# Patient Record
Sex: MALE | Race: WHITE | Employment: OTHER | URBAN - METROPOLITAN AREA
[De-identification: names, ages, dates, MRNs, and addresses within clinical notes are randomized per-mention and may not be internally consistent; named-entity substitution may affect disease eponyms.]

---

## 2017-12-11 ENCOUNTER — HOSPITAL ENCOUNTER (OUTPATIENT)
Age: 72
Discharge: HOME | End: 2017-12-11
Attending: FAMILY MEDICINE
Payer: MEDICARE

## 2017-12-11 VITALS
TEMPERATURE: 98 F | OXYGEN SATURATION: 98 % | SYSTOLIC BLOOD PRESSURE: 138 MMHG | HEART RATE: 75 BPM | DIASTOLIC BLOOD PRESSURE: 81 MMHG | RESPIRATION RATE: 19 BRPM

## 2017-12-11 DIAGNOSIS — L03.317 CELLULITIS OF BUTTOCK: ICD-10-CM

## 2017-12-11 DIAGNOSIS — L89.311 DECUBITUS ULCER OF RIGHT BUTTOCK, STAGE 1: Primary | ICD-10-CM

## 2017-12-11 PROCEDURE — 99202 OFFICE O/P NEW SF 15 MIN: CPT

## 2017-12-11 PROCEDURE — 99213 OFFICE O/P EST LOW 20 MIN: CPT

## 2017-12-11 RX ORDER — CEPHALEXIN 500 MG/1
500 CAPSULE ORAL 4 TIMES DAILY
Qty: 28 CAPSULE | Refills: 0 | Status: SHIPPED | OUTPATIENT
Start: 2017-12-11 | End: 2017-12-18

## 2020-01-22 ENCOUNTER — HOSPITAL ENCOUNTER (OUTPATIENT)
Age: 75
Discharge: HOME | End: 2020-01-22
Attending: FAMILY MEDICINE
Payer: MEDICARE

## 2020-01-22 VITALS
HEIGHT: 69 IN | DIASTOLIC BLOOD PRESSURE: 89 MMHG | SYSTOLIC BLOOD PRESSURE: 155 MMHG | WEIGHT: 180 LBS | BODY MASS INDEX: 26.66 KG/M2 | RESPIRATION RATE: 16 BRPM | TEMPERATURE: 98 F | HEART RATE: 79 BPM

## 2020-01-22 DIAGNOSIS — H61.23 BILATERAL IMPACTED CERUMEN: Primary | ICD-10-CM

## 2020-01-22 PROCEDURE — 69210 REMOVE IMPACTED EAR WAX UNI: CPT

## 2020-01-22 PROCEDURE — 99213 OFFICE O/P EST LOW 20 MIN: CPT

## 2020-01-22 PROCEDURE — 99212 OFFICE O/P EST SF 10 MIN: CPT

## 2020-01-22 NOTE — ED PROVIDER NOTES
Patient Seen in: [facility]      History   Patient presents with:  Cough/URI  Ear Problem Pain    Stated Complaint: ear problem 2 wks    HPI    82-year-old male presents the IC secondary to cerumen impaction.   Patient's had hist Left ear canal with a small piece of hard cerumen. TM is clear. Attempted to remove the cerumen with a curette and partially removed, but patient with some discomfort. Right canal with cerumen impaction. Nose: Bilateral nares are clear. Mouth: MMM.  P

## 2020-03-02 PROBLEM — G35 MS (MULTIPLE SCLEROSIS) (HCC): Status: ACTIVE | Noted: 2020-03-02

## 2020-03-02 PROBLEM — M1A.09X0 IDIOPATHIC CHRONIC GOUT OF MULTIPLE SITES WITHOUT TOPHUS: Status: ACTIVE | Noted: 2020-03-02

## 2020-03-03 PROBLEM — G35 MULTIPLE SCLEROSIS (HCC): Status: ACTIVE | Noted: 2019-08-13

## 2020-03-03 PROBLEM — M1A.09X0 IDIOPATHIC CHRONIC GOUT OF MULTIPLE SITES WITHOUT TOPHUS: Status: RESOLVED | Noted: 2020-03-02 | Resolved: 2020-03-03

## 2020-08-25 ENCOUNTER — OFFICE VISIT (OUTPATIENT)
Dept: WOUND CARE | Facility: HOSPITAL | Age: 75
End: 2020-08-25
Attending: FAMILY MEDICINE
Payer: MEDICARE

## 2020-08-25 DIAGNOSIS — L89.312 PRESSURE ULCER OF RIGHT BUTTOCK, STAGE 2 (HCC): Primary | ICD-10-CM

## 2020-08-25 DIAGNOSIS — L89.322 PRESSURE INJURY OF LEFT BUTTOCK, STAGE 2 (HCC): ICD-10-CM

## 2020-08-25 PROCEDURE — 99214 OFFICE O/P EST MOD 30 MIN: CPT

## 2020-08-25 NOTE — PROGRESS NOTES
Print   x Close    Header Image    Progress Note Details  Patient Name: Tawanda Griffith  Patient Number: [id number]  Patient YOB: 1945  Date: 8/25/2020  Physician / Preeti Membreno: Robyn Hylton: Gabe 46    Chief Complaint  Shantel Fernandez (ROS)  This information was obtained from the patient    Complaints and Symptoms  Patient denies complaints or symptoms related to:  Constitutional Symptoms (General Health)  Eyes  Ear/Nose/Mouth/Throat  Respiratory  Cardiovascular (Central/Peripheral)  Ga area of 2.88 sq cm and a volume of 0.288 cubic cm. No tunneling has been noted. No sinus tract has been noted. No undermining has been noted. There is a moderate amount of sero-sanguineous drainage noted which has no odor.  The patient reports a wound pain intact. Alert and oriented times 3. Short and long term memory intact. No evidence of depression, anxiety, or agitation. Calm, cooperative, and communicative. Appropriate interactions and affect.         Assessment    Active Problems    ICD-10  (Encounter D

## 2020-09-01 ENCOUNTER — OFFICE VISIT (OUTPATIENT)
Dept: WOUND CARE | Facility: HOSPITAL | Age: 75
End: 2020-09-01
Attending: FAMILY MEDICINE
Payer: MEDICARE

## 2020-09-01 DIAGNOSIS — L89.312 PRESSURE ULCER OF RIGHT BUTTOCK, STAGE 2 (HCC): Primary | ICD-10-CM

## 2020-09-01 DIAGNOSIS — L89.322 PRESSURE INJURY OF LEFT BUTTOCK, STAGE 2 (HCC): ICD-10-CM

## 2020-09-01 PROCEDURE — 99213 OFFICE O/P EST LOW 20 MIN: CPT

## 2020-09-01 NOTE — PROGRESS NOTES
Print   x Close    Header Image    Progress Note Details  Patient Name: Nay Edmondson  Patient Number: [id number]  Patient YOB: 1945  Date: 9/1/2020  Physician / Mraj Salvage: Letty Likes: Yuni Chamorro    Chief Complaint  This measurements are 1.3cm length x 2cm width x 0.1cm depth, with an area of 2.6 sq cm and a volume of 0.26 cubic cm. No tunneling has been noted. No sinus tract has been noted. No undermining has been noted.  There is a scant amount of sero-sanguineous drainag hyperresonance . Palpation of chest without tactile fremitus. Bilateral breath sounds are clear and equal w/ no wheezes, rales or rhonchi. Cardiovascular:  Palpation of heart without thrills. Heart rhythm and rate regular, without murmur or gallop.  No c

## 2020-09-08 ENCOUNTER — OFFICE VISIT (OUTPATIENT)
Dept: WOUND CARE | Facility: HOSPITAL | Age: 75
End: 2020-09-08
Attending: FAMILY MEDICINE
Payer: MEDICARE

## 2020-09-08 DIAGNOSIS — L89.312 PRESSURE ULCER OF RIGHT BUTTOCK, STAGE 2 (HCC): Primary | ICD-10-CM

## 2020-09-08 DIAGNOSIS — L89.322 PRESSURE INJURY OF LEFT BUTTOCK, STAGE 2 (HCC): ICD-10-CM

## 2020-09-08 PROCEDURE — 99213 OFFICE O/P EST LOW 20 MIN: CPT

## 2020-09-08 NOTE — PROGRESS NOTES
Print   x Close    Header Image    Progress Note Details  Patient Name: Maral Fish  Patient Number: [id number]  Patient YOB: 1945  Date: 9/8/2020  Physician / Merle Grad: Tilden Hamman: Gabe 46    Chief Complaint  This loss > 5% / month?: No  Does the patient recieve enteral feedings?: No    Additional Information  Medication reconciliation completed at today's visit. : Yes        Objective    Wound Assessment(s)  Wound #1 Right Buttock is a chronic Stage 2 Pressure Inju Weight normal for height. . Appearance neat and clean. Appears in no acute distress. Well nourished and well developed. Respiratory:  Respiratory effort is easy and symmetric bilaterally. Rate is normal at rest and on room air .  Chest Percussion clear wi follow-up appointment should be scheduled.             Entered By: Sita Pulliam on 09/08/2020 16:46:54  Signature(s): Date(s):

## 2020-09-15 ENCOUNTER — OFFICE VISIT (OUTPATIENT)
Dept: WOUND CARE | Facility: HOSPITAL | Age: 75
End: 2020-09-15
Attending: FAMILY MEDICINE
Payer: MEDICARE

## 2020-09-15 DIAGNOSIS — L89.312 PRESSURE ULCER OF RIGHT BUTTOCK, STAGE 2 (HCC): Primary | ICD-10-CM

## 2020-09-15 DIAGNOSIS — L89.322 PRESSURE INJURY OF LEFT BUTTOCK, STAGE 2 (HCC): ICD-10-CM

## 2020-09-15 PROCEDURE — 99213 OFFICE O/P EST LOW 20 MIN: CPT

## 2020-09-15 NOTE — PROGRESS NOTES
Print   x Close    Header Image    CurrentOld Version  Modified Version:  Progress Note Details  Patient Name: Will Barkley  Patient Number: [id number]  Patient YOB: 1945  Date: 9/15/2020  Physician / Long Pitts: Dayna Ross: Chanda Estes 5% / month?: No  Does the patient recieve enteral feedings?: No    Additional Information  Medication reconciliation completed at today's visit. : Yes        Objective    Wound Assessment(s)  Wound #1 Right Buttock is a chronic Stage 2 Pressure Injury Pres Extremities free of varicosities, clubbing or edema. Capillary refill < 3 seconds. Digits are warm. toenails are wnl for color, thickness and hygeine. + hairgrowth on legs and feet. .    Gastrointestinal (GI):  No inguinal or umbilical hernias.  Abdomen is s

## 2020-09-22 ENCOUNTER — OFFICE VISIT (OUTPATIENT)
Dept: WOUND CARE | Facility: HOSPITAL | Age: 75
End: 2020-09-22
Attending: FAMILY MEDICINE
Payer: MEDICARE

## 2020-09-22 DIAGNOSIS — L89.312 PRESSURE ULCER OF RIGHT BUTTOCK, STAGE 2 (HCC): Primary | ICD-10-CM

## 2020-09-22 DIAGNOSIS — L89.322 PRESSURE INJURY OF LEFT BUTTOCK, STAGE 2 (HCC): ICD-10-CM

## 2020-09-22 PROCEDURE — 99213 OFFICE O/P EST LOW 20 MIN: CPT

## 2020-09-22 NOTE — PROGRESS NOTES
Print   x Close    Header Image    Progress Note Details  Patient Name: Mary Darden  Patient Number: [id number]  Patient YOB: 1945  Date: 9/22/2020  Physician / Deann Deepwater: Lalitha Alvarez: Gabe 46    Chief Complaint  Abhi Louie daily if not on a fluid restriction? : Yes  Patient on prescription diet?: Low Sodium  Unintentional weight gain or loss > 5% / month?: No  Does the patient recieve enteral feedings?: No    Additional Information  Medication reconciliation completed at d and rate regular, without murmur or gallop. No carotids bruits. No abdominal aorta bruits. Femoral arteries without bruits and pulses strong. dp/pt palpable bilaterally. Extremities free of varicosities, clubbing or edema. Capillary refill < 3 seconds.  Dig

## 2020-09-29 ENCOUNTER — OFFICE VISIT (OUTPATIENT)
Dept: WOUND CARE | Facility: HOSPITAL | Age: 75
End: 2020-09-29
Attending: FAMILY MEDICINE
Payer: MEDICARE

## 2020-09-29 DIAGNOSIS — L89.312 PRESSURE ULCER OF RIGHT BUTTOCK, STAGE 2 (HCC): Primary | ICD-10-CM

## 2020-09-29 DIAGNOSIS — L89.322 PRESSURE INJURY OF LEFT BUTTOCK, STAGE 2 (HCC): ICD-10-CM

## 2020-09-29 PROCEDURE — 99213 OFFICE O/P EST LOW 20 MIN: CPT

## 2020-09-29 NOTE — PROGRESS NOTES
Print   x Close    Header Image    CurrentOld Version  Modified Version:  Progress Note Details  Patient Name: Nany Colon  Patient Number: [id number]  Patient YOB: 1945  Date: 9/29/2020  Physician / Nate Linda: Ayala Sanford: Roxana Hart protein daily?: Yes  5 servings fruit/veg daily? : Yes  8 – 10 cups water daily if not on a fluid restriction? : Yes  Patient on prescription diet?: Low Sodium  Unintentional weight gain or loss > 5% / month?: No  Does the patient recieve enteral feedings? no wheezes, rales or rhonchi. Cardiovascular:  Palpation of heart without thrills. Heart rhythm and rate regular, without murmur or gallop. No carotids bruits. No abdominal aorta bruits. Femoral arteries without bruits and pulses strong.  dp/pt palpable

## 2020-10-13 ENCOUNTER — OFFICE VISIT (OUTPATIENT)
Dept: WOUND CARE | Facility: HOSPITAL | Age: 75
End: 2020-10-13
Attending: FAMILY MEDICINE
Payer: MEDICARE

## 2020-10-13 DIAGNOSIS — L89.322 PRESSURE INJURY OF LEFT BUTTOCK, STAGE 2 (HCC): ICD-10-CM

## 2020-10-13 DIAGNOSIS — L89.312 PRESSURE ULCER OF RIGHT BUTTOCK, STAGE 2 (HCC): Primary | ICD-10-CM

## 2020-10-13 PROCEDURE — 99213 OFFICE O/P EST LOW 20 MIN: CPT

## 2020-10-19 ENCOUNTER — APPOINTMENT (OUTPATIENT)
Dept: WOUND CARE | Facility: HOSPITAL | Age: 75
End: 2020-10-19
Attending: FAMILY MEDICINE
Payer: MEDICARE

## 2020-10-23 ENCOUNTER — OFFICE VISIT (OUTPATIENT)
Dept: WOUND CARE | Facility: HOSPITAL | Age: 75
End: 2020-10-23
Attending: FAMILY MEDICINE
Payer: MEDICARE

## 2020-10-23 DIAGNOSIS — L89.313 PRESSURE ULCER OF RIGHT BUTTOCK, STAGE 3 (HCC): ICD-10-CM

## 2020-10-23 DIAGNOSIS — R26.89 OTHER ABNORMALITIES OF GAIT AND MOBILITY: ICD-10-CM

## 2020-10-23 DIAGNOSIS — T14.8XXA INFECTED WOUND: Primary | ICD-10-CM

## 2020-10-23 DIAGNOSIS — L89.322 PRESSURE INJURY OF LEFT BUTTOCK, STAGE 2 (HCC): ICD-10-CM

## 2020-10-23 DIAGNOSIS — L08.9 INFECTED WOUND: Primary | ICD-10-CM

## 2020-10-23 DIAGNOSIS — G35 MULTIPLE SCLEROSIS (HCC): ICD-10-CM

## 2020-10-23 PROCEDURE — 87186 SC STD MICRODIL/AGAR DIL: CPT

## 2020-10-23 PROCEDURE — 87205 SMEAR GRAM STAIN: CPT

## 2020-10-23 PROCEDURE — 87077 CULTURE AEROBIC IDENTIFY: CPT

## 2020-10-23 PROCEDURE — 87070 CULTURE OTHR SPECIMN AEROBIC: CPT

## 2020-10-23 PROCEDURE — 99213 OFFICE O/P EST LOW 20 MIN: CPT

## 2020-10-23 RX ORDER — CLOTRIMAZOLE AND BETAMETHASONE DIPROPIONATE 10; .64 MG/G; MG/G
1 CREAM TOPICAL 2 TIMES DAILY
Qty: 45 G | Refills: 0 | Status: SHIPPED | OUTPATIENT
Start: 2020-10-23 | End: 2020-11-22

## 2020-10-23 NOTE — PROGRESS NOTES
Chief Complaint  This information was obtained from the patient  Patient is here for a follow of right buttocks wound. Patient denies any pain at this time. Wife believes its getting bigger not smaller.     Allergies  NKA  General Notes:  no allergies not Assessment(s)  Wound #1 Right Buttock is a chronic Stage 2 Pressure Injury Pressure Ulcer and has received a status of Bridged.  Subsequent wound encounter measurements are 1.4cm length x 2.1cm width x 0.1cm depth, with an area of 2.94 sq cm and a volume of patient verbally acknowledges understanding of all instructions and all questions were answered.  - wound culture            Entered By: Narayan Thornton on 10/23/2020 16:20:05  Signature(s): Date(s):

## 2020-10-27 RX ORDER — GENTAMICIN SULFATE 1 MG/G
1 OINTMENT TOPICAL 3 TIMES DAILY
Qty: 30 G | Refills: 3 | Status: SHIPPED | OUTPATIENT
Start: 2020-10-27 | End: 2020-12-02 | Stop reason: ALTCHOICE

## 2020-10-30 ENCOUNTER — OFFICE VISIT (OUTPATIENT)
Dept: WOUND CARE | Facility: HOSPITAL | Age: 75
End: 2020-10-30
Attending: FAMILY MEDICINE
Payer: MEDICARE

## 2020-10-30 DIAGNOSIS — A49.01 METHICILLIN SUSCEPTIBLE STAPHYLOCOCCUS AUREUS INFECTION, UNSPECIFIED SITE: ICD-10-CM

## 2020-10-30 DIAGNOSIS — L89.312 PRESSURE ULCER OF RIGHT BUTTOCK, STAGE 2 (HCC): Primary | ICD-10-CM

## 2020-10-30 DIAGNOSIS — B96.4 PROTEUS (MIRABILIS) (MORGANII) AS THE CAUSE OF DISEASES CLASSIFIED ELSEWHERE: ICD-10-CM

## 2020-10-30 DIAGNOSIS — G35 MULTIPLE SCLEROSIS (HCC): ICD-10-CM

## 2020-10-30 DIAGNOSIS — L89.322 PRESSURE INJURY OF LEFT BUTTOCK, STAGE 2 (HCC): ICD-10-CM

## 2020-10-30 PROCEDURE — 99214 OFFICE O/P EST MOD 30 MIN: CPT

## 2020-11-13 ENCOUNTER — OFFICE VISIT (OUTPATIENT)
Dept: WOUND CARE | Facility: HOSPITAL | Age: 75
End: 2020-11-13
Attending: INTERNAL MEDICINE
Payer: MEDICARE

## 2020-11-13 DIAGNOSIS — L89.322 PRESSURE ULCER OF LEFT BUTTOCK, STAGE 2 (HCC): ICD-10-CM

## 2020-11-13 DIAGNOSIS — L89.312 PRESSURE ULCER OF RIGHT BUTTOCK, STAGE 2 (HCC): Primary | ICD-10-CM

## 2020-11-13 PROCEDURE — 99213 OFFICE O/P EST LOW 20 MIN: CPT

## 2020-11-13 NOTE — PROGRESS NOTES
Chief Complaint  This information was obtained from the patient  Patient is here for a follow of right buttocks wound. Pt denies any new problems or pain.     Allergies  NKA  General Notes:  no allergies noted    HPI  This information was obtained from th ()  Endocrine  Hematologic/Lymphatic  Allergic/Immunologic  Psychiatric    General Notes:  neg except HPI    Additional Information  Medication reconciliation completed at today's visit. : Yes        Objective    Wound Assessment(s)  Wound #1 Right Butto PRN    Off-Loading:  EHOB cushion  Turn Q 2 hrs - change positions every 2 hours  Other: - pressure relieving mattress or inlay is recommended    Follow-Up Appointments:  Return Appointment in 2 weeks.     Misc/Additional Orders:  Supplement with a daily mu

## 2020-11-25 ENCOUNTER — OFFICE VISIT (OUTPATIENT)
Dept: WOUND CARE | Facility: HOSPITAL | Age: 75
End: 2020-11-25
Attending: INTERNAL MEDICINE
Payer: MEDICARE

## 2020-11-25 DIAGNOSIS — B96.4 PROTEUS (MIRABILIS) (MORGANII) AS THE CAUSE OF DISEASES CLASSIFIED ELSEWHERE: ICD-10-CM

## 2020-11-25 DIAGNOSIS — L89.312 PRESSURE ULCER OF RIGHT BUTTOCK, STAGE 2 (HCC): Primary | ICD-10-CM

## 2020-11-25 DIAGNOSIS — G35 MULTIPLE SCLEROSIS (HCC): ICD-10-CM

## 2020-11-25 DIAGNOSIS — A49.01 METHICILLIN SUSCEPTIBLE STAPHYLOCOCCUS AUREUS INFECTION, UNSPECIFIED SITE: ICD-10-CM

## 2020-11-25 DIAGNOSIS — L89.322 PRESSURE ULCER OF LEFT BUTTOCK, STAGE 2 (HCC): ICD-10-CM

## 2020-11-25 PROCEDURE — 99214 OFFICE O/P EST MOD 30 MIN: CPT

## 2020-11-25 NOTE — PROGRESS NOTES
Chief Complaint  This information was obtained from the patient  Patient is here for a follow of right buttocks wound.  Wife is in the [facility] and has concerns that the treatment plan we are doing is not working, the endoform worked better than the Josselyn patricia of Multiple Sclerosis , is not mobile, and prone to pressure ulcers. They have used medihoney, bacitracin, but wounds seem to have gotten worse. Denies fever or chills denies much pain.     Review of Systems (ROS)  This information was obtained from the pat tunneling has been noted. No sinus tract has been noted. No undermining has been noted. There is a moderate amount of serous drainage noted which has no odor. The patient reports a wound pain of level 3/10. The wound margin is well defined.  Wound bed has 7 patient verbally acknowledges understanding of all instructions and all questions were answered. Wound #2 Left Buttock    Wound Cleansing & Dressings:  May shower and/or cleanse wound with mild soap and water.   Barrier ointment/paste/cream - COLOPLAST-

## 2020-12-02 ENCOUNTER — OFFICE VISIT (OUTPATIENT)
Dept: WOUND CARE | Facility: HOSPITAL | Age: 75
End: 2020-12-02
Attending: INTERNAL MEDICINE
Payer: MEDICARE

## 2020-12-02 DIAGNOSIS — G35 MULTIPLE SCLEROSIS (HCC): ICD-10-CM

## 2020-12-02 DIAGNOSIS — L08.9 INFECTED WOUND: Primary | ICD-10-CM

## 2020-12-02 DIAGNOSIS — A49.01 METHICILLIN SUSCEPTIBLE STAPHYLOCOCCUS AUREUS INFECTION, UNSPECIFIED SITE: ICD-10-CM

## 2020-12-02 DIAGNOSIS — L89.322 PRESSURE ULCER OF LEFT BUTTOCK, STAGE 2 (HCC): ICD-10-CM

## 2020-12-02 DIAGNOSIS — B96.4 PROTEUS (MIRABILIS) (MORGANII) AS THE CAUSE OF DISEASES CLASSIFIED ELSEWHERE: ICD-10-CM

## 2020-12-02 DIAGNOSIS — L89.312 PRESSURE ULCER OF RIGHT BUTTOCK, STAGE 2 (HCC): ICD-10-CM

## 2020-12-02 DIAGNOSIS — T14.8XXA INFECTED WOUND: Primary | ICD-10-CM

## 2020-12-02 PROCEDURE — 99214 OFFICE O/P EST MOD 30 MIN: CPT

## 2020-12-02 PROCEDURE — 87077 CULTURE AEROBIC IDENTIFY: CPT

## 2020-12-02 PROCEDURE — 87205 SMEAR GRAM STAIN: CPT

## 2020-12-02 PROCEDURE — 87147 CULTURE TYPE IMMUNOLOGIC: CPT

## 2020-12-02 PROCEDURE — 87186 SC STD MICRODIL/AGAR DIL: CPT

## 2020-12-02 PROCEDURE — 87070 CULTURE OTHR SPECIMN AEROBIC: CPT

## 2020-12-02 RX ORDER — CLOTRIMAZOLE AND BETAMETHASONE DIPROPIONATE 10; .64 MG/G; MG/G
1 CREAM TOPICAL DAILY
Qty: 45 G | Refills: 1 | Status: SHIPPED | OUTPATIENT
Start: 2020-12-02 | End: 2021-01-01

## 2020-12-02 NOTE — PROGRESS NOTES
Chief Complaint  This information was obtained from the patient  Patient is here for a wound care follow up. He denies any pain to the wound.     Allergies  NKA  General Notes:  no allergies noted    HPI  This information was obtained from the patient to have gotten worse. Denies fever or chills denies much pain.     Review of Systems (ROS)  This information was obtained from the patient    Complaints and Symptoms  Patient denies complaints or symptoms related to:  Constitutional Symptoms (General Health wound pain of level 0/10. The wound margin is well defined. Wound bed has 26-50% epithelialization, 26-50% bright red, pink, firm granulation. The periwound skin exhibited: Dry/Scaly, Ecchymosis, Erythema.  The periwound skin did not exhibit: Induration, C and/or cleanse wound with mild soap and water. Antifungal cream/powder  Barrier ointment/paste/cream    Follow-Up Appointments:  Return Appointment in 1 week. Wound culture done today.        Orders Placed This Encounter      clotrimazole-betamethas 0

## 2020-12-09 ENCOUNTER — OFFICE VISIT (OUTPATIENT)
Dept: WOUND CARE | Facility: HOSPITAL | Age: 75
End: 2020-12-09
Attending: INTERNAL MEDICINE
Payer: MEDICARE

## 2020-12-09 DIAGNOSIS — A49.01 METHICILLIN SUSCEPTIBLE STAPHYLOCOCCUS AUREUS INFECTION, UNSPECIFIED SITE: ICD-10-CM

## 2020-12-09 DIAGNOSIS — G35 MULTIPLE SCLEROSIS (HCC): ICD-10-CM

## 2020-12-09 DIAGNOSIS — L89.322 PRESSURE ULCER OF LEFT BUTTOCK, STAGE 2 (HCC): ICD-10-CM

## 2020-12-09 DIAGNOSIS — B96.4 PROTEUS (MIRABILIS) (MORGANII) AS THE CAUSE OF DISEASES CLASSIFIED ELSEWHERE: ICD-10-CM

## 2020-12-09 DIAGNOSIS — L89.312 PRESSURE ULCER OF RIGHT BUTTOCK, STAGE 2 (HCC): Primary | ICD-10-CM

## 2020-12-09 PROCEDURE — 99213 OFFICE O/P EST LOW 20 MIN: CPT

## 2020-12-09 NOTE — PROGRESS NOTES
Chief Complaint  This information was obtained from the patient  Patient is here for a wound care follow up. He denies any pain to the wound.     Allergies  NKA  General Notes:  no allergies noted    HPI  This information was obtained from the patient is not mobile, and prone to pressure ulcers. They have used medihoney, bacitracin, but wounds seem to have gotten worse. Denies fever or chills denies much pain.     Review of Systems (ROS)  This information was obtained from the patient    Complaints and S sinus tract has been noted. No undermining has been noted. There is a small amount of sero-sanguineous drainage noted which has no odor. The patient reports a wound pain of level 0/10. The wound margin is well defined.  Wound bed has % pink, firm gran Ointment/Cream.  ABD pad  Change Dressing BID    Follow-Up Appointments:  Return Appointment in 1 week.             Entered By: Ada Dior on 12/09/2020 15:03:28  Signature(s): Date(s):

## 2020-12-16 ENCOUNTER — OFFICE VISIT (OUTPATIENT)
Dept: WOUND CARE | Facility: HOSPITAL | Age: 75
End: 2020-12-16
Attending: INTERNAL MEDICINE
Payer: MEDICARE

## 2020-12-16 DIAGNOSIS — L89.322 PRESSURE ULCER OF LEFT BUTTOCK, STAGE 2 (HCC): ICD-10-CM

## 2020-12-16 DIAGNOSIS — L89.312 PRESSURE ULCER OF RIGHT BUTTOCK, STAGE 2 (HCC): Primary | ICD-10-CM

## 2020-12-16 DIAGNOSIS — G35 MULTIPLE SCLEROSIS (HCC): ICD-10-CM

## 2020-12-16 DIAGNOSIS — A49.01 METHICILLIN SUSCEPTIBLE STAPHYLOCOCCUS AUREUS INFECTION, UNSPECIFIED SITE: ICD-10-CM

## 2020-12-16 DIAGNOSIS — B96.4 PROTEUS (MIRABILIS) (MORGANII) AS THE CAUSE OF DISEASES CLASSIFIED ELSEWHERE: ICD-10-CM

## 2020-12-16 PROCEDURE — 97597 DBRDMT OPN WND 1ST 20 CM/<: CPT

## 2020-12-16 NOTE — PROGRESS NOTES
Chief Complaint  This information was obtained from the patient  Patient is here for a wound care follow up. He denies any pain to the wound.     Allergies  NKA  General Notes:  no allergies noted    HPI  This information was obtained from the patient healed. 8/25/20: New patient here for evaluation of buttock wounds on both sides since around 7/27/20 or shortly before. Pt has a h/o of Multiple Sclerosis , is not mobile, and prone to pressure ulcers.  They have used medihoney, bacitracin, but wounds s width x 0.1cm depth, with an area of 0.16 sq cm and a volume of 0.016 cubic cm. No tunneling has been noted. No sinus tract has been noted. No undermining has been noted. There is a small amount of sero-sanguineous drainage noted which has no odor.  The pat debridement Stage noted as Stage 2 Pressure Injury. Plan    Wound Orders:  Wound #1 Right Buttock    Wound Cleansing & Dressings:  May shower and/or cleanse wound with mild soap and water.   Barrier ointment/paste/cream - periwound  Endoform Collagen

## 2020-12-30 ENCOUNTER — OFFICE VISIT (OUTPATIENT)
Dept: WOUND CARE | Facility: HOSPITAL | Age: 75
End: 2020-12-30
Attending: INTERNAL MEDICINE
Payer: MEDICARE

## 2020-12-30 DIAGNOSIS — B96.4 PROTEUS (MIRABILIS) (MORGANII) AS THE CAUSE OF DISEASES CLASSIFIED ELSEWHERE: ICD-10-CM

## 2020-12-30 DIAGNOSIS — A49.01 METHICILLIN SUSCEPTIBLE STAPHYLOCOCCUS AUREUS INFECTION, UNSPECIFIED SITE: ICD-10-CM

## 2020-12-30 DIAGNOSIS — G35 MULTIPLE SCLEROSIS (HCC): ICD-10-CM

## 2020-12-30 DIAGNOSIS — L89.322 PRESSURE ULCER OF LEFT BUTTOCK, STAGE 2 (HCC): ICD-10-CM

## 2020-12-30 DIAGNOSIS — L89.312 PRESSURE ULCER OF RIGHT BUTTOCK, STAGE 2 (HCC): Primary | ICD-10-CM

## 2020-12-30 PROCEDURE — 97597 DBRDMT OPN WND 1ST 20 CM/<: CPT

## 2020-12-30 NOTE — PROGRESS NOTES
Chief Complaint  This information was obtained from the patient  Patient is here for a wound care follow up. He denies any pain to the wound at this time but complains of tenderness when touched.     Allergies  NKA  General Notes:  no allergies noted    H buttock wounds, left buttock wound may be healed, right is improved. no new complaints. Did receive contact from [facility], supplier of low air loss inlay and pressure relief cushion.  9/1/20: f/u of buttock wounds, using coloplast, they appear to be much improv Moist, Maceration, Erythema. The temperature of the periwound skin is WNL. Periwound skin does not exhibit signs or symptoms of infection. Wound #2 Left Buttock is a chronic Stage 2 Pressure Injury Pressure Ulcer and has received a status of Not Healed. the start of the procedure. A minimal amount of bleeding was controlled with pressure. The procedure was tolerated well with a pain level of 0 throughout and a pain level of 0 following the procedure.  Post Debridement Measurements: 3cm length x 3.6cm width

## 2021-01-04 NOTE — PROGRESS NOTES
Chief Complaint  This information was obtained from the patient  Patient is here for a wound care follow up. He denies any pain to the wound at this time but complains of tenderness when touched.     Allergies  NKA  General Notes:  no allergies noted    H buttock wounds, left buttock wound may be healed, right is improved. no new complaints. 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  9/1/20: f/u of buttock wounds, using coloplast, they appear to be much improv Moist, Maceration, Erythema. The temperature of the periwound skin is WNL. Periwound skin does not exhibit signs or symptoms of infection. Wound #2 Left Buttock is a chronic Stage 2 Pressure Injury Pressure Ulcer and has received a status of Not Healed. the start of the procedure. A minimal amount of bleeding was controlled with pressure. The procedure was tolerated well with a pain level of 0 throughout and a pain level of 0 following the procedure.  Post Debridement Measurements: 3cm length x 3.6cm width

## 2021-01-08 ENCOUNTER — OFFICE VISIT (OUTPATIENT)
Dept: WOUND CARE | Facility: HOSPITAL | Age: 76
End: 2021-01-08
Attending: INTERNAL MEDICINE
Payer: MEDICARE

## 2021-01-08 DIAGNOSIS — L89.322 PRESSURE ULCER OF LEFT BUTTOCK, STAGE 2 (HCC): ICD-10-CM

## 2021-01-08 DIAGNOSIS — L89.312 PRESSURE ULCER OF RIGHT BUTTOCK, STAGE 2 (HCC): Primary | ICD-10-CM

## 2021-01-08 PROCEDURE — 99213 OFFICE O/P EST LOW 20 MIN: CPT

## 2021-01-08 NOTE — PROGRESS NOTES
Chief Complaint  This information was obtained from the patient  Patient is here for a wound care follow up. He denies any pain to the wound at this time but complains of tenderness when touched.     Allergies  NKA  General Notes:  no allergies noted    H buttock wounds, doing well. appears to have improved with collagen. 9/15/20: f/u of buttock wounds. doing well, wife is doing dressing changes. no complaints.  using coloplast.    9/8/20: f/u of buttock wounds, left buttock wound may be healed, right is 0/10. The wound margin is well defined. Wound bed has % pale grey, pink, firm granulation. The periwound skin exhibited: Dry/Scaly, Ecchymosis. The periwound skin did not exhibit: Induration, Moist, Maceration, Erythema.  The temperature of the periw COLOPLAST WOUND BASE  Change Dressing Daily and/or PRN    Off-Loading:  EHOB cushion  Turn Q 2 hrs - change positions every 2 hours  Other: - pressure relieving mattress or inlay is recommended    Misc/Additional Orders:  Supplement with a daily multivitam

## 2021-01-29 ENCOUNTER — OFFICE VISIT (OUTPATIENT)
Dept: WOUND CARE | Facility: HOSPITAL | Age: 76
End: 2021-01-29
Attending: INTERNAL MEDICINE
Payer: MEDICARE

## 2021-01-29 DIAGNOSIS — G35 MULTIPLE SCLEROSIS (HCC): ICD-10-CM

## 2021-01-29 DIAGNOSIS — L89.322 PRESSURE ULCER OF LEFT BUTTOCK, STAGE 2 (HCC): ICD-10-CM

## 2021-01-29 DIAGNOSIS — L89.312 PRESSURE ULCER OF RIGHT BUTTOCK, STAGE 2 (HCC): Primary | ICD-10-CM

## 2021-01-29 PROCEDURE — 99213 OFFICE O/P EST LOW 20 MIN: CPT

## 2021-01-29 NOTE — PROGRESS NOTES
Chief Complaint  This information was obtained from the patient  Patient is here for a wound care follow up.  Patients denies any issues    Allergies  NKA  General Notes:  no allergies noted    HPI  This information was obtained from the patient    1/29/2 well. appears to have improved with collagen. 9/15/20: f/u of buttock wounds. doing well, wife is doing dressing changes. no complaints. using coloplast.    9/8/20: f/u of buttock wounds, left buttock wound may be healed, right is improved.  no new compl pink, firm granulation. The periwound skin exhibited: Dry/Scaly, Ecchymosis. The periwound skin did not exhibit: Induration, Moist, Maceration, Erythema. The temperature of the periwound skin is WNL.  Periwound skin does not exhibit signs or symptoms of in Dressing Daily and/or PRN    Off-Loading:  EHOB cushion  Turn Q 2 hrs - change positions every 2 hours  Other: - pressure relieving mattress or inlay is recommended    Misc/Additional Orders:  Supplement with a daily multivitamin  Increase protein into die

## 2021-02-24 ENCOUNTER — OFFICE VISIT (OUTPATIENT)
Dept: WOUND CARE | Facility: HOSPITAL | Age: 76
End: 2021-02-24
Attending: INTERNAL MEDICINE
Payer: MEDICARE

## 2021-02-24 DIAGNOSIS — L89.312 PRESSURE ULCER OF RIGHT BUTTOCK, STAGE 2 (HCC): Primary | ICD-10-CM

## 2021-02-24 DIAGNOSIS — A49.01 METHICILLIN SUSCEPTIBLE STAPHYLOCOCCUS AUREUS INFECTION, UNSPECIFIED SITE: ICD-10-CM

## 2021-02-24 DIAGNOSIS — L89.322 PRESSURE ULCER OF LEFT BUTTOCK, STAGE 2 (HCC): ICD-10-CM

## 2021-02-24 DIAGNOSIS — B96.4 PROTEUS (MIRABILIS) (MORGANII) AS THE CAUSE OF DISEASES CLASSIFIED ELSEWHERE: ICD-10-CM

## 2021-02-24 DIAGNOSIS — G35 MULTIPLE SCLEROSIS (HCC): ICD-10-CM

## 2021-02-24 PROCEDURE — 99214 OFFICE O/P EST MOD 30 MIN: CPT

## 2021-02-24 NOTE — PROGRESS NOTES
Chief Complaint  This information was obtained from the patient  Patient is here for a wound care follow up. Patients denies any issues or pain.     Allergies  NKA  General Notes:  no allergies noted    HPI  This information was obtained from the patient time.    9/29/20: f/u of buttock wounds, doing well. no new complaints, improved. 9/22/20: f/u of buttock wounds, doing well. appears to have improved with collagen. 9/15/20: f/u of buttock wounds. doing well, wife is doing dressing changes.  no compl noted which has no odor. The patient reports a wound pain of level 0/10. The wound margin is well defined. Wound bed has % bright red, pink, firm granulation. The periwound skin exhibited: Dry/Scaly, Ecchymosis.  The periwound skin did not exhibit: I wound with mild soap and water.   Barrier ointment/paste/cream - COLOPLAST WOUND BASE  Change Dressing Daily and/or PRN    Off-Loading:  EHOB cushion  Turn Q 2 hrs - change positions every 2 hours  Other: - pressure relieving mattress or inlay is recommende

## 2021-03-24 ENCOUNTER — OFFICE VISIT (OUTPATIENT)
Dept: WOUND CARE | Facility: HOSPITAL | Age: 76
End: 2021-03-24
Attending: INTERNAL MEDICINE
Payer: MEDICARE

## 2021-03-24 DIAGNOSIS — G35 MULTIPLE SCLEROSIS (HCC): ICD-10-CM

## 2021-03-24 DIAGNOSIS — L89.312 PRESSURE ULCER OF RIGHT BUTTOCK, STAGE 2 (HCC): Primary | ICD-10-CM

## 2021-03-24 DIAGNOSIS — A49.01 METHICILLIN SUSCEPTIBLE STAPHYLOCOCCUS AUREUS INFECTION, UNSPECIFIED SITE: ICD-10-CM

## 2021-03-24 DIAGNOSIS — L89.322 PRESSURE ULCER OF LEFT BUTTOCK, STAGE 2 (HCC): ICD-10-CM

## 2021-03-24 DIAGNOSIS — B96.4 PROTEUS (MIRABILIS) (MORGANII) AS THE CAUSE OF DISEASES CLASSIFIED ELSEWHERE: ICD-10-CM

## 2021-03-24 PROCEDURE — 99213 OFFICE O/P EST LOW 20 MIN: CPT

## 2021-03-24 NOTE — PROGRESS NOTES
Chief Complaint  This information was obtained from the patient  Patient is here for a wound care follow up.  Patients denies any pain or issues    Allergies  NKA  General Notes:  no allergies noted    HPI  This information was obtained from the patient with maceration and breakdown. pt is not ambulatory and sits most of the time. 9/29/20: f/u of buttock wounds, doing well. no new complaints, improved. 9/22/20: f/u of buttock wounds, doing well. appears to have improved with collagen.     9/15/20: f has been noted. There is a small amount of sero-sanguineous drainage noted which has no odor. The patient reports a wound pain of level 0/10. The wound margin is well defined. Wound bed has % bright red, pink, firm granulation.   The periwound skin ex water.   Barrier ointment/paste/cream - COLOPLAST WOUND BASE  Change Dressing Daily and/or PRN    Off-Loading:  EHOB cushion  Turn Q 2 hrs - change positions every 2 hours  Other: - pressure relieving mattress or inlay is recommended    Misc/Additional Orde

## 2021-04-21 ENCOUNTER — OFFICE VISIT (OUTPATIENT)
Dept: WOUND CARE | Facility: HOSPITAL | Age: 76
End: 2021-04-21
Attending: INTERNAL MEDICINE
Payer: MEDICARE

## 2021-04-21 DIAGNOSIS — G35 MULTIPLE SCLEROSIS (HCC): ICD-10-CM

## 2021-04-21 DIAGNOSIS — A49.01 METHICILLIN SUSCEPTIBLE STAPHYLOCOCCUS AUREUS INFECTION, UNSPECIFIED SITE: ICD-10-CM

## 2021-04-21 DIAGNOSIS — L89.322 PRESSURE ULCER OF LEFT BUTTOCK, STAGE 2 (HCC): ICD-10-CM

## 2021-04-21 DIAGNOSIS — L89.312 PRESSURE ULCER OF RIGHT BUTTOCK, STAGE 2 (HCC): Primary | ICD-10-CM

## 2021-04-21 DIAGNOSIS — B96.4 PROTEUS (MIRABILIS) (MORGANII) AS THE CAUSE OF DISEASES CLASSIFIED ELSEWHERE: ICD-10-CM

## 2021-04-21 PROCEDURE — 99213 OFFICE O/P EST LOW 20 MIN: CPT

## 2021-04-21 NOTE — PROGRESS NOTES
Chief Complaint  This information was obtained from the patient  Patient is here for a wound care follow up.  Patients denies any pain or issues    Allergies  NKA  General Notes:  no allergies noted    HPI  This information was obtained from the patient proteus - ordered gentamycin. 10/23/20- Wound not improving - infat getting bigger. periwound significantly of a darker color with maceration and breakdown. pt is not ambulatory and sits most of the time. 9/29/20: f/u of buttock wounds, doing well. with an area of 0.02 sq cm and a volume of 0.002 cubic cm. No tunneling has been noted. No sinus tract has been noted. No undermining has been noted. There is a scant amount of sero-sanguineous drainage noted which has no odor.  The patient reports a wound answered. Additional Orders:     Follow-Up Appointments:  Return appointment in 4 weeks            Entered By: Rolando Carlton on 04/21/2021 14:43:51  Signature(s): Date(s):

## 2021-05-26 ENCOUNTER — OFFICE VISIT (OUTPATIENT)
Dept: WOUND CARE | Facility: HOSPITAL | Age: 76
End: 2021-05-26
Attending: INTERNAL MEDICINE
Payer: MEDICARE

## 2021-05-26 VITALS
RESPIRATION RATE: 14 BRPM | BODY MASS INDEX: 27.4 KG/M2 | SYSTOLIC BLOOD PRESSURE: 146 MMHG | HEIGHT: 69 IN | TEMPERATURE: 98 F | HEART RATE: 82 BPM | DIASTOLIC BLOOD PRESSURE: 78 MMHG | WEIGHT: 185 LBS

## 2021-05-26 DIAGNOSIS — L89.312 PRESSURE ULCER OF RIGHT BUTTOCK, STAGE 2 (HCC): Primary | ICD-10-CM

## 2021-05-26 DIAGNOSIS — G35 MS (MULTIPLE SCLEROSIS) (HCC): ICD-10-CM

## 2021-05-26 DIAGNOSIS — L89.322 PRESSURE ULCER OF LEFT BUTTOCK, STAGE 2 (HCC): ICD-10-CM

## 2021-05-26 PROCEDURE — 99213 OFFICE O/P EST LOW 20 MIN: CPT

## 2021-05-26 NOTE — PROGRESS NOTES
Weekly Wound Education Note    Teaching Provided To: Patient  Training Topics: Discharge instructions;Cleasing and general instructions;Dressing;Off-loading  Training Method: Explain/Verbal  Training Response: Patient responds and understands        Notes:

## 2021-05-26 NOTE — PROGRESS NOTES
[facility] NOTE  Michi Clemente MD  5/26/2021    Chief Complaint:   Patient presents with:  Wound Care: Patients is here for a follow up for right buttock.  Wife stated that been applying bond lotion to the wound       HPI:   Subjective Length (cm) 14 cm 05/26/21 1547   Wound Width (cm) 9 cm 05/26/21 1547   Wound Surface Area (cm^2) 126 cm^2 05/26/21 1547   Wound Depth (cm) 0 cm 05/26/21 1547   Wound Volume (cm^3) 0 cm^3 05/26/21 1547   Wound Bed Granulation (%) 50 % 05/26/21 1547   Wound moisture to stay in this area. Keep dry. Recommend avoid dressings if possible. Offload wounded area as much as possible. Return to clinic as needed only. Patient/Caregiver Education: There are no barriers to learning.  Medical education for above d

## 2021-10-04 RX ORDER — CHOLECALCIFEROL (VITAMIN D3) 125 MCG
2000 CAPSULE ORAL DAILY
COMMUNITY

## 2021-10-04 RX ORDER — MELATONIN
1000 DAILY
COMMUNITY

## 2021-10-08 ENCOUNTER — LAB ENCOUNTER (OUTPATIENT)
Dept: LAB | Age: 76
End: 2021-10-08
Attending: SURGERY
Payer: MEDICARE

## 2021-10-08 DIAGNOSIS — M79.89 SOFT TISSUE MASS: ICD-10-CM

## 2021-10-10 ENCOUNTER — ANESTHESIA EVENT (OUTPATIENT)
Dept: SURGERY | Facility: HOSPITAL | Age: 76
End: 2021-10-10
Payer: MEDICARE

## 2021-10-11 ENCOUNTER — HOSPITAL ENCOUNTER (OUTPATIENT)
Facility: HOSPITAL | Age: 76
Setting detail: HOSPITAL OUTPATIENT SURGERY
Discharge: HOME | End: 2021-10-11
Attending: SURGERY | Admitting: SURGERY
Payer: MEDICARE

## 2021-10-11 ENCOUNTER — ANESTHESIA (OUTPATIENT)
Dept: SURGERY | Facility: HOSPITAL | Age: 76
End: 2021-10-11
Payer: MEDICARE

## 2021-10-11 VITALS
WEIGHT: 160 LBS | DIASTOLIC BLOOD PRESSURE: 74 MMHG | HEIGHT: 69 IN | RESPIRATION RATE: 16 BRPM | TEMPERATURE: 97 F | OXYGEN SATURATION: 99 % | HEART RATE: 53 BPM | SYSTOLIC BLOOD PRESSURE: 158 MMHG | BODY MASS INDEX: 23.7 KG/M2

## 2021-10-11 DIAGNOSIS — M79.89 SOFT TISSUE MASS: Primary | ICD-10-CM

## 2021-10-11 PROCEDURE — 88304 TISSUE EXAM BY PATHOLOGIST: CPT | Performed by: SURGERY

## 2021-10-11 PROCEDURE — 0JBF0ZZ EXCISION OF LEFT UPPER ARM SUBCUTANEOUS TISSUE AND FASCIA, OPEN APPROACH: ICD-10-PCS | Performed by: SURGERY

## 2021-10-11 RX ORDER — HYDROCODONE BITARTRATE AND ACETAMINOPHEN 5; 325 MG/1; MG/1
1 TABLET ORAL AS NEEDED
Status: DISCONTINUED | OUTPATIENT
Start: 2021-10-11 | End: 2021-10-11

## 2021-10-11 RX ORDER — BUPIVACAINE HYDROCHLORIDE AND EPINEPHRINE 5; 5 MG/ML; UG/ML
INJECTION, SOLUTION EPIDURAL; INTRACAUDAL; PERINEURAL AS NEEDED
Status: DISCONTINUED | OUTPATIENT
Start: 2021-10-11 | End: 2021-10-11 | Stop reason: HOSPADM

## 2021-10-11 RX ORDER — HYDROCODONE BITARTRATE AND ACETAMINOPHEN 5; 325 MG/1; MG/1
2 TABLET ORAL AS NEEDED
Status: DISCONTINUED | OUTPATIENT
Start: 2021-10-11 | End: 2021-10-11

## 2021-10-11 RX ORDER — HYDROMORPHONE HYDROCHLORIDE 1 MG/ML
0.4 INJECTION, SOLUTION INTRAMUSCULAR; INTRAVENOUS; SUBCUTANEOUS EVERY 5 MIN PRN
Status: DISCONTINUED | OUTPATIENT
Start: 2021-10-11 | End: 2021-10-11

## 2021-10-11 RX ORDER — ACETAMINOPHEN 500 MG
1000 TABLET ORAL EVERY 6 HOURS PRN
COMMUNITY

## 2021-10-11 RX ORDER — NALOXONE HYDROCHLORIDE 0.4 MG/ML
80 INJECTION, SOLUTION INTRAMUSCULAR; INTRAVENOUS; SUBCUTANEOUS AS NEEDED
Status: DISCONTINUED | OUTPATIENT
Start: 2021-10-11 | End: 2021-10-11

## 2021-10-11 RX ORDER — HYDROCODONE BITARTRATE AND ACETAMINOPHEN 5; 325 MG/1; MG/1
1-2 TABLET ORAL EVERY 6 HOURS PRN
Qty: 30 TABLET | Refills: 0 | Status: SHIPPED | OUTPATIENT
Start: 2021-10-11

## 2021-10-11 RX ORDER — METOCLOPRAMIDE HYDROCHLORIDE 5 MG/ML
10 INJECTION INTRAMUSCULAR; INTRAVENOUS AS NEEDED
Status: DISCONTINUED | OUTPATIENT
Start: 2021-10-11 | End: 2021-10-11

## 2021-10-11 RX ORDER — LIDOCAINE HYDROCHLORIDE 10 MG/ML
INJECTION, SOLUTION INFILTRATION; PERINEURAL AS NEEDED
Status: DISCONTINUED | OUTPATIENT
Start: 2021-10-11 | End: 2021-10-11 | Stop reason: HOSPADM

## 2021-10-11 RX ORDER — ACETAMINOPHEN 500 MG
1000 TABLET ORAL ONCE
Status: DISCONTINUED | OUTPATIENT
Start: 2021-10-11 | End: 2021-10-11 | Stop reason: HOSPADM

## 2021-10-11 RX ORDER — SODIUM CHLORIDE, SODIUM LACTATE, POTASSIUM CHLORIDE, CALCIUM CHLORIDE 600; 310; 30; 20 MG/100ML; MG/100ML; MG/100ML; MG/100ML
INJECTION, SOLUTION INTRAVENOUS CONTINUOUS
Status: DISCONTINUED | OUTPATIENT
Start: 2021-10-11 | End: 2021-10-11

## 2021-10-11 RX ORDER — ONDANSETRON 2 MG/ML
INJECTION INTRAMUSCULAR; INTRAVENOUS AS NEEDED
Status: DISCONTINUED | OUTPATIENT
Start: 2021-10-11 | End: 2021-10-11 | Stop reason: SURG

## 2021-10-11 RX ORDER — LIDOCAINE HYDROCHLORIDE 10 MG/ML
INJECTION, SOLUTION EPIDURAL; INFILTRATION; INTRACAUDAL; PERINEURAL AS NEEDED
Status: DISCONTINUED | OUTPATIENT
Start: 2021-10-11 | End: 2021-10-11 | Stop reason: SURG

## 2021-10-11 RX ORDER — ONDANSETRON 2 MG/ML
4 INJECTION INTRAMUSCULAR; INTRAVENOUS AS NEEDED
Status: DISCONTINUED | OUTPATIENT
Start: 2021-10-11 | End: 2021-10-11

## 2021-10-11 RX ORDER — CEFAZOLIN SODIUM/WATER 2 G/20 ML
2 SYRINGE (ML) INTRAVENOUS ONCE
Status: COMPLETED | OUTPATIENT
Start: 2021-10-11 | End: 2021-10-11

## 2021-10-11 RX ADMIN — ONDANSETRON 4 MG: 2 INJECTION INTRAMUSCULAR; INTRAVENOUS at 13:16:00

## 2021-10-11 RX ADMIN — CEFAZOLIN SODIUM/WATER 2 G: 2 G/20 ML SYRINGE (ML) INTRAVENOUS at 13:10:00

## 2021-10-11 RX ADMIN — SODIUM CHLORIDE, SODIUM LACTATE, POTASSIUM CHLORIDE, CALCIUM CHLORIDE: 600; 310; 30; 20 INJECTION, SOLUTION INTRAVENOUS at 13:29:00

## 2021-10-11 RX ADMIN — LIDOCAINE HYDROCHLORIDE 50 MG: 10 INJECTION, SOLUTION EPIDURAL; INFILTRATION; INTRACAUDAL; PERINEURAL at 13:08:00

## 2021-10-11 NOTE — ANESTHESIA POSTPROCEDURE EVALUATION
[id number] [facility]  Patient Status:  Hospital Outpatient Surgery   Age/Gender 78year old adult MRN [id number]   Location [address] [place] [address] Attending Temitope Frias MD   [facility] Day # 0 VIKASH Isbell,        Anesthesia Po

## 2021-10-11 NOTE — H&P
No chief complaint on file. HPI:    Nohemi Kiran is a 78year old male who presents for evaluation of soft tissue mass left upper arm. Patient states he has had this for many years. He will occasionally squeeze this mass and express purulence. lymphadenopathy  EXTREMITIES: 2.5 cm soft tissue mass left upper arm with some surrounding scar. I expect this to be a epidermal inclusion cyst                    IMPRESSION/PLAN:    1. Soft tissue mass left upper arm: Excision under sedation.   The risk,

## 2021-10-11 NOTE — OPERATIVE REPORT
[facility]    PATIENT'S NAME: Destiny Tevin   ATTENDING PHYSICIAN: Priyanka Javier M.D. OPERATING PHYSICIAN: Priyanka Javier M.D.    PATIENT ACCOUNT#:   [de-identified]    LOCATION:  PREOPASCC  PRE ASCC 9 EDWP 10  MEDICAL RECORD #:   [id number]       DATE OF BI

## 2021-10-11 NOTE — BRIEF OP NOTE
Pre-Operative Diagnosis: Soft tissue mass [M79.89]     Post-Operative Diagnosis: Soft tissue mass [M79.89]      Procedure Performed:   EXCISION OF SOFT TISSUE MASS ON LEFT UPPER ARM    Surgeon(s) and Role:     Yaa Millard MD - Primary    Assistant(s):

## 2021-10-11 NOTE — ANESTHESIA PREPROCEDURE EVALUATION
PRE-OP EVALUATION    Patient Name: Tejas Mckinney    Admit Diagnosis: Soft tissue mass [M79.89]    Pre-op Diagnosis: Soft tissue mass [M79.89]    EXCISION OF SOFT TISSUE MASS ON LEFT UPPER ARM    Anesthesia Procedure: EXCISION OF SOFT TISSUE MASS ON LEFT U time  HYDROCORTISONE 2.5 % External Lotion, APPLY SPARINGLY TO AFFECTED AREA EVERY DAY (Patient taking differently: Apply topically daily as needed.), Disp: 118 mL, Rfl: 0, 10/10/2021 at Unknown time  allopurinol 300 MG Oral Tab, Take 1 tablet (300 mg tota obvious evidence of dental disease           Pulmonary      Breath sounds clear to auscultation bilaterally. Other findings            ASA: 2   Plan: MAC  NPO status verified and patient meets guidelines.         Comment: All anesthetic relate

## 2021-11-21 ENCOUNTER — IMMUNIZATION (OUTPATIENT)
Dept: LAB | Facility: HOSPITAL | Age: 76
End: 2021-11-21
Attending: EMERGENCY MEDICINE
Payer: MEDICARE

## 2021-11-21 DIAGNOSIS — Z23 NEED FOR VACCINATION: Primary | ICD-10-CM

## 2021-11-21 PROCEDURE — 0064A SARSCOV2 VAC 50MCG/0.25ML IM: CPT

## 2022-01-20 PROBLEM — G35 MS (MULTIPLE SCLEROSIS) (HCC): Status: RESOLVED | Noted: 2020-03-02 | Resolved: 2022-01-20

## 2022-03-03 ENCOUNTER — TELEPHONE (OUTPATIENT)
Dept: NEUROLOGY | Facility: CLINIC | Age: 77
End: 2022-03-03

## 2022-03-03 ENCOUNTER — OFFICE VISIT (OUTPATIENT)
Dept: NEUROLOGY | Facility: CLINIC | Age: 77
End: 2022-03-03
Payer: MEDICARE

## 2022-03-03 VITALS
WEIGHT: 160 LBS | HEIGHT: 69 IN | RESPIRATION RATE: 16 BRPM | DIASTOLIC BLOOD PRESSURE: 74 MMHG | HEART RATE: 74 BPM | SYSTOLIC BLOOD PRESSURE: 140 MMHG | BODY MASS INDEX: 23.7 KG/M2

## 2022-03-03 DIAGNOSIS — G35 MULTIPLE SCLEROSIS (HCC): Primary | ICD-10-CM

## 2022-03-03 PROCEDURE — 99204 OFFICE O/P NEW MOD 45 MIN: CPT | Performed by: OTHER

## 2022-03-03 RX ORDER — DALFAMPRIDINE 10 MG/1
1 TABLET, FILM COATED, EXTENDED RELEASE ORAL 2 TIMES DAILY
COMMUNITY

## 2022-03-03 NOTE — TELEPHONE ENCOUNTER
Received fax from Dr. Marilyn Garcia with [facility] with patient information and recent DOS. Placed original on Dr. Darylene Ormond desk and copy sent to scan.

## 2022-03-03 NOTE — TELEPHONE ENCOUNTER
Provider would like patient to have steroid infusions. Solumedrol 500 mg daily for 3 days in the [place] office next week Monday, Tuesday and Wednesday    Referral order placed.

## 2022-03-04 NOTE — TELEPHONE ENCOUNTER
RN spoke to the patient and was informed he is not able to get a ride to the office next week for the Solumedrol Infusions. Per the patient he will call on Monday to schedule the infusions.

## 2022-03-07 ENCOUNTER — PATIENT MESSAGE (OUTPATIENT)
Dept: NEUROLOGY | Facility: CLINIC | Age: 77
End: 2022-03-07

## 2022-03-07 NOTE — TELEPHONE ENCOUNTER
From: Tracee Meehan  To: Myrna Villavicencio MD  Sent: 3/7/2022 11:30 AM CST  Subject: Lakeisha Randhawa,    Just making sure. Magnesium citrate is used to treat constipation. Is an alternate use for wound treatment?     Sim

## 2022-03-14 NOTE — PROGRESS NOTES
Irma Darling:    There are patchy areas of MS plaques in the cord and where there was a dominant lesion at C5 level, it is less prominent now but is replaced by some cord atrophy. Am waiting for the brain MRI to get a full picture of what we are dealing with.     Dr. Omi Markham

## 2022-03-21 ENCOUNTER — NURSE ONLY (OUTPATIENT)
Dept: NEUROLOGY | Facility: CLINIC | Age: 77
End: 2022-03-21
Payer: MEDICARE

## 2022-03-21 VITALS — DIASTOLIC BLOOD PRESSURE: 80 MMHG | SYSTOLIC BLOOD PRESSURE: 114 MMHG

## 2022-03-21 DIAGNOSIS — G35 MULTIPLE SCLEROSIS (HCC): Primary | ICD-10-CM

## 2022-03-21 PROCEDURE — 96365 THER/PROPH/DIAG IV INF INIT: CPT | Performed by: OTHER

## 2022-03-21 RX ORDER — METHYLPREDNISOLONE SODIUM SUCCINATE 500 MG/1
500 INJECTION, POWDER, FOR SOLUTION INTRAMUSCULAR; INTRAVENOUS DAILY
Status: COMPLETED | OUTPATIENT
Start: 2022-03-21 | End: 2022-03-23

## 2022-03-21 RX ADMIN — METHYLPREDNISOLONE SODIUM SUCCINATE 500 MG: 500 INJECTION, POWDER, FOR SOLUTION INTRAMUSCULAR; INTRAVENOUS at 11:59:00

## 2022-03-21 NOTE — PROGRESS NOTES
IV started per Jose Aguilar RN and Solu-Medrol infused over 20 minutes without difficulty. IV discontinued per RN. Patient tolerated infusion well with no complications. Complains of fatigue, general weakness in arms and legs, spasms in various muscles in arms/legs after use.

## 2022-03-22 ENCOUNTER — NURSE ONLY (OUTPATIENT)
Dept: NEUROLOGY | Facility: CLINIC | Age: 77
End: 2022-03-22
Payer: MEDICARE

## 2022-03-22 VITALS — HEART RATE: 65 BPM | RESPIRATION RATE: 18 BRPM | SYSTOLIC BLOOD PRESSURE: 100 MMHG | DIASTOLIC BLOOD PRESSURE: 70 MMHG

## 2022-03-22 DIAGNOSIS — G35 MULTIPLE SCLEROSIS (HCC): ICD-10-CM

## 2022-03-22 PROCEDURE — 96365 THER/PROPH/DIAG IV INF INIT: CPT | Performed by: OTHER

## 2022-03-22 RX ADMIN — METHYLPREDNISOLONE SODIUM SUCCINATE 500 MG: 500 INJECTION, POWDER, FOR SOLUTION INTRAMUSCULAR; INTRAVENOUS at 13:51:00

## 2022-03-22 NOTE — PROGRESS NOTES
IV started per Fanny Toth RN into right antecubital site and Solu-Medrol infused over 20 minutes without difficulty. IV discontinued per RN. Patient tolerated infusion well with no complications. Patient voiced positive response to infusion. States cognition improved, able to \"swim\" longer today and was able to stand and pull pants up.

## 2022-03-23 ENCOUNTER — NURSE ONLY (OUTPATIENT)
Dept: NEUROLOGY | Facility: CLINIC | Age: 77
End: 2022-03-23
Payer: MEDICARE

## 2022-03-23 VITALS — HEART RATE: 72 BPM | SYSTOLIC BLOOD PRESSURE: 134 MMHG | DIASTOLIC BLOOD PRESSURE: 82 MMHG

## 2022-03-23 DIAGNOSIS — G35 MULTIPLE SCLEROSIS (HCC): ICD-10-CM

## 2022-03-23 PROCEDURE — 96365 THER/PROPH/DIAG IV INF INIT: CPT | Performed by: OTHER

## 2022-03-23 RX ORDER — PREDNISONE 20 MG/1
TABLET ORAL
Qty: 30 TABLET | Refills: 0 | Status: SHIPPED | OUTPATIENT
Start: 2022-03-24

## 2022-03-23 NOTE — PROGRESS NOTES
IV started per Jada Sanchez RN and Solu-Medrol infused over 20 minutes without difficulty. IV discontinued per RN. Patient tolerated infusion well with no complications. Patient states he is feeling much better, was able to swim 12 laps this morning. States cognitive issues have improved greatly.

## 2022-03-26 ENCOUNTER — PATIENT MESSAGE (OUTPATIENT)
Dept: NEUROLOGY | Facility: CLINIC | Age: 77
End: 2022-03-26

## 2022-03-28 ENCOUNTER — TELEPHONE (OUTPATIENT)
Dept: NEUROLOGY | Facility: CLINIC | Age: 77
End: 2022-03-28

## 2022-03-28 NOTE — TELEPHONE ENCOUNTER
LMTCB to inform that in MRI Spine Cervical, Dr Bella Martin states he is waiting for MRI Brain to get a full picture. Patient may schedule f/up for 2+ days after MRI Brain completed.

## 2022-03-28 NOTE — TELEPHONE ENCOUNTER
Received call from [facility], who never received MRI Brain order from   MRI Brain ordered @ [facility]. Patient informed of new order and [facility] will contact him to schedule.

## 2022-03-28 NOTE — TELEPHONE ENCOUNTER
From: Selvin Loveless  To: Darrow Phoenix, MD  Sent: 3/26/2022 11:01 AM CDT  Subject: Follow up    Hi Dr. Sariah Zaldivar,    Do I need to set up a return visit?     Sim

## 2022-03-28 NOTE — TELEPHONE ENCOUNTER
Patient returning call, patient had MRI Brain @ [facility]. Images not visible in chart. Patient does have results on CD. Patient to call [facility] to have images made available in chart. Informed that provider will discuss plan of care after viewing MRI Brain results, as explained in result note for MRI Cervical Spine.

## 2022-03-28 NOTE — TELEPHONE ENCOUNTER
Received call from Naveen Guzman @ [facility], who never received MRI Brain order from MRI Brain ordered @ [facility]. Patient informed of new order and [facility] will contact him to schedule. Patient will try to complete imaging soon, but may reschedule next OV on 4/1/2022.

## 2022-04-04 ENCOUNTER — TELEPHONE (OUTPATIENT)
Dept: NEUROLOGY | Facility: CLINIC | Age: 77
End: 2022-04-04

## 2022-04-05 NOTE — TELEPHONE ENCOUNTER
Tried calling but no answer    Points to consider: minor brain lesions  Majority of lesions are CORD  Favorable response to steroid speaks for favorable response to DMT  JCV not done yet  If low, then choice would be to put him on TYSABRI  Otherwise, recommend putting him on B cell depleting therapy (Luz Boles) or S1P1 drug (Hung Khalil)    Luisito Jj MD  Vascular & General Neurology  Director, Multiple Sclerosis Program  [facility]  4/5/2022, Time completed 4:28 PM

## 2022-04-06 NOTE — TELEPHONE ENCOUNTER
Pt call back he stated we have been calling his wife instead of him. That's why no one is answering please call him not her.

## 2022-04-06 NOTE — TELEPHONE ENCOUNTER
Patient called, advised he missed Dr. Dozier Ask call and is returning call. Please call back to advise MRI results.

## 2022-04-07 NOTE — TELEPHONE ENCOUNTER
Pt spoke to the patient and informed him of the above information. Pt wanted to know if Dr. Carloz Santos was able to compare his old scans to his new scans. If he had what did he find. Please advise.

## 2022-04-13 NOTE — TELEPHONE ENCOUNTER
Spoke to patient and relayed that on my review there is definite progression mostly of the disease burden in the cervical and thoracic cord which probably explain his greatest disability being in his ambulation. He has not done his BERT virus index and I encouraged him to do with as soon as possible so we can make a decision whether to put him on S1 P1 drug or the more aggressive Tysabri.   I also told him that he does not have any follow-up appointment and to grab any spot available for me or with my assistant    Lanie Dumont MD  Vascular & General Neurology  Director, Multiple Sclerosis Program  [facility]  4/13/2022, Time completed 4:54 PM

## 2022-04-14 ENCOUNTER — TELEPHONE (OUTPATIENT)
Dept: NEUROLOGY | Facility: CLINIC | Age: 77
End: 2022-04-14

## 2022-04-14 NOTE — TELEPHONE ENCOUNTER
Faxed JCV Lab orders to [facility] to bring JCV form that was sent via [employer] to appt.     Fax:  [phone]  Fax sent & confirmed

## 2022-05-06 LAB
INDEX VALUE: 3.66
JCV ANTIBODY: POSITIVE

## 2022-05-26 ENCOUNTER — OFFICE VISIT (OUTPATIENT)
Dept: NEUROLOGY | Facility: CLINIC | Age: 77
End: 2022-05-26
Payer: MEDICARE

## 2022-05-26 VITALS — RESPIRATION RATE: 16 BRPM | DIASTOLIC BLOOD PRESSURE: 84 MMHG | SYSTOLIC BLOOD PRESSURE: 122 MMHG | HEART RATE: 87 BPM

## 2022-05-26 DIAGNOSIS — G35 MS (MULTIPLE SCLEROSIS) (HCC): Primary | ICD-10-CM

## 2022-05-27 ENCOUNTER — TELEPHONE (OUTPATIENT)
Dept: NEUROLOGY | Facility: CLINIC | Age: 77
End: 2022-05-27

## 2022-05-27 NOTE — TELEPHONE ENCOUNTER
RN discussed the drug Aubagio with the patient. Patient signed the consent form. RN completed the start form. RN faxed the startform and demographics to [phone]. Fax confirmation received.

## 2022-06-21 ENCOUNTER — TELEPHONE (OUTPATIENT)
Dept: NEUROLOGY | Facility: CLINIC | Age: 77
End: 2022-06-21

## 2022-06-21 DIAGNOSIS — G35 MS (MULTIPLE SCLEROSIS) (HCC): Primary | ICD-10-CM

## 2022-06-22 RX ORDER — RENAGEL 800 MG/1
TABLET ORAL
Qty: 30 TABLET | Refills: 11 | COMMUNITY
Start: 2022-06-22 | End: 2022-07-28

## 2022-07-28 RX ORDER — RENAGEL 800 MG/1
14 TABLET ORAL DAILY
Qty: 90 TABLET | Refills: 3 | Status: SHIPPED
Start: 2022-07-28

## 2022-07-28 NOTE — TELEPHONE ENCOUNTER
Marlene Chaparro lab results dated 6/18/22. Placed on [employer] desk for review.   Sent to scanning on 6/21/22
PA has been approved from 5/31/22 - 12/31/22. Per alternate TE start form has been faxed. LMTCB to advise that labs are approved and patient may start medication. Start form sent to scan. Medication updated in Epic.
Patient is applying for the Aubagio Patient Assistance program. They are requiring a written prescription for the program.    Hand signed prescription will need to be faxed to [facility] @ [phone]. Will need a office cover sheet attached. Prescription entered in [facility], awaiting provider approval and signature. Prescription faxed with receipt confirmation.
Please let patient know that TB test is negative and CBC/CMP were unremarkable. He can start Aubagio. Remind him that once he starts he will need to do monthly lab monitoring.
Relayed below to pt. Verbalized understanding but did indicate that cost of medication is $3000. Spoke with alternative RN regarding options. Recommended to call [place] [facility] first to see if assistance available through [employer] or can at least get enrolled. Also mentioned possibility of receiving samples, but would need to investigate. Relayed to pt.  Verbalized understanding, was appreciative
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 Patient presented to [facility] with  c/o squeezing and burning 6-7/10 anterior chest pain starting on right side of the chest and radiating to RUE, worsened with moderate exertion and associated with dyspnea. TROP I peaked at 3.180, CPK 2.5, CKMB 5. ECHO completed: EF >55%, normal. CTA Chest: negative for PE, CXR: negative. Patient then transferred to [facility] for cardiac cath for further cardiac evaluation. Upon arrival to cath lab pt denies fever, chills, palpitations, diaphoresis, PND, orthopnea, SOB.  S/p cath , CAD, referred for surgical evaluation.    3/17/17 s/p cabg x 4 with LIMA ef nml  post op Thrombocytopenia, heme following for thrombocytopenia and h/o CML, pt on sprycell.  HIT negative.  Endocrine following for glucose control, new to insulin.  The patient was instructed and demonstrated the ability to administer himself insulin.  He is following up with Dr. Quevedo next week.  C/o "dizziness with ambulation yesterday.  He was non orthostatic and is w/o complaints today.  He is being discharged home

## 2022-08-03 ENCOUNTER — TELEPHONE (OUTPATIENT)
Dept: NEUROLOGY | Facility: CLINIC | Age: 77
End: 2022-08-03

## 2022-08-03 NOTE — TELEPHONE ENCOUNTER
Patient received his first shipment of Aubagio tablets. Asking when he should start taking the medication. Patient has not been on any medication before. Would like to start after he returns from vacation. Instructed to make a follow up for 3 months after starting new medication. Also needs to contact office to let us know he has been on medication so we can order the lab testing for 6 months. All questions answered. Patient will notify office via [employer].

## 2022-08-03 NOTE — TELEPHONE ENCOUNTER
Pt calling for medication advice. She got Aubagio delivered today. Going on vacation, would like to know when to start this medication. Please advise.

## 2022-08-19 ENCOUNTER — TELEPHONE (OUTPATIENT)
Dept: NEUROLOGY | Facility: CLINIC | Age: 77
End: 2022-08-19

## 2022-08-19 NOTE — TELEPHONE ENCOUNTER
S Side effects after starting AUBAGIO 2 days ago    B Dx MS, Rx AUBAGIO    A Complains of: leg spasms keeping him up at night, new onset weakness in L foot with tenderness, slight HA, feeling of fluid in lungs. R Wants to know if he should continue taking AUBAGIO. Referred to PCP for lungs and told patient should be evaluated. Discussed hydration, states he took in 40 oz yesterday. Routed to provider for recommendations.

## 2022-08-19 NOTE — TELEPHONE ENCOUNTER
Patient calling, advised that he has started [place] and has feedback regarding the medication. Is requesting to speak to a nurse. Please advise.

## 2022-08-22 DIAGNOSIS — G35 MULTIPLE SCLEROSIS (HCC): ICD-10-CM

## 2022-08-22 RX ORDER — PREDNISONE 20 MG/1
TABLET ORAL
Qty: 30 TABLET | Refills: 0 | OUTPATIENT
Start: 2022-08-22

## 2022-08-23 NOTE — ED PROVIDER NOTES
Patient Seen in: [facility]    History   Patient presents with:  Abscess (integumentary)    Stated Complaint: SORE ON BUTTOCKS    HPI  68-year-old male with previous history of multiple sclerosis present with chief complaint of Examination of the right buttock area there is a localized area of bruising, erythema measuring approximately 7×5 cm in size with the superficial skin abrasion, localized tenderness. No swelling, no abscess, no fluctuation.           ED Course   Labs Revie The patient is a 24y Female complaining of shortness of breath.

## 2022-09-10 ENCOUNTER — PATIENT MESSAGE (OUTPATIENT)
Dept: NEUROLOGY | Facility: CLINIC | Age: 77
End: 2022-09-10

## 2022-09-12 NOTE — TELEPHONE ENCOUNTER
From: Rodrigo Osorio  To: Mt Ruiz MD  Sent: 9/10/2022 12:31 PM CDT  Subject: Alternatives    Hi Dr. Beto Olivarez,    I will be starting Aubagio next week. Lately, a ton of ads have run for Cheryl Tyson and Dorina Keto claiming their superiority to Aubagio. Is this just marketing hype?     Sim

## 2022-09-12 NOTE — TELEPHONE ENCOUNTER
Answered patient via [employer], this is the medication Dr Dimitri Yuen feels will benefit him most at this time, but to reach out with any specific questions he may have.

## 2022-09-14 NOTE — TELEPHONE ENCOUNTER
Started back on Aubagio today and will observe  Mole removed in ankle    Will report back     Dr. Love Gusman

## 2023-03-15 ENCOUNTER — TELEPHONE (OUTPATIENT)
Dept: NEUROLOGY | Facility: CLINIC | Age: 78
End: 2023-03-15

## 2023-03-15 DIAGNOSIS — G35 MS (MULTIPLE SCLEROSIS) (HCC): ICD-10-CM

## 2023-03-15 RX ORDER — RENAGEL 800 MG/1
14 TABLET ORAL DAILY
Qty: 90 TABLET | Refills: 1 | Status: SHIPPED | OUTPATIENT
Start: 2023-03-15

## 2023-03-15 NOTE — TELEPHONE ENCOUNTER
Patient has changed pharmacy to [facility], requests current Rx be sent there. Order changed to [address] [facility], signed as written.

## 2023-06-01 ENCOUNTER — TELEPHONE (OUTPATIENT)
Dept: NEUROLOGY | Facility: CLINIC | Age: 78
End: 2023-06-01

## 2023-06-01 DIAGNOSIS — G35 MS (MULTIPLE SCLEROSIS) (HCC): ICD-10-CM

## 2023-06-01 RX ORDER — RENAGEL 800 MG/1
14 TABLET ORAL DAILY
Qty: 90 TABLET | Refills: 3 | Status: SHIPPED | OUTPATIENT
Start: 2023-06-01

## 2023-06-01 NOTE — TELEPHONE ENCOUNTER
Fax received from [facility] requesting new prescription for patients Aubagio. Prescription requested for PAP program.    Prescription can be escribed to [address] [place] [address] Rd. Order sent as requested to requesting pharmacy.

## 2023-08-02 ENCOUNTER — HOSPITAL ENCOUNTER (EMERGENCY)
Facility: HOSPITAL | Age: 78
Discharge: HOME | End: 2023-08-02
Attending: EMERGENCY MEDICINE
Payer: MEDICARE

## 2023-08-02 VITALS
HEART RATE: 78 BPM | HEIGHT: 69 IN | TEMPERATURE: 97 F | RESPIRATION RATE: 18 BRPM | OXYGEN SATURATION: 98 % | BODY MASS INDEX: 26.66 KG/M2 | DIASTOLIC BLOOD PRESSURE: 78 MMHG | WEIGHT: 180 LBS | SYSTOLIC BLOOD PRESSURE: 160 MMHG

## 2023-08-02 DIAGNOSIS — L89.159 PRESSURE INJURY OF SKIN OF SACRAL REGION, UNSPECIFIED INJURY STAGE: Primary | ICD-10-CM

## 2023-08-02 DIAGNOSIS — L03.317 CELLULITIS OF BUTTOCK: ICD-10-CM

## 2023-08-02 LAB
ANION GAP SERPL CALC-SCNC: 7 MMOL/L (ref 0–18)
BASOPHILS # BLD AUTO: 0.01 X10(3) UL (ref 0–0.2)
BASOPHILS NFR BLD AUTO: 0.1 %
BUN BLD-MCNC: 17 MG/DL (ref 7–18)
CALCIUM BLD-MCNC: 9.3 MG/DL (ref 8.5–10.1)
CHLORIDE SERPL-SCNC: 106 MMOL/L (ref 98–112)
CO2 SERPL-SCNC: 25 MMOL/L (ref 21–32)
CREAT BLD-MCNC: 0.98 MG/DL
EGFRCR SERPLBLD CKD-EPI 2021: 80 ML/MIN/1.73M2 (ref 60–?)
EOSINOPHIL # BLD AUTO: 0 X10(3) UL (ref 0–0.7)
EOSINOPHIL NFR BLD AUTO: 0 %
ERYTHROCYTE [DISTWIDTH] IN BLOOD BY AUTOMATED COUNT: 12.7 %
GLUCOSE BLD-MCNC: 115 MG/DL (ref 70–99)
HCT VFR BLD AUTO: 51 %
HGB BLD-MCNC: 16.5 G/DL
IMM GRANULOCYTES # BLD AUTO: 0.03 X10(3) UL (ref 0–1)
IMM GRANULOCYTES NFR BLD: 0.4 %
LYMPHOCYTES # BLD AUTO: 1.38 X10(3) UL (ref 1–4)
LYMPHOCYTES NFR BLD AUTO: 20.1 %
MCH RBC QN AUTO: 29.7 PG (ref 26–34)
MCHC RBC AUTO-ENTMCNC: 32.4 G/DL (ref 31–37)
MCV RBC AUTO: 91.9 FL
MONOCYTES # BLD AUTO: 0.94 X10(3) UL (ref 0.1–1)
MONOCYTES NFR BLD AUTO: 13.7 %
NEUTROPHILS # BLD AUTO: 4.5 X10 (3) UL (ref 1.5–7.7)
NEUTROPHILS # BLD AUTO: 4.5 X10(3) UL (ref 1.5–7.7)
NEUTROPHILS NFR BLD AUTO: 65.7 %
OSMOLALITY SERPL CALC.SUM OF ELEC: 288 MOSM/KG (ref 275–295)
PLATELET # BLD AUTO: 260 10(3)UL (ref 150–450)
POTASSIUM SERPL-SCNC: 4 MMOL/L (ref 3.5–5.1)
RBC # BLD AUTO: 5.55 X10(6)UL
SODIUM SERPL-SCNC: 138 MMOL/L (ref 136–145)
WBC # BLD AUTO: 6.9 X10(3) UL (ref 4–11)

## 2023-08-02 PROCEDURE — 85025 COMPLETE CBC W/AUTO DIFF WBC: CPT | Performed by: EMERGENCY MEDICINE

## 2023-08-02 PROCEDURE — 80048 BASIC METABOLIC PNL TOTAL CA: CPT | Performed by: EMERGENCY MEDICINE

## 2023-08-02 PROCEDURE — 99283 EMERGENCY DEPT VISIT LOW MDM: CPT

## 2023-08-02 PROCEDURE — 99284 EMERGENCY DEPT VISIT MOD MDM: CPT

## 2023-08-02 PROCEDURE — 36415 COLL VENOUS BLD VENIPUNCTURE: CPT

## 2023-08-02 RX ORDER — CEPHALEXIN 500 MG/1
500 CAPSULE ORAL 4 TIMES DAILY
Qty: 40 CAPSULE | Refills: 0 | Status: SHIPPED | OUTPATIENT
Start: 2023-08-02 | End: 2023-08-12

## 2023-08-02 RX ORDER — SULFAMETHOXAZOLE AND TRIMETHOPRIM 800; 160 MG/1; MG/1
1 TABLET ORAL 2 TIMES DAILY
COMMUNITY
Start: 2023-07-24

## 2023-08-02 NOTE — DISCHARGE INSTRUCTIONS
Follow-up for further evaluation with wound clinic. Call today for appointment. Continued dressing changes as previously directed by dermatology. Add Keflex as prescribed. Return if new or worse symptoms.

## 2023-08-02 NOTE — ED QUICK NOTES
Rounding Completed    Plan of Care reviewed. Waiting for lab results. Elimination needs assessed    Bed is locked and in lowest position. Call light within reach.

## 2023-08-02 NOTE — ED INITIAL ASSESSMENT (HPI)
Pt states wound on buttock that has been being treated for several years. Pt was sent by dermatology due to not healing. Pt now having thick discharge from wound.

## 2023-08-18 ENCOUNTER — OFFICE VISIT (OUTPATIENT)
Dept: WOUND CARE | Facility: HOSPITAL | Age: 78
End: 2023-08-18
Attending: INTERNAL MEDICINE
Payer: MEDICARE

## 2023-08-18 VITALS
SYSTOLIC BLOOD PRESSURE: 156 MMHG | TEMPERATURE: 98 F | DIASTOLIC BLOOD PRESSURE: 91 MMHG | HEART RATE: 94 BPM | RESPIRATION RATE: 16 BRPM | WEIGHT: 180 LBS | BODY MASS INDEX: 26.66 KG/M2 | HEIGHT: 69 IN

## 2023-08-18 DIAGNOSIS — L89.153 DECUBITUS ULCER OF COCCYX, STAGE III (HCC): Primary | ICD-10-CM

## 2023-08-18 DIAGNOSIS — R19.7 DIARRHEA, UNSPECIFIED TYPE: ICD-10-CM

## 2023-08-18 DIAGNOSIS — G35 MULTIPLE SCLEROSIS (HCC): ICD-10-CM

## 2023-08-18 PROCEDURE — 99214 OFFICE O/P EST MOD 30 MIN: CPT

## 2023-08-18 NOTE — PATIENT INSTRUCTIONS
Wound Cleaning and Dressings:    Wash your hands with soap and water. Always wear gloves while changing dressings. Donot touch wound / warner-wound skin with un-gloved hands. Remove old dressing, discard and place into trash. DRESSINGS: coloplast.   Change dressing daily and after soiling    Off-Loading: EHOB cushion. Miscellaneous Instructions:  Supplement with a daily multivitamin   Increase protein intake / consider protein supplements - see below      DIETARY MODIFICATIONS TO HELP WITH WOUND HEALING:    Protein: Meats, beans, eggs, milk and yogurt particularly [place] yogurt), tofu, soy nuts, soy protein products    Vitamin C: Citrus fruits and juices, strawberries, tomatoes, tomato juice, peppers, baked potatoes, spinach, broccoli, cauliflower, [place] sprouts, cabbage    Vitamin A: Dark green, leafy vegetables, orange or yellow vegetables, cantaloupe, fortified dairy products, liver, fortified cereals    Zinc: Fortified cereals, red meats, seafood    Consider Jah by [employer] (These are essential branch chain amino acids that help with tissue building and wound healing) and take 2 packets/day. you can order online at [employer] or [address] [place] [address] "[employer] REMINDERS:    The treatment plan has been discussed at length with you and your provider. Follow all instructions carefully, it is very important. If you do not follow all instructions, you are at  risk of your wound not healing, infection, possible loss of limb and even end of life. Please call the clinic during regular business hours ( 7:30 AM - 5:30 PM) if you notice increased bleeding, redness, warmth, pain or pus like drainage or start running a fever greater than 100.3. For after hour emergencies, please call your primary physician or go to the nearest emergency room. Composite Graft Text: The defect edges were debeveled with a #15 scalpel blade.  Given the location of the defect, shape of the defect, the proximity to free margins and the fact the defect was full thickness a composite graft was deemed most appropriate.  The defect was outline and then transferred to the donor site.  A full thickness graft was then excised from the donor site. The graft was then placed in the primary defect, oriented appropriately and then sutured into place.  The secondary defect was then repaired using a primary closure.

## 2023-08-18 NOTE — PROGRESS NOTES
Weekly Wound Education Note    Teaching Provided To: Patient; Family  Training Topics: Cleasing and general instructions; Discharge instructions;Dressing;Off-loading  Training Method: Explain/Verbal  Training Response: Patient responds and understands; Reinforcement needed        Notes: Initial visit: wound to coccyx. Triad paste applied - spouse educated on dressing. Pt is currently on Bactrim due to a UTI and also states he will be having a prostate procedure in the near future. Dicussed off-loading, spouse states the patinet has EHOB and KALYANI cushio at home - informed her that either cushion should be with him at all times even in his travel chair.

## 2023-09-08 ENCOUNTER — APPOINTMENT (OUTPATIENT)
Dept: WOUND CARE | Facility: HOSPITAL | Age: 78
End: 2023-09-08
Attending: INTERNAL MEDICINE
Payer: MEDICARE

## 2023-09-08 ENCOUNTER — TELEPHONE (OUTPATIENT)
Dept: WOUND CARE | Facility: HOSPITAL | Age: 78
End: 2023-09-08

## 2023-09-08 NOTE — TELEPHONE ENCOUNTER
Per Nic Russell is not feeling well. Offered to come in on 9/13, /915 and 9/22 wife said she is working. Pt is schedule on 9/29.

## 2023-09-29 ENCOUNTER — OFFICE VISIT (OUTPATIENT)
Dept: WOUND CARE | Facility: HOSPITAL | Age: 78
End: 2023-09-29
Attending: INTERNAL MEDICINE
Payer: MEDICARE

## 2023-09-29 VITALS
HEART RATE: 90 BPM | RESPIRATION RATE: 14 BRPM | TEMPERATURE: 98 F | SYSTOLIC BLOOD PRESSURE: 173 MMHG | DIASTOLIC BLOOD PRESSURE: 96 MMHG

## 2023-09-29 DIAGNOSIS — L30.8 DERMATITIS ASSOCIATED WITH MOISTURE: ICD-10-CM

## 2023-09-29 DIAGNOSIS — G35 MULTIPLE SCLEROSIS (HCC): ICD-10-CM

## 2023-09-29 DIAGNOSIS — L89.153 DECUBITUS ULCER OF COCCYX, STAGE III (HCC): Primary | ICD-10-CM

## 2023-09-29 DIAGNOSIS — R26.9 GAIT ABNORMALITY: ICD-10-CM

## 2023-09-29 PROCEDURE — 99213 OFFICE O/P EST LOW 20 MIN: CPT

## 2023-09-29 NOTE — PROGRESS NOTES
Weekly Wound Education Note    Teaching Provided To: Patient  Training Topics: Cleasing and general instructions; Discharge instructions;Dressing;Off-loading  Training Method: Explain/Verbal  Training Response: Patient responds and understands; Reinforcement needed        Notes: Stable. Switch to zinc paste, spouse feels that triad paste is making skin \"more red\". continue to use EHOB, provided with EHOB bed overlay information.

## 2023-10-16 ENCOUNTER — TELEPHONE (OUTPATIENT)
Dept: NEUROLOGY | Facility: CLINIC | Age: 78
End: 2023-10-16

## 2023-10-16 DIAGNOSIS — G35 MS (MULTIPLE SCLEROSIS) (HCC): Primary | ICD-10-CM

## 2023-10-16 NOTE — TELEPHONE ENCOUNTER
Pt stated he has an appt on 10/23/23 and asking if should complete liver labs for aubagio prior to appt. Pt completes lab outside [facility] pt to discuss and advise.

## 2023-10-16 NOTE — TELEPHONE ENCOUNTER
Last CMP performed 1/12/23. Patient due for hepatic panel. Order placed. Patient notified; he will perform at [address] in [facility].

## 2023-10-23 ENCOUNTER — OFFICE VISIT (OUTPATIENT)
Dept: NEUROLOGY | Facility: CLINIC | Age: 78
End: 2023-10-23

## 2023-10-23 VITALS
RESPIRATION RATE: 16 BRPM | DIASTOLIC BLOOD PRESSURE: 74 MMHG | SYSTOLIC BLOOD PRESSURE: 126 MMHG | BODY MASS INDEX: 26.66 KG/M2 | HEIGHT: 69 IN | HEART RATE: 91 BPM | WEIGHT: 180 LBS

## 2023-10-23 DIAGNOSIS — G35 MS (MULTIPLE SCLEROSIS) (HCC): Primary | ICD-10-CM

## 2023-10-23 DIAGNOSIS — R29.898 WEAKNESS OF BOTH LEGS: ICD-10-CM

## 2023-10-23 DIAGNOSIS — N39.498 OTHER URINARY INCONTINENCE: ICD-10-CM

## 2023-10-23 PROCEDURE — 99214 OFFICE O/P EST MOD 30 MIN: CPT | Performed by: OTHER

## 2023-10-23 PROCEDURE — 96365 THER/PROPH/DIAG IV INF INIT: CPT | Performed by: OTHER

## 2023-10-23 RX ORDER — PREDNISONE 20 MG/1
TABLET ORAL
Qty: 30 TABLET | Refills: 0 | Status: SHIPPED | OUTPATIENT
Start: 2023-10-26

## 2023-10-23 RX ORDER — PREDNISONE 20 MG/1
20 TABLET ORAL AS NEEDED
COMMUNITY
Start: 2023-08-21

## 2023-10-23 RX ORDER — METHYLPREDNISOLONE SODIUM SUCCINATE 500 MG/8ML
500 INJECTION INTRAMUSCULAR; INTRAVENOUS DAILY
Status: COMPLETED | OUTPATIENT
Start: 2023-10-24 | End: 2023-10-25

## 2023-10-23 RX ORDER — METHYLPREDNISOLONE SODIUM SUCCINATE 1 G/16ML
1000 INJECTION, POWDER, LYOPHILIZED, FOR SOLUTION INTRAMUSCULAR; INTRAVENOUS ONCE
Status: COMPLETED | OUTPATIENT
Start: 2023-10-23 | End: 2023-10-23

## 2023-10-23 RX ORDER — CEFDINIR 300 MG/1
300 CAPSULE ORAL 2 TIMES DAILY
COMMUNITY
Start: 2023-10-20 | End: 2023-10-30

## 2023-10-23 RX ADMIN — METHYLPREDNISOLONE SODIUM SUCCINATE 1000 MG: 1 INJECTION, POWDER, LYOPHILIZED, FOR SOLUTION INTRAMUSCULAR; INTRAVENOUS at 14:57:00

## 2023-10-23 NOTE — PROGRESS NOTES
IV started per Winsome Yates RN and Solu-Medrol infused over 20 minutes without difficulty. IV discontinued per RN. Patient tolerated infusion well with no complications. Complains of fatigue and BLE weakness.

## 2023-10-24 ENCOUNTER — NURSE ONLY (OUTPATIENT)
Dept: NEUROLOGY | Facility: CLINIC | Age: 78
End: 2023-10-24

## 2023-10-24 VITALS — SYSTOLIC BLOOD PRESSURE: 122 MMHG | DIASTOLIC BLOOD PRESSURE: 76 MMHG | RESPIRATION RATE: 16 BRPM | HEART RATE: 72 BPM

## 2023-10-24 PROCEDURE — 96365 THER/PROPH/DIAG IV INF INIT: CPT | Performed by: OTHER

## 2023-10-24 RX ADMIN — METHYLPREDNISOLONE SODIUM SUCCINATE 500 MG: 500 INJECTION INTRAMUSCULAR; INTRAVENOUS at 10:16:00

## 2023-10-25 ENCOUNTER — NURSE ONLY (OUTPATIENT)
Dept: NEUROLOGY | Facility: CLINIC | Age: 78
End: 2023-10-25

## 2023-10-25 VITALS — DIASTOLIC BLOOD PRESSURE: 70 MMHG | SYSTOLIC BLOOD PRESSURE: 124 MMHG | HEART RATE: 69 BPM

## 2023-10-25 PROCEDURE — 96365 THER/PROPH/DIAG IV INF INIT: CPT | Performed by: OTHER

## 2023-10-25 NOTE — PROGRESS NOTES
IV started per [facility] and Solu-Medrol infused over 20 minutes without difficulty. IV discontinued per RN. Patient tolerated infusion well with no complications. Continues to be tired, is not sleeping well.

## 2023-11-10 ENCOUNTER — OFFICE VISIT (OUTPATIENT)
Dept: WOUND CARE | Facility: HOSPITAL | Age: 78
End: 2023-11-10
Attending: INTERNAL MEDICINE
Payer: MEDICARE

## 2023-11-10 VITALS
HEART RATE: 79 BPM | TEMPERATURE: 98 F | SYSTOLIC BLOOD PRESSURE: 123 MMHG | DIASTOLIC BLOOD PRESSURE: 71 MMHG | RESPIRATION RATE: 14 BRPM

## 2023-11-10 DIAGNOSIS — G35 MULTIPLE SCLEROSIS (HCC): ICD-10-CM

## 2023-11-10 DIAGNOSIS — R26.9 GAIT ABNORMALITY: ICD-10-CM

## 2023-11-10 DIAGNOSIS — N39.490 OVERFLOW INCONTINENCE OF URINE: ICD-10-CM

## 2023-11-10 DIAGNOSIS — L89.153 DECUBITUS ULCER OF COCCYX, STAGE III (HCC): Primary | ICD-10-CM

## 2023-11-10 PROCEDURE — 87186 SC STD MICRODIL/AGAR DIL: CPT | Performed by: INTERNAL MEDICINE

## 2023-11-10 PROCEDURE — 87077 CULTURE AEROBIC IDENTIFY: CPT | Performed by: INTERNAL MEDICINE

## 2023-11-10 PROCEDURE — 99214 OFFICE O/P EST MOD 30 MIN: CPT

## 2023-11-10 PROCEDURE — 87205 SMEAR GRAM STAIN: CPT | Performed by: INTERNAL MEDICINE

## 2023-11-10 PROCEDURE — 87070 CULTURE OTHR SPECIMN AEROBIC: CPT | Performed by: INTERNAL MEDICINE

## 2023-11-10 RX ORDER — GENTAMICIN SULFATE 1 MG/G
1 OINTMENT TOPICAL 3 TIMES DAILY
Qty: 30 G | Refills: 3 | Status: SHIPPED | OUTPATIENT
Start: 2023-11-10

## 2023-11-10 NOTE — PATIENT INSTRUCTIONS
Wound Cleaning and Dressings:    Wash your hands with soap and water. Always wear gloves while changing dressings. Donot touch wound / warner-wound skin with un-gloved hands. Remove old dressing, discard and place into trash. DRESSINGS: COLOPLAST TRIAD PASTE / zinc barrier cream on affected areas of skin covering wund bed   Change dressing daily and after soiling    Off-Loading: EHOB cushion. Miscellaneous Instructions:  Supplement with a daily multivitamin   Increase protein intake / consider protein supplements - see below      DIETARY MODIFICATIONS TO HELP WITH WOUND HEALING:    Protein: Meats, beans, eggs, milk and yogurt particularly [place] yogurt), tofu, soy nuts, soy protein products    Vitamin C: Citrus fruits and juices, strawberries, tomatoes, tomato juice, peppers, baked potatoes, spinach, broccoli, cauliflower, [place] sprouts, cabbage    Vitamin A: Dark green, leafy vegetables, orange or yellow vegetables, cantaloupe, fortified dairy products, liver, fortified cereals    Zinc: Fortified cereals, red meats, seafood    Consider Jah by [employer] (These are essential branch chain amino acids that help with tissue building and wound healing) and take 2 packets/day. you can order online at [employer] or [address] [place] [address] [employer] REMINDERS:    The treatment plan has been discussed at length with you and your provider. Follow all instructions carefully, it is very important. If you do not follow all instructions, you are at  risk of your wound not healing, infection, possible loss of limb and even end of life. Please call the clinic during regular business hours ( 7:30 AM - 5:30 PM) if you notice increased bleeding, redness, warmth, pain or pus like drainage or start running a fever greater than 100.3. For after hour emergencies, please call your primary physician or go to the nearest emergency room.

## 2023-11-10 NOTE — PROGRESS NOTES
Weekly Wound Education Note    Teaching Provided To: Patient; Family  Training Topics: Cleasing and general instructions; Discharge instructions;Dressing;Off-loading  Training Method: Explain/Verbal  Training Response: Patient responds and understands; Reinforcement needed        Notes: Wound cultured today. Gentamycin ordered. Bacitracin applied to open areas, zinc to periwound, bordered foam. Reminded to offload with EHOB cushion.  Orderde supplies from [facility].

## 2023-11-13 ENCOUNTER — TELEPHONE (OUTPATIENT)
Dept: NEUROLOGY | Facility: CLINIC | Age: 78
End: 2023-11-13

## 2023-11-13 DIAGNOSIS — G35 MS (MULTIPLE SCLEROSIS) (HCC): Primary | ICD-10-CM

## 2023-11-13 NOTE — PROGRESS NOTES
Spoke to patient, went over results. He states he has  topical gentamicin and has started using - continue as ordered. He verbalizes understanding.

## 2023-11-13 NOTE — TELEPHONE ENCOUNTER
Patient completed his steroids. Per patient he started to have intermittent face prick feelings. Patient took some prednisone he had at home and the symptoms went away.    Patient is concerned why he had those symptoms and maybe he should stay on a steroid.    Patient informed RN Dr. Aleman mentioned starting a new drug and he would like to move forward starting a new medication, he feels the Aubagio is not helping. Please advise.

## 2023-11-13 NOTE — TELEPHONE ENCOUNTER
Patient calling, advised that he is having an MS flare.    States for the last few days he has had prickling sensations on neck/face, leg weakness to the point that he could not move his legs yesterday.     Has done solumedrol in the past and would like to know what the best course of action to address symptoms is.     Please advise.

## 2023-11-14 NOTE — TELEPHONE ENCOUNTER
Tell him part of the plan is to put him on pulsed monthly solumedrol while starting the B cell depleting drug      Current Outpatient Medications:     gentamicin 0.1 % External Ointment, Apply 1 Application topically 3 (three) times daily., Disp: 30 g, Rfl: 3    predniSONE 20 MG Oral Tab, Take 1 tablet (20 mg total) by mouth as needed., Disp: , Rfl:     predniSONE 20 MG Oral Tab, Take 60mg (3 tabs) daily x 5 days, then 40mg (2 tabs) daily x 5 days, then 20mg (1 tab) daily x 5 days then discontinue medication., Disp: 30 tablet, Rfl: 0    AUBAGIO 14 MG Oral Tab, Take 14 mg by mouth daily., Disp: 90 tablet, Rfl: 3    MAGNESIUM OR, Take 400 mg by mouth daily., Disp: , Rfl:     ALLOPURINOL 300 MG Oral Tab, TAKE 1 TABLET BY MOUTH EVERY DAY, Disp: 90 tablet, Rfl: 3    hydrocortisone 2.5 % External Cream, Apply topically as needed., Disp: , Rfl:     Dalfampridine ER 10 MG Oral Tablet 12 Hr, Take 1 tablet (10 mg total) by mouth in the morning and 1 tablet (10 mg total) before bedtime., Disp: , Rfl:     LOSARTAN 100 MG Oral Tab, TAKE 1 TABLET BY MOUTH EVERY DAY, Disp: 90 tablet, Rfl: 0    acetaminophen 500 MG Oral Tab, Take 2 tablets (1,000 mg total) by mouth every 6 (six) hours as needed for Pain., Disp: , Rfl:     Multiple Vitamins-Minerals (CENTRUM) Oral Tab, Take 1 tablet by mouth daily., Disp: , Rfl:     Glucosamine-Chondroit-Vit C-Mn (GLUCOSAMINE CHONDR 1500 COMPLX OR), Take 1 tablet by mouth daily., Disp: , Rfl:     Vitamin D3, Cholecalciferol, 50 MCG (2000 UT) Oral Tab, Take 1 tablet (2,000 Units total) by mouth daily., Disp: , Rfl:     cyanocobalamine 1000 MCG Oral Tab, Take 1 tablet (1,000 mcg total) by mouth daily., Disp: , Rfl:     ketoconazole 2 % External Cream, Apply topically 2 (two) times daily., Disp: , Rfl:     silver sulfADIAZINE 1 % External Cream, silver sulfadiazine 1 % topical cream  APPLY TO AFFECTED AREA TWICE A DAY DIRECTLY TO SORE, Disp: , Rfl:     ALPRAZolam 0.5 MG Oral Tab, Take 1 tablet (0.5 mg  total) by mouth 3 (three) times daily as needed., Disp: , Rfl:       Dr. SKYLER Aleman

## 2023-11-15 NOTE — TELEPHONE ENCOUNTER
RN spoke to the patient and he would like to infuse Ocrevus at [facility]. RN advised we will send the paperwork to him and order the necessary labs.    RN informed the patient we will confirm with Dr. Aleman if he would like him to start steroids first or after he started Ocrevus.     Patient verbalized understanding and will wait to hear from the office.

## 2023-11-16 NOTE — TELEPHONE ENCOUNTER
Patient enrolled for [employer] online at [employer]. Patient completed his authorization.    Will await lab completion prior to sending referral to [employer].

## 2023-11-20 ENCOUNTER — TELEPHONE (OUTPATIENT)
Dept: NEUROLOGY | Facility: CLINIC | Age: 78
End: 2023-11-20

## 2023-11-20 NOTE — TELEPHONE ENCOUNTER
RN LM for the patient's wife to confirm if they have completed the labs for Briumvi. RN advised to call the office with an update.

## 2023-11-20 NOTE — TELEPHONE ENCOUNTER
MERARY faxed Kemal Bowie approval to [id number]. Faxed confirmation received.      Labs pending and still need to send referral.

## 2023-11-21 ENCOUNTER — TELEPHONE (OUTPATIENT)
Dept: NEUROLOGY | Facility: CLINIC | Age: 78
End: 2023-11-21

## 2023-11-21 NOTE — TELEPHONE ENCOUNTER
Fax referral sent to [employer] to obtain insurance authorization.    Patient has not had labs completed at this time.

## 2023-12-06 ENCOUNTER — TELEPHONE (OUTPATIENT)
Dept: NEUROLOGY | Facility: CLINIC | Age: 78
End: 2023-12-06

## 2023-12-06 NOTE — TELEPHONE ENCOUNTER
[employer] calling, advised needs Hepatitis B results to be faxed to them prior to beginning ocrevus. Currently scheduled for 12/12/23.    P: [phone]  F: [phone]    Please advise.

## 2023-12-06 NOTE — TELEPHONE ENCOUNTER
Informed infusion center that patient still needs to complete his labs and will need to reschedule his infusions.

## 2023-12-15 ENCOUNTER — APPOINTMENT (OUTPATIENT)
Dept: WOUND CARE | Facility: HOSPITAL | Age: 78
End: 2023-12-15
Attending: INTERNAL MEDICINE
Payer: MEDICARE

## 2023-12-15 ENCOUNTER — TELEPHONE (OUTPATIENT)
Dept: WOUND CARE | Facility: HOSPITAL | Age: 78
End: 2023-12-15

## 2023-12-15 NOTE — TELEPHONE ENCOUNTER
Noted. Provider made aware. RN called pt, pt states he needs supplies ordered. Order faxed to [facility]. Noted in chart patient has rescheduled already.

## 2023-12-21 ENCOUNTER — LAB ENCOUNTER (OUTPATIENT)
Dept: LAB | Age: 78
End: 2023-12-21
Attending: Other
Payer: MEDICARE

## 2023-12-21 DIAGNOSIS — G35 MS (MULTIPLE SCLEROSIS) (HCC): ICD-10-CM

## 2023-12-21 LAB
ALBUMIN SERPL-MCNC: 3.4 G/DL (ref 3.4–5)
ALP LIVER SERPL-CCNC: 45 U/L
ALT SERPL-CCNC: 26 U/L
AST SERPL-CCNC: 17 U/L (ref 15–37)
BILIRUB DIRECT SERPL-MCNC: 0.2 MG/DL (ref 0–0.2)
BILIRUB SERPL-MCNC: 0.6 MG/DL (ref 0.1–2)
HBV CORE AB SERPL QL IA: NONREACTIVE
HBV SURFACE AB SER QL: NONREACTIVE
HBV SURFACE AB SERPL IA-ACNC: 3.63 MIU/ML
HBV SURFACE AG SER-ACNC: 0.34 [IU]/L
HBV SURFACE AG SERPL QL IA: NONREACTIVE
IGA SERPL-MCNC: 157 MG/DL (ref 70–312)
IGM SERPL-MCNC: 155 MG/DL (ref 43–279)
IMMUNOGLOBULIN PNL SER-MCNC: 708 MG/DL (ref 791–1643)
PROT SERPL-MCNC: 7.2 G/DL (ref 6.4–8.2)

## 2023-12-21 PROCEDURE — 86704 HEP B CORE ANTIBODY TOTAL: CPT

## 2023-12-21 PROCEDURE — 80076 HEPATIC FUNCTION PANEL: CPT | Performed by: OTHER

## 2023-12-21 PROCEDURE — 86480 TB TEST CELL IMMUN MEASURE: CPT

## 2023-12-21 PROCEDURE — 86706 HEP B SURFACE ANTIBODY: CPT

## 2023-12-21 PROCEDURE — 36415 COLL VENOUS BLD VENIPUNCTURE: CPT

## 2023-12-21 PROCEDURE — 87340 HEPATITIS B SURFACE AG IA: CPT

## 2023-12-21 PROCEDURE — 82784 ASSAY IGA/IGD/IGG/IGM EACH: CPT

## 2023-12-26 LAB
M TB IFN-G CD4+ T-CELLS BLD-ACNC: 0 IU/ML
M TB TUBERC IFN-G BLD QL: NEGATIVE
M TB TUBERC IGNF/MITOGEN IGNF CONTROL: >10 IU/ML
QFT TB1 AG MINUS NIL: 0 IU/ML
QFT TB2 AG MINUS NIL: 0 IU/ML

## 2023-12-27 NOTE — TELEPHONE ENCOUNTER
Dominique with [facility] calling, requesting fax with lab results to be sent to  [phone].     States patient has infusion scheduled this Friday, requesting results to be sent as soon as possible.

## 2024-01-01 ENCOUNTER — APPOINTMENT (OUTPATIENT)
Dept: CT IMAGING | Facility: HOSPITAL | Age: 79
End: 2024-01-01
Attending: STUDENT IN AN ORGANIZED HEALTH CARE EDUCATION/TRAINING PROGRAM
Payer: MEDICARE

## 2024-01-01 ENCOUNTER — APPOINTMENT (OUTPATIENT)
Dept: GENERAL RADIOLOGY | Facility: HOSPITAL | Age: 79
End: 2024-01-01
Attending: STUDENT IN AN ORGANIZED HEALTH CARE EDUCATION/TRAINING PROGRAM
Payer: MEDICARE

## 2024-01-01 ENCOUNTER — APPOINTMENT (OUTPATIENT)
Dept: GENERAL RADIOLOGY | Facility: HOSPITAL | Age: 79
End: 2024-01-01
Attending: NURSE PRACTITIONER
Payer: MEDICARE

## 2024-01-01 ENCOUNTER — APPOINTMENT (OUTPATIENT)
Dept: GENERAL RADIOLOGY | Facility: HOSPITAL | Age: 79
End: 2024-01-01
Payer: MEDICARE

## 2024-01-01 ENCOUNTER — HOSPITAL ENCOUNTER (INPATIENT)
Facility: HOSPITAL | Age: 79
LOS: 3 days | End: 2024-01-01
Attending: STUDENT IN AN ORGANIZED HEALTH CARE EDUCATION/TRAINING PROGRAM | Admitting: STUDENT IN AN ORGANIZED HEALTH CARE EDUCATION/TRAINING PROGRAM
Payer: OTHER MISCELLANEOUS

## 2024-01-01 ENCOUNTER — HOSPITAL ENCOUNTER (INPATIENT)
Facility: HOSPITAL | Age: 79
LOS: 3 days | Discharge: HOSPICE | End: 2024-01-01
Attending: STUDENT IN AN ORGANIZED HEALTH CARE EDUCATION/TRAINING PROGRAM | Admitting: HOSPITALIST
Payer: MEDICARE

## 2024-01-01 VITALS
HEART RATE: 97 BPM | BODY MASS INDEX: 20 KG/M2 | SYSTOLIC BLOOD PRESSURE: 119 MMHG | DIASTOLIC BLOOD PRESSURE: 60 MMHG | OXYGEN SATURATION: 98 % | TEMPERATURE: 98 F | RESPIRATION RATE: 24 BRPM | WEIGHT: 134.06 LBS

## 2024-01-01 VITALS
TEMPERATURE: 98 F | RESPIRATION RATE: 24 BRPM | HEART RATE: 117 BPM | DIASTOLIC BLOOD PRESSURE: 72 MMHG | OXYGEN SATURATION: 87 % | SYSTOLIC BLOOD PRESSURE: 134 MMHG

## 2024-01-01 DIAGNOSIS — L89.304 PRESSURE INJURY OF BUTTOCK, STAGE 4, UNSPECIFIED LATERALITY (HCC): ICD-10-CM

## 2024-01-01 DIAGNOSIS — R65.21 SEPTIC SHOCK (HCC): Primary | ICD-10-CM

## 2024-01-01 DIAGNOSIS — T68.XXXA HYPOTHERMIA, INITIAL ENCOUNTER: ICD-10-CM

## 2024-01-01 DIAGNOSIS — K52.89 STERCORAL COLITIS: ICD-10-CM

## 2024-01-01 DIAGNOSIS — A41.9 SEPTIC SHOCK (HCC): Primary | ICD-10-CM

## 2024-01-01 DIAGNOSIS — G35 MS (MULTIPLE SCLEROSIS) (HCC): ICD-10-CM

## 2024-01-01 DIAGNOSIS — N39.0 URINARY TRACT INFECTION WITHOUT HEMATURIA, SITE UNSPECIFIED: ICD-10-CM

## 2024-01-01 LAB
ADENOVIRUS F 40/41 PCR: NEGATIVE
ALBUMIN SERPL-MCNC: 2 G/DL (ref 3.2–4.8)
ALBUMIN SERPL-MCNC: 2 G/DL (ref 3.2–4.8)
ALBUMIN SERPL-MCNC: 2.1 G/DL (ref 3.2–4.8)
ALBUMIN SERPL-MCNC: 2.2 G/DL (ref 3.2–4.8)
ALBUMIN/GLOB SERPL: 1 {RATIO} (ref 1–2)
ALBUMIN/GLOB SERPL: 1.2 {RATIO} (ref 1–2)
ALBUMIN/GLOB SERPL: 1.2 {RATIO} (ref 1–2)
ALBUMIN/GLOB SERPL: 1.4 {RATIO} (ref 1–2)
ALP LIVER SERPL-CCNC: 56 U/L
ALP LIVER SERPL-CCNC: 67 U/L
ALP LIVER SERPL-CCNC: 72 U/L
ALP LIVER SERPL-CCNC: 80 U/L
ALT SERPL-CCNC: 7 U/L
ALT SERPL-CCNC: 9 U/L
ALT SERPL-CCNC: 9 U/L
ALT SERPL-CCNC: <7 U/L
ANION GAP SERPL CALC-SCNC: 10 MMOL/L (ref 0–18)
ANION GAP SERPL CALC-SCNC: 13 MMOL/L (ref 0–18)
ANION GAP SERPL CALC-SCNC: 7 MMOL/L (ref 0–18)
ANION GAP SERPL CALC-SCNC: 9 MMOL/L (ref 0–18)
ANION GAP SERPL CALC-SCNC: 9 MMOL/L (ref 0–18)
AST SERPL-CCNC: 10 U/L (ref ?–34)
AST SERPL-CCNC: 11 U/L (ref ?–34)
AST SERPL-CCNC: 12 U/L (ref ?–34)
AST SERPL-CCNC: 9 U/L (ref ?–34)
ASTROVIRUS PCR: NEGATIVE
ATRIAL RATE: 103 BPM
BASE EXCESS BLDA CALC-SCNC: -5.5 MMOL/L (ref ?–2)
BASE EXCESS BLDA CALC-SCNC: -5.5 MMOL/L (ref ?–2)
BASOPHILS # BLD: 0 X10(3) UL (ref 0–0.2)
BASOPHILS NFR BLD: 0 %
BILIRUB SERPL-MCNC: 0.2 MG/DL (ref 0.2–1.1)
BILIRUB SERPL-MCNC: 0.3 MG/DL (ref 0.2–1.1)
BILIRUB UR QL STRIP.AUTO: NEGATIVE
BODY TEMPERATURE: 98.6 F
BODY TEMPERATURE: 98.6 F
BUN BLD-MCNC: 27 MG/DL (ref 9–23)
BUN BLD-MCNC: 27 MG/DL (ref 9–23)
BUN BLD-MCNC: 28 MG/DL (ref 9–23)
BUN BLD-MCNC: 29 MG/DL (ref 9–23)
BUN BLD-MCNC: 33 MG/DL (ref 9–23)
C CAYETANENSIS DNA SPEC QL NAA+PROBE: NEGATIVE
C DIFF GDH + TOXINS A+B STL QL IA.RAPID: DETECTED
C DIFF TOX B STL QL: POSITIVE
CA-I BLD-SCNC: 1.32 MMOL/L (ref 0.95–1.32)
CA-I BLD-SCNC: 1.33 MMOL/L (ref 0.95–1.32)
CALCIUM BLD-MCNC: 8.1 MG/DL (ref 8.7–10.4)
CALCIUM BLD-MCNC: 8.2 MG/DL (ref 8.7–10.4)
CALCIUM BLD-MCNC: 8.3 MG/DL (ref 8.7–10.4)
CALCIUM BLD-MCNC: 8.7 MG/DL (ref 8.7–10.4)
CALCIUM BLD-MCNC: 9.2 MG/DL (ref 8.7–10.4)
CAMPY SP DNA.DIARRHEA STL QL NAA+PROBE: NEGATIVE
CHLORIDE SERPL-SCNC: 108 MMOL/L (ref 98–112)
CHLORIDE SERPL-SCNC: 109 MMOL/L (ref 98–112)
CHLORIDE SERPL-SCNC: 109 MMOL/L (ref 98–112)
CHLORIDE SERPL-SCNC: 110 MMOL/L (ref 98–112)
CHLORIDE SERPL-SCNC: 111 MMOL/L (ref 98–112)
CO2 SERPL-SCNC: 17 MMOL/L (ref 21–32)
CO2 SERPL-SCNC: 20 MMOL/L (ref 21–32)
CO2 SERPL-SCNC: 22 MMOL/L (ref 21–32)
COHGB MFR BLD: 1.6 % SAT (ref 0–3)
COHGB MFR BLD: 1.7 % SAT (ref 0–3)
COLOR UR AUTO: YELLOW
CORTIS SERPL-MCNC: 42.1 UG/DL
CREAT BLD-MCNC: 0.43 MG/DL
CREAT BLD-MCNC: 0.43 MG/DL
CREAT BLD-MCNC: 0.47 MG/DL
CREAT BLD-MCNC: 0.49 MG/DL
CREAT BLD-MCNC: 0.67 MG/DL
CRYPTOSP DNA SPEC QL NAA+PROBE: NEGATIVE
EAEC PAA PLAS AGGR+AATA ST NAA+NON-PRB: NEGATIVE
EC STX1+STX2 + H7 FLIC SPEC NAA+PROBE: NEGATIVE
EGFRCR SERPLBLD CKD-EPI 2021: 106 ML/MIN/1.73M2 (ref 60–?)
EGFRCR SERPLBLD CKD-EPI 2021: 107 ML/MIN/1.73M2 (ref 60–?)
EGFRCR SERPLBLD CKD-EPI 2021: 110 ML/MIN/1.73M2 (ref 60–?)
EGFRCR SERPLBLD CKD-EPI 2021: 110 ML/MIN/1.73M2 (ref 60–?)
EGFRCR SERPLBLD CKD-EPI 2021: 96 ML/MIN/1.73M2 (ref 60–?)
ENTAMOEBA HISTOLYTICA PCR: NEGATIVE
EOSINOPHIL # BLD: 0 X10(3) UL (ref 0–0.7)
EOSINOPHIL NFR BLD: 0 %
EPEC EAE GENE STL QL NAA+NON-PROBE: NEGATIVE
ERYTHROCYTE [DISTWIDTH] IN BLOOD BY AUTOMATED COUNT: 17.2 %
ERYTHROCYTE [DISTWIDTH] IN BLOOD BY AUTOMATED COUNT: 17.2 %
ERYTHROCYTE [DISTWIDTH] IN BLOOD BY AUTOMATED COUNT: 17.3 %
ERYTHROCYTE [DISTWIDTH] IN BLOOD BY AUTOMATED COUNT: 17.5 %
ETEC LTA+ST1A+ST1B TOX ST NAA+NON-PROBE: NEGATIVE
FLUAV + FLUBV RNA SPEC NAA+PROBE: NEGATIVE
FLUAV + FLUBV RNA SPEC NAA+PROBE: NEGATIVE
GIARDIA LAMBLIA PCR: NEGATIVE
GLOBULIN PLAS-MCNC: 1.4 G/DL (ref 2–3.5)
GLOBULIN PLAS-MCNC: 1.7 G/DL (ref 2–3.5)
GLOBULIN PLAS-MCNC: 1.7 G/DL (ref 2–3.5)
GLOBULIN PLAS-MCNC: 2.3 G/DL (ref 2–3.5)
GLUCOSE BLD-MCNC: 100 MG/DL (ref 70–99)
GLUCOSE BLD-MCNC: 113 MG/DL (ref 70–99)
GLUCOSE BLD-MCNC: 114 MG/DL (ref 70–99)
GLUCOSE BLD-MCNC: 125 MG/DL (ref 70–99)
GLUCOSE BLD-MCNC: 150 MG/DL (ref 70–99)
GLUCOSE BLD-MCNC: 179 MG/DL (ref 70–99)
GLUCOSE BLD-MCNC: 232 MG/DL (ref 70–99)
GLUCOSE BLD-MCNC: 78 MG/DL (ref 70–99)
GLUCOSE BLD-MCNC: 83 MG/DL (ref 70–99)
GLUCOSE BLD-MCNC: 86 MG/DL (ref 70–99)
GLUCOSE BLD-MCNC: 98 MG/DL (ref 70–99)
GLUCOSE UR STRIP.AUTO-MCNC: NORMAL MG/DL
HCO3 BLDA-SCNC: 20.7 MEQ/L (ref 21–27)
HCO3 BLDA-SCNC: 20.7 MEQ/L (ref 21–27)
HCT VFR BLD AUTO: 27.3 %
HCT VFR BLD AUTO: 29.3 %
HCT VFR BLD AUTO: 29.9 %
HCT VFR BLD AUTO: 32.4 %
HGB BLD-MCNC: 10 G/DL
HGB BLD-MCNC: 8.3 G/DL
HGB BLD-MCNC: 8.4 G/DL
HGB BLD-MCNC: 8.5 G/DL
HGB BLD-MCNC: 9.1 G/DL
HGB BLD-MCNC: 9.2 G/DL
KETONES UR STRIP.AUTO-MCNC: NEGATIVE MG/DL
LACTATE BLD-SCNC: 1.2 MMOL/L (ref 0.5–2)
LACTATE BLD-SCNC: 1.4 MMOL/L (ref 0.5–2)
LACTATE SERPL-SCNC: 1.7 MMOL/L (ref 0.5–2)
LACTATE SERPL-SCNC: 2.3 MMOL/L (ref 0.5–2)
LACTATE SERPL-SCNC: 2.6 MMOL/L (ref 0.5–2)
LACTATE SERPL-SCNC: 3.3 MMOL/L (ref 0.5–2)
LEUKOCYTE ESTERASE UR QL STRIP.AUTO: 500
LYMPHOCYTES NFR BLD: 1.16 X10(3) UL (ref 1–4)
LYMPHOCYTES NFR BLD: 1.18 X10(3) UL (ref 1–4)
LYMPHOCYTES NFR BLD: 2 %
LYMPHOCYTES NFR BLD: 3 %
LYMPHOCYTES NFR BLD: 3 X10(3) UL (ref 1–4)
LYMPHOCYTES NFR BLD: 3.25 X10(3) UL (ref 1–4)
LYMPHOCYTES NFR BLD: 7 %
LYMPHOCYTES NFR BLD: 9 %
MAGNESIUM SERPL-MCNC: 1.8 MG/DL (ref 1.6–2.6)
MAGNESIUM SERPL-MCNC: 1.9 MG/DL (ref 1.6–2.6)
MAGNESIUM SERPL-MCNC: 2.2 MG/DL (ref 1.6–2.6)
MCH RBC QN AUTO: 23.8 PG (ref 26–34)
MCH RBC QN AUTO: 24 PG (ref 26–34)
MCH RBC QN AUTO: 24.2 PG (ref 26–34)
MCH RBC QN AUTO: 24.4 PG (ref 26–34)
MCHC RBC AUTO-ENTMCNC: 30.4 G/DL (ref 31–37)
MCHC RBC AUTO-ENTMCNC: 30.8 G/DL (ref 31–37)
MCHC RBC AUTO-ENTMCNC: 30.9 G/DL (ref 31–37)
MCHC RBC AUTO-ENTMCNC: 31.1 G/DL (ref 31–37)
MCV RBC AUTO: 77.5 FL
MCV RBC AUTO: 78.5 FL
MCV RBC AUTO: 78.6 FL
MCV RBC AUTO: 78.9 FL
METAMYELOCYTES # BLD: 1.54 X10(3) UL
METAMYELOCYTES NFR BLD: 4 %
METHGB MFR BLD: 0 % SAT (ref 0.4–1.5)
METHGB MFR BLD: 0.5 % SAT (ref 0.4–1.5)
MONOCYTES # BLD: 0.59 X10(3) UL (ref 0.1–1)
MONOCYTES # BLD: 1 X10(3) UL (ref 0.1–1)
MONOCYTES # BLD: 1.39 X10(3) UL (ref 0.1–1)
MONOCYTES # BLD: 1.54 X10(3) UL (ref 0.1–1)
MONOCYTES NFR BLD: 1 %
MONOCYTES NFR BLD: 3 %
MONOCYTES NFR BLD: 3 %
MONOCYTES NFR BLD: 4 %
MORPHOLOGY: NORMAL
MRSA DNA SPEC QL NAA+PROBE: POSITIVE
NEUTROPHILS # BLD AUTO: 27.4 X10 (3) UL (ref 1.5–7.7)
NEUTROPHILS # BLD AUTO: 28.66 X10 (3) UL (ref 1.5–7.7)
NEUTROPHILS # BLD AUTO: 40.42 X10 (3) UL (ref 1.5–7.7)
NEUTROPHILS # BLD AUTO: 53.03 X10 (3) UL (ref 1.5–7.7)
NEUTROPHILS NFR BLD: 78 %
NEUTROPHILS NFR BLD: 82 %
NEUTROPHILS NFR BLD: 85 %
NEUTROPHILS NFR BLD: 89 %
NEUTS BAND NFR BLD: 10 %
NEUTS BAND NFR BLD: 4 %
NEUTS BAND NFR BLD: 8 %
NEUTS BAND NFR BLD: 8 %
NEUTS HYPERSEG # BLD: 29.3 X10(3) UL (ref 1.5–7.7)
NEUTS HYPERSEG # BLD: 34.35 X10(3) UL (ref 1.5–7.7)
NEUTS HYPERSEG # BLD: 41.76 X10(3) UL (ref 1.5–7.7)
NEUTS HYPERSEG # BLD: 57.42 X10(3) UL (ref 1.5–7.7)
NITRITE UR QL STRIP.AUTO: NEGATIVE
NOROVIRUS GI/GII PCR: NEGATIVE
OSMOLALITY SERPL CALC.SUM OF ELEC: 289 MOSM/KG (ref 275–295)
OSMOLALITY SERPL CALC.SUM OF ELEC: 294 MOSM/KG (ref 275–295)
OSMOLALITY SERPL CALC.SUM OF ELEC: 294 MOSM/KG (ref 275–295)
OSMOLALITY SERPL CALC.SUM OF ELEC: 296 MOSM/KG (ref 275–295)
OSMOLALITY SERPL CALC.SUM OF ELEC: 296 MOSM/KG (ref 275–295)
OXYHGB MFR BLDA: 97 % (ref 92–100)
OXYHGB MFR BLDA: 98.2 % (ref 92–100)
P AXIS: 55 DEGREES
P SHIGELLOIDES DNA STL QL NAA+PROBE: NEGATIVE
P-R INTERVAL: 172 MS
PCO2 BLDA: 29 MM HG (ref 35–45)
PCO2 BLDA: 30 MM HG (ref 35–45)
PH BLDA: 7.4 [PH] (ref 7.35–7.45)
PH BLDA: 7.41 [PH] (ref 7.35–7.45)
PH UR STRIP.AUTO: 8 [PH] (ref 5–8)
PHOSPHATE SERPL-MCNC: 4.6 MG/DL (ref 2.4–5.1)
PHOSPHATE SERPL-MCNC: 4.6 MG/DL (ref 2.4–5.1)
PHOSPHATE SERPL-MCNC: 4.9 MG/DL (ref 2.4–5.1)
PLATELET # BLD AUTO: 369 10(3)UL (ref 150–450)
PLATELET # BLD AUTO: 374 10(3)UL (ref 150–450)
PLATELET # BLD AUTO: 452 10(3)UL (ref 150–450)
PLATELET # BLD AUTO: 522 10(3)UL (ref 150–450)
PLATELET MORPHOLOGY: NORMAL
PO2 BLDA: 108 MM HG (ref 80–100)
PO2 BLDA: 99 MM HG (ref 80–100)
POTASSIUM BLD-SCNC: 3.6 MMOL/L (ref 3.6–5.1)
POTASSIUM BLD-SCNC: 4.2 MMOL/L (ref 3.6–5.1)
POTASSIUM SERPL-SCNC: 3.4 MMOL/L (ref 3.5–5.1)
POTASSIUM SERPL-SCNC: 3.8 MMOL/L (ref 3.5–5.1)
POTASSIUM SERPL-SCNC: 4 MMOL/L (ref 3.5–5.1)
POTASSIUM SERPL-SCNC: 4 MMOL/L (ref 3.5–5.1)
POTASSIUM SERPL-SCNC: 4.1 MMOL/L (ref 3.5–5.1)
POTASSIUM SERPL-SCNC: 5 MMOL/L (ref 3.5–5.1)
PROT SERPL-MCNC: 3.4 G/DL (ref 5.7–8.2)
PROT SERPL-MCNC: 3.7 G/DL (ref 5.7–8.2)
PROT SERPL-MCNC: 3.8 G/DL (ref 5.7–8.2)
PROT SERPL-MCNC: 4.5 G/DL (ref 5.7–8.2)
PROT UR STRIP.AUTO-MCNC: 600 MG/DL
Q-T INTERVAL: 336 MS
QRS DURATION: 84 MS
QTC CALCULATION (BEZET): 440 MS
R AXIS: 46 DEGREES
RBC # BLD AUTO: 3.46 X10(6)UL
RBC # BLD AUTO: 3.73 X10(6)UL
RBC # BLD AUTO: 3.86 X10(6)UL
RBC # BLD AUTO: 4.13 X10(6)UL
RBC #/AREA URNS AUTO: >10 /HPF
ROTAVIRUS A PCR: NEGATIVE
RSV RNA SPEC NAA+PROBE: NEGATIVE
SALMONELLA DNA SPEC QL NAA+PROBE: NEGATIVE
SAPOVIRUS PCR: NEGATIVE
SARS-COV-2 RNA RESP QL NAA+PROBE: NOT DETECTED
SHIGELLA SP+EIEC IPAH ST NAA+NON-PROBE: NEGATIVE
SODIUM BLD-SCNC: 133 MMOL/L (ref 135–145)
SODIUM BLD-SCNC: 134 MMOL/L (ref 135–145)
SODIUM SERPL-SCNC: 136 MMOL/L (ref 136–145)
SODIUM SERPL-SCNC: 139 MMOL/L (ref 136–145)
SODIUM SERPL-SCNC: 139 MMOL/L (ref 136–145)
SODIUM SERPL-SCNC: 140 MMOL/L (ref 136–145)
SODIUM SERPL-SCNC: 140 MMOL/L (ref 136–145)
SP GR UR STRIP.AUTO: 1.03 (ref 1–1.03)
T AXIS: 225 DEGREES
TOTAL CELLS COUNTED BLD: 100
TROPONIN I SERPL HS-MCNC: 7 NG/L
UROBILINOGEN UR STRIP.AUTO-MCNC: 4 MG/DL
V CHOLERAE DNA SPEC QL NAA+PROBE: NEGATIVE
VENTRICULAR RATE: 103 BPM
VIBRIO DNA SPEC NAA+PROBE: NEGATIVE
WBC # BLD AUTO: 33.3 X10(3) UL (ref 4–11)
WBC # BLD AUTO: 38.6 X10(3) UL (ref 4–11)
WBC # BLD AUTO: 46.4 X10(3) UL (ref 4–11)
WBC # BLD AUTO: 59.2 X10(3) UL (ref 4–11)
WBC #/AREA URNS AUTO: >50 /HPF
WBC CLUMPS UR QL AUTO: PRESENT /HPF
YERSINIA DNA SPEC NAA+PROBE: NEGATIVE

## 2024-01-01 PROCEDURE — 71045 X-RAY EXAM CHEST 1 VIEW: CPT | Performed by: STUDENT IN AN ORGANIZED HEALTH CARE EDUCATION/TRAINING PROGRAM

## 2024-01-01 PROCEDURE — 99233 SBSQ HOSP IP/OBS HIGH 50: CPT | Performed by: NURSE PRACTITIONER

## 2024-01-01 PROCEDURE — 99223 1ST HOSP IP/OBS HIGH 75: CPT | Performed by: NURSE PRACTITIONER

## 2024-01-01 PROCEDURE — 71045 X-RAY EXAM CHEST 1 VIEW: CPT | Performed by: NURSE PRACTITIONER

## 2024-01-01 PROCEDURE — 05HD33Z INSERTION OF INFUSION DEVICE INTO RIGHT CEPHALIC VEIN, PERCUTANEOUS APPROACH: ICD-10-PCS | Performed by: STUDENT IN AN ORGANIZED HEALTH CARE EDUCATION/TRAINING PROGRAM

## 2024-01-01 PROCEDURE — XW0334A INTRODUCTION OF CEFEPIME-TANIBORBACTAM ANTI-INFECTIVE INTO PERIPHERAL VEIN, PERCUTANEOUS APPROACH, NEW TECHNOLOGY GROUP 10: ICD-10-PCS | Performed by: STUDENT IN AN ORGANIZED HEALTH CARE EDUCATION/TRAINING PROGRAM

## 2024-01-01 PROCEDURE — 4A133J1 MONITORING OF ARTERIAL PULSE, PERIPHERAL, PERCUTANEOUS APPROACH: ICD-10-PCS | Performed by: STUDENT IN AN ORGANIZED HEALTH CARE EDUCATION/TRAINING PROGRAM

## 2024-01-01 PROCEDURE — 74177 CT ABD & PELVIS W/CONTRAST: CPT | Performed by: STUDENT IN AN ORGANIZED HEALTH CARE EDUCATION/TRAINING PROGRAM

## 2024-01-01 PROCEDURE — 99291 CRITICAL CARE FIRST HOUR: CPT | Performed by: INTERNAL MEDICINE

## 2024-01-01 PROCEDURE — 74018 RADEX ABDOMEN 1 VIEW: CPT | Performed by: NURSE PRACTITIONER

## 2024-01-01 PROCEDURE — B54MZZA ULTRASONOGRAPHY OF RIGHT UPPER EXTREMITY VEINS, GUIDANCE: ICD-10-PCS | Performed by: STUDENT IN AN ORGANIZED HEALTH CARE EDUCATION/TRAINING PROGRAM

## 2024-01-01 PROCEDURE — 99291 CRITICAL CARE FIRST HOUR: CPT | Performed by: EMERGENCY MEDICINE

## 2024-01-01 PROCEDURE — 99232 SBSQ HOSP IP/OBS MODERATE 35: CPT | Performed by: NURSE PRACTITIONER

## 2024-01-01 PROCEDURE — 0DH67UZ INSERTION OF FEEDING DEVICE INTO STOMACH, VIA NATURAL OR ARTIFICIAL OPENING: ICD-10-PCS | Performed by: STUDENT IN AN ORGANIZED HEALTH CARE EDUCATION/TRAINING PROGRAM

## 2024-01-01 PROCEDURE — 03HY32Z INSERTION OF MONITORING DEVICE INTO UPPER ARTERY, PERCUTANEOUS APPROACH: ICD-10-PCS | Performed by: STUDENT IN AN ORGANIZED HEALTH CARE EDUCATION/TRAINING PROGRAM

## 2024-01-01 PROCEDURE — 4A133B1 MONITORING OF ARTERIAL PRESSURE, PERIPHERAL, PERCUTANEOUS APPROACH: ICD-10-PCS | Performed by: STUDENT IN AN ORGANIZED HEALTH CARE EDUCATION/TRAINING PROGRAM

## 2024-01-01 PROCEDURE — 99233 SBSQ HOSP IP/OBS HIGH 50: CPT | Performed by: INTERNAL MEDICINE

## 2024-01-01 RX ORDER — FUROSEMIDE 10 MG/ML
40 INJECTION INTRAMUSCULAR; INTRAVENOUS EVERY 8 HOURS PRN
Status: DISCONTINUED | OUTPATIENT
Start: 2024-01-01 | End: 2024-01-01

## 2024-01-01 RX ORDER — MORPHINE SULFATE 2 MG/ML
2 INJECTION, SOLUTION INTRAMUSCULAR; INTRAVENOUS EVERY 2 HOUR PRN
Status: DISCONTINUED | OUTPATIENT
Start: 2024-01-01 | End: 2024-01-01

## 2024-01-01 RX ORDER — MORPHINE SULFATE 4 MG/ML
4 INJECTION, SOLUTION INTRAMUSCULAR; INTRAVENOUS EVERY 2 HOUR PRN
Status: DISCONTINUED | OUTPATIENT
Start: 2024-01-01 | End: 2024-01-01

## 2024-01-01 RX ORDER — HALOPERIDOL 5 MG/ML
1 INJECTION INTRAMUSCULAR
Status: DISCONTINUED | OUTPATIENT
Start: 2024-01-01 | End: 2024-01-01

## 2024-01-01 RX ORDER — SODIUM PHOSPHATE, DIBASIC AND SODIUM PHOSPHATE, MONOBASIC 7; 19 G/230ML; G/230ML
1 ENEMA RECTAL ONCE AS NEEDED
Status: DISCONTINUED | OUTPATIENT
Start: 2024-01-01 | End: 2024-01-01

## 2024-01-01 RX ORDER — SCOLOPAMINE TRANSDERMAL SYSTEM 1 MG/1
1 PATCH, EXTENDED RELEASE TRANSDERMAL
Status: DISCONTINUED | OUTPATIENT
Start: 2024-01-01 | End: 2024-01-01

## 2024-01-01 RX ORDER — ACETAMINOPHEN 10 MG/ML
1000 INJECTION, SOLUTION INTRAVENOUS EVERY 6 HOURS PRN
Status: DISCONTINUED | OUTPATIENT
Start: 2024-01-01 | End: 2024-01-01

## 2024-01-01 RX ORDER — ONDANSETRON 2 MG/ML
4 INJECTION INTRAMUSCULAR; INTRAVENOUS EVERY 6 HOURS PRN
Status: DISCONTINUED | OUTPATIENT
Start: 2024-01-01 | End: 2024-01-01

## 2024-01-01 RX ORDER — ACETAMINOPHEN 650 MG/1
650 SUPPOSITORY RECTAL EVERY 6 HOURS PRN
Status: DISCONTINUED | OUTPATIENT
Start: 2024-01-01 | End: 2024-01-01

## 2024-01-01 RX ORDER — ALBUMIN (HUMAN) 12.5 G/50ML
25 SOLUTION INTRAVENOUS ONCE
Status: COMPLETED | OUTPATIENT
Start: 2024-01-01 | End: 2024-01-01

## 2024-01-01 RX ORDER — MORPHINE SULFATE 20 MG/ML
5 SOLUTION ORAL
Status: DISCONTINUED | OUTPATIENT
Start: 2024-01-01 | End: 2024-01-01

## 2024-01-01 RX ORDER — LIDOCAINE HYDROCHLORIDE 10 MG/ML
INJECTION, SOLUTION EPIDURAL; INFILTRATION; INTRACAUDAL; PERINEURAL
Status: DISPENSED
Start: 2024-01-01 | End: 2024-01-01

## 2024-01-01 RX ORDER — ACETAMINOPHEN 160 MG/5ML
1000 SOLUTION ORAL EVERY 6 HOURS PRN
Status: DISCONTINUED | OUTPATIENT
Start: 2024-01-01 | End: 2024-01-01

## 2024-01-01 RX ORDER — MIDODRINE HYDROCHLORIDE 5 MG/1
5 TABLET ORAL 3 TIMES DAILY
Status: DISCONTINUED | OUTPATIENT
Start: 2024-01-01 | End: 2024-01-01

## 2024-01-01 RX ORDER — VANCOMYCIN HYDROCHLORIDE 50 MG/ML
125 KIT ORAL 4 TIMES DAILY
Status: CANCELLED | OUTPATIENT
Start: 2024-01-01 | End: 2024-12-14

## 2024-01-01 RX ORDER — SODIUM CHLORIDE 9 MG/ML
150 INJECTION, SOLUTION INTRAVENOUS CONTINUOUS
Status: DISCONTINUED | OUTPATIENT
Start: 2024-01-01 | End: 2024-01-01

## 2024-01-01 RX ORDER — VANCOMYCIN HYDROCHLORIDE 125 MG/1
125 CAPSULE ORAL 4 TIMES DAILY
Status: DISCONTINUED | OUTPATIENT
Start: 2024-01-01 | End: 2024-01-01

## 2024-01-01 RX ORDER — GLYCOPYRROLATE 0.2 MG/ML
0.4 INJECTION, SOLUTION INTRAMUSCULAR; INTRAVENOUS
Status: DISCONTINUED | OUTPATIENT
Start: 2024-01-01 | End: 2024-01-01

## 2024-01-01 RX ORDER — VANCOMYCIN HYDROCHLORIDE 50 MG/ML
125 KIT ORAL 4 TIMES DAILY
Status: DISCONTINUED | OUTPATIENT
Start: 2024-01-01 | End: 2024-01-01

## 2024-01-01 RX ORDER — METRONIDAZOLE 500 MG/100ML
500 INJECTION, SOLUTION INTRAVENOUS EVERY 8 HOURS
Status: DISCONTINUED | OUTPATIENT
Start: 2024-01-01 | End: 2024-01-01

## 2024-01-01 RX ORDER — BISACODYL 10 MG
10 SUPPOSITORY, RECTAL RECTAL
Status: DISCONTINUED | OUTPATIENT
Start: 2024-01-01 | End: 2024-01-01

## 2024-01-01 RX ORDER — POLYETHYLENE GLYCOL 3350 17 G/17G
17 POWDER, FOR SOLUTION ORAL DAILY PRN
Status: DISCONTINUED | OUTPATIENT
Start: 2024-01-01 | End: 2024-01-01

## 2024-01-01 RX ORDER — VANCOMYCIN HYDROCHLORIDE
25 ONCE
Status: COMPLETED | OUTPATIENT
Start: 2024-01-01 | End: 2024-01-01

## 2024-01-01 RX ORDER — VANCOMYCIN HYDROCHLORIDE 250 MG/1
500 CAPSULE ORAL 4 TIMES DAILY
Status: DISCONTINUED | OUTPATIENT
Start: 2024-01-01 | End: 2024-01-01

## 2024-01-01 RX ORDER — METRONIDAZOLE 500 MG/100ML
500 INJECTION, SOLUTION INTRAVENOUS EVERY 8 HOURS
Status: CANCELLED | OUTPATIENT
Start: 2024-01-01

## 2024-01-01 RX ORDER — MAGNESIUM SULFATE HEPTAHYDRATE 40 MG/ML
2 INJECTION, SOLUTION INTRAVENOUS ONCE
Status: COMPLETED | OUTPATIENT
Start: 2024-01-01 | End: 2024-01-01

## 2024-01-01 RX ORDER — NAPROXEN SODIUM 220 MG/1
1-2 TABLET, FILM COATED ORAL AS NEEDED
COMMUNITY

## 2024-01-01 RX ORDER — MORPHINE SULFATE 2 MG/ML
1 INJECTION, SOLUTION INTRAMUSCULAR; INTRAVENOUS EVERY 2 HOUR PRN
Status: DISCONTINUED | OUTPATIENT
Start: 2024-01-01 | End: 2024-01-01

## 2024-01-01 RX ORDER — ATROPINE SULFATE 10 MG/ML
2 SOLUTION/ DROPS OPHTHALMIC EVERY 2 HOUR PRN
Status: DISCONTINUED | OUTPATIENT
Start: 2024-01-01 | End: 2024-01-01

## 2024-01-01 RX ORDER — GLYCOPYRROLATE 0.2 MG/ML
0.4 INJECTION, SOLUTION INTRAMUSCULAR; INTRAVENOUS EVERY 4 HOURS
Status: DISCONTINUED | OUTPATIENT
Start: 2024-01-01 | End: 2024-01-01

## 2024-01-01 RX ORDER — LORAZEPAM 2 MG/ML
1 INJECTION INTRAMUSCULAR EVERY 4 HOURS PRN
Status: DISCONTINUED | OUTPATIENT
Start: 2024-01-01 | End: 2024-01-01

## 2024-01-01 RX ORDER — SODIUM CHLORIDE, SODIUM LACTATE, POTASSIUM CHLORIDE, CALCIUM CHLORIDE 600; 310; 30; 20 MG/100ML; MG/100ML; MG/100ML; MG/100ML
INJECTION, SOLUTION INTRAVENOUS CONTINUOUS
Status: DISCONTINUED | OUTPATIENT
Start: 2024-01-01 | End: 2024-01-01

## 2024-01-01 RX ORDER — LORAZEPAM 2 MG/ML
1 INJECTION INTRAMUSCULAR
Status: DISCONTINUED | OUTPATIENT
Start: 2024-01-01 | End: 2024-01-01

## 2024-01-01 RX ORDER — MORPHINE SULFATE 2 MG/ML
1 INJECTION, SOLUTION INTRAMUSCULAR; INTRAVENOUS
Status: DISCONTINUED | OUTPATIENT
Start: 2024-01-01 | End: 2024-01-01

## 2024-01-01 RX ORDER — HEPARIN SODIUM 5000 [USP'U]/ML
5000 INJECTION, SOLUTION INTRAVENOUS; SUBCUTANEOUS EVERY 8 HOURS SCHEDULED
Status: DISCONTINUED | OUTPATIENT
Start: 2024-01-01 | End: 2024-01-01

## 2024-01-01 RX ORDER — LIDOCAINE HYDROCHLORIDE 10 MG/ML
1 INJECTION, SOLUTION EPIDURAL; INFILTRATION; INTRACAUDAL; PERINEURAL ONCE
Status: COMPLETED | OUTPATIENT
Start: 2024-01-01 | End: 2024-01-01

## 2024-01-01 RX ORDER — DOXEPIN HYDROCHLORIDE 50 MG/1
1 CAPSULE ORAL DAILY
Status: DISCONTINUED | OUTPATIENT
Start: 2024-01-01 | End: 2024-01-01

## 2024-01-01 RX ORDER — DOCUSATE SODIUM 100 MG/1
100 CAPSULE, LIQUID FILLED ORAL 2 TIMES DAILY
Status: DISCONTINUED | OUTPATIENT
Start: 2024-01-01 | End: 2024-01-01

## 2024-01-01 RX ORDER — SODIUM BICARBONATE 325 MG/1
325 TABLET ORAL AS NEEDED
Status: DISCONTINUED | OUTPATIENT
Start: 2024-01-01 | End: 2024-01-01

## 2024-01-02 ENCOUNTER — MED REC SCAN ONLY (OUTPATIENT)
Dept: NEUROLOGY | Facility: CLINIC | Age: 79
End: 2024-01-02

## 2024-01-02 NOTE — TELEPHONE ENCOUNTER
Ocrevus infusion record received from [facility].    Patient infused first half of loading dose on 12/29/2023.    Patient denied any recent infection or vaccine prior to infusion.    Patient infused 300 mg Ocrevus IVPB without reaction. Stable during the post infusion observation period.    Premedicated with:    650 mg acetaminophen orally once  Solu-medrol 125 mg IVPB once  Diphenhydramine 50 mg orally once    Next infusion scheduled for 1/15/2024.    Paperwork to scanning on med rec scan dated 1/2/2024.

## 2024-01-05 ENCOUNTER — APPOINTMENT (OUTPATIENT)
Dept: WOUND CARE | Facility: HOSPITAL | Age: 79
End: 2024-01-05
Attending: INTERNAL MEDICINE
Payer: MEDICARE

## 2024-01-05 ENCOUNTER — TELEPHONE (OUTPATIENT)
Dept: WOUND CARE | Facility: HOSPITAL | Age: 79
End: 2024-01-05

## 2024-01-05 NOTE — TELEPHONE ENCOUNTER
Said had an infusion done and he is not feeling well. Wife left a voicemail msg with nurse. Pt will call us when ready to reschedule.

## 2024-01-05 NOTE — TELEPHONE ENCOUNTER
Received call from patient's wife stating that she needs advice on what else to do about his wound. Asked about if patient has a wheel chair cushion. She stated that they have a ROHO with a doughnut cushion on top. Instructed wife to stop using the doughnut as it does not allow for proper off loading, just use ROHO. Also suggested the EHOB bed cushion for while patient is sleeping. Wife stated understanding. Wife stated that the bandage is leaving an imprint on his buttock. Suggested the she just use the triad paste as this is able to be used with out a secondary dressing. Wife stated that she can try that. Instructed wife that patient needs to be seen in clinic in order to assist with further dressing recommendations. She stated understanding and will schedule a follow up when pt feels better.

## 2024-01-06 ENCOUNTER — TELEPHONE (OUTPATIENT)
Dept: NEUROLOGY | Facility: CLINIC | Age: 79
End: 2024-01-06

## 2024-01-06 NOTE — TELEPHONE ENCOUNTER
I was called by Brenden via answering service. He states that he has been lethargic, tired, had headaches since his infusion. He states that he called the  and they told him that he can contact his neurologist so he called the answering service to get a hold of Dr. Aleman. He states that he is feeling well and has nothing emergent to report just wanted to let Dr Aleman know of his lethargy since his infusion. I asked that he call the clinic on Monday or send a [employer] message to discuss and he verbalized agreement.

## 2024-01-08 RX ORDER — GENTAMICIN SULFATE 1 MG/G
1 OINTMENT TOPICAL 3 TIMES DAILY
Qty: 30 G | Refills: 3 | Status: SHIPPED | OUTPATIENT
Start: 2024-01-08

## 2024-01-08 NOTE — TELEPHONE ENCOUNTER
Since last infusion, has had:    Increased general fatigue and weakness. Low energy. Has leveled off, not getting worse.    Increased bladder incontinence starting one week after. Last two days have been good.    Increased HA, from one yearly to one headache. Takes  mg which is helping.    Patient is willing to move forward with next infusion on 1/15/2024.  Next OV 2/15/2024.    Provider informed.

## 2024-01-16 NOTE — TELEPHONE ENCOUNTER
Ocrevus infusion record received for 2nd loading dose.    Patient received 300 mg IVPB Ocrevus on 1/15/2024.    Patient denied any infection or vaccine prior to infusion.    Premedicated with the followin mg oral Acetaminophen once  Solumedrol 125 mg IVPB once  Diphenhydramine 50 mg orally once    Patient stable during infusion and observation period.    Next infusion will be full maintenance infusion scheduled for 2024.    Paperwork to scanning on [employer] scan dated: 2024.

## 2024-01-26 ENCOUNTER — OFFICE VISIT (OUTPATIENT)
Dept: WOUND CARE | Facility: HOSPITAL | Age: 79
End: 2024-01-26
Attending: INTERNAL MEDICINE
Payer: MEDICARE

## 2024-01-26 VITALS
TEMPERATURE: 98 F | RESPIRATION RATE: 16 BRPM | HEART RATE: 88 BPM | DIASTOLIC BLOOD PRESSURE: 80 MMHG | SYSTOLIC BLOOD PRESSURE: 155 MMHG

## 2024-01-26 DIAGNOSIS — G35 MULTIPLE SCLEROSIS (HCC): ICD-10-CM

## 2024-01-26 DIAGNOSIS — L89.150 PRESSURE ULCER OF COCCYGEAL REGION, UNSTAGEABLE (HCC): ICD-10-CM

## 2024-01-26 DIAGNOSIS — N39.490 OVERFLOW INCONTINENCE OF URINE: ICD-10-CM

## 2024-01-26 DIAGNOSIS — L89.153 DECUBITUS ULCER OF COCCYX, STAGE III (HCC): Primary | ICD-10-CM

## 2024-01-26 DIAGNOSIS — L30.8 DERMATITIS ASSOCIATED WITH MOISTURE: ICD-10-CM

## 2024-01-26 DIAGNOSIS — R26.9 GAIT ABNORMALITY: ICD-10-CM

## 2024-01-26 PROCEDURE — 99214 OFFICE O/P EST MOD 30 MIN: CPT

## 2024-01-26 PROCEDURE — 87205 SMEAR GRAM STAIN: CPT | Performed by: INTERNAL MEDICINE

## 2024-01-26 PROCEDURE — 87070 CULTURE OTHR SPECIMN AEROBIC: CPT | Performed by: INTERNAL MEDICINE

## 2024-01-26 PROCEDURE — 87077 CULTURE AEROBIC IDENTIFY: CPT | Performed by: INTERNAL MEDICINE

## 2024-01-26 PROCEDURE — 87186 SC STD MICRODIL/AGAR DIL: CPT | Performed by: INTERNAL MEDICINE

## 2024-01-26 NOTE — PROGRESS NOTES
Weekly Wound Education Note    Teaching Provided To: Patient  Training Topics: Cleasing and general instructions;Discharge instructions;Dressing;Off-loading  Training Method: Explain/Verbal  Training Response: Patient responds and understands;Reinforcement needed        Notes: Wound cultured. Triad paste to wound and periwound, no dressings. More pressure noted to area. Provided with EHOB cushion information, ROHO cushion being used - cushion needs to be assessed, did not bring in.  Discussed offloading - very important, due to increased pressure noted to the area.

## 2024-01-26 NOTE — PROGRESS NOTES
[facility] PROGRESS NOTE  DEE VALDERRAMA MD  1/26/2024    Chief Complaint:   Chief Complaint   Patient presents with    Wound Care     Patients is here for a follow up. Wife stated been bleeding and getting worst        HPI:   Subjective   Sim Cardenas is a 78 year old male coming in for a follow-up visit.    HPI    Pt not seen in 2 months ( nov 2023)  Wound has deteriorated significantly.   Central area of dark unstageable region - surrounded by full thickness ulcer - surrounded by moisture associated damage to skin.     Pt has not been offloading the wounded area- MS issues getting worse.   Looks infected / colonized possibly.     Also recently had diarrhea-- with incontinence   Also has urinary incontinence.     Review of Systems  Negative except HPI   Denies chest pain / SOB / palpitations  Denies fever.     Allergies  No Known Allergies    Current Meds:  Current Outpatient Medications   Medication Sig Dispense Refill    gentamicin 0.1 % External Ointment Apply 1 Application topically 3 (three) times daily. 30 g 3    predniSONE 20 MG Oral Tab Take 1 tablet (20 mg total) by mouth as needed.      predniSONE 20 MG Oral Tab Take 60mg (3 tabs) daily x 5 days, then 40mg (2 tabs) daily x 5 days, then 20mg (1 tab) daily x 5 days then discontinue medication. 30 tablet 0    AUBAGIO 14 MG Oral Tab Take 14 mg by mouth daily. 90 tablet 3    MAGNESIUM OR Take 400 mg by mouth daily.      ALLOPURINOL 300 MG Oral Tab TAKE 1 TABLET BY MOUTH EVERY DAY 90 tablet 3    hydrocortisone 2.5 % External Cream Apply topically as needed.      Dalfampridine ER 10 MG Oral Tablet 12 Hr Take 1 tablet (10 mg total) by mouth in the morning and 1 tablet (10 mg total) before bedtime.      LOSARTAN 100 MG Oral Tab TAKE 1 TABLET BY MOUTH EVERY DAY 90 tablet 0    acetaminophen 500 MG Oral Tab Take 2 tablets (1,000 mg total) by mouth every 6 (six) hours as needed for Pain.      Multiple Vitamins-Minerals (CENTRUM) Oral Tab Take 1 tablet by  mouth daily.      Glucosamine-Chondroit-Vit C-Mn (GLUCOSAMINE CHONDR 1500 COMPLX OR) Take 1 tablet by mouth daily.      Vitamin D3, Cholecalciferol, 50 MCG (2000 UT) Oral Tab Take 1 tablet (2,000 Units total) by mouth daily.      cyanocobalamine 1000 MCG Oral Tab Take 1 tablet (1,000 mcg total) by mouth daily.      ketoconazole 2 % External Cream Apply topically 2 (two) times daily.      silver sulfADIAZINE 1 % External Cream silver sulfadiazine 1 % topical cream   APPLY TO AFFECTED AREA TWICE A DAY DIRECTLY TO SORE      ALPRAZolam 0.5 MG Oral Tab Take 1 tablet (0.5 mg total) by mouth 3 (three) times daily as needed.           EXAM:   Objective   Objective    Physical Exam    Vital Signs  Vitals:    01/26/24 1015   BP: 155/80   Pulse: 88   Resp: 16   Temp: 97.7 °F (36.5 °C)       Wound Assessment  Wound 08/18/23 #3 Pressure Injury Coccyx (Active)   Wound Image   01/26/24 1017   Drainage Amount Moderate 01/26/24 1017   Drainage Description Serosanguineous 01/26/24 1017   Wound Length (cm) 7 cm 01/26/24 1017   Wound Width (cm) 6.8 cm 01/26/24 1017   Wound Surface Area (cm^2) 47.6 cm^2 01/26/24 1017   Wound Depth (cm) 0.1 cm 01/26/24 1017   Wound Volume (cm^3) 4.76 cm^3 01/26/24 1017   Wound Healing % -380 01/26/24 1017   Margins Poorly defined 01/26/24 1017   Non-staged Wound Description Full thickness 01/26/24 1017   Ashlee-wound Assessment Edema;Moist;Excoriated;Non-blanchable erythema 01/26/24 1017   Wound Granulation Tissue Red;Firm 01/26/24 1017   Wound Bed Granulation (%) 60 % 01/26/24 1017   Wound Bed Epithelium (%) 40 % 11/10/23 1521   Wound Bed Slough (%) 40 % 01/26/24 1017   Wound Odor None 01/26/24 1017   Shape clustered 01/26/24 1017   Pressure Injury Stage U 01/26/24 1017           ASSESSMENT AND PLAN:     Assessment   Assessment    Encounter Diagnosis  1. Decubitus ulcer of coccyx, stage III ([facility])    2. Pressure ulcer of coccygeal region, unstageable (HCC)    3. Multiple sclerosis (HCC)    4. Gait  abnormality    5. Overflow incontinence of urine    6. Dermatitis associated with moisture        Problem List  Patient Active Problem List   Diagnosis    Hypertrophy of prostate with urinary obstruction and other lower urinary tract symptoms (LUTS)    Spinal stenosis, lumbar region, without neurogenic claudication    Anxiety state, unspecified    Lumbar spondylosis with myelopathy    Benign essential hypertension    Multiple sclerosis (HCC)    Lumbar stenosis         MANAGEMENT    Wound culture done today  Extensive counseling reg: offloading.   Use coloplast triad paste all over.       Patient Instructions     Wound culture   Intense offloading - demoinstrated various techniques to offload  Use wedge pillow  Use EHOB cushion - see pamphlet    Wound Cleaning and Dressings:    Wash your hands with soap and water. Always wear gloves while changing dressings. Donot touch wound / warner-wound skin with un-gloved hands. Remove old dressing, discard and place into trash.      DRESSINGS: COLOPLAST TRIAD PASTE / zinc barrier cream on affected areas of skin covering wund bed   Change dressing daily and after soiling    Off-Loading: EHOB cushion, wedge pillow    Miscellaneous Instructions:  Supplement with a daily multivitamin   Increase protein intake / consider protein supplements - see below      DIETARY MODIFICATIONS TO HELP WITH WOUND HEALING:    Protein: Meats, beans, eggs, milk and yogurt particularly Greek yogurt), tofu, soy nuts, soy protein products    Vitamin C: Citrus fruits and juices, strawberries, tomatoes, tomato juice, peppers, baked potatoes, spinach, broccoli, cauliflower, [place] sprouts, cabbage    Vitamin A: Dark green, leafy vegetables, orange or yellow vegetables, cantaloupe, fortified dairy products, liver, fortified cereals    Zinc: Fortified cereals, red meats, seafood    Consider Jah by [employer] (These are essential branch chain amino acids that help with tissue building and wound healing) and  take 2 packets/day. you can order online at [employer] or [employer]    ADDITIONAL REMINDERS:    The treatment plan has been discussed at length with you and your provider. Follow all instructions carefully, it is very important. If you do not follow all instructions, you are at  risk of your wound not healing, infection, possible loss of limb and even end of life.  Please call the clinic during regular business hours ( 7:30 AM - 5:30 PM) if you notice increased bleeding, redness, warmth, pain or pus like drainage or start running a fever greater than 100.3.    For after hour emergencies, please call your primary physician or go to the nearest emergency room.        Patient/Caregiver Education: There are no barriers to learning. Medical education for above diagnosis given.   Answered all questions.    Outcome: Patient verbalizes understanding. Patient is notified to call with any questions, complications, allergies, or worsening or changing symptoms.  Patient is to call with any side effects or complications as a result of the treatments today.      DOCUMENTATION OF TIME SPENT: Code selection for this visit was based on time spent : 35 min on date of service in preparing to see the patient, obtaining and/or reviewing separately obtained history, performing a medically appropriate examination, counseling and educating the patient/family/caregiver, ordering medications or testing, referring and communicating with other healthcare providers, documenting clinical information in the E HR, independently interpreting results and communicating results to the patient/family/caregiver and care coordination with the patient's other providers.    Followup: Return in about 2 weeks (around 2/9/2024) for Wound followup.      Note to Patient:  The 21st Century Cures Act makes medical notes like these available to patients in the interest of transparency. However, be advised this is a medical document and is intended as [id number]  communication; it is written in medical language and may appear blunt, direct, or contain abbreviations or verbiage that are unfamiliar. Medical documents are intended to carry relevant information, facts as evident, and the clinical opinion of the practitioner.    Also, please note that this report has been produced using speech recognition software and may contain errors related to that system including, but not limited to, errors in grammar, punctuation, and spelling, as well as words and phrases that possibly may have been recognized inappropriately.  If there are any questions or concerns, contact the dictating provider for clarification.      Jenna Hurtado MD  1/26/2024  10:45 AM

## 2024-01-26 NOTE — PATIENT INSTRUCTIONS
Wound culture   Intense offloading - demoinstrated various techniques to offload  Use wedge pillow  Use EHOB cushion - see pamphlet    Wound Cleaning and Dressings:    Wash your hands with soap and water. Always wear gloves while changing dressings. Donot touch wound / warner-wound skin with un-gloved hands. Remove old dressing, discard and place into trash.      DRESSINGS: COLOPLAST TRIAD PASTE / zinc barrier cream on affected areas of skin covering wund bed   Change dressing daily and after soiling    Off-Loading: EHOB cushion, wedge pillow    Miscellaneous Instructions:  Supplement with a daily multivitamin   Increase protein intake / consider protein supplements - see below      DIETARY MODIFICATIONS TO HELP WITH WOUND HEALING:    Protein: Meats, beans, eggs, milk and yogurt particularly Greek yogurt), tofu, soy nuts, soy protein products    Vitamin C: Citrus fruits and juices, strawberries, tomatoes, tomato juice, peppers, baked potatoes, spinach, broccoli, cauliflower, [place] sprouts, cabbage    Vitamin A: Dark green, leafy vegetables, orange or yellow vegetables, cantaloupe, fortified dairy products, liver, fortified cereals    Zinc: Fortified cereals, red meats, seafood    Consider Jah by [employer] (These are essential branch chain amino acids that help with tissue building and wound healing) and take 2 packets/day. you can order online at [employer] or [employer]    ADDITIONAL REMINDERS:    The treatment plan has been discussed at length with you and your provider. Follow all instructions carefully, it is very important. If you do not follow all instructions, you are at  risk of your wound not healing, infection, possible loss of limb and even end of life.  Please call the clinic during regular business hours ( 7:30 AM - 5:30 PM) if you notice increased bleeding, redness, warmth, pain or pus like drainage or start running a fever greater than 100.3.    For after hour emergencies, please call your primary  physician or go to the nearest emergency room.

## 2024-01-29 NOTE — PROGRESS NOTES
Reviewed culture results and new orders with patient.  He stated he will  prescriptions today.  Patient also stated he is taking Jah now and is trying to sleep on his side at night.

## 2024-02-01 ENCOUNTER — TELEPHONE (OUTPATIENT)
Dept: WOUND CARE | Facility: HOSPITAL | Age: 79
End: 2024-02-01

## 2024-02-01 NOTE — TELEPHONE ENCOUNTER
This med does NOT cause lethargy or urinary incontinence.   Get labs done that I ordered 1/29/24    Jenna Hurtado MD, 02/01/24, 4:09 PM

## 2024-02-01 NOTE — TELEPHONE ENCOUNTER
Attempted to call pt on both cell and home phone listed, phone continued to ring unable to leave message.

## 2024-02-02 ENCOUNTER — HOSPITAL ENCOUNTER (INPATIENT)
Facility: HOSPITAL | Age: 79
LOS: 5 days | Discharge: NURSING, REHAB OR LONG-TERM CARE | End: 2024-02-07
Attending: EMERGENCY MEDICINE | Admitting: INTERNAL MEDICINE
Payer: MEDICARE

## 2024-02-02 ENCOUNTER — APPOINTMENT (OUTPATIENT)
Dept: GENERAL RADIOLOGY | Facility: HOSPITAL | Age: 79
End: 2024-02-02
Attending: EMERGENCY MEDICINE
Payer: MEDICARE

## 2024-02-02 ENCOUNTER — APPOINTMENT (OUTPATIENT)
Dept: CT IMAGING | Facility: HOSPITAL | Age: 79
End: 2024-02-02
Attending: EMERGENCY MEDICINE
Payer: MEDICARE

## 2024-02-02 DIAGNOSIS — L89.309 PRESSURE INJURY OF SKIN OF BUTTOCK, UNSPECIFIED INJURY STAGE, UNSPECIFIED LATERALITY: Primary | ICD-10-CM

## 2024-02-02 DIAGNOSIS — N30.00 ACUTE CYSTITIS WITHOUT HEMATURIA: ICD-10-CM

## 2024-02-02 DIAGNOSIS — R07.89 CHEST PAIN, ATYPICAL: ICD-10-CM

## 2024-02-02 DIAGNOSIS — U07.1 COVID-19: ICD-10-CM

## 2024-02-02 DIAGNOSIS — I31.9 PERICARDITIS, UNSPECIFIED CHRONICITY, UNSPECIFIED TYPE: ICD-10-CM

## 2024-02-02 DIAGNOSIS — G35 MULTIPLE SCLEROSIS (HCC): ICD-10-CM

## 2024-02-02 PROBLEM — R79.89 AZOTEMIA: Status: ACTIVE | Noted: 2024-02-02

## 2024-02-02 PROBLEM — R79.89 AZOTEMIA: Status: ACTIVE | Noted: 2024-01-01

## 2024-02-02 LAB
ALBUMIN SERPL-MCNC: 2.8 G/DL (ref 3.4–5)
ALBUMIN/GLOB SERPL: 0.7 {RATIO} (ref 1–2)
ALP LIVER SERPL-CCNC: 37 U/L
ALT SERPL-CCNC: 33 U/L
ANION GAP SERPL CALC-SCNC: 4 MMOL/L (ref 0–18)
AST SERPL-CCNC: 49 U/L (ref 15–37)
BASOPHILS # BLD AUTO: 0.03 X10(3) UL (ref 0–0.2)
BASOPHILS NFR BLD AUTO: 0.2 %
BILIRUB SERPL-MCNC: 0.5 MG/DL (ref 0.1–2)
BILIRUB UR QL STRIP.AUTO: NEGATIVE
BUN BLD-MCNC: 25 MG/DL (ref 9–23)
CALCIUM BLD-MCNC: 9.3 MG/DL (ref 8.5–10.1)
CHLORIDE SERPL-SCNC: 109 MMOL/L (ref 98–112)
CLARITY UR REFRACT.AUTO: CLEAR
CO2 SERPL-SCNC: 26 MMOL/L (ref 21–32)
CREAT BLD-MCNC: 1.05 MG/DL
D DIMER PPP FEU-MCNC: 2.28 UG/ML FEU (ref ?–0.76)
EGFRCR SERPLBLD CKD-EPI 2021: 74 ML/MIN/1.73M2 (ref 60–?)
EOSINOPHIL # BLD AUTO: 0 X10(3) UL (ref 0–0.7)
EOSINOPHIL NFR BLD AUTO: 0 %
ERYTHROCYTE [DISTWIDTH] IN BLOOD BY AUTOMATED COUNT: 13.6 %
FLUAV + FLUBV RNA SPEC NAA+PROBE: NEGATIVE
FLUAV + FLUBV RNA SPEC NAA+PROBE: NEGATIVE
GLOBULIN PLAS-MCNC: 4.1 G/DL (ref 2.8–4.4)
GLUCOSE BLD-MCNC: 86 MG/DL (ref 70–99)
GLUCOSE UR STRIP.AUTO-MCNC: NORMAL MG/DL
HCT VFR BLD AUTO: 42.2 %
HGB BLD-MCNC: 14.1 G/DL
IMM GRANULOCYTES # BLD AUTO: 0.29 X10(3) UL (ref 0–1)
IMM GRANULOCYTES NFR BLD: 1.8 %
KETONES UR STRIP.AUTO-MCNC: NEGATIVE MG/DL
LACTATE SERPL-SCNC: 1.5 MMOL/L (ref 0.4–2)
LEUKOCYTE ESTERASE UR QL STRIP.AUTO: 500
LYMPHOCYTES # BLD AUTO: 1.5 X10(3) UL (ref 1–4)
LYMPHOCYTES NFR BLD AUTO: 9.5 %
MCH RBC QN AUTO: 30.4 PG (ref 26–34)
MCHC RBC AUTO-ENTMCNC: 33.4 G/DL (ref 31–37)
MCV RBC AUTO: 90.9 FL
MONOCYTES # BLD AUTO: 2.17 X10(3) UL (ref 0.1–1)
MONOCYTES NFR BLD AUTO: 13.8 %
NEUTROPHILS # BLD AUTO: 11.72 X10 (3) UL (ref 1.5–7.7)
NEUTROPHILS # BLD AUTO: 11.72 X10(3) UL (ref 1.5–7.7)
NEUTROPHILS NFR BLD AUTO: 74.7 %
NITRITE UR QL STRIP.AUTO: NEGATIVE
OSMOLALITY SERPL CALC.SUM OF ELEC: 292 MOSM/KG (ref 275–295)
PH UR STRIP.AUTO: 5.5 [PH] (ref 5–8)
PLATELET # BLD AUTO: 331 10(3)UL (ref 150–450)
POTASSIUM SERPL-SCNC: 3.8 MMOL/L (ref 3.5–5.1)
PROT SERPL-MCNC: 6.9 G/DL (ref 6.4–8.2)
PROT UR STRIP.AUTO-MCNC: NEGATIVE MG/DL
RBC # BLD AUTO: 4.64 X10(6)UL
RSV RNA SPEC NAA+PROBE: NEGATIVE
SARS-COV-2 RNA RESP QL NAA+PROBE: DETECTED
SODIUM SERPL-SCNC: 139 MMOL/L (ref 136–145)
SP GR UR STRIP.AUTO: 1.03 (ref 1–1.03)
TROPONIN I SERPL HS-MCNC: 54 NG/L
UROBILINOGEN UR STRIP.AUTO-MCNC: NORMAL MG/DL
WBC # BLD AUTO: 15.7 X10(3) UL (ref 4–11)
WBC #/AREA URNS AUTO: >50 /HPF

## 2024-02-02 PROCEDURE — 71275 CT ANGIOGRAPHY CHEST: CPT | Performed by: EMERGENCY MEDICINE

## 2024-02-02 PROCEDURE — 71045 X-RAY EXAM CHEST 1 VIEW: CPT | Performed by: EMERGENCY MEDICINE

## 2024-02-02 RX ORDER — LOSARTAN POTASSIUM 100 MG/1
100 TABLET ORAL DAILY
Status: DISCONTINUED | OUTPATIENT
Start: 2024-02-02 | End: 2024-02-07

## 2024-02-02 RX ORDER — SENNOSIDES 8.6 MG
17.2 TABLET ORAL NIGHTLY PRN
Status: DISCONTINUED | OUTPATIENT
Start: 2024-02-02 | End: 2024-02-07

## 2024-02-02 RX ORDER — SODIUM CHLORIDE 9 MG/ML
INJECTION, SOLUTION INTRAVENOUS CONTINUOUS
Status: CANCELLED | OUTPATIENT
Start: 2024-02-02 | End: 2024-02-02

## 2024-02-02 RX ORDER — ONDANSETRON 2 MG/ML
4 INJECTION INTRAMUSCULAR; INTRAVENOUS EVERY 6 HOURS PRN
Status: DISCONTINUED | OUTPATIENT
Start: 2024-02-02 | End: 2024-02-07

## 2024-02-02 RX ORDER — GUAIFENESIN 600 MG/1
600 TABLET, EXTENDED RELEASE ORAL 2 TIMES DAILY
Status: DISCONTINUED | OUTPATIENT
Start: 2024-02-02 | End: 2024-02-07

## 2024-02-02 RX ORDER — HYDROCODONE BITARTRATE AND ACETAMINOPHEN 5; 325 MG/1; MG/1
1 TABLET ORAL EVERY 4 HOURS PRN
Status: DISCONTINUED | OUTPATIENT
Start: 2024-02-02 | End: 2024-02-07

## 2024-02-02 RX ORDER — BISACODYL 10 MG
10 SUPPOSITORY, RECTAL RECTAL
Status: DISCONTINUED | OUTPATIENT
Start: 2024-02-02 | End: 2024-02-07

## 2024-02-02 RX ORDER — ACETAMINOPHEN 325 MG/1
650 TABLET ORAL EVERY 4 HOURS PRN
Status: DISCONTINUED | OUTPATIENT
Start: 2024-02-02 | End: 2024-02-07

## 2024-02-02 RX ORDER — ENEMA 19; 7 G/133ML; G/133ML
1 ENEMA RECTAL ONCE AS NEEDED
Status: DISCONTINUED | OUTPATIENT
Start: 2024-02-02 | End: 2024-02-07

## 2024-02-02 RX ORDER — SODIUM CHLORIDE 9 MG/ML
INJECTION, SOLUTION INTRAVENOUS CONTINUOUS
Status: DISCONTINUED | OUTPATIENT
Start: 2024-02-02 | End: 2024-02-04

## 2024-02-02 RX ORDER — HYDROCODONE BITARTRATE AND ACETAMINOPHEN 5; 325 MG/1; MG/1
2 TABLET ORAL EVERY 4 HOURS PRN
Status: DISCONTINUED | OUTPATIENT
Start: 2024-02-02 | End: 2024-02-07

## 2024-02-02 RX ORDER — POLYETHYLENE GLYCOL 3350 17 G/17G
17 POWDER, FOR SOLUTION ORAL DAILY PRN
Status: DISCONTINUED | OUTPATIENT
Start: 2024-02-02 | End: 2024-02-07

## 2024-02-02 RX ORDER — HEPARIN SODIUM 5000 [USP'U]/ML
5000 INJECTION, SOLUTION INTRAVENOUS; SUBCUTANEOUS EVERY 8 HOURS SCHEDULED
Status: DISCONTINUED | OUTPATIENT
Start: 2024-02-02 | End: 2024-02-07

## 2024-02-02 RX ORDER — METOCLOPRAMIDE HYDROCHLORIDE 5 MG/ML
10 INJECTION INTRAMUSCULAR; INTRAVENOUS EVERY 8 HOURS PRN
Status: DISCONTINUED | OUTPATIENT
Start: 2024-02-02 | End: 2024-02-07

## 2024-02-02 RX ORDER — ALPRAZOLAM 0.5 MG/1
0.5 TABLET ORAL 3 TIMES DAILY PRN
Status: DISCONTINUED | OUTPATIENT
Start: 2024-02-02 | End: 2024-02-07

## 2024-02-02 RX ORDER — BENZONATATE 100 MG/1
200 CAPSULE ORAL 3 TIMES DAILY PRN
Status: DISCONTINUED | OUTPATIENT
Start: 2024-02-02 | End: 2024-02-07

## 2024-02-02 NOTE — TELEPHONE ENCOUNTER
Spoke to spouse, pt has started medication again. She feels he wasn't felling well due to \"MS infusion\", having a cold, and starting a new medication. Provider recommended pt get labs drawn. She states he is week and starting in home PT. Pt has [facility], informed her that labs orders will be faxed for HH RN to drawn labs. She verbalized understanding.

## 2024-02-02 NOTE — ED INITIAL ASSESSMENT (HPI)
Patient presents via EMS for evaluation of chest pain that started a little over two weeks ago. He reports flu like symptoms prior to the onset with persistent \"hacking cough\". He states the phlegm was green originally and has now moved to a yellow color. Denies known fever. History of MS and reports over the last 4 weeks he has become unable to ambulate. Reports large pressure sore.

## 2024-02-03 LAB
ANION GAP SERPL CALC-SCNC: 4 MMOL/L (ref 0–18)
ATRIAL RATE: 104 BPM
BASOPHILS # BLD AUTO: 0.01 X10(3) UL (ref 0–0.2)
BASOPHILS NFR BLD AUTO: 0.1 %
BUN BLD-MCNC: 17 MG/DL (ref 9–23)
CALCIUM BLD-MCNC: 8.9 MG/DL (ref 8.5–10.1)
CHLORIDE SERPL-SCNC: 112 MMOL/L (ref 98–112)
CO2 SERPL-SCNC: 25 MMOL/L (ref 21–32)
CREAT BLD-MCNC: 0.62 MG/DL
EGFRCR SERPLBLD CKD-EPI 2021: 99 ML/MIN/1.73M2 (ref 60–?)
EOSINOPHIL # BLD AUTO: 0 X10(3) UL (ref 0–0.7)
EOSINOPHIL NFR BLD AUTO: 0 %
ERYTHROCYTE [DISTWIDTH] IN BLOOD BY AUTOMATED COUNT: 13.7 %
GLUCOSE BLD-MCNC: 81 MG/DL (ref 70–99)
HCT VFR BLD AUTO: 38.5 %
HGB BLD-MCNC: 12.5 G/DL
IMM GRANULOCYTES # BLD AUTO: 0.15 X10(3) UL (ref 0–1)
IMM GRANULOCYTES NFR BLD: 1.5 %
LYMPHOCYTES # BLD AUTO: 1.53 X10(3) UL (ref 1–4)
LYMPHOCYTES NFR BLD AUTO: 15.2 %
MAGNESIUM SERPL-MCNC: 2.2 MG/DL (ref 1.6–2.6)
MAGNESIUM SERPL-MCNC: 2.2 MG/DL (ref 1.6–2.6)
MCH RBC QN AUTO: 30 PG (ref 26–34)
MCHC RBC AUTO-ENTMCNC: 32.5 G/DL (ref 31–37)
MCV RBC AUTO: 92.5 FL
MONOCYTES # BLD AUTO: 1.12 X10(3) UL (ref 0.1–1)
MONOCYTES NFR BLD AUTO: 11.1 %
NEUTROPHILS # BLD AUTO: 7.26 X10 (3) UL (ref 1.5–7.7)
NEUTROPHILS # BLD AUTO: 7.26 X10(3) UL (ref 1.5–7.7)
NEUTROPHILS NFR BLD AUTO: 72.1 %
OSMOLALITY SERPL CALC.SUM OF ELEC: 293 MOSM/KG (ref 275–295)
P AXIS: 51 DEGREES
P-R INTERVAL: 160 MS
PHOSPHATE SERPL-MCNC: 2.6 MG/DL (ref 2.5–4.9)
PLATELET # BLD AUTO: 296 10(3)UL (ref 150–450)
POTASSIUM SERPL-SCNC: 3.9 MMOL/L (ref 3.5–5.1)
Q-T INTERVAL: 338 MS
QRS DURATION: 78 MS
QTC CALCULATION (BEZET): 444 MS
R AXIS: 0 DEGREES
RBC # BLD AUTO: 4.16 X10(6)UL
SODIUM SERPL-SCNC: 141 MMOL/L (ref 136–145)
T AXIS: 52 DEGREES
VENTRICULAR RATE: 104 BPM
WBC # BLD AUTO: 10.1 X10(3) UL (ref 4–11)

## 2024-02-03 PROCEDURE — 99223 1ST HOSP IP/OBS HIGH 75: CPT | Performed by: OTHER

## 2024-02-03 NOTE — CM/SW NOTE
02/03/24 1400   CM/SW Referral Data   Referral Source Social Work (self-referral)   Reason for Referral Discharge planning   Informant EMR;Clinical Staff Member   Discharge Needs   Anticipated D/C needs Subacute rehab;Transportation services       HOME SITUATION per PT eval  Type of Home: House   Home Layout: One level;Ramped entrance     Lives With: Spouse  Patient Owned Equipment: Wheelchair     Prior Level of [place] per PT eval: patient was non ambulatory for 3 years due to MS, He can SPT with assistance to the wheelchair and stays on the wheelchair for 10 hours a day, he is being assisted during ADLs .       Chart reviewed for discharge planning.  Pt is a 79 y/o man admitted with MS exacerbation, sacral wound and found to be COVID+.  Noted therapy recommending [facility] at [facility].  Referrals sent to facilities via [facility].  [facility] will be needed.  Pt was made inpatient status on 2/2 and would need continued hospital stay until 2/5 in order to have Medicare MIRLANDE coverage.  / to remain available for support and/or discharge planning.     Rena Jean-Baptiste, [facility]  Discharge Planner  [phone]

## 2024-02-03 NOTE — CONSULTS
[facility]  Report of Consultation    Brenden Cardenas Patient Status:  Inpatient    1945 MRN [id number]   Location [facility] 3NW-A Attending Rohit Adrian DO   Hosp Day # 1 PCP Napoleon Ovalles DO     Reason for Consultation:    Decubitus ulcer    History of Present Illness:    Brenden Cardenas is a a(n) 78 year old male.  Patient was admitted to the hospital through the emergency room yesterday with generalized weakness cough and congestion.  Patient was diagnosed with COVID.  He underwent a CTA of his chest which showed no evidence of pulmonary embolus with some mild atelectasis.  Patient has MS and has been more bedridden with difficulty with ambulation.  He has been followed by the [place] wound [facility] for decubitus ulcer.  This is progressed over the past month.  He has developed a necrotic eschar with a more extensive area of tissue loss.  Patient was also found to have a UTI on urine with culture pending.    Past Medical History:    Past Medical History:   Diagnosis Date    Anxiety state, unspecified     BACK PAIN     Calculus of kidney     Cervical stenosis of spine     COVID-19     Gout     Gout     High blood pressure     HYPERTENSION     Multiple sclerosis (HCC)     Osteoarthrosis, unspecified whether generalized or localized, unspecified site     OTHER DISEASES     GOUT    Seborrheic dermatitis     Skin cancer     Unspecified essential hypertension     Visual impairment     glasses       Past Surgical History:    Past Surgical History:   Procedure Laterality Date    OTHER ACCESSORY      \"urolift\"    OTHER ACCESSORY      multiple skin Bx's/removals    OTHER SURGICAL HISTORY  2011    LUMBAR SX    OTHER SURGICAL HISTORY      cervical surgery    SPINE SURGERY PROCEDURE UNLISTED         Family History:    Family History   Problem Relation Age of Onset    Other (Other) Father         industrial lung abn    Other (gout) Father     Other (Other) Brother         colitis    Hypertension Mother         Social History:     reports that he has never smoked. He has never used smokeless tobacco. He reports current alcohol use. He reports that he does not use drugs.    Allergies:    No Known Allergies    Current Medications:      Current Facility-Administered Medications:     sodium chloride 0.9% infusion, , Intravenous, Continuous    heparin (Porcine) 5000 UNIT/ML injection 5,000 Units, 5,000 Units, Subcutaneous, Q8H TONYA    acetaminophen (Tylenol) tab 650 mg, 650 mg, Oral, Q4H PRN **OR** HYDROcodone-acetaminophen (Norco) 5-325 MG per tab 1 tablet, 1 tablet, Oral, Q4H PRN **OR** HYDROcodone-acetaminophen (Norco) 5-325 MG per tab 2 tablet, 2 tablet, Oral, Q4H PRN    polyethylene glycol (PEG 3350) (Miralax) 17 g oral packet 17 g, 17 g, Oral, Daily PRN    sennosides (Senokot) tab 17.2 mg, 17.2 mg, Oral, Nightly PRN    bisacodyl (Dulcolax) 10 MG rectal suppository 10 mg, 10 mg, Rectal, Daily PRN    fleet enema (Fleet) 7-19 GM/118ML rectal enema 133 mL, 1 enema, Rectal, Once PRN    ondansetron (Zofran) 4 MG/2ML injection 4 mg, 4 mg, Intravenous, Q6H PRN    metoclopramide (Reglan) 5 mg/mL injection 10 mg, 10 mg, Intravenous, Q8H PRN    guaiFENesin ER (Mucinex) 12 hr tab 600 mg, 600 mg, Oral, BID    benzonatate (Tessalon) cap 200 mg, 200 mg, Oral, TID PRN    ampicillin-sulbactam (Unasyn) 3 g in sodium chloride 0.9% 100mL IVPB-ADD, 3 g, Intravenous, q6h    ALPRAZolam (Xanax) tab 0.5 mg, 0.5 mg, Oral, TID PRN    losartan (Cozaar) tab 100 mg, 100 mg, Oral, Daily    Home Medications:    No current facility-administered medications on file prior to encounter.     Current Outpatient Medications on File Prior to Encounter   Medication Sig Dispense Refill    sodium chloride 0.9% SOLN 480 mL with Ocrelizumab 300 MG/10ML SOLN 600 mg Inject 600 mg into the vein one time. Every 6 months. Last infusion January, 2024      gentamicin 0.1 % External Ointment Apply 1 Application topically 3 (three) times daily. 30 g 3    predniSONE 20  MG Oral Tab Take 1 tablet (20 mg total) by mouth as needed.      MAGNESIUM OR Take 400 mg by mouth daily.      ALLOPURINOL 300 MG Oral Tab TAKE 1 TABLET BY MOUTH EVERY DAY (Patient taking differently: Take 1 tablet (300 mg total) by mouth every 3 (three) days. Every 3 days. Last dose 1/31/24) 90 tablet 3    Dalfampridine ER 10 MG Oral Tablet 12 Hr Take 1 tablet (10 mg total) by mouth in the morning and 1 tablet (10 mg total) before bedtime.      LOSARTAN 100 MG Oral Tab TAKE 1 TABLET BY MOUTH EVERY DAY 90 tablet 0    Multiple Vitamins-Minerals (CENTRUM) Oral Tab Take 1 tablet by mouth daily.      Vitamin D3, Cholecalciferol, 50 MCG (2000 UT) Oral Tab Take 1 tablet (2,000 Units total) by mouth daily.      cyanocobalamine 1000 MCG Oral Tab Take 1 tablet (1,000 mcg total) by mouth daily.      silver sulfADIAZINE 1 % External Cream silver sulfadiazine 1 % topical cream   APPLY TO AFFECTED AREA TWICE A DAY DIRECTLY TO SORE      ALPRAZolam 0.5 MG Oral Tab Take 1 tablet (0.5 mg total) by mouth 3 (three) times daily as needed.      acetaminophen 500 MG Oral Tab Take 2 tablets (1,000 mg total) by mouth every 6 (six) hours as needed for Pain.         Review of Systems:    10 point review performed pertinent positives and negatives per history of present illness.    Physical Exam:    Blood pressure 126/65, pulse 84, temperature 97.5 °F (36.4 °C), temperature source Oral, resp. rate 18, height 5' 9\" (1.753 m), weight 175 lb (79.4 kg), SpO2 96%.    GENERAL: well developed, well nourished male, in no apparent distress    SKIN: anicteric    HEENT:   normocephalic    NECK: supple, no JVD    RESPIRATORY: clear to auscultation    CARDIOVASCULAR: RRR    ABDOMEN: Soft    LYMPHATIC: no lymphadenopathy    EXTREMITIES: no cyanosis, clubbing or edema    SACRAL: Large decubitus ulcer with various stages with a central superficial black eschar measuring approximately 5 x 6 cm.  No underlying fluctuance.  Eschar itself appears to be  relatively superficial and appears to be sloughing with local wound care.    .    Laboratory Data:  Recent Labs   Lab 02/02/24  1805 02/03/24  0620   WBC 15.7* 10.1   HGB 14.1 12.5*   MCV 90.9 92.5   .0 296.0       Recent Labs   Lab 02/02/24  1805 02/03/24  0620    141   K 3.8 3.9    112   CO2 26.0 25.0   BUN 25* 17   CREATSERUM 1.05 0.62*   GLU 86 81   CA 9.3 8.9   MG 2.2 2.2   PHOS 2.6  --        Recent Labs   Lab 02/02/24  1805   ALT 33   AST 49*   ALB 2.8*       No results for input(s): \"TROP\" in the last 168 hours.          Radiology:    CT ANGIOGRAPHY, CHEST (CPT=71275)    Result Date: 2/2/2024  PROCEDURE:  CT ANGIOGRAPHY, CHEST (CPT=71275)  COMPARISON:  None.  INDICATIONS:  chest discomfort that started 2 weeks ago. states he is falling apart. hx of MS and has not been able to ambulate for the past month.  TECHNIQUE:  IV contrast-enhanced multislice CT angiography is performed through the pulmonary arterial anatomy. 3D volume renderings are generated.  Dose reduction techniques were used. Dose information is transmitted to the ACR (American College of Radiology) NRDR (National Radiology Data Registry) which includes the Dose Index Registry.  PATIENT STATED HISTORY:(As transcribed by Technologist)  Patient presents via EMS for evaluation of chest pain that started a little over two weeks ago. He reports flu like symptoms prior to the onset with persistent \"hacking cough\". He states the phlegm was green originally and has now moved to a yellow color. Denies known fever. History of MS and reports over the last 4 weeks he has become unable to ambulate. Reports large pressure sore.    CONTRAST USED:  100cc of Isovue 370  FINDINGS:  VASCULATURE:  There is no pulmonary embolism to the first subsegmental arterial level. THORACIC AORTA:  No aneurysm or visible dissection on this nongated PE protocol exam.  LUNGS:  Motion artifact limits assessment of the lungs.  The central airways appear patent.   Mild basilar atelectasis is present.  There is no focal consolidation. MEDIASTINUM:  Small thyroid nodules are likely present.  These are incompletely assessed. SHIKHA:  No mass or adenopathy.  CARDIAC:  No enlargement, pericardial thickening, or significant coronary artery calcification. PLEURA:  No mass or effusion.  CHEST WALL:  No mass or axillary adenopathy.  LIMITED ABDOMEN:  Minimal nodularity is noted of the left adrenal gland.  This is incompletely assessed but likely is related to underlying adenoma. BONES:  Mild multilevel degenerative changes of the thoracic spine are noted. OTHER:  Negative.             CONCLUSION:  There is no pulmonary embolism to the first subsegmental arterial level.    LOCATION:  [id number]   Dictated by (CST): Ray Gonzalez MD on 2/02/2024 at 8:04 PM     Finalized by (CST): Ray Gonzalez MD on 2/02/2024 at 8:31 PM       XR CHEST AP PORTABLE  (CPT=71045)    Result Date: 2/2/2024  PROCEDURE:  XR CHEST AP PORTABLE  (CPT=71045)  TECHNIQUE:  AP chest radiograph was obtained.  COMPARISON:  [facility] , CT, CT ANGIOGRAPHY, CHEST (CPT=71275), 2/02/2024, 7:28 PM.  [facility] XR, CHEST PA   LATERAL, 1/31/2011, 4:16 PM.  INDICATIONS:  chest discomfort that started 2 weeks ago. states he is falling apart. hx of MS and has not been able to ambulate for the past month.  PATIENT STATED HISTORY: (As transcribed by Technologist)  Chest discomfort.    FINDINGS:  There is left basilar airspace disease adjacent to an elevated left hemidiaphragm.  There is minimal blunting the left costophrenic angle which may reflect a tiny left pleural effusion.  There is cardiomegaly.  There is no pulmonary edema.  The right lung is clear.  Old right rib fracture deformities are noted.  Orthopedic hardware seen in the lower cervical spine.  There is no pneumothorax.  There is no mediastinal widening.  Degenerative changes are seen in the spine.            CONCLUSION:  1. Cardiomegaly without edema. 2. Left basilar airspace disease  could reflect atelectasis or pneumonia.  The left hemidiaphragm is elevated which is a new finding.  Tiny left pleural effusion is noted.    LOCATION:  [facility]      Dictated by (CST): Jaylon Hebert MD on 2/02/2024 at 7:59 PM     Finalized by (CST): Jaylon Hebert MD on 2/02/2024 at 8:01 PM          Problem List:    Patient Active Problem List   Diagnosis    Hypertrophy of prostate with urinary obstruction and other lower urinary tract symptoms (LUTS)    Spinal stenosis, lumbar region, without neurogenic claudication    Anxiety state, unspecified    Lumbar spondylosis with myelopathy    Benign essential hypertension    Multiple sclerosis (HCC)    Lumbar stenosis    Azotemia    Pressure injury of skin of buttock, unspecified injury stage, unspecified laterality    Chest pain, atypical    COVID-19    Acute cystitis without hematuria       Impression:    Sacral decubitus ulcer    Plan:    Continue local wound care with consideration of bedside debridement in the near future.  Wound consultation.  Continue antibiotics as directed by the medical service.        Balaji Olson MD  2/3/2024

## 2024-02-03 NOTE — ED PROVIDER NOTES
Patient Seen in: [facility] Emergency Department      History     Chief Complaint   Patient presents with    Chest Pain     Stated Complaint: chest discomfort that started 2 weeks ago. states he is falling apart. hx of MS*    Subjective:   HPI    Patient is a 78-year-old male with severe multiple sclerosis, essentially bedbound and usually can pivot and bear weight who presents with multiple complaints.  He says he was started on a new effusion.  His MS is getting worse.  He also started with cough and congestion approximately 2 weeks ago.  He has had chest pain ever since he started coughing.  It is worse with coughing and positions.  His wife is at bedside and helps with the history.  Is been very difficulty caring for patient at home as his weakness has gotten worse.  Difficulty getting the bathroom.  Is a known decubiti.  Become more necrotic and erythematous.  Patient does not believe he can continue to be cared for at home at this point.  Wife is in agreement.  Denies chest pain currently.    Objective:   Past Medical History:   Diagnosis Date    Anxiety state, unspecified     BACK PAIN     Calculus of kidney     Cervical stenosis of spine     COVID-19     Gout     Gout     High blood pressure     HYPERTENSION     Multiple sclerosis (HCC)     Osteoarthrosis, unspecified whether generalized or localized, unspecified site     OTHER DISEASES     GOUT    Seborrheic dermatitis     Skin cancer     Unspecified essential hypertension     Visual impairment     glasses              Past Surgical History:   Procedure Laterality Date    OTHER ACCESSORY      \"urolift\"    OTHER ACCESSORY      multiple skin Bx's/removals    OTHER SURGICAL HISTORY  02/2011    LUMBAR SX    OTHER SURGICAL HISTORY      cervical surgery    SPINE SURGERY PROCEDURE UNLISTED                  Social History     Socioeconomic History    Marital status:    Tobacco Use    Smoking status: Never    Smokeless tobacco: Never   Vaping Use     Vaping Use: Never used   Substance and Sexual Activity    Alcohol use: Yes     Comment: once every few days    Drug use: Never   Other Topics Concern    Caffeine Concern No    Exercise Yes     Comment: Full therapy 2 x week     Social Determinants of Health     Food Insecurity: No Food Insecurity (2/2/2024)    Food Insecurity     Food Insecurity: Never true   Transportation Needs: No Transportation Needs (2/2/2024)    Transportation Needs     Lack of Transportation: No   Housing Stability: Low Risk  (2/2/2024)    Housing Stability     Housing Instability: No              Review of Systems    Positive for stated complaint: chest discomfort that started 2 weeks ago. states he is falling apart. hx of MS*  Other systems are as noted in HPI.  Constitutional and vital signs reviewed.      All other systems reviewed and negative except as noted above.    Physical Exam     ED Triage Vitals [02/02/24 1739]   BP (!) 161/76   Pulse 108   Resp 22   Temp 98.1 °F (36.7 °C)   Temp src Oral   SpO2 98 %   O2 Device None (Room air)       Current:/78 (BP Location: Left arm)   Pulse 96   Temp 98.5 °F (36.9 °C) (Oral)   Resp 18   Ht 175.3 cm (5' 9\")   Wt 79.4 kg   SpO2 96%   BMI 25.84 kg/m²         Physical Exam    General: Patient is chronic ill-appearing 78-year-old male  HEENT: Normal cephalic atraumatic.  Nonicteric sclera.  Moist mucous membranes.  No meningismus.  No adenopathy  Lungs: No tachypnea.  Lungs clear to auscultation bilaterally without rales/rhonchi.  Equal breath sounds bilaterally  Cardiac: No tachycardia.  No murmurs.  Regular rate and rhythm.  Chest wall pain with palpation and movement  Abdomen: Soft and nontender throughout.  No rebound or guarding  Extremities: No clubbing/cyanosis/edema.  Decubiti: Posterior buttocks there is areas of eschar with surrounding erythema.  Skin: No rashes, no pallor  Neuro: Awake oriented ×3.  Diffuse weakness.  Able to lift legs of the week.    ED Course     Labs  Reviewed   COMP METABOLIC PANEL (14) - Abnormal; Notable for the following components:       Result Value    BUN 25 (*)     AST 49 (*)     Alkaline Phosphatase 37 (*)     Albumin 2.8 (*)     A/G Ratio 0.7 (*)     All other components within normal limits   URINALYSIS, ROUTINE - Abnormal; Notable for the following components:    Blood Urine 1+ (*)     Leukocyte Esterase Urine 500 (*)     WBC Urine >50 (*)     RBC Urine 6-10 (*)     Bacteria Urine Rare (*)     Squamous Epi. Cells Few (*)     All other components within normal limits   D-DIMER - Abnormal; Notable for the following components:    D-Dimer 2.28 (*)     All other components within normal limits   SARS-COV-2/FLU A AND B/RSV BY PCR (GENEXPERT) - Abnormal; Notable for the following components:    SARS-CoV-2 (COVID-19) - (GeneXpert) Detected (*)     All other components within normal limits    Narrative:     This test is intended for the qualitative detection and differentiation of SARS-CoV-2, influenza A, influenza B, and respiratory syncytial virus (RSV) viral RNA in nasopharyngeal or nares swabs from individuals suspected of respiratory viral infection consistent with COVID-19 by their healthcare provider. Signs and symptoms of respiratory viral infection due to SARS-CoV-2, influenza, and RSV can be similar.    Test performed using the Xpert Xpress SARS-CoV-2/FLU/RSV (real time RT-PCR)  assay on the GeneXpert instrument, [employer], [place] [address].   This test is being used under the Food and Drug Administration's Emergency Use Authorization.    The authorized Fact Sheet for Healthcare Providers for this assay is available upon request from the laboratory.   CBC W/ DIFFERENTIAL - Abnormal; Notable for the following components:    WBC 15.7 (*)     Neutrophil Absolute Prelim 11.72 (*)     Neutrophil Absolute 11.72 (*)     Monocyte Absolute 2.17 (*)     All other components within normal limits   TROPONIN I HIGH SENSITIVITY - Normal   LACTIC ACID, PLASMA -  Normal   CBC WITH DIFFERENTIAL WITH PLATELET    Narrative:     The following orders were created for panel order CBC With Differential With Platelet.  Procedure                               Abnormality         Status                     ---------                               -----------         ------                     CBC W/ DIFFERENTIAL[[id number]]          Abnormal            Final result                 Please view results for these tests on the individual orders.   SCAN SLIDE   BLOOD CULTURE   BLOOD CULTURE   AEROBIC BACTERIAL CULTURE   URINE CULTURE, ROUTINE     EKG    Rate, intervals and axes as noted on EKG Report.  Rate: 104  Rhythm: Sinus Rhythm  Reading: Sinus tachycardia.  LVH.  J-point elevation in the inferior anterior leads.  Otherwise, agree with EKG report.  Ask intervals are noted         CTA chest: No PE to the first subsegmental arterial level.  No focal consolidation of the lungs.        Chest x-ray: I personally reviewed the films and my independent interpertaion showed no acute pathology.  Official report reviewed showing cardiomegaly.  Left basilar airspace disease could reflect atelectasis or pneumonia.     UA suggested infection with greater than 50 whites.  Will send for culture  COVID is positive.  Symptoms over the last 2 weeks.  White count 15.7.  Lactate is normal.  Electrolytes noted.  D-dimer elevated leading to the CTA chest    MDM      Patient is a 78-year-old male with progressive multiple sclerosis with worsening weakness over 2 to 3 weeks.  Difficulty caring for patient at home.  Worsening decubiti with signs of infection.  Necrotic tissue.  Eschar.  Also atypical chest pain.  Has had coughing and congested says the cough is improving.  Chest pain is worse with movement and coughing.  EKG suggest possibly pericarditis.  Will check troponin.  Denies chest pain currently.  Patient will need to be admitted.  Blood cultures and decubiti were cultured.  Will start on Unasyn.   Check chest x-ray.  Will reassess after blood work and imaging  Admission disposition: 2/2/2024  8:41 PM           Urine suggested infection as well.  Decubiti with necrotic eschar and surrounding cellulitis.  Discussed with ED pharmacist part.  Started on Unasyn.  Will admit to the hospital.  Discussed with neurology, general surgery, hospitalist.  IV antibiotics.  Will need possible treatment for his MS though would hold steroids given his infectious symptoms.  Neurology to evaluate in the hospital.  Will likely need debridement of his decubiti.  May need placement as home care has become difficult currently.    EKG suggest possible pericarditis.  Troponin is negative.  Chest pain is positional.  Consider echo.                         Medical Decision Making      Disposition and Plan     Clinical Impression:  1. Multiple sclerosis (HCC)    2. Pressure injury of skin of buttock, unspecified injury stage, unspecified laterality    3. Chest pain, atypical    4. COVID-19    5. Acute cystitis without hematuria    6. Pericarditis, unspecified chronicity, unspecified type         Disposition:  Admit  2/2/2024  8:41 pm    Follow-up:  No follow-up provider specified.        Medications Prescribed:  Current Discharge Medication List                            Hospital Problems       Present on Admission  Date Reviewed: 1/29/2024            ICD-10-CM Noted POA    * (Principal) Multiple sclerosis (HCC) G35 8/13/2019 Unknown    Acute cystitis without hematuria N30.00 2/2/2024 Unknown    Azotemia R79.89 2/2/2024 Yes    Chest pain, atypical R07.89 2/2/2024 Unknown    COVID-19 U07.1 2/2/2024 Unknown    Pressure injury of skin of buttock, unspecified injury stage, unspecified laterality L89.309 2/2/2024 Unknown

## 2024-02-03 NOTE — CONSULTS
[facility]  JESS Neurology Consultation Note    Date of consult: 2/3/2024    Assessment:  Principal Problem:    Multiple sclerosis (HCC)  Active Problems:    Azotemia    Pressure injury of skin of buttock, unspecified injury stage, unspecified laterality    Chest pain, atypical    COVID-19    Acute cystitis without hematuria    MS; lesions in brain, cervical and thoracic spine    Recommendations:  Would not recommend high dose steroids due to ongoing infection (sacral decubitus ulcer, UTI)  Surgery /wound care to follow  PT/OT/rehab  Medical management per hospitalist service  No further recommendation or work up from neurological standpoint, please follow up with Dr Aleman after dc  I explained to pt re: assessment and management plan.    Subjective:   Reason for consult: MS  HPI: Brenden Cardenas is a 78 year old male with past medical history of multiple sclerosis presented ER yesterday with weakness in legs, .he has known decubitus ulcer, which is more necrotic and erythematous. He also started to cough about 2 weeks ago, is positive for covid. Neurology is consulted for MS, for which he is following with Dr Aleman for years.    Meds & History   REVIEW OF SYSTEMS:   A comprehensive 14-point system was reviewed. Pertinent positives and negatives are noted in HPI.     MEDICATIONS:  Current Facility-Administered Medications   Medication Dose Route Frequency    sodium chloride 0.9% infusion   Intravenous Continuous    heparin (Porcine) 5000 UNIT/ML injection 5,000 Units  5,000 Units Subcutaneous Q8H TONYA    acetaminophen (Tylenol) tab 650 mg  650 mg Oral Q4H PRN    Or    HYDROcodone-acetaminophen (Norco) 5-325 MG per tab 1 tablet  1 tablet Oral Q4H PRN    Or    HYDROcodone-acetaminophen (Norco) 5-325 MG per tab 2 tablet  2 tablet Oral Q4H PRN    polyethylene glycol (PEG 3350) (Miralax) 17 g oral packet 17 g  17 g Oral Daily PRN    sennosides (Senokot) tab 17.2 mg  17.2 mg Oral Nightly PRN    bisacodyl  (Dulcolax) 10 MG rectal suppository 10 mg  10 mg Rectal Daily PRN    fleet enema (Fleet) 7-19 GM/118ML rectal enema 133 mL  1 enema Rectal Once PRN    ondansetron (Zofran) 4 MG/2ML injection 4 mg  4 mg Intravenous Q6H PRN    metoclopramide (Reglan) 5 mg/mL injection 10 mg  10 mg Intravenous Q8H PRN    guaiFENesin ER (Mucinex) 12 hr tab 600 mg  600 mg Oral BID    benzonatate (Tessalon) cap 200 mg  200 mg Oral TID PRN    ampicillin-sulbactam (Unasyn) 3 g in sodium chloride 0.9% 100mL IVPB-ADD  3 g Intravenous q6h    ALPRAZolam (Xanax) tab 0.5 mg  0.5 mg Oral TID PRN    losartan (Cozaar) tab 100 mg  100 mg Oral Daily     Allergies:  No Known Allergies  Past Medical History:   Diagnosis Date    Anxiety state, unspecified     BACK PAIN     Calculus of kidney     Cervical stenosis of spine     COVID-19     Gout     Gout     High blood pressure     HYPERTENSION     Multiple sclerosis (HCC)     Osteoarthrosis, unspecified whether generalized or localized, unspecified site     OTHER DISEASES     GOUT    Seborrheic dermatitis     Skin cancer     Unspecified essential hypertension     Visual impairment     glasses     Past Surgical History:   Procedure Laterality Date    OTHER ACCESSORY      \"urolift\"    OTHER ACCESSORY      multiple skin Bx's/removals    OTHER SURGICAL HISTORY  02/2011    LUMBAR SX    OTHER SURGICAL HISTORY      cervical surgery    SPINE SURGERY PROCEDURE UNLISTED       Social History:  Social History     Tobacco Use    Smoking status: Never    Smokeless tobacco: Never   Substance Use Topics    Alcohol use: Yes     Comment: once every few days     Family History   Problem Relation Age of Onset    Other (Other) Father         industrial lung abn    Other (gout) Father     Other (Other) Brother         colitis    Hypertension Mother      Objective:   Physical Examination:  /65 (BP Location: Left arm)   Pulse 84   Temp 97.5 °F (36.4 °C) (Oral)   Resp 18   Ht 69\"   Wt 175 lb (79.4 kg)   SpO2 96%    BMI 25.84 kg/m²   General: Awake and alert; in no acute distress  HEENT: Eye sclerae are anicteric; scalp is atraumatic  Neck: Supple  Cardiac: Regular rate and regular rhythm  Lungs: Clear  Abdomen: non-tender  Extremities: No clubbing   Psychiatric: Normal mood and affect; answers questions appropriately  Dermatologic: No rashes  Neurological Examination:  Language: normal   Speech: no dysarthria  CN: II-XII intact  Motor strength:  upper extremities, left arm limited ROM of shoulder, strength fair, LEs paraplegic, some ankle extensor preserved strength  Tone: spastic  Gait: deferred    Data and Notes Reviewed on 2/3/2024  Labs Reviewed:   Recent Labs   Lab 02/03/24  0620   RBC 4.16   HGB 12.5*   HCT 38.5*   MCV 92.5   MCH 30.0   MCHC 32.5   RDW 13.7   NEPRELIM 7.26   WBC 10.1   .0     Recent Labs   Lab 02/02/24  1805 02/03/24  0620   GLU 86 81   BUN 25* 17   CREATSERUM 1.05 0.62*   CA 9.3 8.9   ALB 2.8*  --     141   K 3.8 3.9    112   CO2 26.0 25.0   ALKPHO 37*  --    AST 49*  --    ALT 33  --    BILT 0.5  --    TP 6.9  --             Sherly Espinosa MD (Michael)   Neurology  [facility]  2/3/2024, 10:58 AM  CC: Napoleon Ovalles DO

## 2024-02-03 NOTE — PHYSICAL THERAPY NOTE
PHYSICAL THERAPY EVALUATION - INPATIENT     Room Number: 312/312-A  Evaluation Date: 2/3/2024  Type of Evaluation: Initial  Physician Order: PT Eval and Treat    Presenting Problem: chest pain, weakness  Co-Morbidities : MS, pressure sore  Reason for Therapy: Mobility Dysfunction and Discharge Planning    History related to current admission: Patient is a 78 year old male admitted on 2/2/2024 from home  for weakness with history of MS.  Pt diagnosed with COVID, acute cystitis.        ASSESSMENT   In this PT evaluation, the patient presents with the following impairments poor sitting balance and activity tolerance, B LE muscle strength impaired, poor trunk control.  These impairments and comorbidities manifest themselves as functional limitations in independent bed mobility, transfers.  The patient is below baseline and would benefit from skilled inpatient PT to address the above deficits to assist patient in returning to prior to level of function.   Functional outcome measures completed include [facility].  The AM-PAC '6-Clicks' Inpatient Basic Mobility Short Form was completed and this patient is demonstrating a Approx Degree of Impairment: 72.57%  degree of impairment in mobility. Research supports that patients with this level of impairment may benefit from MIRLANDE.    DISCHARGE RECOMMENDATIONS  PT Discharge Recommendations: Sub-acute rehabilitation    PLAN  PT Treatment Plan: Bed mobility;Body mechanics;Endurance;Patient education;Strengthening;Transfer training;Balance training  Rehab Potential : Fair  Frequency (Obs): 3x/week  Number of Visits to Meet Established Goals: 5      CURRENT GOALS    Goal #1 Patient is able to demonstrate supine - sit EOB @ level: moderate assistance     Goal #2 Patient is able to demonstrate transfers Sit to/from Stand at assistance level: moderate assistance     Goal #3 Patient will perform SPT bed<>wheelchair modA     Goal #4    Goal #5    Goal #6    Goal Comments: Goals established  on 2/3/2024    HOME SITUATION  Type of Home: House   Home Layout: One level;Ramped entrance                Lives With: Spouse     Patient Owned Equipment: Wheelchair       Prior Level of [place]: patient was non ambulatory for 3 years due to MS, He can SPT with assistance to the wheelchair and stays on the wheelchair for 10 hours a day, he is being assisted during ADLs .    SUBJECTIVE  \" I used to use the wheelchair and attend meetings and lately I  have been weak\"       OBJECTIVE  Precautions: Other (Comment) (isolation)  Fall Risk: High fall risk    WEIGHT BEARING RESTRICTION                   PAIN ASSESSMENT             COGNITION  Orientation Level:  oriented x4    RANGE OF MOTION AND STRENGTH ASSESSMENT  Upper extremity ROM limited on the L , BUE strength : 3-/5     Lower extremity ROM is within functional limits PROM with tightness on B knee and ankle ROM     Lower extremity strength : B hip and knee: 2+/5, B ankle: 1/5       BALANCE  Static Sitting: Poor -  Dynamic Sitting: Dependent  Static Standing: Not tested  Dynamic Standing: Not tested    ADDITIONAL TESTS                                    ACTIVITY TOLERANCE                         O2 WALK       NEUROLOGICAL FINDINGS                        AM-PAC '6-Clicks' INPATIENT SHORT FORM - BASIC MOBILITY  How much difficulty does the patient currently have...  Patient Difficulty: Turning over in bed (including adjusting bedclothes, sheets and blankets)?: A Lot   Patient Difficulty: Sitting down on and standing up from a chair with arms (e.g., wheelchair, bedside commode, etc.): None   Patient Difficulty: Moving from lying on back to sitting on the side of the bed?: A Lot   How much help from another person does the patient currently need...   Help from Another: Moving to and from a bed to a chair (including a wheelchair)?: Total   Help from Another: Need to walk in hospital room?: Total   Help from Another: Climbing 3-5 steps with a railing?: Total        AM-PAC Score:  Raw Score: 11   Approx Degree of Impairment: 72.57%   Standardized Score (AM-PAC Scale): 33.86   CMS Modifier (G-Code): CL    FUNCTIONAL ABILITY STATUS  Gait Assessment        Skilled Therapy Provided     Bed Mobility:  Rolling: maxA  Supine to sit: strong maxA    Sit to supine: dependent      Transfer Mobility:  Sit to stand: NT    Stand to sit: NT   Gait = NA    Therapist's Comments: patient presents with impaired strength on B LE mostly and poor trunk control, he was unable to maintain sustained sitting at the side of the bed even with strong assistance from PT . He is functioning below baseline level and will benefit from MIRLANDE,     Exercise/Education Provided:  Bed mobility, thera act, balance     Patient End of Session: In bed;Needs met;Call light within reach;RN aware of session/findings;Alarm set      Patient Evaluation Complexity Level:  History Moderate - 1 or 2 personal factors and/or co-morbidities   Examination of body systems Moderate - addressing a total of 3 or more elements   Clinical Presentation Moderate - Evolving   Clinical Decision Making Moderate - Evolving       PT Session Time: 25 minutes    Therapeutic Activity: 20 minutes  Neuromuscular Re-education:  minutes  Therapeutic Exercise:  minutes

## 2024-02-03 NOTE — ED QUICK NOTES
..Orders for admission, patient is aware of plan and ready to go upstairs. Any questions, please call ED RN Alissa  at extension [phone].     Vaccinated?  Type of COVID test sent:genexpert  COVID Suspicion level: high- positive      Titratable drug(s) infusing:  Rate:n/a    LOC at time of transport:alert and oriented x4    Other pertinent information:     CIWA score= n/a  NIH score= n/a

## 2024-02-03 NOTE — PLAN OF CARE
"CC:  Chief Complaint   Patient presents with    Urticaria       HPI:Ainsley Flores is a  19 y.o. here for evaluation of severe urticaria without any internal symptoms.  This is the 2nd time she has had a major attacked of urticaria.  Neither she nor her mother have found the basic source but there is some suggestion that perhaps red dye or a tomato sauce on catching up triggered this attack.  She had had pizza 2 nights before she develops a rash and the rash has continued and is all over her body in very pruritic ,so much that she cannot sleep at night.  She has had a seizure disorder and is on Lamictal and Topamax, but has not had a seizure in 5 years.  Her EEGs have  been negative, but she has been kept on the medication.  Because of that she cannot take antihistamines; but the itching has been so bad that mother gave her Benadryl yesterday, and she is still very groggy from taking the medication.       REVIEW OF SYSTEMS  Constitutional:  No fever  HEENT:  No runny nose  Respiratory:  No cough   GI:  No vomiting or diarrhea  Other:  All other systems are negative    PAST MEDICAL HISTORY:   Past Medical History:   Diagnosis Date    Migraine headache     Seizures          PE: Vital signs in growth chart reviewed. /77   Pulse (!) 128   Temp 98.2 °F (36.8 °C) (Oral)   Ht 5' 7" (1.702 m)   Wt 61.8 kg (136 lb 3.9 oz)   BMI 21.34 kg/m²     APPEARANCE: Well nourished, well developed, in  acute distress.    SKIN: Normal skin turgor, hives all over her body including puffy eyelids and a swollen lip..  HEAD: Normocephalic, atraumatic.  NECK: Supple,no masses.   LYMPHS: no cervical or supraclavicular nodes  EYES: Conjunctivae clear. No discharge. Pupils round.  EARS: TM's intact. Light reflex normal. No retraction.   NOSE: Mucosa pink.  MOUTH & THROAT: Moist mucous membranes. No tonsillar enlargement. No pharyngeal erythema or exudate. No stridor.  CHEST: Lungs clear to auscultation.  Respirations " Pt a&o x 4.  Friendly & cooperative w/ care  Wife @ bedside.  Participating & helpful with plan of care  Isolation for COVID  Pt states he lives at home w/ wife but would like SNF upon discharge.  SW following  Appetite good.   Tolerating cardiac diet. Denies n&v  Using male purewick for urination  Tele = nsr w/ rare pvc's  Declines turn q2h & adl's  Dressing to sacrum cdi  Unasyn & ivf's  Clinitron bed ordered for large deep tissue wound to sacrum   unlabored.,   CARDIOVASCULAR: Regular rate and rhythm without murmur. No edema..  ABDOMEN: Not distended. Soft. No tenderness or masses.No hepatomegaly or splenomegaly,  PSYCH: appropriate, interactive  MUSCULOSKELETAL:good muscle tone and strength; moves all extremities.      ASSESSMENT:  1.  Urticaria  2.  Seizure disorder      PLAN:  Symptomatic Treatment. See Medcard.  Betamethasone given 1 cc; refer to allergist; advised patient no more foods with any kind of red dye; spent about 30 min discussing her problem; advised to seek another neurologist since mom is not happy with present 1.              Return if symptoms worsen and if you develop any new symptoms.              Call PRN.

## 2024-02-03 NOTE — PROGRESS NOTES
NURSING ADMISSION NOTE      Patient admitted via Cart from ED, accompanied by spouse. Pt placed in appropriate isolation for positive covid.  Pt denies pain. POC and MD orders reviewed w/ pt and all questions answered. See flowsheet for wound pictures and assessment  Oriented to room.  Safety precautions initiated.  Bed in low position.  Call light in reach.

## 2024-02-03 NOTE — H&P
[place] Hospitalist History and Physical      Chief Complaint   Patient presents with    Chest Pain        PCP: Napoleon Ovalles DO    History of Present Illness: Patient is a 78 year old male with PMH sig for MS,  HTN, and anxiety who presented to the ED for evaluation of weakness, cough, and congestion.  He state he started to cough about 2 weeks ago, has had chest pain ever since.  He has also become progressively weak with difficulty getting to the bathroom.  He has a known sacral decub that has become more necrotic and erythematous.  His wife has had more difficulty caring for him at home.  No F/C, N/V.  No sick contacts.    In the ED, WBC 15.7K.  UA with positive LE, RBC, WBC, and bacteria.  CTA chest neg for PE or PNA, showed mild basilar atelectasis.  COVID swab positive.  Wound cultures from sacral decubs were collected.  Empiric unasyn was given and IVFs.      On my evaluation, pt with no new complaints.      Past Medical History:   Diagnosis Date    Anxiety state, unspecified     BACK PAIN     Calculus of kidney     Cervical stenosis of spine     COVID-19     Gout     Gout     High blood pressure     HYPERTENSION     Multiple sclerosis (HCC)     Osteoarthrosis, unspecified whether generalized or localized, unspecified site     OTHER DISEASES     GOUT    Seborrheic dermatitis     Skin cancer     Unspecified essential hypertension     Visual impairment     glasses      Past Surgical History:   Procedure Laterality Date    OTHER ACCESSORY      \"urolift\"    OTHER ACCESSORY      multiple skin Bx's/removals    OTHER SURGICAL HISTORY  02/2011    LUMBAR SX    OTHER SURGICAL HISTORY      cervical surgery    SPINE SURGERY PROCEDURE UNLISTED          ALL:  No Known Allergies     No current facility-administered medications on file prior to encounter.     Current Outpatient Medications on File Prior to Encounter   Medication Sig Dispense Refill    sodium chloride 0.9% SOLN 480 mL with Ocrelizumab 300  MG/10ML SOLN 600 mg Inject 600 mg into the vein one time. Every 6 months. Last infusion January, 2024      gentamicin 0.1 % External Ointment Apply 1 Application topically 3 (three) times daily. 30 g 3    predniSONE 20 MG Oral Tab Take 1 tablet (20 mg total) by mouth as needed.      MAGNESIUM OR Take 400 mg by mouth daily.      ALLOPURINOL 300 MG Oral Tab TAKE 1 TABLET BY MOUTH EVERY DAY (Patient taking differently: Take 1 tablet (300 mg total) by mouth every 3 (three) days. Every 3 days. Last dose 1/31/24) 90 tablet 3    Dalfampridine ER 10 MG Oral Tablet 12 Hr Take 1 tablet (10 mg total) by mouth in the morning and 1 tablet (10 mg total) before bedtime.      LOSARTAN 100 MG Oral Tab TAKE 1 TABLET BY MOUTH EVERY DAY 90 tablet 0    Multiple Vitamins-Minerals (CENTRUM) Oral Tab Take 1 tablet by mouth daily.      Vitamin D3, Cholecalciferol, 50 MCG (2000 UT) Oral Tab Take 1 tablet (2,000 Units total) by mouth daily.      cyanocobalamine 1000 MCG Oral Tab Take 1 tablet (1,000 mcg total) by mouth daily.      silver sulfADIAZINE 1 % External Cream silver sulfadiazine 1 % topical cream   APPLY TO AFFECTED AREA TWICE A DAY DIRECTLY TO SORE      ALPRAZolam 0.5 MG Oral Tab Take 1 tablet (0.5 mg total) by mouth 3 (three) times daily as needed.      acetaminophen 500 MG Oral Tab Take 2 tablets (1,000 mg total) by mouth every 6 (six) hours as needed for Pain.           Social History     Tobacco Use    Smoking status: Never    Smokeless tobacco: Never   Substance Use Topics    Alcohol use: Yes     Comment: once every few days        Fam Hx  Family History   Problem Relation Age of Onset    Other (Other) Father         industrial lung abn    Other (gout) Father     Other (Other) Brother         colitis    Hypertension Mother        Review of Systems  Comprehensive ROS reviewed and negative except for what is stated in HPI.      OBJECTIVE:  /65 (BP Location: Right arm)   Pulse 83   Temp 98.2 °F (36.8 °C) (Oral)   Resp 18    Ht 5' 9\" (1.753 m)   Wt 175 lb (79.4 kg)   SpO2 95%   BMI 25.84 kg/m²   Gen: No acute distress, alert and oriented x3, no focal neurologic deficits. Chronically ill appearing male.    HEENT:  EOMI, PERRLA, OP clear, MMM  Pulm: Lungs clear bilaterally, normal respiratory effort  CV: Heart with regular rate and rhythm, no murmur.  Normal PMI.    Abd: Abdomen soft, nontender, nondistended, no organomegaly, bowel sounds present  MSK: Full range of motion in extremities, no clubbing, no cyanosis  Skin: +large sacral decub with overlying eschar and surrounding ertythema   Neuro:  Grossly intact, no focal deficits      Data Review:    LABS:   Lab Results   Component Value Date    WBC 10.1 02/03/2024    HGB 12.5 02/03/2024    HCT 38.5 02/03/2024    .0 02/03/2024    CREATSERUM 0.62 02/03/2024    BUN 17 02/03/2024     02/03/2024    K 3.9 02/03/2024     02/03/2024    CO2 25.0 02/03/2024    GLU 81 02/03/2024    CA 8.9 02/03/2024    ALB 2.8 02/02/2024    ALKPHO 37 02/02/2024    BILT 0.5 02/02/2024    TP 6.9 02/02/2024    AST 49 02/02/2024    ALT 33 02/02/2024    DDIMER 2.28 02/02/2024    MG 2.2 02/03/2024    PHOS 2.6 02/02/2024     EKG - personally reviewed - sinus tachy with non specific ST abnormality.  Possible pericarditis?    CXR: image personally reviewed.      Radiology: CT ANGIOGRAPHY, CHEST (CPT=71275)    Result Date: 2/2/2024  PROCEDURE:  CT ANGIOGRAPHY, CHEST (CPT=71275)  COMPARISON:  None.  INDICATIONS:  chest discomfort that started 2 weeks ago. states he is falling apart. hx of MS and has not been able to ambulate for the past month.  TECHNIQUE:  IV contrast-enhanced multislice CT angiography is performed through the pulmonary arterial anatomy. 3D volume renderings are generated.  Dose reduction techniques were used. Dose information is transmitted to the ACR (American College of Radiology) NRDR (National Radiology Data Registry) which includes the Dose Index Registry.  PATIENT STATED  HISTORY:(As transcribed by Technologist)  Patient presents via EMS for evaluation of chest pain that started a little over two weeks ago. He reports flu like symptoms prior to the onset with persistent \"hacking cough\". He states the phlegm was green originally and has now moved to a yellow color. Denies known fever. History of MS and reports over the last 4 weeks he has become unable to ambulate. Reports large pressure sore.    CONTRAST USED:  100cc of Isovue 370  FINDINGS:  VASCULATURE:  There is no pulmonary embolism to the first subsegmental arterial level. THORACIC AORTA:  No aneurysm or visible dissection on this nongated PE protocol exam.  LUNGS:  Motion artifact limits assessment of the lungs.  The central airways appear patent.  Mild basilar atelectasis is present.  There is no focal consolidation. MEDIASTINUM:  Small thyroid nodules are likely present.  These are incompletely assessed. SHIKHA:  No mass or adenopathy.  CARDIAC:  No enlargement, pericardial thickening, or significant coronary artery calcification. PLEURA:  No mass or effusion.  CHEST WALL:  No mass or axillary adenopathy.  LIMITED ABDOMEN:  Minimal nodularity is noted of the left adrenal gland.  This is incompletely assessed but likely is related to underlying adenoma. BONES:  Mild multilevel degenerative changes of the thoracic spine are noted. OTHER:  Negative.             CONCLUSION:  There is no pulmonary embolism to the first subsegmental arterial level.    LOCATION:  [id number]   Dictated by (CST): Ray Gonzalez MD on 2/02/2024 at 8:04 PM     Finalized by (CST): Ray Gonzalez MD on 2/02/2024 at 8:31 PM       XR CHEST AP PORTABLE  (CPT=71045)    Result Date: 2/2/2024  PROCEDURE:  XR CHEST AP PORTABLE  (CPT=71045)  TECHNIQUE:  AP chest radiograph was obtained.  COMPARISON:  [facility] , CT, CT ANGIOGRAPHY, CHEST (CPT=71275), 2/02/2024, 7:28 PM.  SAM, SHELBIE, CHEST PA   LATERAL, 1/31/2011, 4:16 PM.  INDICATIONS:  chest discomfort that started 2 weeks ago.  states he is falling apart. hx of MS and has not been able to ambulate for the past month.  PATIENT STATED HISTORY: (As transcribed by Technologist)  Chest discomfort.    FINDINGS:  There is left basilar airspace disease adjacent to an elevated left hemidiaphragm.  There is minimal blunting the left costophrenic angle which may reflect a tiny left pleural effusion.  There is cardiomegaly.  There is no pulmonary edema.  The right lung is clear.  Old right rib fracture deformities are noted.  Orthopedic hardware seen in the lower cervical spine.  There is no pneumothorax.  There is no mediastinal widening.  Degenerative changes are seen in the spine.            CONCLUSION:  1. Cardiomegaly without edema. 2. Left basilar airspace disease could reflect atelectasis or pneumonia.  The left hemidiaphragm is elevated which is a new finding.  Tiny left pleural effusion is noted.    LOCATION:  [facility]      Dictated by (CST): Jaylon Hebert MD on 2/02/2024 at 7:59 PM     Finalized by (CST): Jaylon Hebert MD on 2/02/2024 at 8:01 PM          Assessment/Plan:     78 yr old male with PMH sig for MS,  HTN, and anxiety who presented to the ED for evaluation of weakness, cough, and congestion    # Pressure injury of buttock with suspected infection   # Acute cystitis with hematuria   - suspect his sacral decubs are infected, may need debridement   - cont empiric unasyn, f/u wound cultures   - f/u UCx  - gen surg c/s appreciated  - wound care c/s    # Atypical chest pain  - suspect due to cough and recent COVID infection   - check ECHO given abnormal EKG  - monitor     # MS with suspected acute exacerbation due to infection   # LE paraplegia  - hold prednisone for now given acute infection   - neuro c/s  - PT/OT    # COVID-19 infection   - pt with 2 weeks of symptoms   - not hypoxic, no PNA on CTA chest  - cont supportive care    # Essential HTN  - cont losartan     DVT prophy - hep subcutaneous   Dispo: inpt care.  POC d/w pt  who agrees.     Outpatient records or previous hospital records reviewed.   DM hospitalist to continue to follow patient while in house  A total of 76 minutes taken with patient and coordinating care.  Greater than 50% face to face encounter.      Advanced Care Planning  While discussing goals of care with pt, Sim voluntarily participated in an advanced care planning discussion.  The following was discussed: POA and code status.  He confirms that his wife has his healthcare POA.  He confirms FULL CODE status.     17 minutes were spent discussing advanced care planning.  This time was exclusive of the documented time for this visit.     Rohit Adrian DO  [facility] Hospitalist

## 2024-02-04 LAB
ANION GAP SERPL CALC-SCNC: 2 MMOL/L (ref 0–18)
ANION GAP SERPL CALC-SCNC: 6 MMOL/L (ref 0–18)
ATRIAL RATE: 88 BPM
BASOPHILS # BLD AUTO: 0.01 X10(3) UL (ref 0–0.2)
BASOPHILS NFR BLD AUTO: 0.1 %
BUN BLD-MCNC: 13 MG/DL (ref 9–23)
BUN BLD-MCNC: 13 MG/DL (ref 9–23)
CALCIUM BLD-MCNC: 8.8 MG/DL (ref 8.5–10.1)
CALCIUM BLD-MCNC: 8.9 MG/DL (ref 8.5–10.1)
CHLORIDE SERPL-SCNC: 109 MMOL/L (ref 98–112)
CHLORIDE SERPL-SCNC: 109 MMOL/L (ref 98–112)
CO2 SERPL-SCNC: 24 MMOL/L (ref 21–32)
CO2 SERPL-SCNC: 27 MMOL/L (ref 21–32)
CREAT BLD-MCNC: 0.67 MG/DL
CREAT BLD-MCNC: 0.7 MG/DL
EGFRCR SERPLBLD CKD-EPI 2021: 95 ML/MIN/1.73M2 (ref 60–?)
EGFRCR SERPLBLD CKD-EPI 2021: 97 ML/MIN/1.73M2 (ref 60–?)
EOSINOPHIL # BLD AUTO: 0 X10(3) UL (ref 0–0.7)
EOSINOPHIL NFR BLD AUTO: 0 %
ERYTHROCYTE [DISTWIDTH] IN BLOOD BY AUTOMATED COUNT: 13.2 %
GLUCOSE BLD-MCNC: 118 MG/DL (ref 70–99)
GLUCOSE BLD-MCNC: 84 MG/DL (ref 70–99)
HCT VFR BLD AUTO: 39.3 %
HGB BLD-MCNC: 12.7 G/DL
IMM GRANULOCYTES # BLD AUTO: 0.15 X10(3) UL (ref 0–1)
IMM GRANULOCYTES NFR BLD: 1.5 %
LYMPHOCYTES # BLD AUTO: 1.53 X10(3) UL (ref 1–4)
LYMPHOCYTES NFR BLD AUTO: 15.4 %
MAGNESIUM SERPL-MCNC: 1.8 MG/DL (ref 1.6–2.6)
MAGNESIUM SERPL-MCNC: 1.9 MG/DL (ref 1.6–2.6)
MCH RBC QN AUTO: 29.9 PG (ref 26–34)
MCHC RBC AUTO-ENTMCNC: 32.3 G/DL (ref 31–37)
MCV RBC AUTO: 92.5 FL
MONOCYTES # BLD AUTO: 1.16 X10(3) UL (ref 0.1–1)
MONOCYTES NFR BLD AUTO: 11.7 %
NEUTROPHILS # BLD AUTO: 7.06 X10 (3) UL (ref 1.5–7.7)
NEUTROPHILS # BLD AUTO: 7.06 X10(3) UL (ref 1.5–7.7)
NEUTROPHILS NFR BLD AUTO: 71.3 %
OSMOLALITY SERPL CALC.SUM OF ELEC: 287 MOSM/KG (ref 275–295)
OSMOLALITY SERPL CALC.SUM OF ELEC: 287 MOSM/KG (ref 275–295)
P AXIS: 6 DEGREES
P-R INTERVAL: 146 MS
PLATELET # BLD AUTO: 314 10(3)UL (ref 150–450)
POTASSIUM SERPL-SCNC: 3.5 MMOL/L (ref 3.5–5.1)
POTASSIUM SERPL-SCNC: 3.6 MMOL/L (ref 3.5–5.1)
Q-T INTERVAL: 378 MS
QRS DURATION: 102 MS
QTC CALCULATION (BEZET): 457 MS
R AXIS: 25 DEGREES
RBC # BLD AUTO: 4.25 X10(6)UL
SODIUM SERPL-SCNC: 138 MMOL/L (ref 136–145)
SODIUM SERPL-SCNC: 139 MMOL/L (ref 136–145)
T AXIS: 3 DEGREES
TROPONIN I SERPL HS-MCNC: 28 NG/L
VENTRICULAR RATE: 88 BPM
WBC # BLD AUTO: 9.9 X10(3) UL (ref 4–11)

## 2024-02-04 PROCEDURE — 0JB70ZZ EXCISION OF BACK SUBCUTANEOUS TISSUE AND FASCIA, OPEN APPROACH: ICD-10-PCS | Performed by: SURGERY

## 2024-02-04 RX ORDER — POTASSIUM CHLORIDE 20 MEQ/1
40 TABLET, EXTENDED RELEASE ORAL EVERY 4 HOURS
Status: DISPENSED | OUTPATIENT
Start: 2024-02-04 | End: 2024-02-04

## 2024-02-04 RX ORDER — MAGNESIUM OXIDE 400 MG/1
400 TABLET ORAL ONCE
Status: COMPLETED | OUTPATIENT
Start: 2024-02-04 | End: 2024-02-04

## 2024-02-04 NOTE — PROGRESS NOTES
[facility] Hospitalist Progress Note     Brenden Cardenas Patient Status:  Inpatient    1945 MRN [id number]   Location [facility] 3NW-A Attending Rohit Adrian,    Hosp Day # 2 PCP Napoleon Ovalles,      Follow Up:  The primary encounter diagnosis was Multiple sclerosis (HCC). Diagnoses of Pressure injury of skin of buttock, unspecified injury stage, unspecified laterality, Chest pain, atypical, COVID-19, Acute cystitis without hematuria, and Pericarditis, unspecified chronicity, unspecified type were also pertinent to this visit.    Subjective:     Patient seen and examined.  He had bedside debridement of his sacral decub by Dr. Olson.  Denies pain, feeling better overall.  Denies chest pain or SOB.       Objective:    Review of Systems:   10 point ROS completed and was negative, except for pertinent positive and negatives stated in subjective.    Vital signs:  Temp:  [97.7 °F (36.5 °C)-98.9 °F (37.2 °C)] 98.3 °F (36.8 °C)  Pulse:  [81-90] 87  Resp:  [18] 18  BP: (106-143)/(48-85) 124/85  SpO2:  [95 %-98 %] 96 %    Physical Exam:    Gen: No acute distress, alert and oriented x3, no focal neurologic deficits. Chronically ill appearing male.    HEENT:  EOMI, PERRLA, OP clear, MMM  Pulm: Lungs clear bilaterally, normal respiratory effort  CV: Heart with regular rate and rhythm, no murmur.  Normal PMI.    Abd: Abdomen soft, nontender, nondistended, no organomegaly, bowel sounds present  MSK: Full range of motion in extremities, no clubbing, no cyanosis  Skin: +large sacral decub with overlying dressing.    Neuro:  Grossly intact, no focal deficits      Diagnostic Data:    Labs:  Recent Labs   Lab 24  1805 24  0620 24  0700   WBC 15.7* 10.1 9.9   HGB 14.1 12.5* 12.7*   MCV 90.9 92.5 92.5   .0 296.0 314.0       Recent Labs   Lab 24  1805 24  0620 24  0700   GLU 86 81 84   BUN 25* 17 13   CREATSERUM 1.05 0.62* 0.70   CA 9.3 8.9 8.9    ALB 2.8*  --   --     141 139   K 3.8 3.9 3.6    112 109   CO2 26.0 25.0 24.0   ALKPHO 37*  --   --    AST 49*  --   --    ALT 33  --   --    BILT 0.5  --   --    TP 6.9  --   --        Estimated Creatinine Clearance: 89.8 mL/min (based on SCr of 0.7 mg/dL).    No results for input(s): \"PTP\", \"INR\" in the last 168 hours.         COVID-19 Lab Results    COVID-19  Lab Results   Component Value Date    COVID19 Detected (A) 02/02/2024    COVID19 Not Detected 10/08/2021       Pro-Calcitonin  No results for input(s): \"PCT\" in the last 168 hours.    Cardiac  No results for input(s): \"TROP\", \"PBNP\" in the last 168 hours.    Creatinine Kinase  No results for input(s): \"CK\" in the last 168 hours.    Inflammatory Markers  Recent Labs   Lab 02/02/24  1805   DDIMER 2.28*       Imaging: Imaging data reviewed in Epic.    Medications:    heparin  5,000 Units Subcutaneous Q8H TONYA    guaiFENesin ER  600 mg Oral BID    ampicillin-sulbactam  3 g Intravenous q6h    losartan  100 mg Oral Daily       Assessment & Plan:      78 yr old male with PMH sig for MS,  HTN, and anxiety who presented to the ED for evaluation of weakness, cough, and congestion     # Pressure injury of buttock with suspected infection   # Acute cystitis with hematuria   - suspect his sacral decubs are infected, may need debridement   - cont empiric unasyn, f/u wound cultures   - f/u urine cultures (none sent from the ED, await repeat)  - gen surg c/s appreciated, pt underwent bedside debridement on 2/4  - wound care c/s appreciated      # Atypical chest pain  # Brief run of SVT  - suspect due to cough and recent COVID infection   - EKG showed SR with PVCs  - check ECHO given abnormal EKG and brief run of SVT  - monitor      # MS with suspected acute exacerbation due to infection   # LE paraplegia  - follows with Dr. Aleman as outpt   - pt not currently taking prednisone for MS, rather he takes it PRN for gout flares   - neuro c/s appreciated   -  PT/OT --> MIRLANDE     # COVID-19 infection   - pt with 2 weeks of symptoms   - not hypoxic, no PNA on CTA chest  - cont supportive care     # Essential HTN  - cont losartan     Plan of care: inpt care.      Plan of care discussed with patient or family at bedside.    Rohit Adrian, DO    Supplementary Documentation:     Quality:  DVT Prophylaxis: hep subcutaneous   CODE status: FULL  [place]: no  Central line: no  If COVID testing is negative, may discontinue isolation: yes     Estimated date of discharge: likely tomorrow   Discharge is dependent on: [facility] approval   At this point Mr. Cardenas is expected to be discharge to: [facility]    Plan of care discussed with pt

## 2024-02-04 NOTE — PROGRESS NOTES
[facility]  Progress Note    Brenden Cardenas Patient Status:  Inpatient    1945 MRN [id number]   Location [facility] 3NW-A Attending Rohit Adrian DO   Hosp Day # 2 PCP Napoleon Ovalles DO     Subjective:    Tolerating diet.  Denies shortness of breath.    Objective/Physical Exam:    Vital Signs:        2024     5:30 AM 2024     8:19 AM   Vitals History   /77 124/85   BP Location Left arm Left arm   Pulse 90 87   Resp 18 18   Temp 98.1 °F (36.7 °C) 98.3 °F (36.8 °C)   SpO2 95 % 96 %        General: Alert,  Cooperative.  No apparent distress.  Skin: Normal texture and turgor.  HEENT: Exam is unremarkable.  Without scleral icterus.    Neck: No JVD. Supple.   Lungs: Clear to auscultation bilaterally.  Cardiac: Regular rate and rhythm. No murmur.  Abdomen: Soft  Extremities:  No lower extremity edema noted.   Neurologic:  No focal neurologic deficit.    Labs:    Recent Labs   Lab 24  18024  0620 24  0700   WBC 15.7* 10.1 9.9   HGB 14.1 12.5* 12.7*   MCV 90.9 92.5 92.5   .0 296.0 314.0     Recent Labs   Lab 24  1805 24  0620 24  0700    141 139   K 3.8 3.9 3.6    112 109   CO2 26.0 25.0 24.0   BUN 25* 17 13   CREATSERUM 1.05 0.62* 0.70   GLU 86 81 84   CA 9.3 8.9 8.9   MG 2.2 2.2 1.8   PHOS 2.6  --   --        RADIOLOGY:    CT ANGIOGRAPHY, CHEST (CPT=71275)    Result Date: 2024  PROCEDURE:  CT ANGIOGRAPHY, CHEST (CPT=71275)  COMPARISON:  None.  INDICATIONS:  chest discomfort that started 2 weeks ago. states he is falling apart. hx of MS and has not been able to ambulate for the past month.  TECHNIQUE:  IV contrast-enhanced multislice CT angiography is performed through the pulmonary arterial anatomy. 3D volume renderings are generated.  Dose reduction techniques were used. Dose information is transmitted to the ACR (American College of Radiology) NRDR (National Radiology Data Registry) which includes the Dose Index Registry.   PATIENT STATED HISTORY:(As transcribed by Technologist)  Patient presents via EMS for evaluation of chest pain that started a little over two weeks ago. He reports flu like symptoms prior to the onset with persistent \"hacking cough\". He states the phlegm was green originally and has now moved to a yellow color. Denies known fever. History of MS and reports over the last 4 weeks he has become unable to ambulate. Reports large pressure sore.    CONTRAST USED:  100cc of Isovue 370  FINDINGS:  VASCULATURE:  There is no pulmonary embolism to the first subsegmental arterial level. THORACIC AORTA:  No aneurysm or visible dissection on this nongated PE protocol exam.  LUNGS:  Motion artifact limits assessment of the lungs.  The central airways appear patent.  Mild basilar atelectasis is present.  There is no focal consolidation. MEDIASTINUM:  Small thyroid nodules are likely present.  These are incompletely assessed. SHIKHA:  No mass or adenopathy.  CARDIAC:  No enlargement, pericardial thickening, or significant coronary artery calcification. PLEURA:  No mass or effusion.  CHEST WALL:  No mass or axillary adenopathy.  LIMITED ABDOMEN:  Minimal nodularity is noted of the left adrenal gland.  This is incompletely assessed but likely is related to underlying adenoma. BONES:  Mild multilevel degenerative changes of the thoracic spine are noted. OTHER:  Negative.             CONCLUSION:  There is no pulmonary embolism to the first subsegmental arterial level.    LOCATION:  [id number]   Dictated by (CST): Ray Gonzalez MD on 2/02/2024 at 8:04 PM     Finalized by (CST): Ray Gonzalez MD on 2/02/2024 at 8:31 PM       XR CHEST AP PORTABLE  (CPT=71045)    Result Date: 2/2/2024  PROCEDURE:  XR CHEST AP PORTABLE  (CPT=71045)  TECHNIQUE:  AP chest radiograph was obtained.  COMPARISON:  [facility] , CT, CT ANGIOGRAPHY, CHEST (CPT=71275), 2/02/2024, 7:28 PM.  [facility], XR, CHEST PA   LATERAL, 1/31/2011, 4:16 PM.  INDICATIONS:  chest discomfort that  started 2 weeks ago. states he is falling apart. hx of MS and has not been able to ambulate for the past month.  PATIENT STATED HISTORY: (As transcribed by Technologist)  Chest discomfort.    FINDINGS:  There is left basilar airspace disease adjacent to an elevated left hemidiaphragm.  There is minimal blunting the left costophrenic angle which may reflect a tiny left pleural effusion.  There is cardiomegaly.  There is no pulmonary edema.  The right lung is clear.  Old right rib fracture deformities are noted.  Orthopedic hardware seen in the lower cervical spine.  There is no pneumothorax.  There is no mediastinal widening.  Degenerative changes are seen in the spine.            CONCLUSION:  1. Cardiomegaly without edema. 2. Left basilar airspace disease could reflect atelectasis or pneumonia.  The left hemidiaphragm is elevated which is a new finding.  Tiny left pleural effusion is noted.    LOCATION:  [facility]      Dictated by (CST): Jaylon Hebert MD on 2/02/2024 at 7:59 PM     Finalized by (CST): Jaylon Hebert MD on 2/02/2024 at 8:01 PM         PROBLEM LIST:    Patient Active Problem List   Diagnosis    Hypertrophy of prostate with urinary obstruction and other lower urinary tract symptoms (LUTS)    Spinal stenosis, lumbar region, without neurogenic claudication    Anxiety state, unspecified    Lumbar spondylosis with myelopathy    Benign essential hypertension    Multiple sclerosis (HCC)    Lumbar stenosis    Azotemia    Pressure injury of skin of buttock, unspecified injury stage, unspecified laterality    Chest pain, atypical    COVID-19    Acute cystitis without hematuria       IMPRESSION:    Sacral decubitus ulcer  COVID    PLAN:    I recommend some debridement bedside this this appears to be relatively superficial.  Plan to debride the eschar and determine depth.  Risk, benefits and alternatives were discussed with the patient.  Voiced understanding and willing to proceed.      Procedure note:  Patient was placed in the left lateral position.  Patient had a central black eschar overlying the larger decubitus ulcer with skin breakdown.  This eschar was debrided with scalpel excision involving the skin and a small amount of subcutaneous tissue.  Underneath this eschar was viable tissue with good bleeding.  Total area debrided was 10 x 10 x .5cm.  Wound was then sterilely dressed with a dry dressing.  Patient tolerated well.               Balaji Olson MD  2/4/2024  9:20 AM

## 2024-02-04 NOTE — PLAN OF CARE
Problem: Patient/Family Goals  Goal: Patient/Family Long Term Goal  Description: Patient's Long Term Goal:     Interventions:  -   - See additional Care Plan goals for specific interventions  Outcome: Progressing  Goal: Patient/Family Short Term Goal  Description: Patient's Short Term Goal:     Interventions:   -  - See additional Care Plan goals for specific interventions  Outcome: Progressing     Problem: SKIN/TISSUE INTEGRITY - ADULT  Goal: Skin integrity remains intact  Description: INTERVENTIONS  - Assess and document risk factors for pressure ulcer development  - Assess and document skin integrity  - Monitor for areas of redness and/or skin breakdown  - Initiate interventions, skin care algorithm/standards of care as needed  Outcome: Progressing  Goal: Incision(s), wounds(s) or drain site(s) healing without S/S of infection  Description: INTERVENTIONS:  - Assess and document risk factors for pressure ulcer development  - Assess and document skin integrity  - Assess and document dressing/incision, wound bed, drain sites and surrounding tissue  - Implement wound care per orders  - Initiate isolation precautions as appropriate  - Initiate Pressure Ulcer prevention bundle as indicated  Outcome: Progressing  Goal: Oral mucous membranes remain intact  Description: INTERVENTIONS  - Assess oral mucosa and hygiene practices  - Implement preventative oral hygiene regimen  - Implement oral medicated treatments as ordered  Outcome: Progressing     Problem: PAIN - ADULT  Goal: Verbalizes/displays adequate comfort level or patient's stated pain goal  Description: INTERVENTIONS:  - Encourage pt to monitor pain and request assistance  - Assess pain using appropriate pain scale  - Administer analgesics based on type and severity of pain and evaluate response  - Implement non-pharmacological measures as appropriate and evaluate response  - Consider cultural and social influences on pain and pain management  - Manage/alleviate  anxiety  - Utilize distraction and/or relaxation techniques  - Monitor for opioid side effects  - Notify MD/LIP if interventions unsuccessful or patient reports new pain  - Anticipate increased pain with activity and pre-medicate as appropriate  Outcome: Progressing     Problem: RISK FOR INFECTION - ADULT  Goal: Absence of fever/infection during anticipated neutropenic period  Description: INTERVENTIONS  - Monitor WBC  - Administer growth factors as ordered  - Implement neutropenic guidelines  Outcome: Progressing

## 2024-02-04 NOTE — PROGRESS NOTES
Received call from tele stating pt had 30 seconds of SVT, HR in the 150's.   Obtaining vitals & EKG now.   Hospitalist paged.  Awaiting return call

## 2024-02-05 ENCOUNTER — APPOINTMENT (OUTPATIENT)
Dept: CV DIAGNOSTICS | Facility: HOSPITAL | Age: 79
End: 2024-02-05
Attending: INTERNAL MEDICINE
Payer: MEDICARE

## 2024-02-05 ENCOUNTER — APPOINTMENT (OUTPATIENT)
Dept: GENERAL RADIOLOGY | Facility: HOSPITAL | Age: 79
End: 2024-02-05
Attending: INTERNAL MEDICINE
Payer: MEDICARE

## 2024-02-05 LAB
ANION GAP SERPL CALC-SCNC: 2 MMOL/L (ref 0–18)
BASOPHILS # BLD AUTO: 0.02 X10(3) UL (ref 0–0.2)
BASOPHILS NFR BLD AUTO: 0.2 %
BUN BLD-MCNC: 13 MG/DL (ref 9–23)
CALCIUM BLD-MCNC: 8.6 MG/DL (ref 8.5–10.1)
CHLORIDE SERPL-SCNC: 111 MMOL/L (ref 98–112)
CO2 SERPL-SCNC: 26 MMOL/L (ref 21–32)
CREAT BLD-MCNC: 0.74 MG/DL
EGFRCR SERPLBLD CKD-EPI 2021: 94 ML/MIN/1.73M2 (ref 60–?)
EOSINOPHIL # BLD AUTO: 0 X10(3) UL (ref 0–0.7)
EOSINOPHIL NFR BLD AUTO: 0 %
ERYTHROCYTE [DISTWIDTH] IN BLOOD BY AUTOMATED COUNT: 13.4 %
GLUCOSE BLD-MCNC: 123 MG/DL (ref 70–99)
HCT VFR BLD AUTO: 39.5 %
HGB BLD-MCNC: 12.8 G/DL
IMM GRANULOCYTES # BLD AUTO: 0.17 X10(3) UL (ref 0–1)
IMM GRANULOCYTES NFR BLD: 1.5 %
LYMPHOCYTES # BLD AUTO: 1.81 X10(3) UL (ref 1–4)
LYMPHOCYTES NFR BLD AUTO: 15.8 %
MAGNESIUM SERPL-MCNC: 2.2 MG/DL (ref 1.6–2.6)
MCH RBC QN AUTO: 29.4 PG (ref 26–34)
MCHC RBC AUTO-ENTMCNC: 32.4 G/DL (ref 31–37)
MCV RBC AUTO: 90.8 FL
MONOCYTES # BLD AUTO: 1.35 X10(3) UL (ref 0.1–1)
MONOCYTES NFR BLD AUTO: 11.8 %
NEUTROPHILS # BLD AUTO: 8.1 X10 (3) UL (ref 1.5–7.7)
NEUTROPHILS # BLD AUTO: 8.1 X10(3) UL (ref 1.5–7.7)
NEUTROPHILS NFR BLD AUTO: 70.7 %
OSMOLALITY SERPL CALC.SUM OF ELEC: 289 MOSM/KG (ref 275–295)
PLATELET # BLD AUTO: 330 10(3)UL (ref 150–450)
POTASSIUM SERPL-SCNC: 4.1 MMOL/L (ref 3.5–5.1)
RBC # BLD AUTO: 4.35 X10(6)UL
SODIUM SERPL-SCNC: 139 MMOL/L (ref 136–145)
WBC # BLD AUTO: 11.5 X10(3) UL (ref 4–11)

## 2024-02-05 PROCEDURE — 72220 X-RAY EXAM SACRUM TAILBONE: CPT | Performed by: INTERNAL MEDICINE

## 2024-02-05 PROCEDURE — 93306 TTE W/DOPPLER COMPLETE: CPT | Performed by: INTERNAL MEDICINE

## 2024-02-05 RX ORDER — HYDROCODONE BITARTRATE AND ACETAMINOPHEN 5; 325 MG/1; MG/1
1 TABLET ORAL EVERY 8 HOURS PRN
Qty: 10 TABLET | Refills: 0 | Status: SHIPPED | OUTPATIENT
Start: 2024-02-05

## 2024-02-05 RX ORDER — CIPROFLOXACIN 2 MG/ML
400 INJECTION, SOLUTION INTRAVENOUS EVERY 12 HOURS
Status: DISCONTINUED | OUTPATIENT
Start: 2024-02-05 | End: 2024-02-05

## 2024-02-05 RX ORDER — ALPRAZOLAM 0.5 MG/1
0.5 TABLET ORAL 3 TIMES DAILY PRN
Qty: 10 TABLET | Refills: 0 | Status: SHIPPED | OUTPATIENT
Start: 2024-02-05

## 2024-02-05 RX ORDER — VANCOMYCIN HYDROCHLORIDE
15 EVERY 24 HOURS
Status: DISCONTINUED | OUTPATIENT
Start: 2024-02-05 | End: 2024-02-07

## 2024-02-05 NOTE — PROGRESS NOTES
[facility]  Progress Note    Brenden Cardenas Patient Status:  Inpatient    1945 MRN [id number]   Location [facility] 3NW-A Attending Rohit Adrian,    Hosp Day # 3 PCP Napoleon Ovalles DO     Subjective:    Patient denies any new complaints    Objective/Physical Exam:    Vital Signs:  Blood pressure 129/67, pulse 87, temperature 98.4 °F (36.9 °C), temperature source Oral, resp. rate 18, height 5' 9\" (1.753 m), weight 175 lb (79.4 kg), SpO2 95%.    General:  Alert, orientated x3.  Cooperative.  No apparent distress.    Labs:  Reviewed    Lab Results   Component Value Date    WBC 11.5 2024    HGB 12.8 2024    HCT 39.5 2024    .0 2024    CREATSERUM 0.74 2024    BUN 13 2024     2024    K 4.1 2024     2024    CO2 26.0 2024     2024    CA 8.6 2024    MG 2.2 2024       Problem List:  Patient Active Problem List   Diagnosis    Hypertrophy of prostate with urinary obstruction and other lower urinary tract symptoms (LUTS)    Spinal stenosis, lumbar region, without neurogenic claudication    Anxiety state, unspecified    Lumbar spondylosis with myelopathy    Benign essential hypertension    Multiple sclerosis (HCC)    Lumbar stenosis    Azotemia    Pressure injury of skin of buttock, unspecified injury stage, unspecified laterality    Chest pain, atypical    COVID-19    Acute cystitis without hematuria       Impression:     79 y/o S/P: bedside debridement of sacral decubitus ulcer  COVID +    Plan:    Wound care consult  Continue local wound care  Diet as tolerated  No further surgical management  Will sign off      COLLIN Alfonso  [facility]  General Surgery  2024

## 2024-02-05 NOTE — CM/SW NOTE
Pt's wife Karen called back to say that she has chosen [facility] for [facility].  [facility] reserved.  SW to f/u with pt's dc to [facility] when pt is ready for dc.

## 2024-02-05 NOTE — PHYSICAL THERAPY NOTE
Attempted to see pt for PT treatment session. Pt refused at this time stating he wants to finish breakfast first. Will re-attempt as able and appropriate.

## 2024-02-05 NOTE — PROGRESS NOTES
Alert & oriented x4. Room air. NSR/ST on tele. Voids into male purewick. Tolerating regular diet. Wheelchair-bound at baseline. Wound to sacrum - wound care done by wound RN. Red rash noted to BLE. Repositioning q. 2 hours. Specialty bed. Denies nausea/chest pain/SOB. Denies pain. Patient updated on plan of care. Questions and concerns addressed.

## 2024-02-05 NOTE — CONSULTS
[facility]  Report of Inpatient Wound Care Consultation    Brenden Cardenas Patient Status:  Inpatient    1945 MRN [id number]   Location [facility] 3NW-A Attending Rohit Adrian DO   Hosp Day # 3 PCP Napoleon Ovalles DO     Reason for Consultation:  Coccyx    History of Present Illness:  Brenden Cardenas is a a(n) 78 year old male. Patient presents with pressure injury to coccyx, post debridement Patient denies pain in the ulcer at this time.  and the patient's spouse were in the room.PCT assisting with turning and repositioning,.    History:  Past Medical History:   Diagnosis Date    Anxiety state, unspecified     BACK PAIN     Calculus of kidney     Cervical stenosis of spine     COVID-19     Gout     Gout     High blood pressure     HYPERTENSION     Multiple sclerosis (HCC)     Osteoarthrosis, unspecified whether generalized or localized, unspecified site     OTHER DISEASES     GOUT    Seborrheic dermatitis     Skin cancer     Unspecified essential hypertension     Visual impairment     glasses     Past Surgical History:   Procedure Laterality Date    OTHER ACCESSORY      \"urolift\"    OTHER ACCESSORY      multiple skin Bx's/removals    OTHER SURGICAL HISTORY  2011    LUMBAR SX    OTHER SURGICAL HISTORY      cervical surgery    SPINE SURGERY PROCEDURE UNLISTED        reports that he has never smoked. He has never used smokeless tobacco. He reports current alcohol use. He reports that he does not use drugs.    Allergies:  @ALLERGY    Laboratory Data:  Lab Results   Component Value Date    WBC 11.5 2024    HGB 12.8 2024    HCT 39.5 2024    .0 2024    CREATSERUM 0.74 2024    BUN 13 2024     2024    K 4.1 2024     2024    CO2 26.0 2024     2024    CA 8.6 2024    MG 2.2 2024         Impression:  Wound 23 #3 Coccyx (Active)   Date First Assessed/Time First Assessed: 23 1042     Wound Number (Wound Clinic Only): #3  Primary Wound Type: Pressure Injury  Location: Coccyx  Wound Description (Comments): (c)       Assessments 2/5/2024  1:31 PM   Wound Image      Drainage Amount Moderate   Drainage Description Serosanguineous   Wound Length (cm) 9 cm   Wound Width (cm) 7.3 cm   Wound Surface Area (cm^2) 65.7 cm^2   Wound Depth (cm) 1 cm   Wound Volume (cm^3) 65.7 cm^3   Wound Healing % -6523   Margins Well-defined edges   Warner-wound Assessment Fragile;Moist;Maceration;Excoriated   Wound Granulation Tissue Pink;Red;Spongy   Wound Bed Granulation (%) 60 %   Wound Bed Slough (%) 40 %   Wound Odor None   Tunneling? No   Undermining? No   Sinus Tracts? No   Pressure Injury Stage Unstageable   Additional notes: No bone exposed at this time, noted hard surface at the central portion of the wound bed.    Wound Cleaning and Dressings:  Wound cleansing:  Cleanse with saline  Wound product: Santyl ointment [ apply nickel thick].Cover with moist gauze - to wound bed only.Then cover with  at least  two 6x6 bordered foam or an ABD pad and secure with tape.Apply zinc oxide to warner wound daily as needed  Dressing change frequency:  Change dressing daily and/or PRN    Miscellaneous/Additional Orders:  Offloading: Clinitron bed/Air mattress and Turn Q 2 hours and as needed, off load heels at all times    Miscellaneous orders: Increase dietary protein intake.Dietitian to evaluate amount of protein supplements     Care Summary: Discussed Plan of Care at beside with patient. Patient verbally acknowledges understanding of all instructions and all questions were answered.Treatment plan was also discussed with the spouse    Recommendations:  Possible discharge to a facility.May follow up at the wound clinic if possible.  X ray to rule out osteomyelitis.    Thank you for allowing me to participate in the care of your patient.  Time Spent 40 minutes , Thank you.    Soo Elias RN, BSN [facility]   Wound Care pager  [phone]  2/5/2024  2:00 PM

## 2024-02-05 NOTE — CM/SW NOTE
Emailed accepting [facility] list to pt's wife Karen at [email or web address].  She will review [facility] list and choose one for pt.  Karen will call SW with her [facility] choice.

## 2024-02-05 NOTE — PROGRESS NOTES
[facility] Hospitalist Progress Note     Brenden Cardenas Patient Status:  Inpatient    1945 MRN [id number]   Location [facility] 3NW-A Attending Rohit Adrian,    Hosp Day # 3 PCP Napoleon Ovalles DO     Follow Up:  The primary encounter diagnosis was Multiple sclerosis (HCC). Diagnoses of Pressure injury of skin of buttock, unspecified injury stage, unspecified laterality, Chest pain, atypical, COVID-19, Acute cystitis without hematuria, and Pericarditis, unspecified chronicity, unspecified type were also pertinent to this visit.    Subjective:     Patient seen and examined.  He had bedside debridement of his sacral decub by Dr. Olson yest  Says minimal pain, but feels better  Coughing - mild  No chest pain or sob    Objective:    Review of Systems:   10 point ROS completed and was negative, except for pertinent positive and negatives stated in subjective.    Vital signs:  Temp:  [98 °F (36.7 °C)-98.9 °F (37.2 °C)] 98.4 °F (36.9 °C)  Pulse:  [80-90] 87  Resp:  [16-20] 18  BP: (125-138)/(59-74) 129/67  SpO2:  [95 %-100 %] 95 %    Physical Exam:    Gen: No acute distress, alert and oriented x3, no focal neurologic deficits. Chronically ill appearing male.    HEENT:  EOMI, PERRLA, OP clear, MMM  Pulm: Lungs clear bilaterally, normal respiratory effort  CV: Heart with regular rate and rhythm, no murmur.  Normal PMI.    Abd: Abdomen soft, nontender, nondistended, no organomegaly, bowel sounds present  MSK: Full range of motion in extremities, no clubbing, no cyanosis  Skin: +large sacral decub with overlying dressing.    Neuro:  Grossly intact, no focal deficits      Diagnostic Data:    Labs:  Recent Labs   Lab 24  1805 24  0620 24  0700 24  0626   WBC 15.7* 10.1 9.9 11.5*   HGB 14.1 12.5* 12.7* 12.8*   MCV 90.9 92.5 92.5 90.8   .0 296.0 314.0 330.0       Recent Labs   Lab 24  1805 24  0620 24  0700 24  135  02/05/24  0626   GLU 86   < > 84 118* 123*   BUN 25*   < > 13 13 13   CREATSERUM 1.05   < > 0.70 0.67* 0.74   CA 9.3   < > 8.9 8.8 8.6   ALB 2.8*  --   --   --   --       < > 139 138 139   K 3.8   < > 3.6 3.5 4.1      < > 109 109 111   CO2 26.0   < > 24.0 27.0 26.0   ALKPHO 37*  --   --   --   --    AST 49*  --   --   --   --    ALT 33  --   --   --   --    BILT 0.5  --   --   --   --    TP 6.9  --   --   --   --     < > = values in this interval not displayed.       Estimated Creatinine Clearance: 84.9 mL/min (based on SCr of 0.74 mg/dL).    No results for input(s): \"PTP\", \"INR\" in the last 168 hours.         COVID-19 Lab Results    COVID-19  Lab Results   Component Value Date    COVID19 Detected (A) 02/02/2024    COVID19 Not Detected 10/08/2021       Pro-Calcitonin  No results for input(s): \"PCT\" in the last 168 hours.    Cardiac  No results for input(s): \"TROP\", \"PBNP\" in the last 168 hours.    Creatinine Kinase  No results for input(s): \"CK\" in the last 168 hours.    Inflammatory Markers  Recent Labs   Lab 02/02/24  1805   DDIMER 2.28*       Imaging: Imaging data reviewed in Epic.    Medications:    heparin  5,000 Units Subcutaneous Q8H TONYA    guaiFENesin ER  600 mg Oral BID    ampicillin-sulbactam  3 g Intravenous q6h    losartan  100 mg Oral Daily       Assessment & Plan:      78 yr old male with PMH sig for MS,  HTN, and anxiety who presented to the ED for evaluation of weakness, cough, and congestion     # Pressure injury of buttock with suspected infection   # Acute cystitis with hematuria   - suspect his sacral decubs are infected, may need debridement   - cont empiric unasyn, f/u wound cultures   - f/u urine cultures (none sent from the ED, await repeat)  - gen surg c/s appreciated, pt underwent bedside debridement on 2/4  - wound care c/s appreciated      # Atypical chest pain  # Brief run of SVT  - suspect due to cough and recent COVID infection   - EKG showed SR with PVCs  - check ECHO  given abnormal EKG and brief run of SVT  - monitor      # MS with suspected acute exacerbation due to infection   # LE paraplegia  - follows with Dr. Aleman as outpt   - pt not currently taking prednisone for MS, rather he takes it PRN for gout flares   - neuro c/s appreciated   - PT/OT --> [facility]     # COVID-19 infection   - pt with 2 weeks of symptoms   - not hypoxic, no PNA on CTA chest  - cont supportive care     # Essential HTN  - cont losartan     Supplementary Documentation:     Quality:  DVT Prophylaxis: hep subcutaneous   CODE status: FULL  [place]: no  Central line: no  If COVID testing is negative, may discontinue isolation: yes     DISPO:  Dc planning in process  Estimated date of discharge: tomorrow  Discharge is dependent on: [facility] approval   At this point Mr. Cardenas is expected to be discharge to: [facility] Saez MD  [facility] Hospitalist  Pager [phone]  Answering Service number: [phone]

## 2024-02-05 NOTE — OCCUPATIONAL THERAPY NOTE
OCCUPATIONAL THERAPY EVALUATION - INPATIENT     Room Number: 312/312-A  Evaluation Date: 2/5/2024  Type of Evaluation: Initial  Presenting Problem: UTI, chest pain, COVID, 2/4 s/p debridement bedside of sacral decubitus ulcer    Physician Order: IP Consult to Occupational Therapy  Reason for Therapy: ADL/IADL Dysfunction and Discharge Planning    History: Patient is a 78 year old male admitted on 2/2/2024 with Presenting Problem: UTI, chest pain, COVID, 2/4 s/p debridement bedside of sacral decubitus ulcer. Co-Morbidities : MS, HTN, spinal stenosis. Pt was admitted from home on 2/2 with chest pain. Diagnosis of COVID, acute cystitis, MS exacerbation, and sacral decubitus ulcer.  2/4 s/p bedside debridement of sacral ulcer.        ASSESSMENT   Patient presents with the following performance deficits: decreased standing endurance and balance. These deficits impact the patient’s ability to participate in ADL, transfers, instrumental activities of daily living, rest and sleep, leisure and social participation.     OT Discharge Recommendations: Sub-acute rehabilitation  OT Device Recommendations: TBD    WEIGHT BEARING RESTRICTION                   EVALUATION SESSION:  Patient Start of Session: supine  FUNCTIONAL TRANSFER ASSESSMENT  Sit to Stand: Edge of Bed  Edge of Bed: Dependent (mod A x 2)    BED MOBILITY  Supine to Sit : Dependent (max A x 2)  Sit to Supine (OT): Dependent (max A x 2)      COGNITION  Overall Cognitive Status:  WFL - within functional limits    Upper Extremity   ROM: within functional limits   Strength: within functional limits     EDUCATION PROVIDED  Patient : Role of Occupational Therapy; Plan of Care; Adaptive Equipment Recommendations; DME Recommendations; Functional Transfer Techniques; Posture/Positioning  Patient's Response to Education: Verbalized Understanding    Equipment used: none   Therapist comments: Pt on pressure off loading mattress. Deflated the mattress before supine to sit.  Max A  x 2 supine to sit. Per pt, \"I need to have shoes on before I stand.\"  OT donned shoes on, total A.   At baseline, pt transfers from bed to wheelchair with arm rest removed (at home) without holding onto any device.  OT and PT offered to bring the chair or RW for support when standing, but pt preferred not to hold onto these.  Mod A x 2 to stand. Pt stood twice.  Refer to PT note.  This transfer was completed in preparation for bedside commode transfer.  Pt was assisted back to the bed, since wound care RN and MD were to be in the room shortly after the session. Mattress inflated back on. Informed RN about redness on B legs.     Patient End of Session: In bed;With  staff;Needs met;Call light within reach;RN aware of session/findings;All patient questions and concerns addressed;Family present    OCCUPATIONAL PROFILE    HOME SITUATION  Type of Home: House  Home Layout: One level;Ramped entrance  Lives With: Spouse    Toilet and Equipment: Standard height toilet (sink next to it)  Shower/Tub and Equipment: Other (Comment) (sponge bath)  Other Equipment:  (wheelchair)          Drives: No       Prior Level of Function: Pt uses wheelchair for mobility. Lebron the wheelchair by the sink, stands by holding onto the sink, turn, and transfers onto the toilet.   Pt's wife assists with sponge bathing and LB dressing.         PAIN ASSESSMENT  Ratin  Location: not reporting pain       OBJECTIVE  Precautions: Bed/chair alarm (buttock wound, s/p debridement 2/4)  Fall Risk: High fall risk      ASSESSMENTS    AM-PAC ‘6-Clicks’ Inpatient Daily Activity Short Form  -   Putting on and taking off regular lower body clothing?: A Lot  -   Bathing (including washing, rinsing, drying)?: A Lot  -   Toileting, which includes using toilet, bedpan or urinal? : A Lot  -   Putting on and taking off regular upper body clothing?: A Little  -   Taking care of personal grooming such as brushing teeth?: A Little  -   Eating meals?: None    AM-PAC  Score:  Score: 16  Approx Degree of Impairment: 53.32%  Standardized Score (AM-PAC Scale): 35.96    ADDITIONAL TESTS     NEUROLOGICAL FINDINGS      COGNITION ASSESSMENTS       PLAN  OT Treatment Plan: Balance activities;Energy conservation/work simplification techniques;ADL training;Functional transfer training;UE strengthening/ROM;Patient/Family education;Patient/Family training;Equipment eval/education;Compensatory technique education  Rehab Potential : Fair  Frequency: 3x/week  Number of Visits to Meet Established Goals: 5    ADL Goals   Patient will perform upper body dressing:  with setup  Patient will perform toileting: with mod assist    Functional Transfer Goals  Patient will transfer from supine to sit:  with min assist  Patient will transfer to bedside commode:  with min assist    UE Exercise Program Goal  Patient will be independent with bilateral AROM HEP (home exercise program).    Patient Evaluation Complexity Level:   Occupational Profile/Medical History LOW - Brief history including review of medical or therapy records    Specific performance deficits impacting engagement in ADL/IADL LOW  1 - 3 performance deficits    Client Assessment/Performance Deficits MODERATE - Comorbidities and min to mod modifications of tasks    Clinical Decision Making LOW - Analysis of occupational profile, problem-focused assessments, limited treatment options    Overall Complexity LOW     OT Session Time: 20 minutes  Self-Care Home Management: 1 minutes  Therapeutic Activity: 8 minutes

## 2024-02-05 NOTE — PHYSICAL THERAPY NOTE
PHYSICAL THERAPY TREATMENT NOTE - INPATIENT    Room Number: 312/312-A     Session: 1     Number of Visits to Meet Established Goals: 5    Presenting Problem: chest pain, weakness  Co-Morbidities : MS, pressure sore    History related to current admission: Patient is a 78 year old male admitted on 2/2/2024 from home  for weakness with history of MS.  Pt diagnosed with COVID, acute cystitis.       ASSESSMENT   Pt is making progress towards all IP PT goals. Pt progressed to partial standing this session with mod A of 2. Pt continues with decreased strength, postural control, balance, and tolerance to activity and would benefit from continued PT to address deficits and work towards PLOF.      DISCHARGE RECOMMENDATIONS  PT Discharge Recommendations: Sub-acute rehabilitation     PLAN  PT Treatment Plan: Bed mobility;Body mechanics;Endurance;Patient education;Strengthening;Transfer training;Balance training  Rehab Potential : Fair  Frequency (Obs): 3x/week    CURRENT GOALS   Goal #1 Patient is able to demonstrate supine - sit EOB @ level: moderate assistance      Goal #2 Patient is able to demonstrate transfers Sit to/from Stand at assistance level: moderate assistance      Goal #3 Patient will perform SPT bed<>wheelchair modA      Goal #4     Goal #5     Goal #6     Goal Comments: Goals established on 2/3/2024       2/5/2024 all goals ongoing       SUBJECTIVE  \"This was encouraging\"     OBJECTIVE  Precautions: Other (Comment) (isolation)    WEIGHT BEARING RESTRICTION                   PAIN ASSESSMENT   Rating:  (did not c/o pain)          BALANCE                                                                                                                       Static Sitting: Fair -  Dynamic Sitting: Poor +           Static Standing: Poor  Dynamic Standing: Not tested    ACTIVITY TOLERANCE   Vitals stable                      O2 WALK         AM-PAC '6-Clicks' INPATIENT SHORT FORM - BASIC MOBILITY  How much difficulty  does the patient currently have...  Patient Difficulty: Turning over in bed (including adjusting bedclothes, sheets and blankets)?: A Lot   Patient Difficulty: Sitting down on and standing up from a chair with arms (e.g., wheelchair, bedside commode, etc.): A Lot   Patient Difficulty: Moving from lying on back to sitting on the side of the bed?: A Lot   How much help from another person does the patient currently need...   Help from Another: Moving to and from a bed to a chair (including a wheelchair)?: A Lot   Help from Another: Need to walk in hospital room?: Total   Help from Another: Climbing 3-5 steps with a railing?: Total       AM-PAC Score:  Raw Score: 10   Approx Degree of Impairment: 76.75%   Standardized Score (AM-PAC Scale): 32.29   CMS Modifier (G-Code): CL    FUNCTIONAL ABILITY STATUS  Gait Assessment   Functional Mobility/Gait Assessment  Gait Assistance:  (pt non-ambulatory at baseline)    Skilled Therapy Provided: Per RN okay to work with pt. Pt received in supine and was agreeable to PT session.     Bed Mobility:  Rolling: NT   Supine<>Sit: max A of 2 with HOB elevated. Pt needed total assist to BLE and max A for trunk.   Sit<>Supine: max A of 2      Transfer Mobility:  Sit<>Stand: partial stand with mod A of 2, 2 reps    Stand<>Sit: mod A of 2   Gait: N/A     Therapist's Comments: Pt educated on role of therapy, goals for session, safety, and fall prevention. Wound care present at end of session to work with pt.        Patient End of Session: In bed;Needs met;Call light within reach;RN aware of session/findings;All patient questions and concerns addressed;Family present    PT Session Time: 15 minutes  Therapeutic Activity: 15 minutes

## 2024-02-05 NOTE — PLAN OF CARE
Pt a&o x 4.  Cheerful & cooperative w/ care  Wife @ bedside for majority of day.  Participating & helpful with plan of care  Diet advanced to regular foods.  Appetite good  Clinitron bed & turn w/ asst x 1  Voiding per male sommer  Denies c/o pain  Iv sl  Unasyn cont  Surgeon @ bedside this am.   Bedside debridement performed by md, appears to be relatively superficial. Dressing applied. Wound care to see tomorrow  Pt states he would like to go to [facility] upon discharge.  Sw following    Apx 1335:  received call from tele stating pt had 30 seconds of svt, hr reaching 150's.   Vitals stable & EKG obtained.   Labs ordered as ordered by hospitalist.   Trop = wnl  2D echo ordered.   Per hospitalist, does not have to be stat

## 2024-02-05 NOTE — OCCUPATIONAL THERAPY NOTE
Attempted to see the pt for OT evaluation. Patient asked therapists to return later, since he is still eating breakfast. Will continue to follow.

## 2024-02-06 LAB
CREAT BLD-MCNC: 0.6 MG/DL
EGFRCR SERPLBLD CKD-EPI 2021: 100 ML/MIN/1.73M2 (ref 60–?)
GLUCOSE BLD-MCNC: 154 MG/DL (ref 70–99)

## 2024-02-06 RX ORDER — METRONIDAZOLE 250 MG/1
250 TABLET ORAL EVERY 8 HOURS SCHEDULED
Status: DISCONTINUED | OUTPATIENT
Start: 2024-02-06 | End: 2024-02-07

## 2024-02-06 NOTE — PLAN OF CARE
Received pt at 1930. Pt is A&O x 4; follows commands, awaiting sacral xray. Pt is on RA, VSS, reg diet. Pt is incontinent of bladder; male external catheter in place. IV access is patent currently infusing new abt orders of cefepime and vanco. Shift assessment performed, HS meds given. Pt transferred to new clinitron bed after returning from xray but by midnight had refused bed therapy wanting bed completely turned off. Heat was tolerated but vibration and beeping was not per pt. NOC safety plan in place; bed in low position, call light and personal items within reach, pt encouraged to call staff for assistance.

## 2024-02-06 NOTE — CM/SW NOTE
Message received from [facility]. Pt was due to start care but was admitted. Current plan for DC is [facility] at [facility]. [facility] admissions notified, VM left w Jasmine. [phone] ext [phone].     CM/SW will remain available for DC planning and/or support.     MARLY GarnicaN, CMSRN    [id number]

## 2024-02-06 NOTE — PROGRESS NOTES
[facility] Pharmacy Dosing Service      Initial Pharmacokinetic Consult for Vancomycin Dosing     Brenden Cardenas is a 78 year old male who is being initiated on vancomycin therapy for cellulitis.  Pharmacy has been asked to dose vancomycin by Dr. Max.  The initial treatment and monitoring approach will be steady state AUC strategy.        Weight and Temperature:    Wt Readings from Last 1 Encounters:   24 79.4 kg (175 lb)        Temp Readings from Last 1 Encounters:   24 98 °F (36.7 °C) (Oral)      Labs:   Recent Labs   Lab 24  0700 24  1358 24  0626   CREATSERUM 0.70 0.67* 0.74      Estimated Creatinine Clearance: 84.9 mL/min (based on SCr of 0.74 mg/dL).     Recent Labs   Lab 24  0620 24  0700 24  0626   WBC 10.1 9.9 11.5*          The Pharmacokinetic Target is:     to 600 mg-h/L and trough <=15 mg/L    Renal Dosing Considerations:    None     Assessment/Plan:   Initial/Loading dose: Will receive 1250 mg IV (15 mg/kg, capped at 2250 mg) x 1 initial dose.      Maintenance dose: Pharmacy will dose vancomycin at 1250 mg IV every 24 hours    Monitorin) Plan for vancomycin peak and trough to be obtained at steady state    2) Pharmacy will order SCr as clinically indicated to assess renal function.    3) Pharmacy will monitor for toxicity and efficacy, adjust vancomycin dose and/or frequency, and order vancomycin levels as appropriate per the Pharmacy and Therapeutics Committee approved protocol until discontinuation of the medication.       We appreciate the opportunity to assist in the care of this patient.     Meghana Pandey, PharmD  2024  6:00 PM  [facility]  Pharmacy Extension: [phone]

## 2024-02-06 NOTE — PROGRESS NOTES
[facility] Hospitalist Progress Note     Brenden Cardenas Patient Status:  Inpatient    1945 MRN [id number]   Location [facility] 3NW-A Attending Rohit Adrian,    Hosp Day # 4 PCP Napoleon Ovalles DO     Follow Up:  The primary encounter diagnosis was Pressure injury of skin of buttock, unspecified injury stage, unspecified laterality. Diagnoses of Multiple sclerosis (HCC), Chest pain, atypical, COVID-19, Acute cystitis without hematuria, and Pericarditis, unspecified chronicity, unspecified type were also pertinent to this visit.    Subjective:     Patient seen and examined.    had bedside debridement of his sacral decub by Dr. Olson  Says minimal pain, but feels better  Coughing - mild  No chest pain or sob    Objective:    Review of Systems:   10 point ROS completed and was negative, except for pertinent positive and negatives stated in subjective.    Vital signs:  Temp:  [97.6 °F (36.4 °C)-98.4 °F (36.9 °C)] 98.2 °F (36.8 °C)  Pulse:  [85-93] 91  Resp:  [18] 18  BP: (122-153)/(67-99) 122/99  SpO2:  [95 %-98 %] 96 %    Physical Exam:    Gen: No acute distress, alert and oriented x3, no focal neurologic deficits. Chronically ill appearing male.    HEENT:  EOMI, PERRLA, OP clear, MMM  Pulm: Lungs clear bilaterally, normal respiratory effort  CV: Heart with regular rate and rhythm, no murmur.  Normal PMI.    Abd: Abdomen soft, nontender, nondistended, no organomegaly, bowel sounds present  MSK: Full range of motion in extremities, no clubbing, no cyanosis  Skin: +large sacral decub with overlying dressing.    Neuro:  Grossly intact, no focal deficits      Diagnostic Data:    Labs:  Recent Labs   Lab 24  18024  0620 24  0700 24  0626   WBC 15.7* 10.1 9.9 11.5*   HGB 14.1 12.5* 12.7* 12.8*   MCV 90.9 92.5 92.5 90.8   .0 296.0 314.0 330.0       Recent Labs   Lab 24  18024  0620 24  0700 24  1358 24  0626  02/06/24  0637   GLU 86   < > 84 118* 123*  --    BUN 25*   < > 13 13 13  --    CREATSERUM 1.05   < > 0.70 0.67* 0.74 0.60*   CA 9.3   < > 8.9 8.8 8.6  --    ALB 2.8*  --   --   --   --   --       < > 139 138 139  --    K 3.8   < > 3.6 3.5 4.1  --       < > 109 109 111  --    CO2 26.0   < > 24.0 27.0 26.0  --    ALKPHO 37*  --   --   --   --   --    AST 49*  --   --   --   --   --    ALT 33  --   --   --   --   --    BILT 0.5  --   --   --   --   --    TP 6.9  --   --   --   --   --     < > = values in this interval not displayed.       Estimated Creatinine Clearance: 104.7 mL/min (A) (based on SCr of 0.6 mg/dL (L)).    No results for input(s): \"PTP\", \"INR\" in the last 168 hours.         COVID-19 Lab Results    COVID-19  Lab Results   Component Value Date    COVID19 Detected (A) 02/02/2024    COVID19 Not Detected 10/08/2021       Pro-Calcitonin  No results for input(s): \"PCT\" in the last 168 hours.    Cardiac  No results for input(s): \"TROP\", \"PBNP\" in the last 168 hours.    Creatinine Kinase  No results for input(s): \"CK\" in the last 168 hours.    Inflammatory Markers  Recent Labs   Lab 02/02/24  1805   DDIMER 2.28*       Imaging: Imaging data reviewed in Epic.    Medications:    collagenase   Topical Daily    cefepime  1 g Intravenous Q8H    vancomycin  15 mg/kg Intravenous Q24H    heparin  5,000 Units Subcutaneous Q8H TONYA    guaiFENesin ER  600 mg Oral BID    losartan  100 mg Oral Daily       Assessment & Plan:      78 yr old male with PMH sig for MS,  HTN, and anxiety who presented to the ED for evaluation of weakness, cough, and congestion     # Pressure injury of buttock with suspected infection   # Acute cystitis with hematuria   - suspect his sacral decubs are infected, may need debridement   - cont empiric unasyn, f/u wound cultures   - f/u urine cultures (none sent from the ED, await repeat)  - gen surg c/s appreciated, pt underwent bedside debridement on 2/4  - wound care consult recommended  xray to r/o OM >> sacral xray w/o evidence of OM  -wound growing mult organisms >> cipro added >> ID consulted to help with abx choice and any further workup        # Atypical chest pain  # Brief run of SVT  - suspect due to cough and recent COVID infection   - EKG showed SR with PVCs  - check ECHO given abnormal EKG and brief run of SVT  - monitor      # MS with suspected acute exacerbation due to infection   # LE paraplegia  - follows with Dr. Aleman as outpt   - pt not currently taking prednisone for MS, rather he takes it PRN for gout flares   - neuro c/s appreciated   - PT/OT --> MIRLANDE     # COVID-19 infection   - pt with 2 weeks of symptoms   - not hypoxic, no PNA on CTA chest  - cont supportive care     # Essential HTN  - cont losartan     Supplementary Documentation:     Quality:  DVT Prophylaxis: hep subcutaneous   CODE status: FULL  [place]: no  Central line: no  If COVID testing is negative, may discontinue isolation: yes     DISPO:  Dc planning in process  Estimated date of discharge: TBD  Discharge is dependent on: MIRLANDE approval >> [facility]  At this point Mr. Cardenas is expected to be discharge to: MIRLANDE Saez MD  [facility]  Pager [phone]  Answering Service number: [phone]

## 2024-02-06 NOTE — PROGRESS NOTES
[facility] Infectious Disease  Progress Note    Brenden Cardenas Patient Status:  Inpatient    1945 MRN [id number]   [facility] 3NW-A Attending Rohit Adrian DO   Hosp Day # 4 PCP Napoleon Ovalles DO     Brenden Cardenas is a 78 year old male.   Chief Complaint   Patient presents with    Chest Pain       HPI:      Covid  Hx m.s.  Bad decub debrided recently  Asked to help with rx               REVIEW OF SYSTEMS:   A comprehensive 10 point review of systems was completed.  Pertinent positives and negatives noted in the the HPI.       Allergies:  No Known Allergies     Current Meds:    Current Facility-Administered Medications:     collagenase (Santyl) 250 UNIT/GM ointment, , Topical, Daily    ceFEPIme (Maxipime) 1 g in sodium chloride 0.9% 100 mL IVPB-MBP, 1 g, Intravenous, Q8H    vancomycin (Vancocin) 1.25 g in sodium chloride 0.9% 250mL IVPB premix, 15 mg/kg, Intravenous, Q24H    heparin (Porcine) 5000 UNIT/ML injection 5,000 Units, 5,000 Units, Subcutaneous, Q8H TONYA    acetaminophen (Tylenol) tab 650 mg, 650 mg, Oral, Q4H PRN **OR** HYDROcodone-acetaminophen (Norco) 5-325 MG per tab 1 tablet, 1 tablet, Oral, Q4H PRN **OR** HYDROcodone-acetaminophen (Norco) 5-325 MG per tab 2 tablet, 2 tablet, Oral, Q4H PRN    polyethylene glycol (PEG 3350) (Miralax) 17 g oral packet 17 g, 17 g, Oral, Daily PRN    sennosides (Senokot) tab 17.2 mg, 17.2 mg, Oral, Nightly PRN    bisacodyl (Dulcolax) 10 MG rectal suppository 10 mg, 10 mg, Rectal, Daily PRN    fleet enema (Fleet) 7-19 GM/118ML rectal enema 133 mL, 1 enema, Rectal, Once PRN    ondansetron (Zofran) 4 MG/2ML injection 4 mg, 4 mg, Intravenous, Q6H PRN    metoclopramide (Reglan) 5 mg/mL injection 10 mg, 10 mg, Intravenous, Q8H PRN    guaiFENesin ER (Mucinex) 12 hr tab 600 mg, 600 mg, Oral, BID    benzonatate (Tessalon) cap 200 mg, 200 mg, Oral, TID PRN    ALPRAZolam (Xanax) tab 0.5 mg, 0.5 mg, Oral, TID PRN    losartan  (Cozaar) tab 100 mg, 100 mg, Oral, Daily   Current Outpatient Medications   Medication Sig Dispense Refill    HYDROcodone-acetaminophen 5-325 MG Oral Tab Take 1 tablet by mouth every 8 (eight) hours as needed. 10 tablet 0    ALPRAZolam 0.5 MG Oral Tab Take 1 tablet (0.5 mg total) by mouth 3 (three) times daily as needed. 10 tablet 0        HISTORY:  Past Medical History:   Diagnosis Date    Anxiety state, unspecified     BACK PAIN     Calculus of kidney     Cervical stenosis of spine     COVID-19     Gout     Gout     High blood pressure     HYPERTENSION     Multiple sclerosis (HCC)     Osteoarthrosis, unspecified whether generalized or localized, unspecified site     OTHER DISEASES     GOUT    Seborrheic dermatitis     Skin cancer     Unspecified essential hypertension     Visual impairment     glasses      Past Surgical History:   Procedure Laterality Date    OTHER ACCESSORY      \"urolift\"    OTHER ACCESSORY      multiple skin Bx's/removals    OTHER SURGICAL HISTORY  02/2011    LUMBAR SX    OTHER SURGICAL HISTORY      cervical surgery    SPINE SURGERY PROCEDURE UNLISTED          Social history and family history negative related to present illness except as above.    PHYSICAL EXAM:   BP (!) 122/99 (BP Location: Left arm)   Pulse 91   Temp 98.2 °F (36.8 °C) (Oral)   Resp 18   Ht 5' 9\" (1.753 m)   Wt 175 lb (79.4 kg)   SpO2 96%   BMI 25.84 kg/m²   GENERAL:  Awake, alert, oriented x3. Non-tox, non-septic and in NAD.  HEENT:  Normocephalic, no scleral abnormalities.  Oropharynx clear, trachea ML.  NECK:  Supple, no masses, no lymphadenopathy.  LUNGS:  Clear to auscultation b/l, no rhonchi, rales, or wheezes.  CARDIO: RRR S1/S2, no rubs, clicks, heaves, or murmurs.  GI:  Soft NT/ND, BS present x4 quadrants, no HSM.  EXTREMITIES:  No edema, no clubbing, no cyanosis.  NEURO:  No focal neurologic deficits.  DERM:  Warm, dry, see wound care photos    IMPRESSION/PLAN:   Polymicrobial sacral decub; unasyn changed to  cefepime vanc;add some po flagyl  Looks like uti vs colonization; urine culture with no growth so will not pursue any further at this time  Covid was present x 2 weeks; no rx indicated by my view; isolation per whatever hospital infection control says  Spoke with pt   Thanks, #[phone]            Recent Results (from the past 72 hour(s))   Basic Metabolic Panel (8)    Collection Time: 02/04/24  7:00 AM   Result Value Ref Range    Glucose 84 70 - 99 mg/dL    Sodium 139 136 - 145 mmol/L    Potassium 3.6 3.5 - 5.1 mmol/L    Chloride 109 98 - 112 mmol/L    CO2 24.0 21.0 - 32.0 mmol/L    Anion Gap 6 0 - 18 mmol/L    BUN 13 9 - 23 mg/dL    Creatinine 0.70 0.70 - 1.30 mg/dL    Calcium, Total 8.9 8.5 - 10.1 mg/dL    Calculated Osmolality 287 275 - 295 mOsm/kg    eGFR-Cr 95 >=60 mL/min/1.73m2   Magnesium    Collection Time: 02/04/24  7:00 AM   Result Value Ref Range    Magnesium 1.8 1.6 - 2.6 mg/dL   CBC W/ DIFFERENTIAL    Collection Time: 02/04/24  7:00 AM   Result Value Ref Range    WBC 9.9 4.0 - 11.0 x10(3) uL    RBC 4.25 3.80 - 5.80 x10(6)uL    HGB 12.7 (L) 13.0 - 17.5 g/dL    HCT 39.3 39.0 - 53.0 %    .0 150.0 - 450.0 10(3)uL    MCV 92.5 80.0 - 100.0 fL    MCH 29.9 26.0 - 34.0 pg    MCHC 32.3 31.0 - 37.0 g/dL    RDW 13.2 %    Neutrophil Absolute Prelim 7.06 1.50 - 7.70 x10 (3) uL    Neutrophil Absolute 7.06 1.50 - 7.70 x10(3) uL    Lymphocyte Absolute 1.53 1.00 - 4.00 x10(3) uL    Monocyte Absolute 1.16 (H) 0.10 - 1.00 x10(3) uL    Eosinophil Absolute 0.00 0.00 - 0.70 x10(3) uL    Basophil Absolute 0.01 0.00 - 0.20 x10(3) uL    Immature Granulocyte Absolute 0.15 0.00 - 1.00 x10(3) uL    Neutrophil % 71.3 %    Lymphocyte % 15.4 %    Monocyte % 11.7 %    Eosinophil % 0.0 %    Basophil % 0.1 %    Immature Granulocyte % 1.5 %   EKG 12 Lead    Collection Time: 02/04/24  1:48 PM   Result Value Ref Range    Ventricular rate 88 BPM    Atrial rate 88 BPM    P-R Interval 146 ms    QRS Duration 102 ms    Q-T Interval 378 ms     QTC Calculation (Bezet) 457 ms    P Axis 6 degrees    R Axis 25 degrees    T Axis 3 degrees   Urine Culture, Routine    Collection Time: 02/04/24  1:54 PM    Specimen: Urine, clean catch   Result Value Ref Range    Urine Culture No Growth at 18-24 hrs.    Basic Metabolic Panel (8)    Collection Time: 02/04/24  1:58 PM   Result Value Ref Range    Glucose 118 (H) 70 - 99 mg/dL    Sodium 138 136 - 145 mmol/L    Potassium 3.5 3.5 - 5.1 mmol/L    Chloride 109 98 - 112 mmol/L    CO2 27.0 21.0 - 32.0 mmol/L    Anion Gap 2 0 - 18 mmol/L    BUN 13 9 - 23 mg/dL    Creatinine 0.67 (L) 0.70 - 1.30 mg/dL    Calcium, Total 8.8 8.5 - 10.1 mg/dL    Calculated Osmolality 287 275 - 295 mOsm/kg    eGFR-Cr 97 >=60 mL/min/1.73m2   Magnesium    Collection Time: 02/04/24  1:58 PM   Result Value Ref Range    Magnesium 1.9 1.6 - 2.6 mg/dL   Troponin I (High Sensitivity)    Collection Time: 02/04/24  1:58 PM   Result Value Ref Range    Troponin I (High Sensitivity) 28 <=79 ng/L   Basic Metabolic Panel (8)    Collection Time: 02/05/24  6:26 AM   Result Value Ref Range    Glucose 123 (H) 70 - 99 mg/dL    Sodium 139 136 - 145 mmol/L    Potassium 4.1 3.5 - 5.1 mmol/L    Chloride 111 98 - 112 mmol/L    CO2 26.0 21.0 - 32.0 mmol/L    Anion Gap 2 0 - 18 mmol/L    BUN 13 9 - 23 mg/dL    Creatinine 0.74 0.70 - 1.30 mg/dL    Calcium, Total 8.6 8.5 - 10.1 mg/dL    Calculated Osmolality 289 275 - 295 mOsm/kg    eGFR-Cr 94 >=60 mL/min/1.73m2   Magnesium    Collection Time: 02/05/24  6:26 AM   Result Value Ref Range    Magnesium 2.2 1.6 - 2.6 mg/dL   CBC W/ DIFFERENTIAL    Collection Time: 02/05/24  6:26 AM   Result Value Ref Range    WBC 11.5 (H) 4.0 - 11.0 x10(3) uL    RBC 4.35 3.80 - 5.80 x10(6)uL    HGB 12.8 (L) 13.0 - 17.5 g/dL    HCT 39.5 39.0 - 53.0 %    .0 150.0 - 450.0 10(3)uL    MCV 90.8 80.0 - 100.0 fL    MCH 29.4 26.0 - 34.0 pg    MCHC 32.4 31.0 - 37.0 g/dL    RDW 13.4 %    Neutrophil Absolute Prelim 8.10 (H) 1.50 - 7.70 x10 (3) uL     Neutrophil Absolute 8.10 (H) 1.50 - 7.70 x10(3) uL    Lymphocyte Absolute 1.81 1.00 - 4.00 x10(3) uL    Monocyte Absolute 1.35 (H) 0.10 - 1.00 x10(3) uL    Eosinophil Absolute 0.00 0.00 - 0.70 x10(3) uL    Basophil Absolute 0.02 0.00 - 0.20 x10(3) uL    Immature Granulocyte Absolute 0.17 0.00 - 1.00 x10(3) uL    Neutrophil % 70.7 %    Lymphocyte % 15.8 %    Monocyte % 11.8 %    Eosinophil % 0.0 %    Basophil % 0.2 %    Immature Granulocyte % 1.5 %   Creatinine, Serum    Collection Time: 02/06/24  6:37 AM   Result Value Ref Range    Creatinine 0.60 (L) 0.70 - 1.30 mg/dL    eGFR-Cr 100 >=60 mL/min/1.73m2         Dong Cruz MD     CALL [facility] AT [phone] IF QUESTIONS OR CONCERNS  THANKS

## 2024-02-06 NOTE — CONSULTS
[facility]    PATIENT'S NAME: PETRA HERNANDEZ   ATTENDING PHYSICIAN: Rohit Adrian DO   CONSULTING PHYSICIAN: Dong Cruz M.D.   PATIENT ACCOUNT#:   [id number]    LOCATION:  [address] [place] [address]  MEDICAL RECORD #:   [id number]       YOB: 1945  ADMISSION DATE:       02/02/2024      CONSULT DATE:  02/06/2024    REPORT OF CONSULTATION    HISTORY OF PRESENT ILLNESS:  This is a 78-year-old man with multiple sclerosis.  He has developed a sacral decubitus and has been admitted.  This was debrided a couple of days ago, and it is the hope according to the patient that enzymatic debridement will be all that is necessary moving forward.  Fever and chills were denied.  Of note was diagnosis of COVID about 2 weeks ago and the COVID test remains positive at this time.  Bladder emptying remains an issue for this patient with multiple sclerosis.  No other GI, , cardiovascular, CNS, or respiratory symptoms noted.    PAST MEDICAL HISTORY:  Significant for the above.  Previous renal stones, DJD, hypertension, gout since his teenage years.    PAST SURGICAL HISTORY:  Cervical and lumbar surgeries.  UroLift was done in the past.     MEDICATIONS:  Unasyn switched to cefepime and vancomycin.      ALLERGIES:  No known allergies.    SOCIAL HISTORY:  Negative cigarettes and alcohol.    FAMILY HISTORY:  Nobody else ill except family members had COVID previously.    REVIEW OF SYSTEMS:  Weight has been stable.      PHYSICAL EXAMINATION:    GENERAL:  This is a well-developed male, bedbound, but no acute distress.   VITAL SIGNS:  He is afebrile.  Vitals are stable.  HEENT:  Pale conjunctivae.  No oral lesions.  NECK:  Supple.  No JVD or adenopathy.  LUNGS:  Seem clear.   HEART:  I do not hear a murmur.  ABDOMEN:  Nontender.  No masses, rebound, or organomegaly.  EXTREMITIES:  No clubbing, cyanosis, edema, phlebitis, or cellulitis.   SKIN:  He was not turned over, but there are good pictures in the Wound Care note that  describe the wound well.  Necrotic tissue is seen.    LABORATORY DATA:  Wound culture:  Staph aureus, pseudomonas, enterococcus; back on January 26 there was also Morganella, group B strep, and E. coli and proteus but no pseudomonas.  Urinalysis, active sediment.  Urine culture, no growth.    CT angio did not show pulmonary embolism this admission.    Chest x-ray, looked like some atelectasis.      Sacrum x-ray, no evidence of osteomyelitis.      IMPRESSION:    1.   A polymicrobial sacral decubitus.  Unasyn was switched to cefepime and vancomycin.  I will add some oral Flagyl for some more anaerobic coverage.  2.   It looks like he had a urinary tract infection but this may be colonization given the negative urine culture.  This will not be pursued any further at this time.  3.   COVID was present with symptoms for about 2 weeks.  No treatment is indicated at this time by my view.  Isolation protocol is whatever the hospital infection control currently has in place.  I doubt he is contagious at this time given the 2-week history.  I have spoken with the patient.  Further suggestions to follow.      Thank you very much for allowing me to see this patient.    Dictated By Dong Cruz M.D.  d: 02/06/2024 11:30:13  t: 02/06/2024 11:55:46  Job [id number]  [facility]/

## 2024-02-06 NOTE — DIETARY NOTE
[facility]    NUTRITION ASSESSMENT    Pt does not meet malnutrition criteria at this time.          NUTRITION INTERVENTION:    Meal and Snacks - Monitor and encourage adequate PO intake.   Medical Food Supplements - Jah BID + Ensure +HP. Rationale/use for oral supplements discussed.  Vitamin and Mineral Supplements - adding Chewable MVI        PATIENT STATUS: 02/06/24 78 year old male admitted with weakness, cough, and congestion. +COVID. Chart reviewed due to skin status. +pressure injury to coccyx. Supplements adjusted. On RA.     PMH sig for MS,  HTN, and anxiety     ANTHROPOMETRICS:  Ht: 175.3 cm (5' 9\")  Wt: 79.4 kg (175 lb).   BMI: Body mass index is 25.84 kg/m².  IBW: 73 kg      WEIGHT HISTORY:   Weight loss: No    Wt Readings from Last 10 Encounters:   02/02/24 79.4 kg (175 lb)   10/23/23 81.6 kg (180 lb)   08/18/23 81.6 kg (180 lb)   08/02/23 81.6 kg (180 lb)   03/03/22 72.6 kg (160 lb)   10/11/21 72.6 kg (160 lb)   08/11/21 81.6 kg (180 lb)   05/26/21 83.9 kg (185 lb)   05/13/21 83.9 kg (185 lb)   03/02/20 81.6 kg (180 lb)        NUTRITION:  Diet:       Procedures    Regular/General diet Is Patient on Accuchecks? No      Food Allergies: No  Cultural/Ethnic/[place] Preferences Addressed: Yes    Percent Meals Eaten (last 3 days)       Date/Time Percent Meals Eaten (%)    02/04/24 1220 100 %    02/05/24 1615 80 %     Percent Meals Eaten (%): ensure at 02/05/24 1615    02/06/24 0956 100 %            GI system review: WNL Last BM:   Skin and wounds: sacral wound unstageable    NUTRITION RELATED PHYSICAL FINDINGS:     1. Body Fat/Muscle Mass:  JAMAL     2. Fluid Accumulation: none per RN documentation     NUTRITION PRESCRIPTION: 79.4kg  Calories: 9901-5661 calories/day (25-30 kcal/kg)  Protein: 103-118 grams protein/day (1.2-1.5 grams protein per kg)  Fluid: ~1 ml/kcal or per MD discretion    NUTRITION DIAGNOSIS/PROBLEM:  Increased nutrient needs related to increased nutritional demands for healing as  evidenced by pressure ulcer      MONITOR AND EVALUATE/NUTRITION GOALS:  PO intake of 75% of meals TID - New  PO intake of 75% of oral nutrition supplement/s - New  Weight stable within 1 to 2 lbs during admission - New  Provide nutrition adequate for wound healing - New      MEDICATIONS:  Reviewed    LABS:  Reviewed    Pt is at Moderate nutrition risk      Trina Morgan RD, LDN  Clinical Nutrition  [id number]

## 2024-02-07 ENCOUNTER — APPOINTMENT (OUTPATIENT)
Dept: WOUND CARE | Facility: HOSPITAL | Age: 79
End: 2024-02-07
Attending: INTERNAL MEDICINE
Payer: MEDICARE

## 2024-02-07 VITALS
BODY MASS INDEX: 25.92 KG/M2 | HEIGHT: 69 IN | WEIGHT: 175 LBS | OXYGEN SATURATION: 94 % | SYSTOLIC BLOOD PRESSURE: 131 MMHG | DIASTOLIC BLOOD PRESSURE: 60 MMHG | RESPIRATION RATE: 20 BRPM | TEMPERATURE: 98 F | HEART RATE: 89 BPM

## 2024-02-07 LAB
CREAT BLD-MCNC: 0.95 MG/DL
EGFRCR SERPLBLD CKD-EPI 2021: 83 ML/MIN/1.73M2 (ref 60–?)

## 2024-02-07 RX ORDER — AMOXICILLIN 875 MG/1
875 TABLET, COATED ORAL EVERY 12 HOURS
Qty: 28 TABLET | Refills: 0 | Status: SHIPPED | OUTPATIENT
Start: 2024-02-07 | End: 2024-02-21

## 2024-02-07 RX ORDER — CIPROFLOXACIN 500 MG/1
500 TABLET, FILM COATED ORAL 2 TIMES DAILY
Qty: 28 TABLET | Refills: 0 | Status: SHIPPED | OUTPATIENT
Start: 2024-02-07 | End: 2024-02-21

## 2024-02-07 NOTE — CM/SW NOTE
02/07/24 1100   Discharge disposition   Expected discharge disposition subacute   Post Acute Care Provider [facility]   Discharge transportation [facility]     Pt discussed in rounds, anticipating medical clearance for today. Pt will transition to PO abx for dc. DANETTE updated [facility], awaiting confirmation of bed availability. DANETTE spoke to sps to update of possible dc. Pt will need [facility] for transport, PCS complete. BLS on will call. RN to call facility for report.    Addendum 1315:  DANETTE notified pt medically cleared for dc. [facility] confirmed they have bed available. BLS arranged for 4:30. DANETTE updated pt sps, and she was agreeable. RN updated.    [facility] Phone [phone]     [facility] [phone]     DANETTE to remain available for dc planning, and/or additional need for support.    Mike Baldwin, LISSY  Discharge Planner  [id number]

## 2024-02-07 NOTE — PROGRESS NOTES
NURSING DISCHARGE NOTE    Discharged Rehab facility via Ambulance.  Accompanied by Support staff  Belongings Taken by patient/family.    VSS, tolerating diet, voiding adequately, pain controlled. Discharge education provided. Reviewed medications and follow up appts. All questions answered and concerns addressed, pt verbalized understanding. IV and telemetry removed. Pt dc in stable condition. Patient left unit with EMS at 1445. Report called to Carol at [facility].

## 2024-02-07 NOTE — PROGRESS NOTES
Alert & oriented x4. Room air. NSR/ST on tele. Voids into male purewick. Tolerating regular diet. Wheelchair-bound at baseline. Wound to sacrum - changed by this RN. Red rash noted to BLE. Repositioning q. 2 hours. Clinitron bed. Denies nausea/chest pain/SOB. Denies pain. Patient updated on plan of care. Questions and concerns addressed.

## 2024-02-07 NOTE — PHYSICAL THERAPY NOTE
IP PT attempted, pt refused stating he is \"checking out\" in 2 hours and has some coursework he has to finish. Pt in clinitron bed .

## 2024-02-07 NOTE — PROGRESS NOTES
[facility] Hospitalist Progress Note     Brenden Cardenas Patient Status:  Inpatient    1945 MRN [id number]   Location [facility] 3NW-A Attending Rohit Adrian,    Hosp Day # 5 PCP Napoleon Ovalles DO     Follow Up:  The primary encounter diagnosis was Pressure injury of skin of buttock, unspecified injury stage, unspecified laterality. Diagnoses of Multiple sclerosis (HCC), Chest pain, atypical, COVID-19, Acute cystitis without hematuria, and Pericarditis, unspecified chronicity, unspecified type were also pertinent to this visit.    Subjective:     Patient seen and examined.    had bedside debridement of his sacral decub by Dr. Olson  Says minimal pain, but feels better  Coughing - mild  No chest pain or sob    Objective:    Review of Systems:   10 point ROS completed and was negative, except for pertinent positive and negatives stated in subjective.    Vital signs:  Temp:  [98.2 °F (36.8 °C)-99.2 °F (37.3 °C)] 99.2 °F (37.3 °C)  Pulse:  [83-91] 83  Resp:  [18-19] 18  BP: (122-152)/(64-99) 130/73  SpO2:  [96 %-98 %] 96 %    Physical Exam:    Gen: No acute distress, alert and oriented x3, no focal neurologic deficits. Chronically ill appearing male.    HEENT:  EOMI, PERRLA, OP clear, MMM  Pulm: Lungs clear bilaterally, normal respiratory effort  CV: Heart with regular rate and rhythm, no murmur.  Normal PMI.    Abd: Abdomen soft, nontender, nondistended, no organomegaly, bowel sounds present  MSK: Full range of motion in extremities, no clubbing, no cyanosis  Skin: +large sacral decub with overlying dressing.    Neuro:  Grossly intact, no focal deficits      Diagnostic Data:    Labs:  Recent Labs   Lab 24  18024  0620 24  0700 24  0626   WBC 15.7* 10.1 9.9 11.5*   HGB 14.1 12.5* 12.7* 12.8*   MCV 90.9 92.5 92.5 90.8   .0 296.0 314.0 330.0       Recent Labs   Lab 24  18024  0620 24  0700 24  1358 24  0626  02/06/24  0637 02/07/24  0521   GLU 86   < > 84 118* 123*  --   --    BUN 25*   < > 13 13 13  --   --    CREATSERUM 1.05   < > 0.70 0.67* 0.74 0.60* 0.95   CA 9.3   < > 8.9 8.8 8.6  --   --    ALB 2.8*  --   --   --   --   --   --       < > 139 138 139  --   --    K 3.8   < > 3.6 3.5 4.1  --   --       < > 109 109 111  --   --    CO2 26.0   < > 24.0 27.0 26.0  --   --    ALKPHO 37*  --   --   --   --   --   --    AST 49*  --   --   --   --   --   --    ALT 33  --   --   --   --   --   --    BILT 0.5  --   --   --   --   --   --    TP 6.9  --   --   --   --   --   --     < > = values in this interval not displayed.       Estimated Creatinine Clearance: 66.2 mL/min (based on SCr of 0.95 mg/dL).    No results for input(s): \"PTP\", \"INR\" in the last 168 hours.         COVID-19 Lab Results    COVID-19  Lab Results   Component Value Date    COVID19 Detected (A) 02/02/2024    COVID19 Not Detected 10/08/2021       Pro-Calcitonin  No results for input(s): \"PCT\" in the last 168 hours.    Cardiac  No results for input(s): \"TROP\", \"PBNP\" in the last 168 hours.    Creatinine Kinase  No results for input(s): \"CK\" in the last 168 hours.    Inflammatory Markers  Recent Labs   Lab 02/02/24  1805   DDIMER 2.28*       Imaging: Imaging data reviewed in Epic.    Medications:    metRONIDAZOLE  250 mg Oral Q8H TONYA    cefepime  1 g Intravenous Q8H    collagenase   Topical Daily    vancomycin  15 mg/kg Intravenous Q24H    heparin  5,000 Units Subcutaneous Q8H TONYA    guaiFENesin ER  600 mg Oral BID    losartan  100 mg Oral Daily       Assessment & Plan:      78 yr old male with PMH sig for MS,  HTN, and anxiety who presented to the ED for evaluation of weakness, cough, and congestion     # Pressure injury of buttock with suspected infection   # Acute cystitis with hematuria   - suspect his sacral decubs are infected, may need debridement   - cont empiric unasyn, f/u wound cultures   - f/u urine cultures (none sent from the ED,  await repeat)  - gen surg c/s appreciated, pt underwent bedside debridement on 2/4  - wound care consult recommended xray to r/o OM >> sacral xray w/o evidence of OM  -wound growing mult organisms >> cefepime, vanco and po flagyl abx per ID  -xray neg for OM - no further imaging at this time       # Atypical chest pain  # Brief run of SVT  - suspect due to cough and recent COVID infection   - EKG showed SR with PVCs  - check ECHO given abnormal EKG and brief run of SVT  - monitor      # MS with suspected acute exacerbation due to infection   # LE paraplegia  - follows with Dr. Aleman as outpt   - pt not currently taking prednisone for MS, rather he takes it PRN for gout flares   - neuro c/s appreciated   - PT/OT --> MIRLANDE     # COVID-19 infection   - pt with 2 weeks of symptoms   - not hypoxic, no PNA on CTA chest  - cont supportive care     # Essential HTN  - cont losartan     Supplementary Documentation:     Quality:  DVT Prophylaxis: hep subcutaneous   CODE status: FULL  [place]: no  Central line: no  If COVID testing is negative, may discontinue isolation: yes     DISPO:  Dc planning in process  Possible dc today if cleared by ID  Estimated date of discharge: TBD  Discharge is dependent on: MIRLANDE approval >> Agusto Salamanca  At this point Mr. Cardenas is expected to be discharge to: MIRLANDE Saez MD  [facility] Hospitalist  Pager [phone]  Answering Service number: [phone]

## 2024-02-07 NOTE — PROGRESS NOTES
[facility]    [facility] Progress Note    Brenden Cardenas Patient Status:  Inpatient    1945 MRN [id number]   [facility] 3NW-A Attending Xenia Saez MD   Hosp Day # 5 PCP Napoleon Ovalles DO     Subjective:  Pt sleeping comfortably, NAD.    Objective:    Allergies:  No Known Allergies    Medications:    Current Facility-Administered Medications:     metRONIDAZOLE (Flagyl) tab 250 mg, 250 mg, Oral, Q8H TONYA    ceFEPIme (Maxipime) 1 g in sodium chloride 0.9% 100 mL IVPB-MBP, 1 g, Intravenous, Q8H    collagenase (Santyl) 250 UNIT/GM ointment, , Topical, Daily    vancomycin (Vancocin) 1.25 g in sodium chloride 0.9% 250mL IVPB premix, 15 mg/kg, Intravenous, Q24H    heparin (Porcine) 5000 UNIT/ML injection 5,000 Units, 5,000 Units, Subcutaneous, Q8H TONYA    acetaminophen (Tylenol) tab 650 mg, 650 mg, Oral, Q4H PRN **OR** HYDROcodone-acetaminophen (Norco) 5-325 MG per tab 1 tablet, 1 tablet, Oral, Q4H PRN **OR** HYDROcodone-acetaminophen (Norco) 5-325 MG per tab 2 tablet, 2 tablet, Oral, Q4H PRN    polyethylene glycol (PEG 3350) (Miralax) 17 g oral packet 17 g, 17 g, Oral, Daily PRN    sennosides (Senokot) tab 17.2 mg, 17.2 mg, Oral, Nightly PRN    bisacodyl (Dulcolax) 10 MG rectal suppository 10 mg, 10 mg, Rectal, Daily PRN    fleet enema (Fleet) 7-19 GM/118ML rectal enema 133 mL, 1 enema, Rectal, Once PRN    ondansetron (Zofran) 4 MG/2ML injection 4 mg, 4 mg, Intravenous, Q6H PRN    metoclopramide (Reglan) 5 mg/mL injection 10 mg, 10 mg, Intravenous, Q8H PRN    guaiFENesin ER (Mucinex) 12 hr tab 600 mg, 600 mg, Oral, BID    benzonatate (Tessalon) cap 200 mg, 200 mg, Oral, TID PRN    ALPRAZolam (Xanax) tab 0.5 mg, 0.5 mg, Oral, TID PRN    losartan (Cozaar) tab 100 mg, 100 mg, Oral, Daily    Physical Exam:  General: Sleeping.  No apparent distress.  Vital Signs:  Blood pressure 131/60, pulse 89, temperature 98.4 °F (36.9 °C), temperature source Oral, resp. rate 20, height 5' 9\" (1.753  m), weight 175 lb (79.4 kg), SpO2 94%.   Temp (24hrs), Av.7 °F (37.1 °C), Min:98.3 °F (36.8 °C), Max:99.2 °F (37.3 °C)      HEENT: Exam is unremarkable.     Lungs: Clear to auscultation bilaterally.  Cardiac: Regular rate and rhythm. No murmur.  Abdomen:  Soft, non-distended, non-tender, with no rebound or guarding.   Extremities:  No lower extremity edema noted.  Without clubbing or cyanosis.    Skin: Images in epic seen  Neurologic: Cranial nerves are grossly intact.      Labs:  Lab Results   Component Value Date    CREATSERUM 0.95 2024         Assessment/Plan:    1.  Infected sacral wound  -s/p sharp debridement at wound clinic PTA, cultures with morganella, e coli, MSSA, GBS, proteus and enterococcus  -was on PO bactrim and topical abx PTA  -seen by surgery, s/p bedside debridement  -on IV cefepime, vancomycin and PO flagyl  2.  Positive COVID  -symptomatic for >2 weeks  3.  Hx of MS    If you have any questions or concerns please call [facility] Infectious Disease at [phone].     NEVAEH Hinson

## 2024-02-08 ENCOUNTER — INITIAL APN SNF VISIT (OUTPATIENT)
Dept: INTERNAL MEDICINE CLINIC | Age: 79
End: 2024-02-08

## 2024-02-08 DIAGNOSIS — N40.1 BENIGN PROSTATIC HYPERPLASIA WITH INCOMPLETE BLADDER EMPTYING: ICD-10-CM

## 2024-02-08 DIAGNOSIS — L98.429 SKIN ULCER OF SACRUM, UNSPECIFIED ULCER STAGE (HCC): Primary | ICD-10-CM

## 2024-02-08 DIAGNOSIS — Z98.890 H/O CERVICAL SPINE SURGERY: ICD-10-CM

## 2024-02-08 DIAGNOSIS — I10 HYPERTENSION, UNSPECIFIED TYPE: ICD-10-CM

## 2024-02-08 DIAGNOSIS — R39.14 BENIGN PROSTATIC HYPERPLASIA WITH INCOMPLETE BLADDER EMPTYING: ICD-10-CM

## 2024-02-08 DIAGNOSIS — Z22.322 MRSA (METHICILLIN RESISTANT STAPH AUREUS) CULTURE POSITIVE: ICD-10-CM

## 2024-02-08 DIAGNOSIS — M19.90 OSTEOARTHRITIS, UNSPECIFIED OSTEOARTHRITIS TYPE, UNSPECIFIED SITE: ICD-10-CM

## 2024-02-08 DIAGNOSIS — Z87.39 H/O: GOUT: ICD-10-CM

## 2024-02-08 DIAGNOSIS — G35 MS (MULTIPLE SCLEROSIS) (HCC): ICD-10-CM

## 2024-02-08 DIAGNOSIS — F41.9 ANXIETY: ICD-10-CM

## 2024-02-08 DIAGNOSIS — Z87.898 H/O CHEST PAIN: ICD-10-CM

## 2024-02-08 DIAGNOSIS — R53.1 WEAKNESS: ICD-10-CM

## 2024-02-08 PROCEDURE — 99310 SBSQ NF CARE HIGH MDM 45: CPT | Performed by: NURSE PRACTITIONER

## 2024-02-09 VITALS
TEMPERATURE: 97 F | SYSTOLIC BLOOD PRESSURE: 113 MMHG | WEIGHT: 175 LBS | OXYGEN SATURATION: 94 % | DIASTOLIC BLOOD PRESSURE: 59 MMHG | HEART RATE: 96 BPM | BODY MASS INDEX: 26 KG/M2 | RESPIRATION RATE: 20 BRPM

## 2024-02-09 RX ORDER — OMEPRAZOLE 20 MG/1
20 CAPSULE, DELAYED RELEASE ORAL EVERY MORNING
COMMUNITY

## 2024-02-10 NOTE — PROGRESS NOTES
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 He has developed a sacral decubitus and has been admitted. This was debrided a couple of days ago, and it is the hope according to the patient that enzymatic debridement will be all that is necessary moving forward. Wound culture: Staph aureus, pseudomonas, enterococcus; back on  there was also Morganella, group B strep, and E. coli and proteus but no pseudomonas. Urinalysis, active sediment. Urine culture, no growth. Started on IV Unasyn, then switched to IV Cefepime, Vanco and PO Flagyl.  Tolerated well.  Discharged in stable condition to [facility] for rehabilitation and medical management.  Per Hospitalist,transitioned to oral Amoxicillin and Cipro.  Will continue to monitor.    Today:  Seen in room.  Very pleasant.  Doing well with no c/o present.  Remains on Isolation d/t recent Covid.      Chief Complaint at visit:   Chief Complaint   Patient presents with    Follow - Up     Sacral Ulcer MRSA, MS       ALLERGIES    He has No Known Allergies.      CURRENT MEDS:    Current Outpatient Medications on File Prior to Visit   Medication Sig    ciprofloxacin 500 MG Oral Tab Take 1 tablet (500 mg total) by mouth 2 (two) times daily for 14 days.    amoxicillin 875 MG Oral Tab Take 1 tablet (875 mg total) by mouth every 12 (twelve) hours for 14 days.    HYDROcodone-acetaminophen 5-325 MG Oral Tab Take 1 tablet by mouth every 8 (eight) hours as needed.    ALPRAZolam 0.5 MG Oral Tab Take  1 tablet (0.5 mg total) by mouth 3 (three) times daily as needed.    sodium chloride 0.9% SOLN 480 mL with Ocrelizumab 300 MG/10ML SOLN 600 mg Inject 600 mg into the vein one time. Every 6 months. Last infusion January, 2024    gentamicin 0.1 % External Ointment Apply 1 Application topically 3 (three) times daily.    predniSONE 20 MG Oral Tab Take 1 tablet (20 mg total) by mouth as needed.    MAGNESIUM OR Take 400 mg by mouth daily.    ALLOPURINOL 300 MG Oral Tab TAKE 1 TABLET BY MOUTH EVERY DAY (Patient taking differently: Take 1 tablet (300 mg total) by mouth every 3 (three) days. Every 3 days. Last dose 1/31/24)    Dalfampridine ER 10 MG Oral Tablet 12 Hr Take 1 tablet (10 mg total) by mouth in the morning and 1 tablet (10 mg total) before bedtime.    LOSARTAN 100 MG Oral Tab TAKE 1 TABLET BY MOUTH EVERY DAY    acetaminophen 500 MG Oral Tab Take 2 tablets (1,000 mg total) by mouth every 6 (six) hours as needed for Pain.    Multiple Vitamins-Minerals (CENTRUM) Oral Tab Take 1 tablet by mouth daily.    Vitamin D3, Cholecalciferol, 50 MCG (2000 UT) Oral Tab Take 1 tablet (2,000 Units total) by mouth daily.    cyanocobalamine 1000 MCG Oral Tab Take 1 tablet (1,000 mcg total) by mouth daily.    silver sulfADIAZINE 1 % External Cream silver sulfadiazine 1 % topical cream   APPLY TO AFFECTED AREA TWICE A DAY DIRECTLY TO SORE     No current facility-administered medications on file prior to visit.         HISTORY:    He  has a past medical history of Anxiety state, unspecified, BACK PAIN, Calculus of kidney, Cervical stenosis of spine, COVID-19, Gout, Gout, High blood pressure, HYPERTENSION, Multiple sclerosis (HCC), Osteoarthrosis, unspecified whether generalized or localized, unspecified site, OTHER DISEASES, Seborrheic dermatitis, Skin cancer, Unspecified essential hypertension, and Visual impairment.    He  has a past surgical history that includes other surgical history (02/2011); other surgical history; spine  surgery procedure unlisted; other accessory; and other accessory.      CODE STATUS VERIFIED: Full Code    SUBJECTIVE:    REVIEW OF SYSTEMS:  GENERAL HEALTH:feels well otherwise  SKIN: denies any unusual skin lesions or rashes  WOUNDS: +Coccyx Ulcer   EYES:no visual complaints or deficits  HENT: denies nasal congestion, sinus pain or sore throat; and hearing loss negative  RESPIRATORY: denies shortness of breath, wheezing or cough   CARDIOVASCULAR:denies chest pain, no palpitations , denies syncope, denies orthopnea, denies cough  GI: denies nausea, vomiting, constipation, diarrhea; no rectal bleeding; no heartburn  :+Trouble emptying bladder  MUSCULOSKELETAL:no joint complaints upper or lower extremities  NEURO:no sensory or motor complaint, denies seizures, denies vertigo, denies tinnitus, and denies tremors  PSYCHE: no symptoms of depression or anxiety  HEMATOLOGY:denies hx anemia, denies bruising, denies excessive bleeding  ENDOCRINE: denies excessive thirst or urination; denies unexpected wt gain or wt loss  ALLERGY/IMM.: denies food or seasonal allergies      OBJECTIVE:  VITALS:  /59   Pulse 96   Temp 97 °F (36.1 °C)   Resp 20   Wt 175 lb (79.4 kg)   SpO2 94%   BMI 25.84 kg/m²     PHYSICAL EXAM:  GENERAL HEALTH: well developed, well nourished, in no apparent distress  LINES, TUBES, DRAINS:  none  SKIN: pale, warm, dry  WOUND: +Coccyx wound  EYES: PERRLA, conjunctiva normal; no drainage from eyes  HENT: normocephalic; normal nose, no nasal drainage, mucous membranes pink, moist  NECK: full range of motion observed  RESPIRATORY:clear to percussion and auscultation, No wheezing/cough/accessory muscle use; on room air  CARDIOVASCULAR: S1, S2 normal, RRR; no S3, no S4; , no click, no murmur  ABDOMEN: normal active BS+, soft, non-distended; no apparent masses; observed, non-tender, no guarding during physical exam  : Deferred  LYMPHATIC: no lymphedema  MUSCULOSKELETAL: no acute synovitis upper or  lower extremity.  Weakness R/T recent hospitalization/diagnoses/sequelae; will undergo therapies to rehab and improve strength, endurance and independence w/ ADLs as able  EXTREMITIES/VASCULAR: no cyanosis, clubbing or edema, radial pulses 2+, and dorsalis pedal pulses 2+  NEUROLOGIC: intact; no sensorimotor deficit, reflexes normal, cranial nerves intact II-XII, follows commands  PSYCHIATRIC: alert and oriented x 3; affect appropriate    DIAGNOSTICS REVIEWED AT THIS VISIT:  Vital signs reviewed in [facility] EMR.  [facility] medical records, notes, lab and imaging results reviewed. And diagnostics available in rehab records/[facility].  Medication reconciliation completed.      CBC W/DIFF: dated 2/8/24  WBC 11.1 10'3/uL 3.5-10.5 H Final  RBC 4.64 10'6/uL (Based on  documented  legal sex) 4.30-  5.80  Final  HGB 13.8 g/dL (Based on  documented  legal sex) 13.0-  17.5  Final  HCT 42.8 % (Based on  documented  legal sex) 38.0-  50.0  Final  MCV 92.2 fL 80.0-99.0 Final  MCH 29.7 pg 27.0-34.0 Final  MCHC 32.2 g/dL 32.0-35.5 Final  RDW 13.9 % 11.0-15.0 Final   10'3/uL 150-400 H Final  MPV 9.8 fL 8.8-12.1 Final  NRBC's 0.0 % 0.0 Final  Absolute NRBCs 0.0 10'3/uL 0.0 Final  Neutrophils 71.7 % 34.0-73.0 Final  Lymphocytes 14.8 % 15.0-50.0 L Final  Monocytes 11.9 % 1.0-15.0 Final  Eosinophils 0.0 % 0.0-8.0 Final  Basophils 0.1 % 0.0-2.0 Final  Immature Granulocytes 1.5 % No defined  reference range  Final  Absolute Neutrophils 7.9 10'3/uL 1.5-8.0 Final  Absolute Lymphocytes 1.6 10'3/uL 1.0-4.0 Final  Absolute Monocytes 1.3 10'3/uL 0.2-1.0 H Final  Absolute Eosinophils 0.0 10'3/uL 0.0-0.6 Final  Absolute Basophils 0.0 10'3/uL 0.0-0.3 Final  Absolute Immature Granulocytes 0.2 10'3/uL 0.00-0.10 H Final    Magnesium 1.9 mg/dL 1.7-2.8 Final    CMP dated:  2/8/24  Sodium 137 mmol/L 133-146 Final  Potassium 4.3 mmol/L 3.5-5.1 Final  Chloride 104 mmol/L  Final  Carbon Dioxide 26 mmol/L 21-31 Final  Anion Gap 7 mmol/L  4-13 Final  Blood Urea Nitrogen 18 mg/dL 7-25 Final  Creatinine 0.75 mg/dL 0.60-1.30 Final  eGFRcr (CKD-EPI 2021) >90 mL/min/1.73  m2  >=60 Final  Calcium 9.1 mg/dL 8.3-10.5 Final  Glucose 98 mg/dL  Final  Protein, Total 5.8 g/dL 6.4-8.3 L Final  Albumin 3.5 g/dL 3.5-5.0 Final  ALT 44 units/L 11-51 Final  Alkaline Phosphatase 33 units/L  L Final  AST 34 units/L 13-39 Final  Bilirubin, Total 0.6 mg/dL 0.2-1.2 Final    Vitamin D, 25-Hydroxy, Total 49.7 ng/mL 30.0-100.0 Final    SEE PLAN BELOW  DJD/H/o Cervical Surgeries/Weakness/Physical Deconditioning/Impaired mobility and ADLs/At risk for falling  -Fall Precautions  -Tylenol 1 g every 6 hours as needed for fever or pain if given for fever notify MD/NP  -Norco 5/325 mg every 8 hours as needed  -PT/OT/ST to evaluate and treat  -MIRLANDE team to establish care plan meeting with patient and POA/family as appropriate  -Anticipate DC on or before TBD; SW to assist patient/family w/ DC planning  -DC Plan:  Home with wife     MRSA in sacral decub  -Wound Care Consult  -Dressings per wound care team  -In House ID consult  -Jah bid  -Completed Unasyn, Cefepime and Flagyl  -Start Cipro 500 mg 2 times a day-->stop 2/21/2024  -Start Amoxicillin 8 675 mg every 12 hours-->stop on 2/21/24    MS  -Prednisone 20 mg every day prn  -Dalfampridine ER 10 mg every day    Recent Covid  -Isolation Precautions    H/o Renal Stones  -No treatment    HTN  -Vitals every shift  -Losartan 100 mg daily, hold for systolic blood pressure less than 110 or heart rate less than 60    H/o Gout  -No treatment    H/o CP Atypical  -No treatment    Anxiety  -Xanax 0.5 mg daily as needed    Bladder Emptying Issue d/t MS/Sacral Wound  -Insert noriega for now until more mobile d/t sacral ulcer  -Monitor for s/s infection q shift and prn    DVT Prophylaxis  -Heparin 5000 units q8h  -Encourage early mobilization and participation in PT/OT as able    GI Prophylaxis  -Add Omeprazole 20 mg qd    Bowel  Management Regimen/Constipation   -Add Miralax 17 g every day prn     Supplements  -Probiotic twice daily-->3/1/24  -Vitamin C 500 mg 2 times a day  -Zinc 220 mg daily  -Vitamin D 2000 units daily  -Magnesium 400 mg daily  -Vitamin B12 1000 mcg daily    LABS  -CBC/CMP weekly while in [facility]    Follow Up Appointments  -Dr. Napoleon Ovalles, PCP within 1-2 weeks following [facility] discharge.   *Follow-Up with specialists as recommended.  Future Appointments   Date Time Provider Department Center   2/15/2024  1:20 PM Samuel Aleman MD [facility]       *Greater than 65 minutes spent w/ patient and family, reviewing medical records, labs, completing medication reconciliation and entering orders to establish plan of care in [facility].    Talib Hubbard, NEVAEH  02/08/24   7:14 PM

## 2024-02-16 ENCOUNTER — SNF VISIT (OUTPATIENT)
Dept: INTERNAL MEDICINE CLINIC | Age: 79
End: 2024-02-16

## 2024-02-16 VITALS
BODY MASS INDEX: 26 KG/M2 | WEIGHT: 175 LBS | TEMPERATURE: 98 F | SYSTOLIC BLOOD PRESSURE: 98 MMHG | HEART RATE: 76 BPM | OXYGEN SATURATION: 97 % | DIASTOLIC BLOOD PRESSURE: 60 MMHG | RESPIRATION RATE: 18 BRPM

## 2024-02-16 DIAGNOSIS — Z22.322 MRSA (METHICILLIN RESISTANT STAPH AUREUS) CULTURE POSITIVE: ICD-10-CM

## 2024-02-16 DIAGNOSIS — G35 MS (MULTIPLE SCLEROSIS) (HCC): ICD-10-CM

## 2024-02-16 DIAGNOSIS — M25.571 ACUTE RIGHT ANKLE PAIN: Primary | ICD-10-CM

## 2024-02-16 DIAGNOSIS — L98.429 SKIN ULCER OF SACRUM, UNSPECIFIED ULCER STAGE (HCC): ICD-10-CM

## 2024-02-16 DIAGNOSIS — R53.1 WEAKNESS: ICD-10-CM

## 2024-02-16 PROCEDURE — 99309 SBSQ NF CARE MODERATE MDM 30: CPT | Performed by: NURSE PRACTITIONER

## 2024-02-17 NOTE — PROGRESS NOTES
Brenden Cardenas, 1945, 78 year old, male    Chief Complaint:    Chief Complaint   Patient presents with    Follow - Up     Right ankle pain      HPI    Brenden Cardenas  : 1945, Age 78 year old  male 78-year-old man with multiple sclerosis. He has developed a sacral decubitus and has been admitted. This was debrided a couple of days ago, and it is the hope according to the patient that enzymatic debridement will be all that is necessary moving forward. Wound culture: Staph aureus, pseudomonas, enterococcus; back on  there was also Morganella, group B strep, and E. coli and proteus but no pseudomonas. Urinalysis, active sediment. Urine culture, no growth. Started on IV Unasyn, then switched to IV Cefepime, Vanco and PO Flagyl.  Tolerated well.  Discharged in stable condition to [facility] for rehabilitation and medical management.  Per Hospitalist,transitioned to oral Amoxicillin and Cipro.  Will continue to monitor.   Subjective:   TODAY:  Asked by nursing to see Sim today for right ankle pain after twisting it in therapy earlier.   He is seen in bed this afternoon. He currently has no right ankle pain. He had pain after stepping wrong on his foot earlier in therapy. He rested and had a pain pill, it feels fine now.   He is otherwise well except for diarrhea from his antibiotics. He is repositioning frequently for a sacral wound.       Denies insomnia, fatigue, fever/chills, cough, SOB, dyspnea, angina, palpitations, n/v, +diarrhea, constipation, and urinary sxs.      Objective:  BP 98/60   Pulse 76   Temp 98.2 °F (36.8 °C)   Resp 18   Wt 175 lb (79.4 kg)   SpO2 97%   BMI 25.84 kg/m²     PHYSICAL EXAM:  GENERAL HEALTH: well developed, well nourished, in no apparent distress  LINES, TUBES, DRAINS:  none  SKIN: pale, warm, dry  WOUND: +Coccyx wound  EYES: PERRLA, conjunctiva normal; no drainage from eyes  HENT: normocephalic; normal nose, no nasal drainage, mucous membranes pink, moist  NECK: full  range of motion observed  RESPIRATORY:clear, No wheezing/cough/accessory muscle use; on room air  CARDIOVASCULAR: S1, S2 normal, RRR; no S3, no S4; , no click, no murmur  ABDOMEN: normal active BS+, soft, non-distended; no apparent masses; observed, non-tender, no guarding during physical exam  : Deferred  LYMPHATIC: no lymphedema  MUSCULOSKELETAL: no acute synovitis upper or lower extremity.  Right ankle examined along with left, no edema, no bruising, no swelling. Non tender and AROM intact without pain. No injury visible or palpable.   Weakness R/T recent hospitalization/diagnoses/sequelae; will undergo therapies to rehab and improve strength, endurance and independence w/ ADLs as able  EXTREMITIES/VASCULAR: no cyanosis, clubbing or edema, radial pulses 2+, and dorsalis pedal pulses 2+  NEUROLOGIC: intact; no sensorimotor deficit, reflexes normal, cranial nerves intact II-XII, follows commands  PSYCHIATRIC: alert and oriented x 3; affect appropriate     Medications reviewed: Yes      Current Outpatient Medications:     omeprazole 20 MG Oral Capsule Delayed Release, Take 1 capsule (20 mg total) by mouth every morning., Disp: , Rfl:     ciprofloxacin 500 MG Oral Tab, Take 1 tablet (500 mg total) by mouth 2 (two) times daily for 14 days., Disp: 28 tablet, Rfl: 0    amoxicillin 875 MG Oral Tab, Take 1 tablet (875 mg total) by mouth every 12 (twelve) hours for 14 days., Disp: 28 tablet, Rfl: 0    HYDROcodone-acetaminophen 5-325 MG Oral Tab, Take 1 tablet by mouth every 8 (eight) hours as needed., Disp: 10 tablet, Rfl: 0    ALPRAZolam 0.5 MG Oral Tab, Take 1 tablet (0.5 mg total) by mouth 3 (three) times daily as needed., Disp: 10 tablet, Rfl: 0    sodium chloride 0.9% SOLN 480 mL with Ocrelizumab 300 MG/10ML SOLN 600 mg, Inject 600 mg into the vein one time. Every 6 months. Last infusion January, 2024, Disp: , Rfl:     gentamicin 0.1 % External Ointment, Apply 1 Application topically 3 (three) times daily., Disp: 30  g, Rfl: 3    predniSONE 20 MG Oral Tab, Take 1 tablet (20 mg total) by mouth as needed., Disp: , Rfl:     MAGNESIUM OR, Take 400 mg by mouth daily., Disp: , Rfl:     ALLOPURINOL 300 MG Oral Tab, TAKE 1 TABLET BY MOUTH EVERY DAY (Patient taking differently: Take 1 tablet (300 mg total) by mouth every 3 (three) days. Every 3 days. Last dose 1/31/24), Disp: 90 tablet, Rfl: 3    Dalfampridine ER 10 MG Oral Tablet 12 Hr, Take 1 tablet (10 mg total) by mouth in the morning and 1 tablet (10 mg total) before bedtime., Disp: , Rfl:     LOSARTAN 100 MG Oral Tab, TAKE 1 TABLET BY MOUTH EVERY DAY, Disp: 90 tablet, Rfl: 0    acetaminophen 500 MG Oral Tab, Take 2 tablets (1,000 mg total) by mouth every 6 (six) hours as needed for Pain., Disp: , Rfl:     Multiple Vitamins-Minerals (CENTRUM) Oral Tab, Take 1 tablet by mouth daily., Disp: , Rfl:     Vitamin D3, Cholecalciferol, 50 MCG (2000 UT) Oral Tab, Take 1 tablet (2,000 Units total) by mouth daily., Disp: , Rfl:     cyanocobalamine 1000 MCG Oral Tab, Take 1 tablet (1,000 mcg total) by mouth daily., Disp: , Rfl:     silver sulfADIAZINE 1 % External Cream, silver sulfadiazine 1 % topical cream  APPLY TO AFFECTED AREA TWICE A DAY DIRECTLY TO SORE, Disp: , Rfl:       Diagnostics reviewed:    2/16/24   CBC W/DIFF       WBC  10.1 10'3/uL 3.5-10.5  Final           RBC  4.34 10'6/uL (Based on documented legal sex) 4.30-5.80  Final           HGB  13.0 g/dL (Based on documented legal sex) 13.0-17.5  Final           HCT  39.6 % (Based on documented legal sex) 38.0-50.0  Final           MCV  91.2 fL 80.0-99.0  Final           MCH  30.0 pg 27.0-34.0  Final           MCHC  32.8 g/dL 32.0-35.5  Final           RDW  13.8 % 11.0-15.0  Final           PLT  384 10'3/uL 150-400  Final           MPV  9.9 fL 8.8-12.1  Final           NRBC's  0.0 % 0.0  Final           Absolute NRBCs  0.0 10'3/uL 0.0  Final           Neutrophils  66.3 % 34.0-73.0  Final            Lymphocytes  18.4 % 15.0-50.0  Final           Monocytes  14.0 % 1.0-15.0  Final           Eosinophils  0.0 % 0.0-8.0  Final           Basophils  0.1 % 0.0-2.0  Final           Immature Granulocytes  1.2 % No defined reference range  Final           Absolute Neutrophils  6.7 10'3/uL 1.5-8.0  Final           Absolute Lymphocytes  1.9 10'3/uL 1.0-4.0  Final           Absolute Monocytes  1.4 10'3/uL 0.2-1.0 H Final           Absolute Eosinophils  0.0 10'3/uL 0.0-0.6  Final           Absolute Basophils  0.0 10'3/uL 0.0-0.3  Final           Absolute Immature Granulocytes  0.1 10'3/uL 0.00-0.10  Final      2/16/2024 8:57 AM: P indicates partial results on a panel have been released.  Additional results will follow.  2/16/2024 8:57 AM: This result has been final verified. No additional or  changed results are expected.   CMP(COMPREHENSIVE METABOLIC PANEL)       Sodium  140 mmol/L 133-146  Final           Potassium  4.3 mmol/L 3.5-5.1  Final           Chloride  104 mmol/L   Final           Carbon Dioxide  25 mmol/L 21-31  Final           Anion Gap  11 mmol/L 4-13  Final           Blood Urea Nitrogen  19 mg/dL 7-25  Final           Creatinine  0.62 mg/dL 0.60-1.30  Final           eGFRcr (CKD-EPI 2021)  >90 mL/min/1.73 m2 >=60  Final           Calcium  8.8 mg/dL 8.3-10.5  Final           Glucose  96 mg/dL   Final           Protein, Total  5.4 g/dL 6.4-8.3 L Final           Albumin  3.0 g/dL 3.5-5.0 L Final           ALT  19 units/L 11-51  Final           Alkaline Phosphatase  32 units/L  L Final           AST  13 units/L 13-39  Final           Bilirubin, Total  0.5 mg/dL 0.2-1.2  Final       Prealbumin  14 mg/dL 17-34 L Final                Assessment and plan:      Right ankle pain after twisting injury  No injury noted  Xray negative for fracture  Pain management  Monitor for further sx    DJD/H/o Cervical Surgeries/Weakness/Physical Deconditioning/Impaired mobility and ADLs/At risk for falling  -Fall  Precautions  -Tylenol 1 g every 6 hours as needed for fever or pain if given for fever notify MD/NP  -Norco 5/325 mg every 8 hours as needed  -PT/OT/ST to evaluate and treat  -[facility] team to establish care plan meeting with patient and POA/family as appropriate  -Anticipate DC on or before TBD; SW to assist patient/family w/ DC planning  -DC Plan:  Home with wife     MRSA in sacral decub  -Wound Care Consult  -Dressings per wound care team  -In House ID consult   -Jah bid  -Completed Unasyn, Cefepime and Flagyl  -Cipro 500 mg 2 times a day-->stop 2/21/2024  -Amoxicillin 875 mg every 12 hours-->stop on 2/21/24     MS  -Prednisone 20 mg every day prn  -Dalfampridine ER 10 mg every day     H/o Renal Stones  -No treatment     HTN  -Vitals every shift  -Losartan 100 mg daily, hold for systolic blood pressure less than 110 or heart rate less than 60     H/o Gout  -No treatment     H/o CP Atypical  -No treatment     Anxiety  -Xanax 0.5 mg daily as needed     Bladder Emptying Issue d/t MS/Sacral Wound  -Insert noriega for now until more mobile d/t sacral ulcer  -Monitor for s/s infection q shift and prn     DVT Prophylaxis  -Heparin 5000 units q8h  -Encourage early mobilization and participation in PT/OT as able     GI Prophylaxis  -Omeprazole 20 mg qd     Bowel Management Regimen/Constipation   -Miralax 17 g every day prn     Supplements  -Probiotic twice daily-->3/1/24  -Vitamin C 500 mg 2 times a day  -Zinc 220 mg daily  -Vitamin D 2000 units daily  -Magnesium 400 mg daily  -Vitamin B12 1000 mcg daily     LABS  -CBC/CMP weekly while in [facility]     Follow Up Appointments  -Dr. Napoleon Ovalles, PCP within 1-2 weeks following [facility] discharge.   *Follow-Up with specialists as recommended.    *Established patient; follow-up moderately complex visit/ greater than 30     35 minutes spent w/ patient and staff, including but not limited to/ reviewing present status, needs, abilities with disciplines, reviewing medical records, vital signs,  labs, completing medication reconciliation and entering orders for continued care in [facility].    Note to patient: The 21st Century Cures Act makes medical notes like these available to patients in the interest of transparency. However, this is a medical document intended as peer to peer communication. It is written in medical language and may contain abbreviations or verbiage that are unfamiliar. It may appear blunt or direct. Medical documents are intended to carry relevant information, facts as evident, and the clinical opinion of the practitioner who signs the document.    Cheryl Stephen, APRN  2/16/2024

## 2024-02-19 ENCOUNTER — SNF VISIT (OUTPATIENT)
Dept: INTERNAL MEDICINE CLINIC | Age: 79
End: 2024-02-19

## 2024-02-19 VITALS
SYSTOLIC BLOOD PRESSURE: 150 MMHG | OXYGEN SATURATION: 94 % | RESPIRATION RATE: 18 BRPM | DIASTOLIC BLOOD PRESSURE: 86 MMHG | TEMPERATURE: 98 F | HEART RATE: 98 BPM

## 2024-02-19 DIAGNOSIS — M25.571 ACUTE RIGHT ANKLE PAIN: Primary | ICD-10-CM

## 2024-02-19 DIAGNOSIS — M19.90 OSTEOARTHRITIS, UNSPECIFIED OSTEOARTHRITIS TYPE, UNSPECIFIED SITE: ICD-10-CM

## 2024-02-19 DIAGNOSIS — Z22.322 MRSA (METHICILLIN RESISTANT STAPH AUREUS) CULTURE POSITIVE: ICD-10-CM

## 2024-02-19 DIAGNOSIS — G35 MS (MULTIPLE SCLEROSIS) (HCC): ICD-10-CM

## 2024-02-19 DIAGNOSIS — R53.1 WEAKNESS: ICD-10-CM

## 2024-02-19 DIAGNOSIS — L98.429 SKIN ULCER OF SACRUM, UNSPECIFIED ULCER STAGE (HCC): ICD-10-CM

## 2024-02-19 NOTE — PROGRESS NOTES
Brenden Cardenas, 1945, 78 year old, male    Chief Complaint:    Chief Complaint   Patient presents with    Follow - Up     Aftercare for Sacral Decub, MS, right foot pain, h/o gout        Subjective:  Brenden Cardenas  : 1945, Age 78 year old  male 78-year-old man with multiple sclerosis. He has developed a sacral decubitus and has been admitted. This was debrided a couple of days ago, and it is the hope according to the patient that enzymatic debridement will be all that is necessary moving forward. Wound culture: Staph aureus, pseudomonas, enterococcus; back on  there was also Morganella, group B strep, and E. coli and proteus but no pseudomonas. Urinalysis, active sediment. Urine culture, no growth. Started on IV Unasyn, then switched to IV Cefepime, Vanco and PO Flagyl.  Tolerated well.  Discharged in stable condition to [facility] for rehabilitation and medical management.  Per Hospitalist,transitioned to oral Amoxicillin and Cipro.  Will continue to monitor.     Today:  RN staff reported that the patient had right foot pain with some swelling.  Had an x-ray and it was negative for fracture.  Assessed the patient.  No swelling noted. No redness noted, although RN staff had stated there was some redness.  Pain is okay for now.  He is being repositioned  every two hours d/t pressure ulcer.  He continues on anbx for the pressure ulcer as well as continues with daily dressing changes.      The patient does have a h/o gout and is taking Allopurinol.  Ordered a Uric Acid level.  If elevated consider starting Colchicine as well.  He has been on the Colchicine in the past.  Patient does not want to be on Prednisone d/t sacral ulcer and SEs of Prednisone on ulcers.  D/w patient.  In agreement with POC.     Denies insomnia, fatigue, fever/chills, cough, SOB, dyspnea, angina, palpitations, n/v, diarrhea, constipation, and urinary sxs.    Objective:  /86   Pulse 98   Temp 97.9 °F (36.6 °C)   Resp 18    SpO2 94%   PHYSICAL EXAM:  GENERAL HEALTH: well developed, well nourished, in no apparent distress  LINES, TUBES, DRAINS:  none  SKIN: pale, warm, dry  WOUND: +Coccyx wound-->8.2 cm X 5.4 cm X 3.5 cm; devitalized necrotic tissue 40%; slough 30% and granulation tissue 30%, per facility record dated 2/13/24 (unable to visualize d/t discomfort and positioning)  EYES: PERRLA, conjunctiva normal; no drainage from eyes  HENT: normocephalic; normal nose, no nasal drainage, mucous membranes pink, moist  NECK: full range of motion observed  RESPIRATORY:clear to percussion and auscultation, No wheezing/cough/accessory muscle use; on room air  CARDIOVASCULAR: S1, S2 normal, RRR; no S3, no S4; , no click, no murmur  ABDOMEN: normal active BS+, soft, non-distended; no apparent masses; observed, non-tender, no guarding during physical exam  : Deferred  LYMPHATIC: no lymphedema  MUSCULOSKELETAL: no acute synovitis upper or lower extremity.  Weakness R/T recent hospitalization/diagnoses/sequelae; will undergo therapies to rehab and improve strength, endurance and independence w/ ADLs as able  EXTREMITIES/VASCULAR: no cyanosis, clubbing or edema, radial pulses 2+, and dorsalis pedal pulses 2+  NEUROLOGIC: intact; no sensorimotor deficit, reflexes normal, cranial nerves intact II-XII, follows commands  PSYCHIATRIC: alert and oriented x 3; affect appropriate    Medications reviewed: Yes    Diagnostics reviewed:  Dated: 2/16/24  CBC W/DIFF  WBC 10.1 10'3/uL 3.5-10.5 Final  RBC 4.34 10'6/uL (Based on  documented  legal sex) 4.30-  5.80  Final  HGB 13.0 g/dL (Based on  documented  legal sex) 13.0-  17.5  Final  HCT 39.6 % (Based on  documented  legal sex) 38.0-  50.0  Final  MCV 91.2 fL 80.0-99.0 Final  MCH 30.0 pg 27.0-34.0 Final  MCHC 32.8 g/dL 32.0-35.5 Final  RDW 13.8 % 11.0-15.0 Final   10'3/uL 150-400 Final  MPV 9.9 fL 8.8-12.1 Final  NRBC's 0.0 % 0.0 Final  Absolute NRBCs 0.0 10'3/uL 0.0 Final  Neutrophils 66.3 %  34.0-73.0 Final  Lymphocytes 18.4 % 15.0-50.0 Final  Monocytes 14.0 % 1.0-15.0 Final  Eosinophils 0.0 % 0.0-8.0 Final  Basophils 0.1 % 0.0-2.0 Final  Immature Granulocytes 1.2 % No defined  reference range  Final  Absolute Neutrophils 6.7 10'3/uL 1.5-8.0 Final  Absolute Lymphocytes 1.9 10'3/uL 1.0-4.0 Final  Absolute Monocytes 1.4 10'3/uL 0.2-1.0 H Final  Absolute Eosinophils 0.0 10'3/uL 0.0-0.6 Final  Absolute Basophils 0.0 10'3/uL 0.0-0.3 Final  Absolute Immature Granulocytes 0.1 10'3/uL 0.00-0.10 Final    CMP(COMPREHENSIVE METABOLIC PANEL)  Sodium 140 mmol/L 133-146 Final  Potassium 4.3 mmol/L 3.5-5.1 Final  Chloride 104 mmol/L  Final  Carbon Dioxide 25 mmol/L 21-31 Final  Anion Gap 11 mmol/L 4-13 Final  Blood Urea Nitrogen 19 mg/dL 7-25 Final  Creatinine 0.62 mg/dL 0.60-1.30 Final  eGFRcr (CKD-EPI 2021) >90 mL/min/1.73  m2  >=60 Final  Calcium 8.8 mg/dL 8.3-10.5 Final  Glucose 96 mg/dL  Final  Protein, Total 5.4 g/dL 6.4-8.3 L Final  Albumin 3.0 g/dL 3.5-5.0 L Final  ALT 19 units/L 11-51 Final  Alkaline Phosphatase 32 units/L  L Final  AST 13 units/L 13-39 Final  Bilirubin, Total 0.5 mg/dL 0.2-1.2 Final  Prealbumin 14 mg/dL 17-34 L Final    PROCEDURE: 02151-(RT) Ankle, 2 Views Status: Final  See Attachment   If you are having trouble viewing these results, please refer to the attached PDF file   Procedure: 98086-(RT) Ankle, 2 Views ([id number])  Indication: acute pain  Findings: Examination reveals some soft tissue swelling with no evidence of recent fracture or  dislocation. Clinical correlation is requested.  Impression: No recent fracture or dislocation.  INTERPRETING DOCTOR: Bonny Barrett MD  ELECTRONICALLY SIGNED: 02/16/2024 04:47:11 PM    Assessment and plan:  Right Foot Pain/H/o Gout  -X-ray negative for fracture  -Uric Acid Level on 2/20/24  -Continue Allopurinol 300 mg qd    DJD/H/o Cervical Surgeries/Weakness/Physical Deconditioning/Impaired mobility and ADLs/At risk for  falling  -Fall Precautions  -Tylenol 1 g every 6 hours as needed for fever or pain if given for fever notify MD/NP  -Norco 5/325 mg every 8 hours as needed  -PT/OT/ST to evaluate and treat  -[facility] team to establish care plan meeting with patient and POA/family as appropriate  -Anticipate DC on or before TBD; SW to assist patient/family w/ DC planning  -DC Plan:  Home with wife     MRSA in sacral decub  -Wound Care Consult  -Dressings per wound care team  -In House ID consult  -Jah bid  -Completed Unasyn, Cefepime and Flagyl  -Cipro 500 mg 2 times a day-->stop 2/21/2024  -Amoxicillin 8 675 mg every 12 hours-->stop on 2/21/24     MS  -Prednisone 20 mg every day prn-->does not want at this time d/t sacral ulcer  -Dalfampridine ER 10 mg every day  -Ocrelizumab 600 mg q 6 months     Recent Covid  -Isolation Precautions-completed  -No symptoms     H/o Renal Stones  -No treatment     HTN  -Vitals every shift  -Losartan 100 mg daily, hold for systolic blood pressure less than 110 or heart rate less than 60     H/o CP Atypical  -No treatment     Anxiety  -Xanax 0.5 mg daily as needed     Bladder Emptying Issue d/t MS/Sacral Wound  -Insert noriega for now until more mobile d/t sacral ulcer  -Monitor for s/s infection q shift and prn     DVT Prophylaxis  -Heparin 5000 units q8h  -Encourage early mobilization and participation in PT/OT as able     GI Prophylaxis  -Omeprazole 20 mg qd     Bowel Management Regimen/Constipation   -Miralax 17 g every day prn     Supplements  -Probiotic twice daily-->3/1/24  -Vitamin C 500 mg 2 times a day  -Zinc 220 mg daily  -Vitamin D 2000 units daily  -Magnesium 400 mg daily  -Vitamin B12 1000 mcg daily     LABS  -CBC/CMP weekly while in [facility]     Follow Up Appointments  -Dr. Napoleon Ovalles, PCP within 1-2 weeks following [facility] discharge.   *Follow-Up with specialists as recommended.         Future Appointments   Date Time Provider Department Center   2/15/2024  1:20 PM Samuel Aleman MD  [facility]      No future appointments.  Talib Hubbard, NEVAEH  2/19/2024  3:40 PM

## 2024-02-23 ENCOUNTER — SNF VISIT (OUTPATIENT)
Dept: INTERNAL MEDICINE CLINIC | Age: 79
End: 2024-02-23

## 2024-02-23 DIAGNOSIS — R53.1 WEAKNESS: ICD-10-CM

## 2024-02-23 DIAGNOSIS — N50.89: ICD-10-CM

## 2024-02-23 DIAGNOSIS — L53.9 ERYTHEMA OF GROIN: ICD-10-CM

## 2024-02-23 DIAGNOSIS — L98.429 SKIN ULCER OF SACRUM, UNSPECIFIED ULCER STAGE (HCC): Primary | ICD-10-CM

## 2024-02-23 PROCEDURE — 99309 SBSQ NF CARE MODERATE MDM 30: CPT | Performed by: NURSE PRACTITIONER

## 2024-02-24 VITALS
OXYGEN SATURATION: 98 % | SYSTOLIC BLOOD PRESSURE: 141 MMHG | TEMPERATURE: 97 F | HEART RATE: 90 BPM | DIASTOLIC BLOOD PRESSURE: 80 MMHG | RESPIRATION RATE: 20 BRPM

## 2024-02-24 RX ORDER — CLOTRIMAZOLE AND BETAMETHASONE DIPROPIONATE 10; .64 MG/G; MG/G
1 CREAM TOPICAL 2 TIMES DAILY
COMMUNITY
Start: 2024-02-23

## 2024-02-24 NOTE — PROGRESS NOTES
Brenden Cardenas, 1945, 78 year old, male    Chief Complaint:    Chief Complaint   Patient presents with    Follow - Up     Cellulitis of the buttocks; MS        Subjective:  Brenden Cardenas  : 1945, Age 78 year old  male 78-year-old man with multiple sclerosis. He has developed a sacral decubitus and has been admitted. This was debrided a couple of days ago, and it is the hope according to the patient that enzymatic debridement will be all that is necessary moving forward. Wound culture: Staph aureus, pseudomonas, enterococcus; back on  there was also Morganella, group B strep, and E. coli and proteus but no pseudomonas. Urinalysis, active sediment. Urine culture, no growth. Started on IV Unasyn, then switched to IV Cefepime, Vanco and PO Flagyl.  Tolerated well.  Discharged in stable condition to [facility] for rehabilitation and medical management.  Per Hospitalist,transitioned to oral Amoxicillin and Cipro.  Will continue to monitor.     Today:  Seen today with wound care team in room.  Assessed the wound.  No improvement. WBCs elevated.  Redness noted around the outside of wound.  Redness on groin and scrotum as well.  Ordered a wound culture.  Completed Cipro and Amoxicillin.  Will have ID followup for recommendations.  Started Clotrimazole-Betamethasone cream for scrotum and groin, per wound care team recommendations.  Staff continues turning the patient q2h.  Will continue to closely monitor.      Foot swelling resolved.  Uric Acid level wnl.  Will continue to monitor.    Denies insomnia, fatigue, fever/chills, cough, SOB, dyspnea, angina, palpitations, n/v, diarrhea, constipation, and urinary sxs.    Objective:  /80   Pulse 90   Temp 96.9 °F (36.1 °C)   Resp 20   SpO2 98%   PHYSICAL EXAM:  GENERAL HEALTH: well developed, well nourished, in no apparent distress  LINES, TUBES, DRAINS:  none  SKIN: pale, warm, dry  WOUND: +Coccyx wound-->8.2 cm X 5.4 cm X 3.5 cm; devitalized necrotic  tissue 40%; slough 30% and granulation tissue 30%, per facility record dated 2/13/24 (unable to visualize d/t discomfort and positioning)-->Redness noted around Sacral wound, groin and scrotum  EYES: PERRLA, conjunctiva normal; no drainage from eyes  HENT: normocephalic; normal nose, no nasal drainage, mucous membranes pink, moist  NECK: full range of motion observed  RESPIRATORY:clear to percussion and auscultation, No wheezing/cough/accessory muscle use; on room air  CARDIOVASCULAR: S1, S2 normal, RRR; no S3, no S4; , no click, no murmur  ABDOMEN: normal active BS+, soft, non-distended; no apparent masses; observed, non-tender, no guarding during physical exam  : Deferred  LYMPHATIC: no lymphedema  MUSCULOSKELETAL: no acute synovitis upper or lower extremity.  Weakness R/T recent hospitalization/diagnoses/sequelae; will undergo therapies to rehab and improve strength, endurance and independence w/ ADLs as able  EXTREMITIES/VASCULAR: no cyanosis, clubbing or edema, radial pulses 2+, and dorsalis pedal pulses 2+  NEUROLOGIC: intact; no sensorimotor deficit, reflexes normal, cranial nerves intact II-XII, follows commands  PSYCHIATRIC: alert and oriented x 3; affect appropriate    Medications reviewed: Yes    Diagnostics reviewed:    2/20/24  Uric Acid 5.4 mg/dL 4.4-7.6 Final    Dated: 2/23/24  CBC W/DIFF  WBC 12.0 10'3/uL 3.5-10.5 H Final  RBC 4.47 10'6/uL (Based on  documented  legal sex) 4.30-  5.80  Final  HGB 13.2 g/dL (Based on  documented  legal sex) 13.0-  17.5  Final  HCT 41.1 % (Based on  documented  legal sex) 38.0-  50.0  Final  MCV 91.9 fL 80.0-99.0 Final  MCH 29.5 pg 27.0-34.0 Final  MCHC 32.1 g/dL 32.0-35.5 Final  RDW 13.6 % 11.0-15.0 Final   10'3/uL 150-400 H Final  MPV 9.8 fL 8.8-12.1 Final  NRBC's 0.0 % 0.0 Final  Absolute NRBCs 0.0 10'3/uL 0.0 Final  Neutrophils 69.1 % 34.0-73.0 Final  Lymphocytes 14.9 % 15.0-50.0 L Final  Monocytes 13.9 % 1.0-15.0 Final  Eosinophils 0.0 % 0.0-8.0  Final  Basophils 0.1 % 0.0-2.0 Final  Immature Granulocytes 2.0 % No defined  reference range  Final  Absolute Neutrophils 8.3 10'3/uL 1.5-8.0 H Final  Absolute Lymphocytes 1.8 10'3/uL 1.0-4.0 Final  Absolute Monocytes 1.7 10'3/uL 0.2-1.0 H Final  Absolute Eosinophils 0.0 10'3/uL 0.0-0.6 Final  Absolute Basophils 0.0 10'3/uL 0.0-0.3 Final  Absolute Immature Granulocytes 0.2 10'3/uL 0.00-0.10 H Final    CMP(COMPREHENSIVE METABOLIC PANEL)  Sodium 136 mmol/L 133-146 Final  Potassium 4.1 mmol/L 3.5-5.1 Final  Chloride 103 mmol/L  Final  Carbon Dioxide 25 mmol/L 21-31 Final  Anion Gap 8 mmol/L 4-13 Final  Blood Urea Nitrogen 21 mg/dL 7-25 Final  Creatinine 0.51 mg/dL 0.60-1.30 L Final  eGFRcr (CKD-EPI 2021) >90 mL/min/1.73  m2  >=60 Final  Calcium 9.0 mg/dL 8.3-10.5 Final  Glucose 109 mg/dL  H Final  Protein, Total 5.9 g/dL 6.4-8.3 L Final  Albumin 3.3 g/dL 3.5-5.0 L Final  ALT 16 units/L 11-51 Final  Alkaline Phosphatase 32 units/L  L Final  AST 13 units/L 13-39 Final  Bilirubin, Total 0.5 mg/dL 0.2-1.2 Final    Assessment and plan:  Right Foot Pain/H/o Gout-resolved  -X-ray negative for fracture  -Uric Acid Level 5.4-->wnl  -Continue Allopurinol 300 mg qd    DJD/H/o Cervical Surgeries/Weakness/Physical Deconditioning/Impaired mobility and ADLs/At risk for falling  -Fall Precautions  -Tylenol 1 g every 6 hours as needed for fever or pain if given for fever notify MD/NP  -Norco 5/325 mg every 8 hours as needed  -PT/OT/ST to evaluate and treat  -[facility] team to establish care plan meeting with patient and POA/family as appropriate  -Anticipate DC on or before TBD; SW to assist patient/family w/ DC planning  -DC Plan:  Home with wife     MRSA in sacral decub  -Severe redness around wound and scrotum and groin  -Wound Culture ordered  -Wound Care Consult  -Dressings per wound care team  -In House ID consult  -Jah bid  -Completed [facility] Unasyn, Cefepime and Flagyl  -Cipro  500-->completed  -Amoxicillin-->completed  -Start Clotrimazole-Betamethasone 0.05% cream bid to groin and scotum     MS  -Prednisone 20 mg every day prn-->does not want at this time d/t sacral ulcer  -Dalfampridine ER 10 mg every day  -Ocrelizumab 600 mg q 6 months     Recent Covid  -Isolation Precautions-completed  -No symptoms     H/o Renal Stones  -No treatment     HTN  -Vitals every shift  -Losartan 100 mg daily, hold for systolic blood pressure less than 110 or heart rate less than 60     H/o CP Atypical  -No treatment     Anxiety  -Xanax 0.5 mg daily as needed     Bladder Emptying Issue d/t MS/Sacral Wound  -Insert noriega for now until more mobile d/t sacral ulcer  -Monitor for s/s infection q shift and prn     DVT Prophylaxis  -Heparin 5000 units q8h  -Encourage early mobilization and participation in PT/OT as able     GI Prophylaxis  -Omeprazole 20 mg qd     Bowel Management Regimen/Constipation   -Miralax 17 g every day prn     Supplements  -Probiotic twice daily-->3/1/24  -Vitamin C 500 mg 2 times a day  -Zinc 220 mg daily  -Vitamin D 2000 units daily  -Magnesium 400 mg daily  -Vitamin B12 1000 mcg daily     LABS  -CBC/CMP weekly while in [facility]     Follow Up Appointments  -Dr. Napoleon Ovalles, PCP within 1-2 weeks following [facility] discharge.   -Dr. Galindo, neurology as directed     No future appointments.  Talib Hubbard, APRN  2/23/2024  4 PM

## 2024-02-27 ENCOUNTER — SNF VISIT (OUTPATIENT)
Dept: INTERNAL MEDICINE CLINIC | Age: 79
End: 2024-02-27

## 2024-02-27 DIAGNOSIS — L98.429 SKIN ULCER OF SACRUM, UNSPECIFIED ULCER STAGE (HCC): Primary | ICD-10-CM

## 2024-02-27 DIAGNOSIS — A49.01 STAPH AUREUS INFECTION: ICD-10-CM

## 2024-02-27 DIAGNOSIS — L53.9 ERYTHEMA OF GROIN: ICD-10-CM

## 2024-02-27 DIAGNOSIS — Z97.8 FOLEY CATHETER STATUS: ICD-10-CM

## 2024-02-27 DIAGNOSIS — N50.89: ICD-10-CM

## 2024-02-27 PROCEDURE — 99309 SBSQ NF CARE MODERATE MDM 30: CPT | Performed by: NURSE PRACTITIONER

## 2024-02-28 VITALS
SYSTOLIC BLOOD PRESSURE: 122 MMHG | RESPIRATION RATE: 18 BRPM | TEMPERATURE: 98 F | OXYGEN SATURATION: 98 % | DIASTOLIC BLOOD PRESSURE: 68 MMHG | HEART RATE: 98 BPM

## 2024-02-28 RX ORDER — SULFAMETHOXAZOLE AND TRIMETHOPRIM 800; 160 MG/1; MG/1
1 TABLET ORAL 2 TIMES DAILY
COMMUNITY
Start: 2024-02-26 | End: 2024-03-07

## 2024-02-28 NOTE — PROGRESS NOTES
Brenden Cardenas, 1945, 78 year old, male    Chief Complaint:    Chief Complaint   Patient presents with    Follow - Up     Cellulitis of the Buttock, MS, Insomnia        Subjective:  Brenden Cardenas  : 1945, Age 78 year old  male 78-year-old man with multiple sclerosis. He has developed a sacral decubitus and has been admitted. This was debrided a couple of days ago, and it is the hope according to the patient that enzymatic debridement will be all that is necessary moving forward. Wound culture: Staph aureus, pseudomonas, enterococcus; back on  there was also Morganella, group B strep, and E. coli and proteus but no pseudomonas. Urinalysis, active sediment. Urine culture, no growth. Started on IV Unasyn, then switched to IV Cefepime, Vanco and PO Flagyl.  Tolerated well.  Discharged in stable condition to [facility] for rehabilitation and medical management.  Per Hospitalist,transitioned to oral Amoxicillin and Cipro.  Will continue to monitor.     Today:  Seen today in room.  Stable.  Culture of wound as shown below.  ID following.  Started on Bactrim, wound culture showed Staph as listed below..  Will continue to monitor.  Continues on Clotrimazole-Betamethasone cream for scrotum and groin, per wound care team recommendations.  Staff continues turning the patient q2h.  Continues with leann for now.  Will continue to closely monitor.      Patient had c/o insomnia.  Ordered Melatonin 3 mg at bedtime prn.  Patient in agreement.    Therapy update:24  Bed mobility--Mod/max a  Transfers--Dep/EZ  Ambulation--Unable  UE ADLs--Min a  LE ADLs--Dependent  Toileting--Dependent  Bathing--Dependent    Denies insomnia, fatigue, fever/chills, cough, SOB, dyspnea, angina, palpitations, n/v, diarrhea, constipation, and urinary sxs.    Objective:  /68   Pulse 98   Temp 98.1 °F (36.7 °C)   Resp 18   SpO2 98%   PHYSICAL EXAM:  GENERAL HEALTH: well developed, well nourished, in no apparent distress  LINES,  TUBES, DRAINS:  +[place]  SKIN: pale, warm, dry  WOUND: +Coccyx wound-->8.2 cm X 5.4 cm X 3.5 cm; devitalized necrotic tissue 40%; slough 30% and granulation tissue 30%, per facility record dated 2/13/24 (unable to visualize d/t discomfort and positioning)-->Redness noted around Sacral wound, groin and scrotum-improved  EYES: PERRLA, conjunctiva normal; no drainage from eyes  HENT: normocephalic; normal nose, no nasal drainage, mucous membranes pink, moist  NECK: full range of motion observed  RESPIRATORY:clear to percussion and auscultation, No wheezing/cough/accessory muscle use; on room air  CARDIOVASCULAR: S1, S2 normal, RRR; no S3, no S4; , no click, no murmur  ABDOMEN: normal active BS+, soft, non-distended; no apparent masses; observed, non-tender, no guarding during physical exam  : Deferred-->+[place]  LYMPHATIC: no lymphedema  MUSCULOSKELETAL: no acute synovitis upper or lower extremity.  Weakness R/T recent hospitalization/diagnoses/sequelae; will undergo therapies to rehab and improve strength, endurance and independence w/ ADLs as able  EXTREMITIES/VASCULAR: no cyanosis, clubbing or edema, radial pulses 2+, and dorsalis pedal pulses 2+  NEUROLOGIC: intact; no sensorimotor deficit, reflexes normal, cranial nerves intact II-XII, follows commands  PSYCHIATRIC: alert and oriented x 3; affect appropriate    Medications reviewed: Yes    Diagnostics reviewed:  Dated:  2/24/24  CULTURE: AEROBIC  CULTURE: AEROBIC RESULTED  AS NOTES  Final  Result Report SEE  RESULTS  BELOW  A Final   Test: Aerobic Culture   Specimen Type: Wound   Specimen Date: 2/24/2024 8:35 AM   Result Date: 2/26/2024 7:27 AM   Result Status: Final result   Abnormal: Yes   Resulting Lab: [facility]   [address] [facility] [address]   Tel: [phone]   CULTURE   ------------------   Moderate Growth Staphylococcus aureus (Abnormal)   Light Growth Normal skin marva   STAIN   ------------------   No Neutrophils seen   Few Gram positive cocci  seen   SUSCEPTIBILITY   ------------------   Staphylococcus aureus   METHOD SUSI   ----------------------------- ------------------------  1 of 2  [facility]  Lab Results Report  Laboratory: 02/26/2024 07:27 CULTURE: AEROBIC  Reviewed By Todd on 02/27/2024 07:40  Latest Version  Source Id:[id number] Result Unit Ref. Range Flag Status  Sim Cardenasodell GARCIA (7056) Order #: [id number] SourceKey: [id number]  Result Report SEE  RESULTS  BELOW  A Final   CLINDAMYCIN >4 ug/mL Resistant   ERYTHROMYCIN >4 ug/mL Resistant   GENTAMICIN <=4 ug/mL Susceptible   LEVOFLOXACIN >4 ug/mL Resistant   OXACILLIN 1 ug/mL Susceptible   TETRACYCLINE <=4 ug/mL Susceptible   TRIMETHOPRIM/SULFAMETHOXAZOLE <=0.5 ug/mL Susceptible   VANCOMYCIN 2 ug/mL Susceptibl    Assessment and plan:  Right Foot Pain/H/o Gout-resolved  -X-ray negative for fracture  -Uric Acid Level 5.4-->wnl  -Continue Allopurinol 300 mg qd    DJD/H/o Cervical Surgeries/Weakness/Physical Deconditioning/Impaired mobility and ADLs/At risk for falling  -Fall Precautions  -Tylenol 1 g every 6 hours as needed for fever or pain if given for fever notify MD/NP  -Norco 5/325 mg every 8 hours as needed  -PT/OT/ST to evaluate and treat  -[facility] team to establish care plan meeting with patient and POA/family as appropriate  -Anticipate DC on or before TBD; SW to assist patient/family w/ DC planning  -DC Plan:  Home with wife     MRSA in sacral decub  -Severe redness around wound and scrotum and groin  -Wound Culture-->Staph Aureus  -Start Bactrim 800/160 bid-->stop on 3/7/24  -Wound Care Consult  -Dressings per wound care team  -In House ID consult  -Jah bid  -Completed n [facility] Unasyn, Cefepime and Flagyl  -Cipro 500-->completed  -Amoxicillin-->completed  -Continue Clotrimazole-Betamethasone 0.05% cream bid to groin and scotum    Noriega status  -Has Buttock Ulcer  -Monitor q shift and prn for s/s of infection  -Meticulous care to noriega q shift and  prn     MS  -Prednisone 20 mg every day prn-->does not want at this time d/t sacral ulcer  -Dalfampridine ER 10 mg every day  -Ocrelizumab 600 mg q 6 months       Insomnia  -Start Melatonin 3 mg at bedtime prn    Recent Covid  -Isolation Precautions-completed  -No symptoms     H/o Renal Stones  -No treatment     HTN  -Vitals every shift  -Losartan 100 mg daily, hold for systolic blood pressure less than 110 or heart rate less than 60     H/o CP Atypical  -No treatment     Anxiety  -Xanax 0.5 mg daily as needed     Bladder Emptying Issue d/t MS/Sacral Wound  -Insert noriega for now until more mobile d/t sacral ulcer  -Monitor for s/s infection q shift and prn     DVT Prophylaxis  -Heparin 5000 units q8h  -Encourage early mobilization and participation in PT/OT as able     GI Prophylaxis  -Omeprazole 20 mg qd     Bowel Management Regimen/Constipation   -Miralax 17 g every day prn     Supplements  -Probiotic twice daily-->3/1/24  -Vitamin C 500 mg 2 times a day  -Zinc 220 mg daily  -Vitamin D 2000 units daily  -Magnesium 400 mg daily  -Vitamin B12 1000 mcg daily     LABS  -CBC/CMP weekly while in [facility]     Follow Up Appointments  -Dr. Napoleon Ovalles, PCP within 1-2 weeks following [facility] discharge.   -Dr. Galindo, neurology as directed     No future appointments.  Talib Hubbard, APRN  2/27/2024  3 p.m.

## 2024-03-01 ENCOUNTER — SNF VISIT (OUTPATIENT)
Dept: INTERNAL MEDICINE CLINIC | Age: 79
End: 2024-03-01

## 2024-03-01 VITALS
RESPIRATION RATE: 20 BRPM | DIASTOLIC BLOOD PRESSURE: 81 MMHG | OXYGEN SATURATION: 98 % | HEART RATE: 100 BPM | SYSTOLIC BLOOD PRESSURE: 137 MMHG | TEMPERATURE: 97 F

## 2024-03-01 DIAGNOSIS — R53.1 WEAKNESS: ICD-10-CM

## 2024-03-01 DIAGNOSIS — K59.00 CONSTIPATION, UNSPECIFIED CONSTIPATION TYPE: ICD-10-CM

## 2024-03-01 DIAGNOSIS — N50.89: ICD-10-CM

## 2024-03-01 DIAGNOSIS — L53.9 ERYTHEMA OF GROIN: ICD-10-CM

## 2024-03-01 DIAGNOSIS — Z97.8 FOLEY CATHETER STATUS: ICD-10-CM

## 2024-03-01 DIAGNOSIS — L98.429 SKIN ULCER OF SACRUM, UNSPECIFIED ULCER STAGE (HCC): ICD-10-CM

## 2024-03-01 DIAGNOSIS — D72.829 LEUKOCYTOSIS, UNSPECIFIED TYPE: Primary | ICD-10-CM

## 2024-03-01 PROCEDURE — 99309 SBSQ NF CARE MODERATE MDM 30: CPT | Performed by: NURSE PRACTITIONER

## 2024-03-01 RX ORDER — SENNA AND DOCUSATE SODIUM 50; 8.6 MG/1; MG/1
1 TABLET, FILM COATED ORAL 2 TIMES DAILY
COMMUNITY

## 2024-03-01 NOTE — PROGRESS NOTES
Brenden Cardenas, 1945, 78 year old, male    Chief Complaint:    Chief Complaint   Patient presents with    Follow - Up     Leukocytosis, Sacral wound, MS, constipation        Subjective:  Brenden Cardenas  : 1945, Age 78 year old  male 78-year-old man with multiple sclerosis. He has developed a sacral decubitus and has been admitted. This was debrided a couple of days ago, and it is the hope according to the patient that enzymatic debridement will be all that is necessary moving forward. Wound culture: Staph aureus, pseudomonas, enterococcus; back on  there was also Morganella, group B strep, and E. coli and proteus but no pseudomonas. Urinalysis, active sediment. Urine culture, no growth. Started on IV Unasyn, then switched to IV Cefepime, Vanco and PO Flagyl.  Tolerated well.  Discharged in stable condition to [facility] for rehabilitation and medical management.  Per Hospitalist,transitioned to oral Amoxicillin and Cipro.  Will continue to monitor.     Today:  Seen today in room with wound care team.  C/o Constipation.  Added Senna S as listed below.       Assessed wound with wound care team.  Redness on the outside of the sacral wound slightly improved.  Continues on the Clotrimazole-Betamethasone cream for scrotum and groin, per wound care team recommendations.   Sacral wound stable.  Continues on Bactrim for wound culture showing Staph.  Will continue to monitor.   Staff continues turning the patient q2h.  Continues with noriega for now.  Will continue to closely monitor.      Patient continues with c/o insomnia.  Discontinued the Melatonin 3 mg at bedtime prn and will continue on Xanax as listed below.  Patient in agreement.    RN staff reported that lab results as listed below.  WBCs elevated more today.  Conferred with ID.  Ordered a UA and C&S as well as CXR pa and lat.      Denies insomnia, fatigue, fever/chills, cough, SOB, dyspnea, angina, palpitations, n/v, diarrhea, constipation, and urinary  sxs-->noriega, clear urine noted.    Objective:  /81   Pulse 100   Temp 97.1 °F (36.2 °C)   Resp 20   SpO2 98%   PHYSICAL EXAM:  GENERAL HEALTH: well developed, well nourished, in no apparent distress  LINES, TUBES, DRAINS:  +Noriega  SKIN: pale, warm, dry  WOUND: +Coccyx wound-->;7 x 4 x 3 cm; Underminin.5 cm. at 12 o'clock  Exudate: Moderate Serous  Thick adherent devitalized necrotic tissue: 15 %  Slough: 15 %  Granulation tissue: 55 %  Other viable tissues: 15 % (Fascia, Muscle)  Wound progress: Improved evidenced by decreased infection,  decreased inflammation, decreased necrotic tissue, decreased slough,  decreased surface area, increased granulation, decreased depth  EYES: PERRLA, conjunctiva normal; no drainage from eyes  HENT: normocephalic; normal nose, no nasal drainage, mucous membranes pink, moist  NECK: full range of motion observed  RESPIRATORY:clear to percussion and auscultation, No wheezing/cough/accessory muscle use; on room air  CARDIOVASCULAR: S1, S2 normal, RRR; no S3, no S4; , no click, no murmur  ABDOMEN: normal active BS+, soft, non-distended; no apparent masses; observed, non-tender, no guarding during physical exam  : Deferred-->+Noriega--clear yellow urine present  LYMPHATIC: no lymphedema  MUSCULOSKELETAL: no acute synovitis upper or lower extremity.  Weakness R/T recent hospitalization/diagnoses/sequelae; will undergo therapies to rehab and improve strength, endurance and independence w/ ADLs as able  EXTREMITIES/VASCULAR: no cyanosis, clubbing or edema, radial pulses 2+, and dorsalis pedal pulses 2+  NEUROLOGIC: intact; no sensorimotor deficit, reflexes normal, cranial nerves intact II-XII, follows commands  PSYCHIATRIC: alert and oriented x 3; affect appropriate    Medications reviewed: Yes    Diagnostics reviewed:  Dated:  24  CULTURE: AEROBIC  CULTURE: AEROBIC RESULTED  AS NOTES  Final  Result Report SEE  RESULTS  BELOW  A Final   Test: Aerobic Culture   Specimen  Type: Wound   Specimen Date: 2/24/2024 8:35 AM   Result Date: 2/26/2024 7:27 AM   Result Status: Final result   Abnormal: Yes   Resulting Lab: [facility]   [address] [facility] [address]   Tel: [phone]   CULTURE   ------------------   Moderate Growth Staphylococcus aureus (Abnormal)   Light Growth Normal skin marva   STAIN   ------------------   No Neutrophils seen   Few Gram positive cocci seen   SUSCEPTIBILITY   ------------------   Staphylococcus aureus   METHOD SUSI   ----------------------------- ------------------------  [address] of [address] [place] [address]  Lab Results Report  Laboratory: 02/26/2024 07:27 CULTURE: AEROBIC  Reviewed By Todd on 02/27/2024 07:40  Latest Version  Source Id:[id number] Result Unit Ref. Range Flag Status  Brenden Cardenas ([id number]) Order #: [id number] SourceKey: [id number]  Result Report SEE  RESULTS  BELOW  A Final   CLINDAMYCIN >4 ug/mL Resistant   ERYTHROMYCIN >4 ug/mL Resistant   GENTAMICIN <=4 ug/mL Susceptible   LEVOFLOXACIN >4 ug/mL Resistant   OXACILLIN 1 ug/mL Susceptible   TETRACYCLINE <=4 ug/mL Susceptible   TRIMETHOPRIM/SULFAMETHOXAZOLE <=0.5 ug/mL Susceptible   VANCOMYCIN 2 ug/mL Susceptible    Dated:  3/1/24  CBC W/DIFF  WBC 14.4 10'3/uL 3.5-10.5 H Final  RBC 4.65 10'6/uL (Based on  documented  legal sex) 4.30-  5.80  Final  HGB 13.7 g/dL (Based on  documented  legal sex) 13.0-  17.5  Final  HCT 42.5 % (Based on  documented  legal sex) 38.0-  50.0  Final  MCV 91.4 fL 80.0-99.0 Final  MCH 29.5 pg 27.0-34.0 Final  MCHC 32.2 g/dL 32.0-35.5 Final  RDW 13.5 % 11.0-15.0 Final   10'3/uL 150-400 H Final  MPV 10.3 fL 8.8-12.1 Final  NRBC's 0.0 % 0.0 Final  Absolute NRBCs 0.0 10'3/uL 0.0 Final  Neutrophils 73.9 % 34.0-73.0 H Final  Lymphocytes 11.5 % 15.0-50.0 L Final  Monocytes 12.3 % 1.0-15.0 Final  Eosinophils 0.0 % 0.0-8.0 Final  Basophils 0.3 % 0.0-2.0 Final  Immature Granulocytes 2.0 % No defined  reference range  Final  Absolute  Neutrophils 10.6 10'3/uL 1.5-8.0 H Final  Absolute Lymphocytes 1.7 10'3/uL 1.0-4.0 Final  Absolute Monocytes 1.8 10'3/uL 0.2-1.0 H Final  Absolute Eosinophils 0.0 10'3/uL 0.0-0.6 Final  Absolute Basophils 0.0 10'3/uL 0.0-0.3 Final  Absolute Immature Granulocytes 0.3 10'3/uL 0.00-0.10 H Final    Magnesium 2.0 mg/dL 1.7-2.8 Final    CMP(COMPREHENSIVE METABOLIC PANEL)  Sodium 137 mmol/L 133-146 Final  Potassium 4.9 mmol/L 3.5-5.1 Final  Chloride 100 mmol/L  Final  Carbon Dioxide 26 mmol/L 21-31 Final  Anion Gap 11 mmol/L 4-13 Final  Blood Urea Nitrogen 19 mg/dL 7-25 Final  Creatinine 0.86 mg/dL 0.60-1.30 Final  eGFRcr (CKD-EPI 2021) 90 mL/min/1.73  m2  >=60 Final  Calcium 9.4 mg/dL 8.3-10.5 Final  Glucose 95 mg/dL  Final  Protein, Total 5.9 g/dL 6.4-8.3 L Final  Albumin 3.5 g/dL 3.5-5.0 Final  ALT 13 units/L 11-51 Final  Alkaline Phosphatase 37 units/L  Final  AST 13 units/L 13-39 Final  Bilirubin, Total 0.5 mg/dL 0.2-1.2 Final    Assessment and plan:  Leukocytosis  -WBCs:  14.4  -UA and C&S  -Stat CXR PA and Lat    Right Foot Pain/H/o Gout-resolved  -X-ray negative for fracture  -Uric Acid Level 5.4-->wnl  -Continue Allopurinol 300 mg qd    DJD/H/o Cervical Surgeries/Weakness/Physical Deconditioning/Impaired mobility and ADLs/At risk for falling  -Fall Precautions  -Tylenol 1 g every 6 hours as needed for fever or pain if given for fever notify MD/NP  -Norco 5/325 mg every 8 hours as needed  -PT/OT/ST to evaluate and treat  -[facility] team to establish care plan meeting with patient and POA/family as appropriate  -Anticipate DC on or before TBD; SW to assist patient/family w/ DC planning  -DC Plan:  Home with wife     MRSA in sacral decub  -Redness around wound and scrotum and groin-->slightly improved  -Wound Culture-->Staph Aureus  -Bactrim 800/160 bid-->stop on 3/7/24  -Wound Care Consult  -Dressings per wound care team  -In House ID consult  -Jah bid  -Completed n \[facility] [place] [facility]\"" Unasyn, Cefepime and  Flagyl  -Cipro 500-->completed  -Amoxicillin-->completed  -Continue Clotrimazole-Betamethasone 0.05% cream bid to groin and scotum    Noriega status  -Has Buttock Ulcer  -Monitor q shift and prn for s/s of infection  -Meticulous care to noriega q shift and prn     MS  -Prednisone 20 mg every day prn-->does not want at this time d/t sacral ulcer  -Dalfampridine ER 10 mg every day  -Ocrelizumab 600 mg q 6 months    Insomnia  -Melatonin-->discontinued, per patient's request  -Xanax 0.5 mg tid prn    Recent Covid  -Isolation Precautions-completed  -No symptoms     H/o Renal Stones  -No treatment     HTN  -Vitals every shift  -Losartan 100 mg daily, hold for systolic blood pressure less than 110 or heart rate less than 60     H/o CP Atypical  -No treatment     Anxiety  -Xanax 0.5 mg daily as needed     Bladder Emptying Issue d/t MS/Sacral Wound  -Insert noriega for now until more mobile d/t sacral ulcer  -Monitor for s/s infection q shift and prn     DVT Prophylaxis  -Heparin 5000 units q8h  -Encourage early mobilization and participation in PT/OT as able     GI Prophylaxis  -Omeprazole 20 mg qd     Bowel Management Regimen/Constipation   -Miralax 17 g every day prn  -Add Senna S 8.6/50 mg bid     Supplements  -Probiotic twice daily  -Vitamin C 500 mg 2 times a day  -Zinc 220 mg daily  -Vitamin D 2000 units daily  -Magnesium 400 mg daily  -Vitamin B12 1000 mcg daily     LABS  -CBC/CMP weekly while in [facility]     Follow Up Appointments  -Dr. Napoleon Ovalles, PCP within 1-2 weeks following [facility] discharge.   -Dr. Galindo, neurology as directed     No future appointments.  Talib Hubbard, APRN  3/1/2024  3:33 p.m.

## 2024-03-05 ENCOUNTER — TELEPHONE (OUTPATIENT)
Dept: NEUROLOGY | Facility: CLINIC | Age: 79
End: 2024-03-05

## 2024-03-05 ENCOUNTER — SNF VISIT (OUTPATIENT)
Dept: INTERNAL MEDICINE CLINIC | Age: 79
End: 2024-03-05

## 2024-03-05 DIAGNOSIS — L98.429 SKIN ULCER OF SACRUM, UNSPECIFIED ULCER STAGE (HCC): ICD-10-CM

## 2024-03-05 DIAGNOSIS — D72.829 LEUKOCYTOSIS, UNSPECIFIED TYPE: Primary | ICD-10-CM

## 2024-03-05 DIAGNOSIS — G35 MS (MULTIPLE SCLEROSIS) (HCC): ICD-10-CM

## 2024-03-05 DIAGNOSIS — K30 UPSET STOMACH: ICD-10-CM

## 2024-03-05 DIAGNOSIS — Z87.39 H/O: GOUT: ICD-10-CM

## 2024-03-05 DIAGNOSIS — R53.1 WEAKNESS: ICD-10-CM

## 2024-03-05 DIAGNOSIS — R63.0 POOR APPETITE: ICD-10-CM

## 2024-03-05 DIAGNOSIS — F41.9 ANXIETY: ICD-10-CM

## 2024-03-05 DIAGNOSIS — A49.01 STAPH AUREUS INFECTION: ICD-10-CM

## 2024-03-05 DIAGNOSIS — R63.4 WEIGHT LOSS: ICD-10-CM

## 2024-03-05 PROCEDURE — 99309 SBSQ NF CARE MODERATE MDM 30: CPT | Performed by: NURSE PRACTITIONER

## 2024-03-05 NOTE — TELEPHONE ENCOUNTER
Spoke with patient:  up to end of January, he was able to stand and pivot on his legs to transfer with assistance.  He contributed a lot to that effort and then had infection of his Ulcer and also Covid and that threw him off and has lost function in both legs  Has been on [place]ust[place] hills and wound is better and is asking what can be done    IVIG at 0.4 gm/kg per day for 5 days is an option to try to treat relapse.  If it can be arranged, this would be an option      Dr. SKYLER Aleman

## 2024-03-05 NOTE — TELEPHONE ENCOUNTER
Patient is currently at [facility]. He stated he is be ridded and would like to talk to to a nurse. Please [phone] he is in room 130

## 2024-03-06 ENCOUNTER — TELEPHONE (OUTPATIENT)
Dept: NEUROLOGY | Facility: CLINIC | Age: 79
End: 2024-03-06

## 2024-03-06 VITALS
RESPIRATION RATE: 20 BRPM | TEMPERATURE: 96 F | DIASTOLIC BLOOD PRESSURE: 72 MMHG | WEIGHT: 157.13 LBS | BODY MASS INDEX: 23 KG/M2 | OXYGEN SATURATION: 99 % | SYSTOLIC BLOOD PRESSURE: 130 MMHG | HEART RATE: 102 BPM

## 2024-03-06 NOTE — PROGRESS NOTES
Brenden Cardenas, 1945, 78 year old, male    Chief Complaint:    Chief Complaint   Patient presents with    Follow - Up     Upset stomach        Subjective:  Brenden Cardenas  : 1945, Age 78 year old  male 78-year-old man with multiple sclerosis. He has developed a sacral decubitus and has been admitted. This was debrided a couple of days ago, and it is the hope according to the patient that enzymatic debridement will be all that is necessary moving forward. Wound culture: Staph aureus, pseudomonas, enterococcus; back on  there was also Morganella, group B strep, and E. coli and proteus but no pseudomonas. Urinalysis, active sediment. Urine culture, no growth. Started on IV Unasyn, then switched to IV Cefepime, Vanco and PO Flagyl.  Tolerated well.  Discharged in stable condition to [facility] for rehabilitation and medical management.  Per Hospitalist,transitioned to oral Amoxicillin and Cipro.  Will continue to monitor.     Today:  Seen today in room with ID NP.  Patient had c/o \"sour stomach.\"  Does not have an appetite.  Lost weight.  No vomiting.  Requested that we stop Vitamin C, B12, MVI, Cholecalciferol and Zinc.  Ordered.  He does report having bms.  This writer also ordered a KUB-stat; and increased Omeprazole to bid and added Simethicone.  Will have dietary follow up for the appetite loss.  Also requested that we get an appointment with his neurologist.  Ordered.  Will continue to monitor.    Wound care reported that the wound is stable.  Noted redness on the outside of the sacral wound slightly improved.  Continues on the Clotrimazole-Betamethasone cream for scrotum and groin, per wound care team recommendations.   Sacral wound stable.  Continues on Bactrim for wound culture showing Staph.  Will continue to monitor.   Staff continues turning the patient q2h.  Continues with leann for now.  Will continue to closely monitor.      WBCs had been elevated from 16.7 and trending down to 15.3. UA  negative.  Last CXR on 3/1, showed no active infiltrates.  ID Ordered blood cultures X 2; CRP and PCT.  The CRP elevated to 76.7, PCT: 0.076.  On Bactrim for Staph in the wound.  Will await blood culture results.    Patient did want Colchicine prn.  Does not have pain in ankles as he usually does.  Will not take Prednisone.  Will monitor.    Denies insomnia, fatigue, fever/chills, cough, SOB, dyspnea, angina, palpitations, n/v, diarrhea, constipation, and urinary sxs-->noriega, clear urine noted.    Objective:  /72   Pulse 102   Temp (!) 96.4 °F (35.8 °C)   Resp 20   Wt 157 lb 1.6 oz (71.3 kg)   SpO2 99%   BMI 23.20 kg/m²   PHYSICAL EXAM:  GENERAL HEALTH: well developed, well nourished, in no apparent distress  LINES, TUBES, DRAINS:  +Noriega  SKIN: pale, warm, dry  WOUND: +Coccyx wound-->;7 x 4 x 3 cm; Underminin.5 cm. at 12 o'clock  Exudate: Moderate Serous  Thick adherent devitalized necrotic tissue: 15 %  Slough: 15 %  Granulation tissue: 55 %  Other viable tissues: 15 % (Fascia, Muscle)  Wound progress: Improved evidenced by decreased infection,  decreased inflammation, decreased necrotic tissue, decreased slough,  decreased surface area, increased granulation, decreased depth  EYES: PERRLA, conjunctiva normal; no drainage from eyes  HENT: normocephalic; normal nose, no nasal drainage, mucous membranes pink, moist  NECK: full range of motion observed  RESPIRATORY:clear to percussion and auscultation, No wheezing/cough/accessory muscle use; on room air  CARDIOVASCULAR: S1, S2 normal, RRR; no S3, no S4; , no click, no murmur  ABDOMEN: normal active BS+, soft, non-distended; no apparent masses; observed, non-tender, no guarding during physical exam  : Deferred-->+Noriega--clear yellow urine present  LYMPHATIC: no lymphedema  MUSCULOSKELETAL: no acute synovitis upper or lower extremity.  Weakness R/T recent hospitalization/diagnoses/sequelae; will undergo therapies to rehab and improve strength,  endurance and independence w/ ADLs as able  EXTREMITIES/VASCULAR: no cyanosis, clubbing or edema, radial pulses 2+, and dorsalis pedal pulses 2+  NEUROLOGIC: intact; no sensorimotor deficit, reflexes normal, cranial nerves intact II-XII, follows commands  PSYCHIATRIC: alert and oriented x 3; affect appropriate    Medications reviewed: Yes    Diagnostics reviewed:  Dated:  2/24/24  CULTURE: AEROBIC  CULTURE: AEROBIC RESULTED  AS NOTES  Final  Result Report SEE  RESULTS  BELOW  A Final   Test: Aerobic Culture   Specimen Type: Wound   Specimen Date: 2/24/2024 8:35 AM   Result Date: 2/26/2024 7:27 AM   Result Status: Final result   Abnormal: Yes   Resulting Lab: [facility]   [address] [facility] [address]   Tel: [phone]   CULTURE   ------------------   Moderate Growth Staphylococcus aureus (Abnormal)   Light Growth Normal skin marva   STAIN   ------------------   No Neutrophils seen   Few Gram positive cocci seen   SUSCEPTIBILITY   ------------------   Staphylococcus aureus   METHOD SUSI   ----------------------------- ------------------------  1 of [address] [place] [address]  Lab Results Report  Laboratory: 02/26/2024 07:27 CULTURE: AEROBIC  Reviewed By Todd on 02/27/2024 07:40  Latest Version  Source Id:[id number] Result Unit Ref. Range Flag Status  Brenden Cardenas ([id number]) Order #: [id number] SourceKey: [id number]  Result Report SEE  RESULTS  BELOW  A Final   CLINDAMYCIN >4 ug/mL Resistant   ERYTHROMYCIN >4 ug/mL Resistant   GENTAMICIN <=4 ug/mL Susceptible   LEVOFLOXACIN >4 ug/mL Resistant   OXACILLIN 1 ug/mL Susceptible   TETRACYCLINE <=4 ug/mL Susceptible   TRIMETHOPRIM/SULFAMETHOXAZOLE <=0.5 ug/mL Susceptible   VANCOMYCIN 2 ug/mL Susceptible    Dated:  3/1/24  CBC W/DIFF  WBC 14.4 10'3/uL 3.5-10.5 H Final  RBC 4.65 10'6/uL (Based on  documented  legal sex) 4.30-  5.80  Final  HGB 13.7 g/dL (Based on  documented  legal sex) 13.0-  17.5  Final  HCT 42.5 % (Based  on  documented  legal sex) 38.0-  50.0  Final  MCV 91.4 fL 80.0-99.0 Final  MCH 29.5 pg 27.0-34.0 Final  MCHC 32.2 g/dL 32.0-35.5 Final  RDW 13.5 % 11.0-15.0 Final   10'3/uL 150-400 H Final  MPV 10.3 fL 8.8-12.1 Final  NRBC's 0.0 % 0.0 Final  Absolute NRBCs 0.0 10'3/uL 0.0 Final  Neutrophils 73.9 % 34.0-73.0 H Final  Lymphocytes 11.5 % 15.0-50.0 L Final  Monocytes 12.3 % 1.0-15.0 Final  Eosinophils 0.0 % 0.0-8.0 Final  Basophils 0.3 % 0.0-2.0 Final  Immature Granulocytes 2.0 % No defined  reference range  Final  Absolute Neutrophils 10.6 10'3/uL 1.5-8.0 H Final  Absolute Lymphocytes 1.7 10'3/uL 1.0-4.0 Final  Absolute Monocytes 1.8 10'3/uL 0.2-1.0 H Final  Absolute Eosinophils 0.0 10'3/uL 0.0-0.6 Final  Absolute Basophils 0.0 10'3/uL 0.0-0.3 Final  Absolute Immature Granulocytes 0.3 10'3/uL 0.00-0.10 H Final    Magnesium 2.0 mg/dL 1.7-2.8 Final    CMP(COMPREHENSIVE METABOLIC PANEL)  Sodium 137 mmol/L 133-146 Final  Potassium 4.9 mmol/L 3.5-5.1 Final  Chloride 100 mmol/L  Final  Carbon Dioxide 26 mmol/L 21-31 Final  Anion Gap 11 mmol/L 4-13 Final  Blood Urea Nitrogen 19 mg/dL 7-25 Final  Creatinine 0.86 mg/dL 0.60-1.30 Final  eGFRcr (CKD-EPI 2021) 90 mL/min/1.73  m2  >=60 Final  Calcium 9.4 mg/dL 8.3-10.5 Final  Glucose 95 mg/dL  Final  Protein, Total 5.9 g/dL 6.4-8.3 L Final  Albumin 3.5 g/dL 3.5-5.0 Final  ALT 13 units/L 11-51 Final  Alkaline Phosphatase 37 units/L  Final  AST 13 units/L 13-39 Final  Bilirubin, Total 0.5 mg/dL 0.2-1.2 Final    Dated:  3/4/24  CMP(COMPREHENSIVE METABOLIC PANEL)  Sodium 136 mmol/L 133-146 Final  Potassium 4.5 mmol/L 3.5-5.1 Final  Chloride 101 mmol/L  Final  Carbon Dioxide 23 mmol/L 21-31 Final  Anion Gap 12 mmol/L 4-13 Final  Blood Urea Nitrogen 24 mg/dL 7-25 Final  Creatinine 0.72 mg/dL 0.60-1.30 Final  eGFRcr (CKD-EPI 2021) >90 mL/min/1.73  m2  >=60 Final  Calcium 9.4 mg/dL 8.3-10.5 Final  Glucose 75 mg/dL  Final  Protein, Total 5.8 g/dL  6.4-8.3 L Final  Albumin 3.5 g/dL 3.5-5.0 Final  ALT 16 units/L 11-51 Final  Alkaline Phosphatase 40 units/L  Final  AST 15 units/L 13-39 Final  Bilirubin, Total 0.4 mg/dL 0.2-1.2 Final    CBC W/DIFF dated: 3/4/24  WBC 16.7 10'3/uL 3.5-10.5 H Final  RBC 4.83 10'6/uL (Based on  documented  legal sex) 4.30-  5.80  Final  HGB 14.1 g/dL (Based on  documented  legal sex) 13.0-  17.5  Final  HCT 44.2 % (Based on  documented  legal sex) 38.0-  50.0  Final  MCV 91.5 fL 80.0-99.0 Final  MCH 29.2 pg 27.0-34.0 Final  MCHC 31.9 g/dL 32.0-35.5 L Final  RDW 13.8 % 11.0-15.0 Final  1 of 2   10'3/uL 150-400 H Final  MPV 10.3 fL 8.8-12.1 Final  NRBC's 0.0 % 0.0 Final  Absolute NRBCs 0.0 10'3/uL 0.0 Final  Neutrophils 70.1 % 34.0-73.0 Final  Lymphocytes 15.4 % 15.0-50.0 Final  Monocytes 10.8 % 1.0-15.0 Final  Eosinophils 0.0 % 0.0-8.0 Final  Basophils 0.2 % 0.0-2.0 Final  Immature Granulocytes 3.5 % No defined  reference range  Final  Absolute Neutrophils 11.7 10'3/uL 1.5-8.0 H Final  Absolute Lymphocytes 2.6 10'3/uL 1.0-4.0 Final  Absolute Monocytes 1.8 10'3/uL 0.2-1.0 H Final  Absolute Eosinophils 0.0 10'3/uL 0.0-0.6 Final  Absolute Basophils 0.0 10'3/uL 0.0-0.3 Final  Absolute Immature Granulocytes 0.6 10'3/uL 0.00-0.10 H Final    Dated: 3/5/24  CBC W/DIFF  WBC 15.3 10'3/uL 3.5-10.5 H Final  RBC 4.77 10'6/uL (Based on  documented  legal sex) 4.30-  5.80  Final  HGB 13.9 g/dL (Based on  documented  legal sex) 13.0-  17.5  Final  HCT 43.1 % (Based on  documented  legal sex) 38.0-  50.0  Final  MCV 90.4 fL 80.0-99.0 Final  MCH 29.1 pg 27.0-34.0 Final  MCHC 32.3 g/dL 32.0-35.5 Final  RDW 13.7 % 11.0-15.0 Final   10'3/uL 150-400 H Final  MPV 9.8 fL 8.8-12.1 Final  NRBC's 0.0 % 0.0 Final  Absolute NRBCs 0.0 10'3/uL 0.0 Final  Neutrophils 70.6 % 34.0-73.0 Final  Lymphocytes 14.1 % 15.0-50.0 L Final  Monocytes 11.6 % 1.0-15.0 Final  Eosinophils 0.0 % 0.0-8.0 Final  Basophils 0.3 % 0.0-2.0 Final  Immature Granulocytes  3.4 % No defined  reference range  Final  Absolute Neutrophils 10.8 10'3/uL 1.5-8.0 H Final  Absolute Lymphocytes 2.2 10'3/uL 1.0-4.0 Final  Absolute Monocytes 1.8 10'3/uL 0.2-1.0 H Final  Absolute Eosinophils 0.0 10'3/uL 0.0-0.6 Final  Absolute Basophils 0.0 10'3/uL 0.0-0.3 Final  Absolute Immature Granulocytes 0.5 10'3/uL 0.00-0.10 H Final    C-Reactive Protein 76.7 mg/L 0.0-10.0 H Final    Procalcitonin 0.076 ng/mL <0.500 Final    Assessment and plan:  Leukocytosis  -WBCs:  14.4-->16.7-->15.3  -ID consult  -UA and C&S-->neg  -CXR PA and Lat-->neg  -CRP-->76.7  -PCT-->0.076  -Continues on Bactrim as listed below for Staph in Wound un til 3/7/24  -Add Blood Cultures X 2, per ID    MRSA in sacral decub  -Redness around wound and scrotum and groin-->slightly improved  -Wound Culture-->Staph Aureus  -Bactrim 800/160 bid-->stop on 3/7/24  -Wound Care Consult  -Dressings per wound care team  -In House ID consult  -Jah bid  -Completed n hospital Unasyn, Cefepime and Flagyl  -Cipro 500-->completed  -Amoxicillin-->completed  -Continue Clotrimazole-Betamethasone 0.05% cream bid to groin and scotum    Poor Appetite  -Lost weight  -Dietary consult    Upset stomach  -Hold MVI, Cholecalciferol, Zinc and Vitamin C at this time  -Increase Omeprazole as listed below  -Add Simethicone 80 mg prn up to 4 tabs every day  -Add Zofran 4 mg q8h prn for nausea    Right Foot Pain/H/o Gout-resolved  -X-ray negative for fracture  -Uric Acid Level 5.4-->wnl  -Continue Allopurinol 300 mg every day  -Consider Colchicine if patient is in pain  -Will not take Prednisone    DJD/H/o Cervical Surgeries/Weakness/Physical Deconditioning/Impaired mobility and ADLs/At risk for falling  -Fall Precautions  -Tylenol 1 g every 6 hours as needed for fever or pain if given for fever notify MD/NP  -Norco 5/325 mg every 8 hours as needed  -PT/OT/ST to evaluate and treat  -[facility] team to establish care plan meeting with patient and POA/family as  appropriate  -Anticipate DC on or before TBD; SW to assist patient/family w/ DC planning  -DC Plan:  Home with wife     Noriega status  -Has Buttock Ulcer  -Monitor q shift and prn for s/s of infection  -Meticulous care to noriega q shift and prn     MS  -Prednisone 20 mg every day prn-->does not want at this time d/t sacral ulcer  -Dalfampridine ER 10 mg every day  -Ocrelizumab 600 mg q 6 months  -F/u with Dr. Aleman, Neurology    Insomnia  -Melatonin-->discontinued, per patient's request  -Xanax 0.5 mg tid prn    Recent Covid  -Isolation Precautions-completed  -No symptoms     H/o Renal Stones  -No treatment     HTN  -Vitals every shift  -Losartan 100 mg daily, hold for systolic blood pressure less than 110 or heart rate less than 60     H/o CP Atypical  -No treatment     Anxiety  -Xanax 0.5 mg daily as needed     Bladder Emptying Issue d/t MS/Sacral Wound  -Insert noriega for now until more mobile d/t sacral ulcer  -Monitor for s/s infection q shift and prn     DVT Prophylaxis  -Heparin 5000 units q8h  -Encourage early mobilization and participation in PT/OT as able     Bowel Management Regimen/Constipation   -Miralax 17 g every day prn  -Senna S 8.6/50 mg bid     Supplements  -Probiotic twice daily  -Discontinued Vitamin C, Zinc, Vitamin D and B12 d/t stomach upset  -Magnesium 400 mg daily     LABS  -CBC/CMP weekly while in [facility]     Follow Up Appointments  -Dr. Napoleon Ovalles, PCP within 1-2 weeks following [facility] discharge.   -Dr. Galindo, neurology as directed     Talib Hubbard, APRN  3/5/2024  4 p.m.

## 2024-03-06 NOTE — TELEPHONE ENCOUNTER
Patient has Medicare so no PA is needed. Therapy plan can be placed for [facility], patient would start Monday for continuity of 5 day IVIG treatment.    Patient notified.    Per Maria Guadalupe Michael at [facility] arranges transport / appointments. LM for Maria Guadalupe to call back.    Therapy plan placed.    Spoke with Charity, provided Maria Guadalupe's direct line for coordination. Per JESS Nash RN does not need to be involved in coordination. They will let us know if there are issues prohibiting infusion.

## 2024-03-08 ENCOUNTER — SNF VISIT (OUTPATIENT)
Dept: INTERNAL MEDICINE CLINIC | Age: 79
End: 2024-03-08

## 2024-03-08 DIAGNOSIS — G35 MS (MULTIPLE SCLEROSIS) (HCC): ICD-10-CM

## 2024-03-08 DIAGNOSIS — R53.1 WEAKNESS: ICD-10-CM

## 2024-03-08 DIAGNOSIS — D72.829 LEUKOCYTOSIS, UNSPECIFIED TYPE: Primary | ICD-10-CM

## 2024-03-08 DIAGNOSIS — R63.0 POOR APPETITE: ICD-10-CM

## 2024-03-08 DIAGNOSIS — L98.429 SKIN ULCER OF SACRUM, UNSPECIFIED ULCER STAGE (HCC): ICD-10-CM

## 2024-03-08 PROCEDURE — 99307 SBSQ NF CARE SF MDM 10: CPT | Performed by: NURSE PRACTITIONER

## 2024-03-09 VITALS
OXYGEN SATURATION: 96 % | TEMPERATURE: 97 F | SYSTOLIC BLOOD PRESSURE: 149 MMHG | DIASTOLIC BLOOD PRESSURE: 78 MMHG | RESPIRATION RATE: 20 BRPM | HEART RATE: 102 BPM

## 2024-03-09 NOTE — PROGRESS NOTES
Brenden Cardenas, 1945, 78 year old, male    Chief Complaint:    Chief Complaint   Patient presents with    Follow - Up     Sacral Ulcer, MS        Subjective:  Brenden Cardenas  : 1945, Age 78 year old  male 78-year-old man with multiple sclerosis. He has developed a sacral decubitus and has been admitted. This was debrided a couple of days ago, and it is the hope according to the patient that enzymatic debridement will be all that is necessary moving forward. Wound culture: Staph aureus, pseudomonas, enterococcus; back on  there was also Morganella, group B strep, and E. coli and proteus but no pseudomonas. Urinalysis, active sediment. Urine culture, no growth. Started on IV Unasyn, then switched to IV Cefepime, Vanco and PO Flagyl.  Tolerated well.  Discharged in stable condition to [facility] for rehabilitation and medical management.  Per Hospitalist,transitioned to oral Amoxicillin and Cipro.  Will continue to monitor.     Today:  Seen today in room with wound care team. Assessed.  Wound care reported that the wound is stable.  Noted redness on the outside of the sacral wound slightly improved.  Continues on the Clotrimazole-Betamethasone cream for scrotum and groin, per wound care team recommendations.   Continues on Bactrim for wound culture showing Staph.  Will continue to monitor.   Staff continues turning the patient q2h.  Continues with leann for now.  Will continue to closely monitor.      Feels better as far as his stomach update.  Off Vitamins except Magnesium for now.     WBCs trending down.  On Bactrim for Staph in the wound.  Will await blood culture results.    Patient did want Colchicine prn.  Does not have pain in ankles as he usually does.  Will not take Prednisone.  Will monitor.    Patient reported that he will have an IVIG infusion on 3/11/24.  Will continue to monitor.    Denies insomnia, fatigue, fever/chills, cough, SOB, dyspnea, angina, palpitations, n/v, diarrhea,  constipation, and urinary sxs-->noriega, clear urine noted.    Objective:  /78   Pulse 102   Temp 97 °F (36.1 °C)   Resp 20   SpO2 96%   PHYSICAL EXAM:  GENERAL HEALTH: well developed, well nourished, in no apparent distress  LINES, TUBES, DRAINS:  +Noriega  SKIN: pale, warm, dry  WOUND: +Coccyx wound-->;8 x 4 x 3 cm; Underminin.5 cm. at 12 o'clock  Exudate: Moderate Serous  Slough: 20 %  Granulation tissue: 45 %  Other viable tissues: 25 % (Fascia, Muscle)  Thick adherent devitalized necrotic tissue:  10%  Wound progress: Improved evidenced by decreased infection,  EYES: PERRLA, conjunctiva normal; no drainage from eyes  HENT: normocephalic; normal nose, no nasal drainage, mucous membranes pink, moist  NECK: full range of motion observed  RESPIRATORY:clear to percussion and auscultation, No wheezing/cough/accessory muscle use; on room air  CARDIOVASCULAR: S1, S2 normal, RRR; no S3, no S4; , no click, no murmur  ABDOMEN: normal active BS+, soft, non-distended; no apparent masses; observed, non-tender, no guarding during physical exam  : Deferred-->+Noriega--clear yellow urine present  LYMPHATIC: no lymphedema  MUSCULOSKELETAL: no acute synovitis upper or lower extremity.  Weakness R/T recent hospitalization/diagnoses/sequelae; will undergo therapies to rehab and improve strength, endurance and independence w/ ADLs as able  EXTREMITIES/VASCULAR: no cyanosis, clubbing or edema, radial pulses 2+, and dorsalis pedal pulses 2+  NEUROLOGIC: intact; no sensorimotor deficit, reflexes normal, cranial nerves intact II-XII, follows commands  PSYCHIATRIC: alert and oriented x 3; affect appropriate    Medications reviewed: Yes    Diagnostics reviewed:    Dated: 3/7/24  CBC W/DIFF  WBC 13.0 10'3/uL 3.5-10.5 H Final  RBC 4.57 10'6/uL (Based on  documented  legal sex) 4.30-  5.80  Final  HGB 13.7 g/dL (Based on  documented  legal sex) 13.0-  17.5  Final  HCT 41.9 % (Based on  documented  legal sex) 38.0-  50.0  Final  MCV  91.7 fL 80.0-99.0 Final  MCH 30.0 pg 27.0-34.0 Final  MCHC 32.7 g/dL 32.0-35.5 Final  RDW 13.6 % 11.0-15.0 Final   10'3/uL 150-400 H Final  MPV 9.8 fL 8.8-12.1 Final  NRBC's 0.0 % 0.0 Final  Absolute NRBCs 0.0 10'3/uL 0.0 Final  Neutrophils 71.1 % 34.0-73.0 Final  Lymphocytes 17.1 % 15.0-50.0 Final  Monocytes 9.6 % 1.0-15.0 Final  Eosinophils 0.0 % 0.0-8.0 Final  Basophils 0.2 % 0.0-2.0 Final  Immature Granulocytes 2.0 % No defined  reference range  Final  Absolute Neutrophils 9.2 10'3/uL 1.5-8.0 H Final  Absolute Lymphocytes 2.2 10'3/uL 1.0-4.0 Final  Absolute Monocytes 1.3 10'3/uL 0.2-1.0 H Final  Absolute Eosinophils 0.0 10'3/uL 0.0-0.6 Final  Absolute Basophils 0.0 10'3/uL 0.0-0.3 Final  Absolute Immature Granulocytes 0.3 10'3/uL 0.00-0.10 H Final    CMP(COMPREHENSIVE METABOLIC PANEL)  Sodium 135 mmol/L 133-146 Final  Potassium 4.7 mmol/L 3.5-5.1 Final  Chloride 98 mmol/L  Final  Carbon Dioxide 28 mmol/L 21-31 Final  Anion Gap 9 mmol/L 4-13 Final  Blood Urea Nitrogen 20 mg/dL 7-25 Final  Creatinine 0.77 mg/dL 0.60-1.30 Final  eGFRcr (CKD-EPI 2021) >90 mL/min/1.73  m2  >=60 Final  Calcium 9.4 mg/dL 8.3-10.5 Final  Glucose 87 mg/dL  Final  Protein, Total 5.9 g/dL 6.4-8.3 L Final  Albumin 3.4 g/dL 3.5-5.0 L Final  ALT 15 units/L 11-51 Final  Alkaline Phosphatase 41 units/L  Final  AST 13 units/L 13-39 Final  Bilirubin, Total 0.5 mg/dL 0.2-1.2 Final  Legend:  Report contains critical results (results with red text)  Report contains abnormal results (results with orange text)  Performing Laboratory Information: [facility]  Reviewed by Name Reviewed by Signature Date  2 of 2    Assessment and plan:  Leukocytosis-trending down  -WBCs:  14.4-->16.7-->15.3-->13.0  -ID consult  -UA and C&S-->neg  -CXR PA and Lat-->neg  -CRP-->76.7  -PCT-->0.076  -Continues on Bactrim as listed below for Staph in Wound un til 3/7/24  -Add Blood Cultures X 2, per ID    MRSA in sacral decub  -Redness around wound and  scrotum and groin-->slightly improved  -Wound Culture-->Staph Aureus  -Bactrim-->Completed  -Wound Care Consult  -Dressings per wound care team  -In House ID consult  -Jah bid  -Completed n \[facility]\"" Unasyn, Cefepime and Flagyl  -Cipro 500-->completed  -Amoxicillin-->completed  -Continue Clotrimazole-Betamethasone 0.05% cream bid to groin and scotum    Poor Appetite  -Appetite improved  -Lost weight  -Dietary consult    Upset stomach-resolved  -Hold MVI, Cholecalciferol, Zinc and Vitamin C at this time  -Increase Omeprazole as listed below  -Simethicone 80 mg prn up to 4 tabs every day  -Zofran 4 mg q8h prn for nausea    Right Foot Pain/H/o Gout-resolved  -X-ray negative for fracture  -Uric Acid Level 5.4-->wnl  -Continue Allopurinol 300 mg every day  -Consider Colchicine if patient is in pain  -Will not take Prednisone    DJD/H/o Cervical Surgeries/Weakness/Physical Deconditioning/Impaired mobility and ADLs/At risk for falling  -Fall Precautions  -Tylenol 1 g every 6 hours as needed for fever or pain if given for fever notify MD/NP  -Norco 5/325 mg every 8 hours as needed  -PT/OT/ST to evaluate and treat  -[facility] team to establish care plan meeting with patient and POA/family as appropriate  -Anticipate DC on or before TBD; SW to assist patient/family w/ DC planning  -DC Plan:  Home with wife     Noriega status  -Has Buttock Ulcer  -Monitor q shift and prn for s/s of infection  -Meticulous care to noriega q shift and prn     MS  -Prednisone 20 mg every day prn-->does not want at this time d/t sacral ulcer  -Dalfampridine ER 10 mg every day  -Ocrelizumab 600 mg q 6 months  -F/u with Dr. Aleman, Neurology on 3/11/24 for IVIG infusion    Insomnia  -Melatonin-->discontinued, per patient's request  -Xanax 0.5 mg tid prn    Recent Covid  -Isolation Precautions-completed  -No symptoms     H/o Renal Stones  -No treatment     HTN  -Vitals every shift  -Losartan 100 mg daily, hold for systolic blood pressure less than 110 or  heart rate less than 60     H/o CP Atypical  -No treatment     Anxiety  -Xanax 0.5 mg daily as needed     Bladder Emptying Issue d/t MS/Sacral Wound  -Insert nroiega for now until more mobile d/t sacral ulcer  -Monitor for s/s infection q shift and prn     DVT Prophylaxis  -Heparin 5000 units q8h  -Encourage early mobilization and participation in PT/OT as able     Bowel Management Regimen/Constipation   -Miralax 17 g every day prn  -Senna S 8.6/50 mg bid     Supplements  -Probiotic twice daily  -Discontinued Vitamin C, Zinc, Vitamin D and B12 d/t stomach upset  -Magnesium 400 mg daily     LABS  -CBC/CMP weekly while in [facility]     Follow Up Appointments  -Dr. Napoleon Ovalles, PCP within 1-2 weeks following [facility] discharge.   -Dr. Galindo, neurology on 3/11/24 for IVIG infusion     Talib Hubbard, APRN  3/8/2024  3 p.m.

## 2024-03-11 ENCOUNTER — OFFICE VISIT (OUTPATIENT)
Dept: HEMATOLOGY/ONCOLOGY | Facility: HOSPITAL | Age: 79
End: 2024-03-11
Attending: Other
Payer: MEDICARE

## 2024-03-11 ENCOUNTER — TELEPHONE (OUTPATIENT)
Dept: NEUROLOGY | Facility: CLINIC | Age: 79
End: 2024-03-11

## 2024-03-11 VITALS
TEMPERATURE: 98 F | SYSTOLIC BLOOD PRESSURE: 143 MMHG | DIASTOLIC BLOOD PRESSURE: 90 MMHG | HEART RATE: 115 BPM | RESPIRATION RATE: 18 BRPM | WEIGHT: 157 LBS | OXYGEN SATURATION: 94 % | BODY MASS INDEX: 23 KG/M2

## 2024-03-11 DIAGNOSIS — G35 MULTIPLE SCLEROSIS (HCC): Primary | ICD-10-CM

## 2024-03-11 PROCEDURE — 96366 THER/PROPH/DIAG IV INF ADDON: CPT

## 2024-03-11 PROCEDURE — 96365 THER/PROPH/DIAG IV INF INIT: CPT

## 2024-03-11 NOTE — PROGRESS NOTES
[id number] Pt arrived via w/c from [facility] for IVIG infusion.  Pt unable to move BLE, has a noriega catheter intact and draining to bag, LUE very weak and immobile.  Call light to R hand during infusion.  Pt given PO intake.    1015 Riddhi infusion without c/o.  1030 Pt riddhi PO intake well, reports he is comfortable.  1115 Went to re-position pt, pt declined at this time, requesting to stay on his left side.    1215 Ride called.  Pt has 30\" left of infusion.  Declines to be repositioned at this time.  Noriega with ~100mL, not emptied at this time.  1300 PIV dc'd.  Pt back to w/c via yeison lift.   here to take pt back to [facility].  Wife and [facility] updated.  Pt aware he will return tomorrow for infusion.

## 2024-03-11 NOTE — TELEPHONE ENCOUNTER
Received call from Charity Chart Nurse [facility].    Patient presents to [facility] without transport/caregiver from [facility]. Accompanied by wife who is not able to care for patient and plans to leave facility while patient is infusing.  Today is day one of 5 day outpatient IVIG infusions.  Patient has indwelling noriega catheter, is unable to re-position self, and does not have use of L side of body or hands.  Charity called [facility], who state they had a miscommunication and cannot provide a caregiver at this time.  Today is day one of 5 infusions. [facility] does not infuse IVIG on Saturdays and has limited open hours.  Patient does not wish to have infusions inpatient.     Per Dr Aleman, patient needs infusion.  Per Charity Charge RN, she will assist with patient care today, and has communicated with [facility] that a caregiver is absolutely necessary for future infusions.

## 2024-03-12 ENCOUNTER — OFFICE VISIT (OUTPATIENT)
Dept: HEMATOLOGY/ONCOLOGY | Facility: HOSPITAL | Age: 79
End: 2024-03-12
Attending: Other
Payer: MEDICARE

## 2024-03-12 VITALS
RESPIRATION RATE: 16 BRPM | OXYGEN SATURATION: 98 % | TEMPERATURE: 98 F | SYSTOLIC BLOOD PRESSURE: 130 MMHG | HEART RATE: 101 BPM | DIASTOLIC BLOOD PRESSURE: 81 MMHG

## 2024-03-12 DIAGNOSIS — G35 MULTIPLE SCLEROSIS (HCC): Primary | ICD-10-CM

## 2024-03-12 PROCEDURE — 96365 THER/PROPH/DIAG IV INF INIT: CPT

## 2024-03-12 PROCEDURE — 96366 THER/PROPH/DIAG IV INF ADDON: CPT

## 2024-03-12 NOTE — PROGRESS NOTES
Pt here for IVIG . Pt denies any issues or concerns.      Ordering MD: Dr. Aleman  Order Exp: 3/6/25     Pt tolerated infusion without difficulty or complaint. Reviewed next apt date/time: yes      Education Record  Learner:  Patient and Spouse  Disease / Diagnosis: multiple sclerosis  Barriers / Limitations:  None  Method:  Brief focused and Discussion  General Topics:  Medication, Plan of care reviewed, and Fall risk and prevention  Outcome:  Shows understanding     Patient transferred using the yeison lift. Tolerated infusion w/o complaint.

## 2024-03-13 ENCOUNTER — OFFICE VISIT (OUTPATIENT)
Dept: HEMATOLOGY/ONCOLOGY | Facility: HOSPITAL | Age: 79
End: 2024-03-13
Attending: Other
Payer: MEDICARE

## 2024-03-13 VITALS
SYSTOLIC BLOOD PRESSURE: 126 MMHG | TEMPERATURE: 99 F | DIASTOLIC BLOOD PRESSURE: 81 MMHG | OXYGEN SATURATION: 97 % | HEART RATE: 95 BPM

## 2024-03-13 DIAGNOSIS — G35 MULTIPLE SCLEROSIS (HCC): Primary | ICD-10-CM

## 2024-03-13 PROCEDURE — 96366 THER/PROPH/DIAG IV INF ADDON: CPT

## 2024-03-13 PROCEDURE — 96365 THER/PROPH/DIAG IV INF INIT: CPT

## 2024-03-13 NOTE — PROGRESS NOTES
Education Record    Learner:  Patient    Disease / Diagnosis: pt here for IVIG    Barriers / Limitations:  None   Comments:    Method:  Brief focused   Comments:    General Topics:  Plan of care reviewed   Comments:    Outcome:  Shows understanding   Comments:

## 2024-03-14 ENCOUNTER — OFFICE VISIT (OUTPATIENT)
Dept: HEMATOLOGY/ONCOLOGY | Facility: HOSPITAL | Age: 79
End: 2024-03-14
Attending: Other
Payer: MEDICARE

## 2024-03-14 VITALS
HEART RATE: 108 BPM | RESPIRATION RATE: 18 BRPM | OXYGEN SATURATION: 97 % | DIASTOLIC BLOOD PRESSURE: 99 MMHG | SYSTOLIC BLOOD PRESSURE: 157 MMHG | TEMPERATURE: 98 F

## 2024-03-14 DIAGNOSIS — G35 MULTIPLE SCLEROSIS (HCC): Primary | ICD-10-CM

## 2024-03-14 PROCEDURE — 96366 THER/PROPH/DIAG IV INF ADDON: CPT

## 2024-03-14 PROCEDURE — 96365 THER/PROPH/DIAG IV INF INIT: CPT

## 2024-03-14 NOTE — PROGRESS NOTES
Education Record    Learner:  Patient    Disease / Diagnosis: MS    Barriers / Limitations:  None   Comments:    Method:  Brief focused and Discussion   Comments:    General Topics:  Plan of care reviewed   Comments:    Outcome:  Shows understanding   Comments:    IVIG infusion - tolerated without issue. Will return tomorrow for last infusion of order.

## 2024-03-15 ENCOUNTER — SNF VISIT (OUTPATIENT)
Dept: INTERNAL MEDICINE CLINIC | Age: 79
End: 2024-03-15

## 2024-03-15 ENCOUNTER — OFFICE VISIT (OUTPATIENT)
Dept: HEMATOLOGY/ONCOLOGY | Facility: HOSPITAL | Age: 79
End: 2024-03-15
Attending: Other
Payer: MEDICARE

## 2024-03-15 DIAGNOSIS — L98.429 SKIN ULCER OF SACRUM, UNSPECIFIED ULCER STAGE (HCC): Primary | ICD-10-CM

## 2024-03-15 DIAGNOSIS — F41.9 ANXIETY: ICD-10-CM

## 2024-03-15 DIAGNOSIS — G35 MS (MULTIPLE SCLEROSIS) (HCC): ICD-10-CM

## 2024-03-15 DIAGNOSIS — R53.1 WEAKNESS: ICD-10-CM

## 2024-03-15 DIAGNOSIS — G35 MULTIPLE SCLEROSIS (HCC): Primary | ICD-10-CM

## 2024-03-15 DIAGNOSIS — Z97.8 FOLEY CATHETER STATUS: ICD-10-CM

## 2024-03-15 PROCEDURE — 99307 SBSQ NF CARE SF MDM 10: CPT | Performed by: NURSE PRACTITIONER

## 2024-03-15 PROCEDURE — 96366 THER/PROPH/DIAG IV INF ADDON: CPT

## 2024-03-15 PROCEDURE — 96365 THER/PROPH/DIAG IV INF INIT: CPT

## 2024-03-15 NOTE — PROGRESS NOTES
Education Record    Learner:  Patient    Disease / Diagnosis:MS    Barriers / Limitations:  None   Comments:    Method:  Discussion   Comments:    General Topics:  Medication and Plan of care reviewed   Comments:    Outcome:  Shows understanding   Comments:

## 2024-03-16 VITALS
TEMPERATURE: 98 F | SYSTOLIC BLOOD PRESSURE: 136 MMHG | OXYGEN SATURATION: 98 % | HEART RATE: 103 BPM | DIASTOLIC BLOOD PRESSURE: 82 MMHG | RESPIRATION RATE: 18 BRPM

## 2024-03-16 NOTE — PROGRESS NOTES
Brenden Cardenas, 1945, 78 year old, male    Chief Complaint:    Chief Complaint   Patient presents with    Follow - Up     Sacral Ulcer, MS        Subjective:  Brenden Cardenas  : 1945, Age 78 year old  male 78-year-old man with multiple sclerosis. He has developed a sacral decubitus and has been admitted. This was debrided a couple of days ago, and it is the hope according to the patient that enzymatic debridement will be all that is necessary moving forward. Wound culture: Staph aureus, pseudomonas, enterococcus; back on  there was also Morganella, group B strep, and E. coli and proteus but no pseudomonas. Urinalysis, active sediment. Urine culture, no growth. Started on IV Unasyn, then switched to IV Cefepime, Vanco and PO Flagyl.  Tolerated well.  Discharged in stable condition to [facility] for rehabilitation and medical management.  Per Hospitalist,transitioned to oral Amoxicillin and Cipro.  Will continue to monitor.     Today:  Seen today in room.  Patient frustrated with slow progress of sacral wound. Seen by wound care physician and had another I and D done.      Of  note, completed the IVIG infusions.  This writer assessed the mattress he is laying on.  Ordered a low air mattress.      Feels better as far as his stomach update.  Off Vitamins except Magnesium for now.  Appetite better. Will continue to monitor.    Bactrim completed.  Blood cultures negative.  WBCs wnl.      Patient did want Colchicine prn.  Does not have pain in ankles as he usually does.  Will not take Prednisone.  Will monitor.    Denies insomnia, fatigue, fever/chills, cough, SOB, dyspnea, angina, palpitations, n/v, diarrhea, constipation, and urinary sxs-->noriega, clear urine noted.    Objective:  /82   Pulse 103   Temp 98 °F (36.7 °C)   Resp 18   SpO2 98%   PHYSICAL EXAM:  GENERAL HEALTH: well developed, well nourished, in no apparent distress  LINES, TUBES, DRAINS:  +Noriega  SKIN: pale, warm, dry  WOUND: +Coccyx  wound-->;not seen on this visit-->per wound care team-->stable  EYES: PERRLA, conjunctiva normal; no drainage from eyes  HENT: normocephalic; normal nose, no nasal drainage, mucous membranes pink, moist  NECK: full range of motion observed  RESPIRATORY:clear to percussion and auscultation, No wheezing/cough/accessory muscle use; on room air  CARDIOVASCULAR: S1, S2 normal, RRR; no S3, no S4; , no click, no murmur  ABDOMEN: normal active BS+, soft, non-distended; no apparent masses; observed, non-tender, no guarding during physical exam  : Deferred-->+[place]--clear yellow urine present  LYMPHATIC: no lymphedema  MUSCULOSKELETAL: no acute synovitis upper or lower extremity.  Weakness R/T recent hospitalization/diagnoses/sequelae; will undergo therapies to rehab and improve strength, endurance and independence w/ ADLs as able  EXTREMITIES/VASCULAR: no cyanosis, clubbing or edema, radial pulses 2+, and dorsalis pedal pulses 2+  NEUROLOGIC: intact; no sensorimotor deficit, reflexes normal, cranial nerves intact II-XII, follows commands  PSYCHIATRIC: alert and oriented x 3; affect appropriate    Medications reviewed: Yes    Diagnostics reviewed:    Dated: 3/7/24  CBC W/DIFF  WBC 13.0 10'3/uL 3.5-10.5 H Final  RBC 4.57 10'6/uL (Based on  documented  legal sex) 4.30-  5.80  Final  HGB 13.7 g/dL (Based on  documented  legal sex) 13.0-  17.5  Final  HCT 41.9 % (Based on  documented  legal sex) 38.0-  50.0  Final  MCV 91.7 fL 80.0-99.0 Final  MCH 30.0 pg 27.0-34.0 Final  MCHC 32.7 g/dL 32.0-35.5 Final  RDW 13.6 % 11.0-15.0 Final   10'3/uL 150-400 H Final  MPV 9.8 fL 8.8-12.1 Final  NRBC's 0.0 % 0.0 Final  Absolute NRBCs 0.0 10'3/uL 0.0 Final  Neutrophils 71.1 % 34.0-73.0 Final  Lymphocytes 17.1 % 15.0-50.0 Final  Monocytes 9.6 % 1.0-15.0 Final  Eosinophils 0.0 % 0.0-8.0 Final  Basophils 0.2 % 0.0-2.0 Final  Immature Granulocytes 2.0 % No defined  reference range  Final  Absolute Neutrophils 9.2 10'3/uL 1.5-8.0 H  Final  Absolute Lymphocytes 2.2 10'3/uL 1.0-4.0 Final  Absolute Monocytes 1.3 10'3/uL 0.2-1.0 H Final  Absolute Eosinophils 0.0 10'3/uL 0.0-0.6 Final  Absolute Basophils 0.0 10'3/uL 0.0-0.3 Final  Absolute Immature Granulocytes 0.3 10'3/uL 0.00-0.10 H Final    CMP(COMPREHENSIVE METABOLIC PANEL)  Sodium 135 mmol/L 133-146 Final  Potassium 4.7 mmol/L 3.5-5.1 Final  Chloride 98 mmol/L  Final  Carbon Dioxide 28 mmol/L 21-31 Final  Anion Gap 9 mmol/L 4-13 Final  Blood Urea Nitrogen 20 mg/dL 7-25 Final  Creatinine 0.77 mg/dL 0.60-1.30 Final  eGFRcr (CKD-EPI 2021) >90 mL/min/1.73  m2  >=60 Final  Calcium 9.4 mg/dL 8.3-10.5 Final  Glucose 87 mg/dL  Final  Protein, Total 5.9 g/dL 6.4-8.3 L Final  Albumin 3.4 g/dL 3.5-5.0 L Final  ALT 15 units/L 11-51 Final  Alkaline Phosphatase 41 units/L  Final  AST 13 units/L 13-39 Final  Bilirubin, Total 0.5 mg/dL 0.2-1.2 Final  Legend:  Report contains critical results (results with red text)  Report contains abnormal results (results with orange text)  Performing Laboratory Information: [facility]  Reviewed by Name Reviewed by Signature Date  2 of 2    Assessment and plan:  Leukocytosis-resolved  -WBCs:  14.4-->16.7-->15.3-->13.0-->7.7  -ID consult  -UA and C&S-->neg  -CXR PA and Lat-->neg  -CRP-->76.7  -PCT-->0.076  -Completed Bactrim   -Blood Cultures X 2 negative    MRSA in sacral decub  -Redness around wound and scrotum and groin-->stable  -Wound Culture-->negative  -Bactrim-->Completed  -Wound Care Consult  -Add Low Air Mattress  -Dressings per wound care team  -In House ID consult  -Jah bid  -Completed [facility] Unasyn, Cefepime and Flagyl  -Cipro 500-->completed  -Amoxicillin-->completed  -Continue Clotrimazole-Betamethasone 0.05% cream bid to groin and scotum    Poor Appetite  -Appetite improved  -Lost weight  -Dietary consult    Upset stomach-resolved  -Hold MVI, Cholecalciferol, Zinc and Vitamin C at this time  -Increase Omeprazole as listed  below  -Simethicone 80 mg prn up to 4 tabs every day  -Zofran 4 mg q8h prn for nausea    Right Foot Pain/H/o Gout-resolved  -X-ray negative for fracture  -Uric Acid Level 5.4-->wnl  -Continue Allopurinol 300 mg every day  -Consider Colchicine if patient is in pain  -Will not take Prednisone    DJD/H/o Cervical Surgeries/Weakness/Physical Deconditioning/Impaired mobility and ADLs/At risk for falling  -Fall Precautions  -Tylenol 1 g every 6 hours as needed for fever or pain if given for fever notify MD/NP  -Norco 5/325 mg every 8 hours as needed  -PT/OT/ST to evaluate and treat  -[facility] team to establish care plan meeting with patient and POA/family as appropriate  -Anticipate DC on or before TBD; SW to assist patient/family w/ DC planning  -DC Plan:  Home with wife     Noriega status  -Has Buttock Ulcer  -Monitor q shift and prn for s/s of infection  -Meticulous care to noriega q shift and prn     MS  -Prednisone 20 mg every day prn-->does not want at this time d/t sacral ulcer  -Dalfampridine ER 10 mg every day  -Ocrelizumab 600 mg q 6 months  -IVIG infusions completed  -F/u with Dr. Aleman, Neurology as directed    Insomnia  -Melatonin-->discontinued, per patient's request  -Xanax 0.5 mg tid prn    Recent Covid  -Isolation Precautions-completed  -No symptoms     H/o Renal Stones  -No treatment     HTN  -Vitals every shift  -Losartan 100 mg daily, hold for systolic blood pressure less than 110 or heart rate less than 60     H/o CP Atypical  -No treatment     Anxiety  -Xanax 0.5 mg daily as needed     Bladder Emptying Issue d/t MS/Sacral Wound  -Insert noriega for now until more mobile d/t sacral ulcer  -Monitor for s/s infection q shift and prn     DVT Prophylaxis  -Heparin 5000 units q8h  -Encourage early mobilization and participation in PT/OT as able     Bowel Management Regimen/Constipation   -Miralax 17 g every day prn  -Senna S 8.6/50 mg bid     Supplements  -Probiotic twice daily  -Discontinued Vitamin C, Zinc,  Vitamin D and B12 d/t stomach upset  -Magnesium 400 mg daily     LABS  -CBC/CMP weekly while in [facility]     Follow Up Appointments  -Dr. Napoleon Ovalles, PCP within 1-2 weeks following [facility] discharge.   -Dr. Galindo, neurology on 3/11/24 for IVIG infusion     Talib Hubbard, APRN  3/15/2024  5 p.m.

## 2024-03-19 ENCOUNTER — SNF VISIT (OUTPATIENT)
Dept: INTERNAL MEDICINE CLINIC | Age: 79
End: 2024-03-19

## 2024-03-19 VITALS
WEIGHT: 156.13 LBS | HEART RATE: 97 BPM | DIASTOLIC BLOOD PRESSURE: 88 MMHG | SYSTOLIC BLOOD PRESSURE: 133 MMHG | RESPIRATION RATE: 20 BRPM | TEMPERATURE: 97 F | OXYGEN SATURATION: 96 % | BODY MASS INDEX: 23 KG/M2

## 2024-03-19 DIAGNOSIS — R53.1 WEAKNESS: ICD-10-CM

## 2024-03-19 DIAGNOSIS — G35 MS (MULTIPLE SCLEROSIS) (HCC): ICD-10-CM

## 2024-03-19 DIAGNOSIS — Z97.8 FOLEY CATHETER STATUS: ICD-10-CM

## 2024-03-19 DIAGNOSIS — L98.429 SKIN ULCER OF SACRUM, UNSPECIFIED ULCER STAGE (HCC): Primary | ICD-10-CM

## 2024-03-19 DIAGNOSIS — R63.0 POOR APPETITE: ICD-10-CM

## 2024-03-19 DIAGNOSIS — Z87.39 H/O: GOUT: ICD-10-CM

## 2024-03-19 DIAGNOSIS — R63.4 WEIGHT LOSS: ICD-10-CM

## 2024-03-19 PROCEDURE — 99309 SBSQ NF CARE MODERATE MDM 30: CPT | Performed by: NURSE PRACTITIONER

## 2024-03-20 NOTE — PROGRESS NOTES
Brenden Cardenas, 1945, 78 year old, male    Chief Complaint:    Chief Complaint   Patient presents with    Follow - Up     Sacral Ulcer, MS, Training of family        Subjective:  Brenden Cardenas  : 1945, Age 78 year old  male 78-year-old man with multiple sclerosis. He has developed a sacral decubitus and has been admitted. This was debrided a couple of days ago, and it is the hope according to the patient that enzymatic debridement will be all that is necessary moving forward. Wound culture: Staph aureus, pseudomonas, enterococcus; back on  there was also Morganella, group B strep, and E. coli and proteus but no pseudomonas. Urinalysis, active sediment. Urine culture, no growth. Started on IV Unasyn, then switched to IV Cefepime, Vanco and PO Flagyl.  Tolerated well.  Discharged in stable condition to [facility] for rehabilitation and medical management.  Per Hospitalist,transitioned to oral Amoxicillin and Cipro.  Will continue to monitor.     Today:  Seen during therapy today with wife at his side.  Training was being provided for possible discharge home.      Recommendations were made to the wife and patient for LTC.  Patient did not want LTC.  This writer recommended home with CG.  Patient was frustrated with discussion.  Wanted more therapy.  He also mentioned that he has pain in his foot again and wanted Colchicine.  This writer ordered a Uric Acid Level for tomorrow.  Patient does not want Prednisone.    Therapy update:3/19/24  Bed mobility--Mod/Max a  Transfers--Dependent with EZ stander  Ambulation--Unable  UE ADLs--Min a  LE ADLs--Dependent  Toileting--Dependent  Bathing--Dependent  Speech Therapy:  VFSS completed reg/thin improving carryover use of strategies    According to RN staff, there are times the patient refuses some of his medications.  Needs more encouragement.      Sacral ulcer not seen today.  Will be seen by Wound Care Physician tomorrow.  Will continue to monitor.      Denies  insomnia, fatigue, fever/chills, cough, SOB, dyspnea, angina, palpitations, n/v, diarrhea, constipation, and urinary sxs-->noriega, clear urine noted.    Objective:  /88   Pulse 97   Temp 97 °F (36.1 °C)   Resp 20   Wt 156 lb 1.6 oz (70.8 kg)   SpO2 96%   BMI 23.05 kg/m²   PHYSICAL EXAM:  GENERAL HEALTH: well developed, well nourished, in no apparent distress  LINES, TUBES, DRAINS:  +Noriega  SKIN: pale, warm, dry  WOUND: +Coccyx wound-->;not seen on this visit-->per wound care team-->stable  EYES: PERRLA, conjunctiva normal; no drainage from eyes  HENT: normocephalic; normal nose, no nasal drainage, mucous membranes pink, moist  NECK: full range of motion observed  RESPIRATORY:clear to percussion and auscultation, No wheezing/cough/accessory muscle use; on room air  CARDIOVASCULAR: S1, S2 normal, RRR; no S3, no S4; , no click, no murmur  ABDOMEN: normal active BS+, soft, non-distended; no apparent masses; observed, non-tender, no guarding during physical exam  : Deferred-->+Noriega--clear yellow urine present  LYMPHATIC: no lymphedema  MUSCULOSKELETAL: no acute synovitis upper or lower extremity.  Weakness R/T recent hospitalization/diagnoses/sequelae; will undergo therapies to rehab and improve strength, endurance and independence w/ ADLs as able  EXTREMITIES/VASCULAR: no cyanosis, clubbing or edema, radial pulses 2+, and dorsalis pedal pulses 2+  NEUROLOGIC: intact; no sensorimotor deficit, reflexes normal, cranial nerves intact II-XII, follows commands  PSYCHIATRIC: alert and oriented x 3; affect appropriate    Medications reviewed: Yes    Diagnostics reviewed:  Dated: 3/14/24  CBC W/DIFF  WBC 9.9 10'3/uL 3.5-10.5 Final  RBC 4.12 10'6/uL (Based on  documented  legal sex) 4.30-  5.80  L Final  HGB 12.1 g/dL (Based on  documented  legal sex) 13.0-  17.5  L Final  HCT 36.7 % (Based on  documented  legal sex) 38.0-  50.0  L Final  MCV 89.1 fL 80.0-99.0 Final  MCH 29.4 pg 27.0-34.0 Final  MCHC 33.0 g/dL  32.0-35.5 Final  RDW 14.4 % 11.0-15.0 Final   10'3/uL 150-400 Final  MPV 9.9 fL 8.8-12.1 Final  NRBC's 0.0 % 0.0 Final  Absolute NRBCs 0.0 10'3/uL 0.0 Final  Neutrophils 63.9 % 34.0-73.0 Final  Lymphocytes 18.8 % 15.0-50.0 Final  Monocytes 15.9 % 1.0-15.0 H Final  Eosinophils 0.0 % 0.0-8.0 Final  Basophils 0.1 % 0.0-2.0 Final  Immature Granulocytes 1.3 % No defined  reference range  Final  Absolute Neutrophils 6.3 10'3/uL 1.5-8.0 Final  Absolute Lymphocytes 1.9 10'3/uL 1.0-4.0 Final  Absolute Monocytes 1.6 10'3/uL 0.2-1.0 H Final  Absolute Eosinophils 0.0 10'3/uL 0.0-0.6 Final  Absolute Basophils 0.0 10'3/uL 0.0-0.3 Final  Absolute Immature Granulocytes 0.1 10'3/uL 0.00-0.10 Final    CMP(COMPREHENSIVE METABOLIC PANEL)  Sodium 134 mmol/L 133-146 Final  Potassium 4.3 mmol/L 3.5-5.1 Final  Chloride 101 mmol/L  Final  Carbon Dioxide 25 mmol/L 21-31 Final  Anion Gap 8 mmol/L 4-13 Final  Blood Urea Nitrogen 11 mg/dL 7-25 Final  Creatinine 0.49 mg/dL 0.60-1.30 L Final  eGFRcr (CKD-EPI 2021) >90 mL/min/1.73  m2  >=60 Final  Calcium 8.7 mg/dL 8.3-10.5 Final  Glucose 98 mg/dL  Final  Protein, Total 7.0 g/dL 6.4-8.3 Final  Albumin 3.1 g/dL 3.5-5.0 L Final  ALT 16 units/L 11-51 Final  Alkaline Phosphatase 31 units/L  L Final  AST 18 units/L 13-39 Final  Bilirubin, Total 0.7 mg/dL 0.2-1.2 Final    Assessment and plan:  Leukocytosis-resolved  -WBCs:  9.9  -ID consult  -UA and C&S-->neg  -CXR PA and Lat-->neg  -CRP-->76.7  -PCT-->0.076  -Completed Bactrim   -Blood Cultures X 2 negative    MRSA in sacral decub  -Redness around wound and scrotum and groin-->stable  -Wound Culture-->negative  -Bactrim-->Completed  -Wound Care Consult  -Low Air Mattress  -Dressings per wound care team  -In House ID consult  -Jah bid  -Completed n [facility] Unasyn, Cefepime and Flagyl  -Cipro 500-->completed  -Amoxicillin-->completed  -Continue Clotrimazole-Betamethasone 0.05% cream bid to groin and scotum    Poor  Appetite  -Appetite improved  -Lost weight  -Dietary consult    Upset stomach-resolved  -Hold MVI, Cholecalciferol, Zinc and Vitamin C at this time  -Increase Omeprazole as listed below  -Simethicone 80 mg prn up to 4 tabs every day  -Zofran 4 mg q8h prn for nausea    Right Foot Pain/H/o Gout  -X-ray negative for fracture  -Uric Acid Level 5.4-->wnl  -Continue Allopurinol 300 mg every day  -Repeat Uric Acid Level  -Consider Colchicine if patient is in pain  -Will not take Prednisone    DJD/H/o Cervical Surgeries/Weakness/Physical Deconditioning/Impaired mobility and ADLs/At risk for falling  -Fall Precautions  -Tylenol 1 g every 6 hours as needed for fever or pain if given for fever notify MD/NP  -Norco 5/325 mg every 8 hours as needed  -PT/OT/ST to evaluate and treat  -[facility] team to establish care plan meeting with patient and POA/family as appropriate  -Anticipate DC on or before TBD; SW to assist patient/family w/ DC planning  -DC Plan:  Home with wife     Noriega status  -Has Buttock Ulcer  -Monitor q shift and prn for s/s of infection  -Meticulous care to noriega q shift and prn     MS  -Prednisone 20 mg every day prn-->does not want at this time d/t sacral ulcer  -Dalfampridine ER 10 mg every day  -Ocrelizumab 600 mg q 6 months  -IVIG infusions completed  -F/u with Dr. Aleman, Neurology as directed    Insomnia  -Melatonin-->discontinued, per patient's request  -Xanax 0.5 mg tid prn    Recent Covid  -Isolation Precautions-completed  -No symptoms     H/o Renal Stones  -No treatment     HTN  -Vitals every shift  -Losartan 100 mg daily, hold for systolic blood pressure less than 110 or heart rate less than 60     H/o CP Atypical  -No treatment     Anxiety  -Xanax 0.5 mg daily as needed     Bladder Emptying Issue d/t MS/Sacral Wound  -Insert noriega for now until more mobile d/t sacral ulcer  -Monitor for s/s infection q shift and prn     DVT Prophylaxis  -Heparin 5000 units q8h  -Encourage early mobilization and  participation in PT/OT as able     Bowel Management Regimen/Constipation   -Miralax 17 g every day prn  -Senna S 8.6/50 mg bid     Supplements  -Probiotic twice daily  -Discontinued Vitamin C, Zinc, Vitamin D and B12 d/t stomach upset  -Magnesium 400 mg daily     LABS  -CBC/CMP weekly while in [facility]     Follow Up Appointments  -Dr. Napoleon Ovalles, PCP within 1-2 weeks following [facility] discharge.   -Dr. Galindo, neurology as directed     Talib Hubbard, APRN  3/19/2024  4 p.m.

## 2024-03-22 ENCOUNTER — SNF VISIT (OUTPATIENT)
Dept: INTERNAL MEDICINE CLINIC | Age: 79
End: 2024-03-22

## 2024-03-22 DIAGNOSIS — L98.429 SKIN ULCER OF SACRUM, UNSPECIFIED ULCER STAGE (HCC): ICD-10-CM

## 2024-03-22 DIAGNOSIS — Z97.8 FOLEY CATHETER STATUS: ICD-10-CM

## 2024-03-22 DIAGNOSIS — R91.8 PULMONARY INFILTRATES: Primary | ICD-10-CM

## 2024-03-22 DIAGNOSIS — R63.4 WEIGHT LOSS: ICD-10-CM

## 2024-03-22 DIAGNOSIS — R53.1 WEAKNESS: ICD-10-CM

## 2024-03-22 DIAGNOSIS — D72.829 LEUKOCYTOSIS, UNSPECIFIED TYPE: ICD-10-CM

## 2024-03-22 DIAGNOSIS — G35 MS (MULTIPLE SCLEROSIS) (HCC): ICD-10-CM

## 2024-03-22 DIAGNOSIS — Z87.39 H/O: GOUT: ICD-10-CM

## 2024-03-22 PROCEDURE — 99309 SBSQ NF CARE MODERATE MDM 30: CPT | Performed by: NURSE PRACTITIONER

## 2024-03-23 VITALS
OXYGEN SATURATION: 95 % | TEMPERATURE: 98 F | SYSTOLIC BLOOD PRESSURE: 110 MMHG | HEART RATE: 96 BPM | RESPIRATION RATE: 18 BRPM | DIASTOLIC BLOOD PRESSURE: 72 MMHG

## 2024-03-23 RX ORDER — AZITHROMYCIN 250 MG/1
250 TABLET, FILM COATED ORAL DAILY
COMMUNITY
Start: 2024-03-22 | End: 2024-03-26

## 2024-03-23 NOTE — PROGRESS NOTES
Brenden Cardenas, 1945, 78 year old, male    Chief Complaint:    Chief Complaint   Patient presents with    Follow - Up     Leukocytosis, Left Lung early infiltrates        Subjective:  Brenden Cardenas  : 1945, Age 78 year old  male 78-year-old man with multiple sclerosis. He has developed a sacral decubitus and has been admitted. This was debrided a couple of days ago, and it is the hope according to the patient that enzymatic debridement will be all that is necessary moving forward. Wound culture: Staph aureus, pseudomonas, enterococcus; back on  there was also Morganella, group B strep, and E. coli and proteus but no pseudomonas. Urinalysis, active sediment. Urine culture, no growth. Started on IV Unasyn, then switched to IV Cefepime, Vanco and PO Flagyl.  Tolerated well.  Discharged in stable condition to [facility] for rehabilitation and medical management.  Per Hospitalist,transitioned to oral Amoxicillin and Cipro.  Will continue to monitor.     Today:  Seen in room.  Doing well with no complaints at present.      RN staff reported elevated WBC level.  CXR was done and revealed early interstitial infiltrates in the left lung base.  Started on the Z-pack.  ID informed.  No new orders.  Will continue to monitor.    Patient reported going on the Colchicine as request prn.  Uric Acid Level wnl.      Wound care team reported that the patient's sacral ulcer slow to improvement.  Debridement done by Wound Care Physician. Will continue to monitor.      Denies insomnia, fatigue, fever/chills, cough, SOB, dyspnea, angina, palpitations, n/v, diarrhea, constipation, and urinary sxs-->noriega, clear urine noted.    Objective:  /72   Pulse 96   Temp 97.9 °F (36.6 °C)   Resp 18   SpO2 95%   PHYSICAL EXAM:  GENERAL HEALTH: well developed, well nourished, in no apparent distress  LINES, TUBES, DRAINS:  +Noriega  SKIN: pale, warm, dry  WOUND: +Coccyx wound-->;not seen on this visit-->per wound care  team-->stable  EYES: PERRLA, conjunctiva normal; no drainage from eyes  HENT: normocephalic; normal nose, no nasal drainage, mucous membranes pink, moist  NECK: full range of motion observed  RESPIRATORY:clear to percussion and auscultation, No wheezing/cough/accessory muscle use; on room air  CARDIOVASCULAR: S1, S2 normal, RRR; no S3, no S4; , no click, no murmur  ABDOMEN: normal active BS+, soft, non-distended; no apparent masses; observed, non-tender, no guarding during physical exam  : Deferred-->+[place]--clear yellow urine present  LYMPHATIC: no lymphedema  MUSCULOSKELETAL: no acute synovitis upper or lower extremity.  Weakness R/T recent hospitalization/diagnoses/sequelae; will undergo therapies to rehab and improve strength, endurance and independence w/ ADLs as able  EXTREMITIES/VASCULAR: no cyanosis, clubbing or edema, radial pulses 2+, and dorsalis pedal pulses 2+  NEUROLOGIC: intact; no sensorimotor deficit, reflexes normal, cranial nerves intact II-XII, follows commands  PSYCHIATRIC: alert and oriented x 3; affect appropriate    Medications reviewed: Yes    Diagnostics reviewed:   Dated:  3/20/24  Uric Acid 4.2 mg/dL 4.4-7.6 L Final    CBC W/DIFF dated:  3/21/24  WBC 13.8 10'3/uL 3.5-10.5 H Final  RBC 3.25 10'6/uL (Based on  documented  legal sex) 4.30-  5.80  L Final  HGB 9.7 g/dL (Based on  documented  legal sex) 13.0-  17.5  L Final  HCT 28.2 % (Based on  documented  legal sex) 38.0-  50.0  L Final  MCV 86.8 fL 80.0-99.0 Final  MCH 29.8 pg 27.0-34.0 Final  MCHC 34.4 g/dL 32.0-35.5 Final  RDW 16.7 % 11.0-15.0 H Final   10'3/uL 150-400 H Final  MPV 9.8 fL 8.8-12.1 Final  NRBC's 0.0 % 0.0 Final  Absolute NRBCs 0.0 10'3/uL 0.0 Final  Neutrophils 69.1 % 34.0-73.0 Final  Lymphocytes 18.3 % 15.0-50.0 Final  Monocytes 10.7 % 1.0-15.0 Final  Eosinophils 0.0 % 0.0-8.0 Final  Basophils 0.1 % 0.0-2.0 Final  Immature Granulocytes 1.8 % No defined  reference range  Final  Absolute Neutrophils 9.5 10'3/uL  1.5-8.0 H Final  Absolute Lymphocytes 2.5 10'3/uL 1.0-4.0 Final  Absolute Monocytes 1.5 10'3/uL 0.2-1.0 H Final  Absolute Eosinophils 0.0 10'3/uL 0.0-0.6 Final  Absolute Basophils 0.0 10'3/uL 0.0-0.3 Final  Absolute Immature Granulocytes 0.3 10'3/uL 0.00-0.10 H Final    CMP(COMPREHENSIVE METABOLIC PANEL) dated:  3/21/24  Sodium 136 mmol/L 133-146 Final  Potassium 3.8 mmol/L 3.5-5.1 Final  Chloride 102 mmol/L  Final  Carbon Dioxide 27 mmol/L 21-31 Final  Anion Gap 7 mmol/L 4-13 Final  Blood Urea Nitrogen 16 mg/dL 7-25 Final  Creatinine 0.49 mg/dL 0.60-1.30 L Final  eGFRcr (CKD-EPI 2021) >90 mL/min/1.73  m2  >=60 Final  Calcium 9.3 mg/dL 8.3-10.5 Final  Glucose 93 mg/dL  Final  Protein, Total 7.4 g/dL 6.4-8.3 Final  Albumin 3.0 g/dL 3.5-5.0 L Final  ALT 17 units/L 11-51 Final  Alkaline Phosphatase 33 units/L  L Final  AST 16 units/L 13-39 Final  Bilirubin, Total 1.0 mg/dL 0.2-1.2 Final    Repeated CBC on 3/21/24  CBC W/DIFF  WBC 16.2 10'3/uL 3.5-10.5 H Final  RBC 3.35 10'6/uL (Based on  documented  legal sex) 4.30-  5.80  L Final  HGB 10.0 g/dL (Based on  documented  legal sex) 13.0-  17.5  L Final  HCT 29.3 % (Based on  documented  legal sex) 38.0-  50.0  L Final  MCV 87.5 fL 80.0-99.0 Final  MCH 29.9 pg 27.0-34.0 Final  MCHC 34.1 g/dL 32.0-35.5 Final  RDW 17.3 % 11.0-15.0 H Final   10'3/uL 150-400 H Final  MPV 9.5 fL 8.8-12.1 Final  NRBC's 0.2 % 0.0 H Final  Absolute NRBCs 0.0 10'3/uL 0.0 Final  Neutrophils 71.5 % 34.0-73.0 Final  Lymphocytes 15.1 % 15.0-50.0 Final  Monocytes 11.8 % 1.0-15.0 Final  Eosinophils 0.0 % 0.0-8.0 Final  Basophils 0.1 % 0.0-2.0 Final  Immature Granulocytes 1.5 % No defined  reference range  Final  Absolute Neutrophils 11.6 10'3/uL 1.5-8.0 H Final  Absolute Lymphocytes 2.5 10'3/uL 1.0-4.0 Final  Absolute Monocytes 1.9 10'3/uL 0.2-1.0 H Final  Absolute Eosinophils 0.0 10'3/uL 0.0-0.6 Final  Absolute Basophils 0.0 10'3/uL 0.0-0.3 Final  Absolute Immature Granulocytes  0.3 10'3/uL 0.00-0.10 H Final    Dated:  3/22/24  Procalcitonin 0.091 ng/mL <0.500 Final    URINALYSIS, WITH MICROSCOPIC, REFLEX  CULTURE dated:  3/21/24  Color, Urine Yellow Final  Clarity, Urine Clear Final  Specific Gravity, Urine 1.022 . 1.005-1.035 Final  pH, Urine 7.0 . 5.0-7.0 Final  Protein, UA 30 mg/dL Negative, 10-20 A Final  Glucose, Urine Normal mg/dL Normal,  Negative  Final  Ketones, Urine Negative mg/dL Negative Final  Bilirubin, Urine Negative Negative Final  Blood, Urine Negative Negative Final  Nitrite, Urine Negative Negative Final  Leukocyte Esterase, Urine Negative Jaime/uL Negative Final  Urobilinogen, Urine Normal mg/dL Normal, <2.0 Final  RBC, Urine None /hpf None, 0-2 Final  WBC, Urine 0-5 /hpf None, 0-5 Final  Squamous Epithelial Cells, Urine Trace /hpf None A Final  Bacteria, Urine Trace /hpf None A Final  Hyaline Cast, Urine None /lpf None, 0-2 Final    Dated:  3/21/24  PROCEDURE: [id number], Single View Status: Final  See Attachment  If you are having trouble viewing these results, please refer to the attached PDF file   Procedure: [id number], Single View ([id number])  Indication: cough, leukocytosis, r/o pneumonia  Findings: Mild cardiomegaly is noted with atheromatous changes of the dorsal aorta. There is  accentuation of the pulmonary vasculature with early interstitial infiltrates in the left  lung base. Clinical correlation is requested. The lung fields are otherwise essentially  clear.  Impression: Mild cardiomegaly with early interstitial infiltrates in the left lung base  INTERPRETING DOCTOR: Bonny Barrett MD  ELECTRONICALLY SIGNED: 03/21/2024 11:25:00 PM      Assessment and plan:  Leukocytosis  -WBCs:  9.9-->13.8-->16.2  -?? Reactive to new debridement  -CXR-->Left Lower Lung Infiltrate, Speech following  -Started on Z-pack to be completed on 3/26/24  -ID consult  -UA and C&S-->neg    MRSA in sacral decub  -Redness around wound and scrotum and groin-->stable  -Wound  Culture-->negative  -Bactrim-->Completed  -Wound Care Consult  -Low Air Mattress  -Dressings per wound care team  -S/p Debridment  -In House ID consult  -Jah bid  -Completed n hospital Unasyn, Cefepime and Flagyl  -Cipro 500-->completed  -Amoxicillin-->completed  -Continue Clotrimazole-Betamethasone 0.05% cream bid to groin and scotum    Poor Appetite  -Appetite improved  -Lost weight  -Dietary following    Upset stomach-resolved  -Hold MVI, Cholecalciferol, Zinc and Vitamin C at this time  -Omeprazole as listed below  -Simethicone 80 mg prn up to 4 tabs every day  -Zofran 4 mg q8h prn for nausea    Right Foot Pain/H/o Gout  -X-ray negative for fracture  -Uric Acid Level 5.4-->wnl  -Continue Allopurinol 300 mg every day  -Repeat Uric Acid Level-->wnl  -Start Colchicine 0.6 mg every day prn, per patient request  -Will not take Prednisone    DJD/H/o Cervical Surgeries/Weakness/Physical Deconditioning/Impaired mobility and ADLs/At risk for falling  -Fall Precautions  -Tylenol 1 g every 6 hours as needed for fever or pain if given for fever notify MD/NP  -Norco 5/325 mg every 8 hours as needed  -PT/OT/ST to evaluate and treat  -[facility] team to establish care plan meeting with patient and POA/family as appropriate  -Anticipate DC on or before TBD; SW to assist patient/family w/ DC planning  -DC Plan:  Home with wife     [place]hakar status  -Has Buttock Ulcer  -Monitor q shift and prn for s/s of infection  -Meticulous care to noriega q shift and prn     MS  -Prednisone 20 mg every day prn-->does not want at this time d/t sacral ulcer  -Dalfampridine ER 10 mg every day  -Ocrelizumab 600 mg q 6 months  -IVIG infusions completed  -F/u with Dr. Aleman, Neurology as directed    Insomnia  -Melatonin-->discontinued, per patient's request  -Xanax 0.5 mg tid prn    Recent Covid  -Isolation Precautions-completed  -No symptoms     H/o Renal Stones  -No treatment     HTN  -Vitals every shift  -Losartan 100 mg daily, hold for systolic blood  pressure less than 110 or heart rate less than 60     H/o CP Atypical  -No treatment     Anxiety  -Xanax 0.5 mg daily as needed     Bladder Emptying Issue d/t MS/Sacral Wound  -Insert noriega for now until more mobile d/t sacral ulcer  -Monitor for s/s infection q shift and prn     DVT Prophylaxis  -Heparin 5000 units q8h  -Encourage early mobilization and participation in PT/OT as able     Bowel Management Regimen/Constipation   -Miralax 17 g every day prn  -Senna S 8.6/50 mg bid     Supplements  -Probiotic twice daily  -Discontinued Vitamin C, Zinc, Vitamin D and B12 d/t stomach upset  -Magnesium 400 mg daily     LABS  -CBC/CMP weekly while in [facility]     Follow Up Appointments  -Dr. Napoleon Ovalles, PCP within 1-2 weeks following [facility] discharge.   -Dr. Galindo, neurology as directed     Talib Hubbard, APRN  3/22/2024  4:30 pm

## 2024-03-26 ENCOUNTER — SNF VISIT (OUTPATIENT)
Dept: INTERNAL MEDICINE CLINIC | Age: 79
End: 2024-03-26

## 2024-03-26 DIAGNOSIS — R53.1 WEAKNESS: ICD-10-CM

## 2024-03-26 DIAGNOSIS — F32.A DEPRESSION, UNSPECIFIED DEPRESSION TYPE: Primary | ICD-10-CM

## 2024-03-26 DIAGNOSIS — D72.829 LEUKOCYTOSIS, UNSPECIFIED TYPE: ICD-10-CM

## 2024-03-26 DIAGNOSIS — R91.8 PULMONARY INFILTRATES: ICD-10-CM

## 2024-03-26 DIAGNOSIS — F41.9 ANXIETY: ICD-10-CM

## 2024-03-26 DIAGNOSIS — L98.429 SKIN ULCER OF SACRUM, UNSPECIFIED ULCER STAGE (HCC): ICD-10-CM

## 2024-03-26 PROCEDURE — 99309 SBSQ NF CARE MODERATE MDM 30: CPT | Performed by: NURSE PRACTITIONER

## 2024-03-27 VITALS
DIASTOLIC BLOOD PRESSURE: 85 MMHG | TEMPERATURE: 97 F | SYSTOLIC BLOOD PRESSURE: 138 MMHG | OXYGEN SATURATION: 97 % | RESPIRATION RATE: 18 BRPM | HEART RATE: 95 BPM

## 2024-03-27 RX ORDER — MIRTAZAPINE 7.5 MG/1
7.5 TABLET, FILM COATED ORAL NIGHTLY
COMMUNITY

## 2024-03-27 NOTE — PROGRESS NOTES
Brenden Cardenas, 1945, 78 year old, male    Chief Complaint:    Chief Complaint   Patient presents with    Follow - Up     Sacral Ulcer, Depression        Subjective:  Brenden Cardenas  : 1945, Age 78 year old  male 78-year-old man with multiple sclerosis. He has developed a sacral decubitus and has been admitted. This was debrided a couple of days ago, and it is the hope according to the patient that enzymatic debridement will be all that is necessary moving forward. Wound culture: Staph aureus, pseudomonas, enterococcus; back on  there was also Morganella, group B strep, and E. coli and proteus but no pseudomonas. Urinalysis, active sediment. Urine culture, no growth. Started on IV Unasyn, then switched to IV Cefepime, Vanco and PO Flagyl.  Tolerated well.  Discharged in stable condition to [facility] for rehabilitation and medical management.  Per Hospitalist,transitioned to oral Amoxicillin and Cipro.  Will continue to monitor.     Today:  Seen in room.  Doing about the same with physical therapy and his wound.  Agreed to start Remeron for depression and poor appetite and lack of sleep.  Will complete the Z-pack today for the early interstitial infiltrates.  WBCs trending down.    Therapy update:3/26/24  Bed mobility--Mod/Max a  Transfers--Dependent with EZ Stand  Ambulation--Unable  UE ADLs--Min a  LE ADLs--Dependent  Toileting--Dependent  Bathing--Dependent    ST:  VRSS completed, reg/thin improving carryover use of strategies LCD 3/20.    Patient reported going on the Colchicine as request prn.  Uric Acid Level wnl.      Wound care team reported that the patient's sacral ulcer slow to improvement.  Debridement done by Wound Care Physician. Will continue to monitor.      Denies insomnia, fatigue, fever/chills, cough, SOB, dyspnea, angina, palpitations, n/v, diarrhea, constipation, and urinary sxs-->noriega, clear urine noted.    Objective:  /85   Pulse 95   Temp 97.4 °F (36.3 °C)   Resp 18    SpO2 97%   PHYSICAL EXAM:  GENERAL HEALTH: well developed, well nourished, in no apparent distress  LINES, TUBES, DRAINS:  +[place]  SKIN: pale, warm, dry  WOUND: +Coccyx wound-->;not seen on this visit-->per wound care team-->stable  EYES: PERRLA, conjunctiva normal; no drainage from eyes  HENT: normocephalic; normal nose, no nasal drainage, mucous membranes pink, moist  NECK: full range of motion observed  RESPIRATORY:clear to percussion and auscultation, No wheezing/cough/accessory muscle use; on room air  CARDIOVASCULAR: S1, S2 normal, RRR; no S3, no S4; , no click, no murmur  ABDOMEN: normal active BS+, soft, non-distended; no apparent masses; observed, non-tender, no guarding during physical exam  : Deferred-->+[place]--clear yellow urine present  LYMPHATIC: no lymphedema  MUSCULOSKELETAL: no acute synovitis upper or lower extremity.  Weakness R/T recent hospitalization/diagnoses/sequelae; will undergo therapies to rehab and improve strength, endurance and independence w/ ADLs as able  EXTREMITIES/VASCULAR: no cyanosis, clubbing or edema, radial pulses 2+, and dorsalis pedal pulses 2+  NEUROLOGIC: intact; no sensorimotor deficit, reflexes normal, cranial nerves intact II-XII, follows commands  PSYCHIATRIC: alert and oriented x 3; affect appropriate    Medications reviewed: Yes    Diagnostics reviewed: 3/26/24  CBC W/DIFF  WBC 12.2 10'3/uL 3.5-10.5 H Final  RBC 3.23 10'6/uL (Based on  documented  legal sex) 4.30-  5.80  L Final  HGB 9.6 g/dL (Based on  documented  legal sex) 13.0-  17.5  L Final  HCT 29.1 % (Based on  documented  legal sex) 38.0-  50.0  L Final  MCV 90.1 fL 80.0-99.0 Final  MCH 29.7 pg 27.0-34.0 Final  MCHC 33.0 g/dL 32.0-35.5 Final  RDW 19.2 % 11.0-15.0 H Final   10'3/uL 150-400 H Final  MPV 9.9 fL 8.8-12.1 Final  NRBC's 0.2 % 0.0 H Final  Absolute NRBCs 0.0 10'3/uL 0.0 Final  Neutrophils 65.0 % 34.0-73.0 Final  Lymphocytes 19.5 % 15.0-50.0 Final  Monocytes 12.6 % 1.0-15.0  Final  Eosinophils 0.0 % 0.0-8.0 Final  Basophils 0.1 % 0.0-2.0 Final  Immature Granulocytes 2.8 % No defined  reference range  Final  Absolute Neutrophils 7.9 10'3/uL 1.5-8.0 Final  Absolute Lymphocytes 2.4 10'3/uL 1.0-4.0 Final  Absolute Monocytes 1.5 10'3/uL 0.2-1.0 H Final  Absolute Eosinophils 0.0 10'3/uL 0.0-0.6 Final  Absolute Basophils 0.0 10'3/uL 0.0-0.3 Final  Absolute Immature Granulocytes 0.3 10'3/uL 0.00-0.10 H Final    CMP(COMPREHENSIVE METABOLIC PANEL)  Sodium 134 mmol/L 133-146 Final  Potassium 3.9 mmol/L 3.5-5.1 Final  Chloride 99 mmol/L  Final  Carbon Dioxide 26 mmol/L 21-31 Final  Anion Gap 9 mmol/L 4-13 Final  Blood Urea Nitrogen 15 mg/dL 7-25 Final  Creatinine 0.59 mg/dL 0.60-1.30 L Final  eGFRcr (CKD-EPI 2021) >90 mL/min/1.73  m2  >=60 Final  Calcium 9.3 mg/dL 8.3-10.5 Final  Glucose 96 mg/dL  Final  Protein, Total 7.2 g/dL 6.4-8.3 Final  Albumin 3.0 g/dL 3.5-5.0 L Final  ALT 20 units/L 11-51 Final  Alkaline Phosphatase 36 units/L  Final  AST 20 units/L 13-39 Final  Bilirubin, Total 0.7 mg/dL 0.2-1.2 Final    Assessment and plan:  Leukocytosis-trending down  -WBCs:  9.9-->13.8-->16.2-->12.2  -?? Reactive to new debridement  -CXR-->Left Lower Lung Infiltrate, Speech following  -Z-pack to be completed today  -ID consult  -UA and C&S-->neg    Depression/Anxiety  -Start Remeron 7.5 mg at bedtime  -Xanax 0.5 mg daily as needed    MRSA in sacral decub  -Redness around wound and scrotum and groin-->stable  -Wound Culture-->negative  -Bactrim-->Completed  -Wound Care Consult  -Low Air Mattress  -Dressings per wound care team  -S/p Debridment  -In House ID consult  -Jah bid  -Completed n hospital Unasyn, Cefepime and Flagyl  -Cipro 500-->completed  -Amoxicillin-->completed  -Continue Clotrimazole-Betamethasone 0.05% cream bid to groin and scotum    Poor Appetite  -Add Remeron as listed above  -Appetite improved  -Lost weight  -Dietary following    Upset stomach-resolved  -Hold MVI,  Cholecalciferol, Zinc and Vitamin C at this time  -Omeprazole as listed below  -Simethicone 80 mg prn up to 4 tabs every day  -Zofran 4 mg q8h prn for nausea    Right Foot Pain/H/o Gout  -X-ray negative for fracture  -Uric Acid Level 5.4-->wnl  -Continue Allopurinol 300 mg every day  -Repeat Uric Acid Level-->wnl  -Taking Colchicine 0.6 mg every day prn, per patient request  -Will not take Prednisone    DJD/H/o Cervical Surgeries/Weakness/Physical Deconditioning/Impaired mobility and ADLs/At risk for falling  -Fall Precautions  -Tylenol 1 g every 6 hours as needed for fever or pain if given for fever notify MD/NP  -Norco 5/325 mg every 8 hours as needed  -PT/OT/ST to evaluate and treat  -[facility] team to establish care plan meeting with patient and POA/family as appropriate  -Anticipate DC on or before TBD; SW to assist patient/family w/ DC planning  -DC Plan:  Home with wife     Noriega status  -Has Buttock Ulcer  -Monitor q shift and prn for s/s of infection  -Meticulous care to noriega q shift and prn     MS  -Prednisone 20 mg every day prn-->does not want at this time d/t sacral ulcer  -Dalfampridine ER 10 mg every day  -Ocrelizumab 600 mg q 6 months  -IVIG infusions completed  -F/u with Dr. Aleman, Neurology as directed    Insomnia  -Remeron 7.5 mg qhs  -Melatonin-->discontinued, per patient's request  -Xanax 0.5 mg tid prn    Recent Covid  -Isolation Precautions-completed  -No symptoms     H/o Renal Stones  -No treatment     HTN  -Vitals every shift  -Losartan 100 mg daily, hold for systolic blood pressure less than 110 or heart rate less than 60     H/o CP Atypical  -No treatment     Bladder Emptying Issue d/t MS/Sacral Wound  -Insert noriega for now until more mobile d/t sacral ulcer  -Monitor for s/s infection q shift and prn     DVT Prophylaxis  -Heparin 5000 units q8h  -Encourage early mobilization and participation in PT/OT as able     Bowel Management Regimen/Constipation   -Miralax 17 g every day prn  -Senna S  8.6/50 mg bid     Supplements  -Probiotic twice daily  -Discontinued Vitamin C, Zinc, Vitamin D and B12 d/t stomach upset  -Magnesium 400 mg daily     LABS  -CBC/CMP weekly while in [facility]     Follow Up Appointments  -Dr. Napoleon Ovalles, PCP within 1-2 weeks following [facility] discharge.   -Dr. Galindo, neurology as directed     Talib Hubbard, NEVAEH  3/26/2024  5 p.m.

## 2024-03-29 ENCOUNTER — SNF VISIT (OUTPATIENT)
Dept: INTERNAL MEDICINE CLINIC | Age: 79
End: 2024-03-29

## 2024-03-29 DIAGNOSIS — L98.429 SKIN ULCER OF SACRUM, UNSPECIFIED ULCER STAGE (HCC): Primary | ICD-10-CM

## 2024-03-29 DIAGNOSIS — G35 MS (MULTIPLE SCLEROSIS) (HCC): ICD-10-CM

## 2024-03-29 DIAGNOSIS — Z97.8 FOLEY CATHETER STATUS: ICD-10-CM

## 2024-03-29 DIAGNOSIS — R53.1 WEAKNESS: ICD-10-CM

## 2024-03-29 DIAGNOSIS — F41.9 ANXIETY: ICD-10-CM

## 2024-03-29 DIAGNOSIS — F32.A DEPRESSION, UNSPECIFIED DEPRESSION TYPE: ICD-10-CM

## 2024-03-30 VITALS
WEIGHT: 163.38 LBS | BODY MASS INDEX: 24 KG/M2 | DIASTOLIC BLOOD PRESSURE: 75 MMHG | TEMPERATURE: 97 F | OXYGEN SATURATION: 97 % | RESPIRATION RATE: 20 BRPM | SYSTOLIC BLOOD PRESSURE: 123 MMHG | HEART RATE: 100 BPM

## 2024-03-30 NOTE — PROGRESS NOTES
Brenden Cardenas, 1945, 78 year old, male    Chief Complaint:    Chief Complaint   Patient presents with    Follow - Up     Sacral Ulcer, MS        Subjective:  Brenden Cardenas  : 1945, Age 78 year old  male 78-year-old man with multiple sclerosis. He has developed a sacral decubitus and has been admitted. This was debrided a couple of days ago, and it is the hope according to the patient that enzymatic debridement will be all that is necessary moving forward. Wound culture: Staph aureus, pseudomonas, enterococcus; back on  there was also Morganella, group B strep, and E. coli and proteus but no pseudomonas. Urinalysis, active sediment. Urine culture, no growth. Started on IV Unasyn, then switched to IV Cefepime, Vanco and PO Flagyl.  Tolerated well.  Discharged in stable condition to [facility] for rehabilitation and medical management.  Per Hospitalist,transitioned to oral Amoxicillin and Cipro.  Will continue to monitor.     Today:  Seen in room with wound care team.  Today Wound care reports his wound has improved.  He does have a new alternating air mattress as well which helps with pain and sleep.    Doing about the same with physical therapy and his wound.  Taking Remeron for depression and poor appetite and lack of sleep.  Seems in better spirits and his appetite better.  He has gained some weight and is happy about that.  Observed in therapy and participating well.  Completed the Z-pack for the early interstitial infiltrates.  WBCs trending down.    Denies insomnia, fatigue, fever/chills, cough, SOB, dyspnea, angina, palpitations, n/v, diarrhea, constipation, and urinary sxs-->noriega, clear urine noted.    Objective:  /75   Pulse 100   Temp 96.9 °F (36.1 °C)   Resp 20   Wt 163 lb 6.4 oz (74.1 kg)   SpO2 97%   BMI 24.13 kg/m²   PHYSICAL EXAM:  GENERAL HEALTH: well developed, well nourished, in no apparent distress  LINES, TUBES, DRAINS:  +Noriega  SKIN: pale, warm, dry  WOUND: +Coccyx  wound-->8.5 x 4 x 3 cm; surface area 34.00 cm; undermining 2.5 cm at 12 o'clock; exudate heavy serous; thick adherent devitalized necrotic tissue: 15%; Slough 15%; Granulation: 45%; other visible tissues: 25% (fascia,muscle)-->Improved evidenced by decreased inflammation, decreased surface area, decreased necrotic tissue, increased granulation  EYES: PERRLA, conjunctiva normal; no drainage from eyes  HENT: normocephalic; normal nose, no nasal drainage, mucous membranes pink, moist  NECK: full range of motion observed  RESPIRATORY:clear to percussion and auscultation, No wheezing/cough/accessory muscle use; on room air  CARDIOVASCULAR: S1, S2 normal, RRR; no S3, no S4; , no click, no murmur  ABDOMEN: normal active BS+, soft, non-distended; no apparent masses; observed, non-tender, no guarding during physical exam  : Deferred-->+[place]--clear yellow urine present  LYMPHATIC: no lymphedema  MUSCULOSKELETAL: no acute synovitis upper or lower extremity.  Weakness R/T recent hospitalization/diagnoses/sequelae; will undergo therapies to rehab and improve strength, endurance and independence w/ ADLs as able  EXTREMITIES/VASCULAR: no cyanosis, clubbing or edema, radial pulses 2+, and dorsalis pedal pulses 2+  NEUROLOGIC: intact; no sensorimotor deficit, reflexes normal, cranial nerves intact II-XII, follows commands  PSYCHIATRIC: alert and oriented x 3; affect appropriate    Medications reviewed: Yes    Diagnostics reviewed: 3/28/24  CBC W/DIFF  WBC 11.6 10'3/uL 3.5-10.5 H Final  RBC 3.54 10'6/uL (Based on  documented  legal sex) 4.30-  5.80  L Final  HGB 10.6 g/dL (Based on  documented  legal sex) 13.0-  17.5  L Final  HCT 31.6 % (Based on  documented  legal sex) 38.0-  50.0  L Final  MCV 89.3 fL 80.0-99.0 Final  MCH 29.9 pg 27.0-34.0 Final  MCHC 33.5 g/dL 32.0-35.5 Final  RDW 19.3 % 11.0-15.0 H Final   10'3/uL 150-400 H Final  MPV 9.4 fL 8.8-12.1 Final  NRBC's 0.0 % 0.0 Final  Absolute NRBCs 0.0 10'3/uL No  reference  range  established  Final  Neutrophils 62.7 % 34.0-73.0 Final  Lymphocytes 21.2 % 15.0-50.0 Final  Monocytes 13.1 % 1.0-15.0 Final  Eosinophils 0.0 % 0.0-8.0 Final  Basophils 0.1 % 0.0-2.0 Final  Immature Granulocytes 2.9 % No defined  reference range  Final  Absolute Neutrophils 7.3 10'3/uL 1.5-8.0 Final  Absolute Lymphocytes 2.5 10'3/uL 1.0-4.0 Final  Absolute Monocytes 1.5 10'3/uL 0.2-1.0 H Final  Absolute Eosinophils 0.0 10'3/uL 0.0-0.6 Final  Absolute Basophils 0.0 10'3/uL 0.0-0.3 Final  Absolute Immature Granulocytes 0.3 10'3/uL 0.00-0.10 H Final    CMP(COMPREHENSIVE METABOLIC PANEL)  Sodium 135 mmol/L 133-146 Final  Potassium 4.1 mmol/L 3.5-5.1 Final  Chloride 99 mmol/L  Final  Carbon Dioxide 28 mmol/L 21-31 Final  Anion Gap 8 mmol/L 4-13 Final  Blood Urea Nitrogen 16 mg/dL 7-25 Final  Creatinine 0.59 mg/dL 0.60-1.30 L Final  eGFRcr (CKD-EPI 2021) >90 mL/min/1.73  m2  >=60 Final  Calcium 9.4 mg/dL 8.3-10.5 Final  Glucose 103 mg/dL  H Final  Protein, Total 7.3 g/dL 6.4-8.3 Final  Albumin 3.1 g/dL 3.5-5.0 L Final  ALT 19 units/L 11-51 Final  Alkaline Phosphatase 37 units/L  Final  AST 19 units/L 13-39 Final  Bilirubin, Total 0.6 mg/dL 0.2-1.2 Final    Assessment and plan:  Leukocytosis-trending down  -WBCs:  9.9-->13.8-->16.2-->12.2-->11.6  -?? Reactive to new debridement  -CXR-->Left Lower Lung Infiltrate, Speech following  -Z-pack to be completed today  -ID consult  -UA and C&S-->neg    Depression/Anxiety  -Continue with Remeron 7.5 mg at bedtime  -Xanax 0.5 mg daily as needed    MRSA in sacral decub  -Redness around wound and scrotum and groin-->stable  -Wound Culture-->negative  -Bactrim-->Completed  -Wound Care Consult  -Low Air Mattress with alternating air pressure  -Dressings per wound care team  -S/p Debridment  -In House ID consult  -Jah bid  -Completed n [facility] Unasyn, Cefepime and Flagyl  -Cipro 500-->completed  -Amoxicillin-->completed  -Continue  Clotrimazole-Betamethasone 0.05% cream bid to groin and scotum    Poor Appetite  -Add Remeron as listed above  -Appetite improved  -Lost weight  -Dietary following    Upset stomach-resolved  -Hold MVI, Cholecalciferol, Zinc and Vitamin C at this time  -Omeprazole as listed below  -Simethicone 80 mg prn up to 4 tabs every day  -Zofran 4 mg q8h prn for nausea    Right Foot Pain/H/o Gout  -X-ray negative for fracture  -Uric Acid Level 5.4-->wnl  -Continue Allopurinol 300 mg every day  -Repeat Uric Acid Level-->wnl  -Taking Colchicine 0.6 mg every day prn, per patient request  -Will not take Prednisone    DJD/H/o Cervical Surgeries/Weakness/Physical Deconditioning/Impaired mobility and ADLs/At risk for falling  -Fall Precautions  -Tylenol 1 g every 6 hours as needed for fever or pain if given for fever notify MD/NP  -Norco 5/325 mg every 8 hours as needed  -PT/OT/ST to evaluate and treat  -[facility] team to establish care plan meeting with patient and POA/family as appropriate  -Anticipate DC on or before TBD; SW to assist patient/family w/ DC planning  -DC Plan:  Home with wife     Noriega status  -Has Buttock Ulcer  -Monitor q shift and prn for s/s of infection  -Meticulous care to noriega q shift and prn     MS  -Prednisone 20 mg every day prn-->does not want at this time d/t sacral ulcer  -Dalfampridine ER 10 mg every day  -Ocrelizumab 600 mg q 6 months  -IVIG infusions completed  -F/u with Dr. Aleman, Neurology as directed    Insomnia  -Remeron 7.5 mg qhs  -Melatonin-->discontinued, per patient's request  -Xanax 0.5 mg tid prn    Recent Covid  -Isolation Precautions-completed  -No symptoms     H/o Renal Stones  -No treatment     HTN  -Vitals every shift  -Losartan 100 mg daily, hold for systolic blood pressure less than 110 or heart rate less than 60     H/o CP Atypical  -No treatment     Bladder Emptying Issue d/t MS/Sacral Wound  -Insert noriega for now until more mobile d/t sacral ulcer  -Monitor for s/s infection q  shift and prn     DVT Prophylaxis  -Heparin 5000 units q8h  -Encourage early mobilization and participation in PT/OT as able     Bowel Management Regimen/Constipation   -Miralax 17 g every day prn  -Senna S 8.6/50 mg bid     Supplements  -Probiotic twice daily  -Discontinued Vitamin C, Zinc, Vitamin D and B12 d/t stomach upset  -Magnesium 400 mg daily     LABS  -CBC/CMP weekly while in [facility]     Follow Up Appointments  -Dr. Napoleon Ovalles, PCP within 1-2 weeks following [facility] discharge.   -Dr. Galindo, neurology as directed     Talib Hubbard, APRN  3/29/2024  5:30  p.m.

## 2024-04-02 ENCOUNTER — SNF VISIT (OUTPATIENT)
Dept: INTERNAL MEDICINE CLINIC | Age: 79
End: 2024-04-02

## 2024-04-02 VITALS
HEART RATE: 105 BPM | DIASTOLIC BLOOD PRESSURE: 82 MMHG | RESPIRATION RATE: 20 BRPM | TEMPERATURE: 98 F | SYSTOLIC BLOOD PRESSURE: 148 MMHG | OXYGEN SATURATION: 97 %

## 2024-04-02 DIAGNOSIS — D72.829 LEUKOCYTOSIS, UNSPECIFIED TYPE: Primary | ICD-10-CM

## 2024-04-02 DIAGNOSIS — G35 MS (MULTIPLE SCLEROSIS) (HCC): ICD-10-CM

## 2024-04-02 DIAGNOSIS — L98.429 SKIN ULCER OF SACRUM, UNSPECIFIED ULCER STAGE (HCC): ICD-10-CM

## 2024-04-02 DIAGNOSIS — F41.9 ANXIETY: ICD-10-CM

## 2024-04-02 DIAGNOSIS — R63.4 WEIGHT LOSS: ICD-10-CM

## 2024-04-02 DIAGNOSIS — F32.A DEPRESSION, UNSPECIFIED DEPRESSION TYPE: ICD-10-CM

## 2024-04-02 DIAGNOSIS — R53.1 WEAKNESS: ICD-10-CM

## 2024-04-02 DIAGNOSIS — R63.0 POOR APPETITE: ICD-10-CM

## 2024-04-02 DIAGNOSIS — Z87.39 H/O: GOUT: ICD-10-CM

## 2024-04-02 PROCEDURE — 99309 SBSQ NF CARE MODERATE MDM 30: CPT | Performed by: NURSE PRACTITIONER

## 2024-04-02 RX ORDER — COLCHICINE 0.6 MG/1
0.6 TABLET ORAL DAILY PRN
COMMUNITY

## 2024-04-02 RX ORDER — ALLOPURINOL 300 MG/1
300 TABLET ORAL DAILY
COMMUNITY

## 2024-04-02 NOTE — PROGRESS NOTES
Brenden Cardenas, 1945, 78 year old, male    Chief Complaint:    Chief Complaint   Patient presents with    Follow - Up     Edema, Localized in left hand; Sacral Ulcer, Poor Appetite        Subjective:  Brenden Cardenas  : 1945, Age 78 year old  male 78-year-old man with multiple sclerosis. He has developed a sacral decubitus and has been admitted. This was debrided a couple of days ago, and it is the hope according to the patient that enzymatic debridement will be all that is necessary moving forward. Wound culture: Staph aureus, pseudomonas, enterococcus; back on  there was also Morganella, group B strep, and E. coli and proteus but no pseudomonas. Urinalysis, active sediment. Urine culture, no growth. Started on IV Unasyn, then switched to IV Cefepime, Vanco and PO Flagyl.  Tolerated well.  Discharged in stable condition to [facility] for rehabilitation and medical management.  Per Hospitalist,transitioned to oral Amoxicillin and Cipro.  Will continue to monitor.     Today:  Seen during therapy and later in room.  Left hand swollen.  Ordered an x-ray of the left hand as well as a uric acid level.  No c/o pain.   Already taking Allopurinol daily and Colchicine prn.  WBCs are also elevated.  No fevers, no chills.  D/w in-house ID, ordered a repeat CBC in am as well as Uric Acid level in am.  D/w patient in agreement with POC.    Appetite continues to be poor. Patient had been placed on Remeron last week for his poor appetite by this writer.  Per patient, cannot tolerate the food.  Patient reported that he and his wife are following up with his PCP and Neurologist for further workup with the MS and poor appetite.      Therapy update:24  Bed mobility--Mod/Max  Transfers--Dep with EZ stand  Ambulation--unable  UE ADLs--Min a  LE ADLs--Dependent  Toileting--Dependent  Bathing--Dependent    Denies insomnia, fatigue, fever/chills, cough, SOB, dyspnea, angina, palpitations, n/v, diarrhea, constipation, and  urinary sxs-->noriega, clear urine noted.    Objective:  /82   Pulse 105   Temp 97.7 °F (36.5 °C)   Resp 20   SpO2 97%   PHYSICAL EXAM:  GENERAL HEALTH: well developed, well nourished, in no apparent distress  LINES, TUBES, DRAINS:  +Noriega  SKIN: pale, warm, dry  WOUND: +Coccyx wound-->8.5 x 4 x 3 cm; surface area 34.00 cm; undermining 2.5 cm at 12 o'clock; exudate heavy serous; thick adherent devitalized necrotic tissue: 15%; Slough 15%; Granulation: 45%; other visible tissues: 25% (fascia,muscle)-->Improved evidenced by decreased inflammation, decreased surface area, decreased necrotic tissue, increased granulation  EYES: PERRLA, conjunctiva normal; no drainage from eyes  HENT: normocephalic; normal nose, no nasal drainage, mucous membranes pink, moist  NECK: full range of motion observed  RESPIRATORY:clear to percussion and auscultation, No wheezing/cough/accessory muscle use; on room air  CARDIOVASCULAR: S1, S2 normal, RRR; no S3, no S4; , no click, no murmur  ABDOMEN: normal active BS+, soft, non-distended; no apparent masses; observed, non-tender, no guarding during physical exam  : Deferred-->+Noriega--clear yellow urine present  LYMPHATIC: no lymphedema  MUSCULOSKELETAL: no acute synovitis upper or lower extremity.  Weakness R/T recent hospitalization/diagnoses/sequelae; will undergo therapies to rehab and improve strength, endurance and independence w/ ADLs as able  EXTREMITIES/VASCULAR: no cyanosis, clubbing or edema, radial pulses 2+, and dorsalis pedal pulses 2+  NEUROLOGIC: intact; no sensorimotor deficit, reflexes normal, cranial nerves intact II-XII, follows commands  PSYCHIATRIC: alert and oriented x 3; affect appropriate    Medications reviewed: Yes    Diagnostics reviewed:   CBC W/DIFF dated:  4/2/24  WBC 13.2 10'3/uL 3.5-10.5 H Final  RBC 3.45 10'6/uL (Based on  documented  legal sex) 4.30-  5.80  L Final  HGB 10.3 g/dL (Based on  documented  legal sex) 13.0-  17.5  L Final  HCT 31.9 %  (Based on  documented  legal sex) 38.0-  50.0  L Final  MCV 92.5 fL 80.0-99.0 Final  MCH 29.9 pg 27.0-34.0 Final  MCHC 32.3 g/dL 32.0-35.5 Final  RDW 19.1 % 11.0-15.0 H Final   10'3/uL 150-400 H Final  MPV 9.4 fL 8.8-12.1 Final  NRBC's 0.0 % 0.0 Final  Absolute NRBCs 0.0 10'3/uL No reference  range  established  Final  Neutrophils 68.5 % 34.0-73.0 Final  Lymphocytes 17.8 % 15.0-50.0 Final  Monocytes 12.5 % 1.0-15.0 Final  Eosinophils 0.0 % 0.0-8.0 Final  Basophils 0.1 % 0.0-2.0 Final  Immature Granulocytes 1.1 % No defined  reference range  Final  Absolute Neutrophils 9.0 10'3/uL 1.5-8.0 H Final  Absolute Lymphocytes 2.4 10'3/uL 1.0-4.0 Final  Absolute Monocytes 1.7 10'3/uL 0.2-1.0 H Final  Absolute Eosinophils 0.0 10'3/uL 0.0-0.6 Final  Absolute Basophils 0.0 10'3/uL 0.0-0.3 Final  Absolute Immature Granulocytes 0.2 10'3/uL 0.00-0.10 H Final    CMP(COMPREHENSIVE METABOLIC PANEL) dated:  4/2/24  Sodium 137 mmol/L 133-146 Final  Potassium 4.5 mmol/L 3.5-5.1 Final  Chloride 101 mmol/L  Final  Carbon Dioxide 26 mmol/L 21-31 Final  Anion Gap 10 mmol/L 4-13 Final  Blood Urea Nitrogen 15 mg/dL 7-25 Final  Creatinine 0.53 mg/dL 0.60-1.30 L Final  eGFRcr (CKD-EPI 2021) >90 mL/min/1.73  m2  >=60 Final  Calcium 8.9 mg/dL 8.3-10.5 Final  Glucose 96 mg/dL  Final  Protein, Total 6.0 g/dL 6.4-8.3 L Final  Albumin 2.9 g/dL 3.5-5.0 L Final  ALT 14 units/L 11-51 Final  Alkaline Phosphatase 32 units/L  L Final  AST 14 units/L 13-39 Final  Bilirubin, Total 0.6 mg/dL 0.2-1.2 Final    Assessment and plan:  Localized Edema, Left hand  -H/o Gout  -On Allopurinol daily and Colchcine prn  -X-ray of left hand   -Uric Acid Level on 4/3/24    Leukocytosis  -WBCs:  9.9-->13.8-->16.2-->12.2-->11.6-->13.2  -Repeat CBC on 4/3/24  -?? Reactive to new debridement  -??Gout flareup  -Recent CXR-->Left Lower Lung Infiltrate, Speech following  -Repeat CXR if WBCs continue to elevate  -Z-pack-->completed  -UA and  C&S-->neg  -Consider UA and C&S if WBCs continue to elevate  -ID following    Depression/Anxiety  -Continue with Remeron 7.5 mg at bedtime  -Xanax 0.5 mg daily as needed    MRSA in sacral decub  -Redness around wound and scrotum and groin-->stable  -Wound Culture-->negative  -Bactrim-->Completed  -Wound Care Consult  -Low Air Mattress with alternating air pressure  -Dressings per wound care team  -S/p Debridment  -In House ID consult  -Jah bid  -Completed n hospital Unasyn, Cefepime and Flagyl  -Cipro 500-->completed  -Amoxicillin-->completed  -Continue Clotrimazole-Betamethasone 0.05% cream bid to groin and scotum    Poor Appetite  -Continue Remeron as listed above  -Patient and wife to make a followup appointment with neuro and PCP  -Lost weight  -Dietary following    Upset stomach-resolved  -Hold MVI, Cholecalciferol, Zinc and Vitamin C at this time  -Omeprazole as listed below  -Simethicone 80 mg prn up to 4 tabs every day  -Zofran 4 mg q8h prn for nausea    Right Foot Pain/H/o Gout  -X-ray negative for fracture  -Uric Acid Level 5.4-->wnl  -Continue Allopurinol 300 mg every day  -Repeat Uric Acid Level-->wnl  -Taking Colchicine 0.6 mg every day prn, per patient request  -Will not take Prednisone    DJD/H/o Cervical Surgeries/Weakness/Physical Deconditioning/Impaired mobility and ADLs/At risk for falling  -Fall Precautions  -Tylenol 1 g every 6 hours as needed for fever or pain if given for fever notify MD/NP  -Norco 5/325 mg every 8 hours as needed  -PT/OT/ST to evaluate and treat  -[facility] team to establish care plan meeting with patient and POA/family as appropriate  -Anticipate DC on or before TBD; SW to assist patient/family w/ DC planning  -DC Plan:  Home with wife     Sudhakar status  -Has Buttock Ulcer  -Monitor q shift and prn for s/s of infection  -Meticulous care to noriega q shift and prn     MS  -Prednisone 20 mg every day prn-->does not want at this time d/t sacral ulcer  -Dalfampridine ER 10 mg every  day  -Ocrelizumab 600 mg q 6 months  -IVIG infusions completed  -F/u with Dr. Aleman, Neurology as directed    Insomnia  -Remeron 7.5 mg qhs  -Melatonin-->discontinued, per patient's request  -Xanax 0.5 mg tid prn    Recent Covid  -Isolation Precautions-completed  -No symptoms     H/o Renal Stones  -No treatment     HTN  -Vitals every shift  -Losartan 100 mg daily, hold for systolic blood pressure less than 110 or heart rate less than 60     H/o CP Atypical  -No treatment     Bladder Emptying Issue d/t MS/Sacral Wound  -Insert noriega for now until more mobile d/t sacral ulcer  -Monitor for s/s infection q shift and prn     DVT Prophylaxis  -Heparin 5000 units q8h  -Encourage early mobilization and participation in PT/OT as able     Bowel Management Regimen/Constipation   -Miralax 17 g every day prn  -Senna S 8.6/50 mg bid     Supplements  -Probiotic twice daily  -Discontinued Vitamin C, Zinc, Vitamin D and B12 d/t stomach upset  -Magnesium 400 mg daily     LABS  -CBC/CMP weekly while in [facility]  -Repeat CBC on 4/3/24  -Uric Acid Level on 4/3/24     Follow Up Appointments  -Dr. Napoleon Ovalles, PCP within 1-2 weeks following [facility] discharge.   -Dr. Galindo, neurology as directed     NEVAEH Yeung  4/2/24  3:15 p.m.

## 2024-04-05 ENCOUNTER — SNF VISIT (OUTPATIENT)
Dept: INTERNAL MEDICINE CLINIC | Age: 79
End: 2024-04-05

## 2024-04-05 ENCOUNTER — TELEPHONE (OUTPATIENT)
Dept: NEUROLOGY | Facility: CLINIC | Age: 79
End: 2024-04-05

## 2024-04-05 DIAGNOSIS — D72.829 LEUKOCYTOSIS, UNSPECIFIED TYPE: Primary | ICD-10-CM

## 2024-04-05 DIAGNOSIS — Z87.39 H/O: GOUT: ICD-10-CM

## 2024-04-05 DIAGNOSIS — F32.A DEPRESSION, UNSPECIFIED DEPRESSION TYPE: ICD-10-CM

## 2024-04-05 DIAGNOSIS — R53.1 WEAKNESS: ICD-10-CM

## 2024-04-05 DIAGNOSIS — Z97.8 FOLEY CATHETER STATUS: ICD-10-CM

## 2024-04-05 DIAGNOSIS — G35 MS (MULTIPLE SCLEROSIS) (HCC): ICD-10-CM

## 2024-04-05 DIAGNOSIS — L98.429 SKIN ULCER OF SACRUM, UNSPECIFIED ULCER STAGE (HCC): ICD-10-CM

## 2024-04-05 DIAGNOSIS — F41.9 ANXIETY: ICD-10-CM

## 2024-04-05 DIAGNOSIS — R63.0 POOR APPETITE: ICD-10-CM

## 2024-04-05 PROCEDURE — 99307 SBSQ NF CARE SF MDM 10: CPT | Performed by: NURSE PRACTITIONER

## 2024-04-05 NOTE — TELEPHONE ENCOUNTER
Spoke to wife who states patient is getting ENSURE BID but has no appetite for facility food and only eats 2-3 bites of home food.  Wife gives no information supporting difficulty chewing or swallowing.  Does report decrease in taste.    Patient has OV on 4/23 with Dr Aleman  Per Dr Aleman, patient to follow up with medical regarding this issue, and will see on 4/23/2024

## 2024-04-05 NOTE — TELEPHONE ENCOUNTER
Patient's wife calling, advised she has concerns about patient relating to appetite. Notes patient has been at [facility] for a little over 6 weeks and has lost over 30lbs during this time.     Wife has been cooking meals that patient is requesting, but he does not eat much of it before saying that his stomach hurts.     Wife is concerned that he might be having trouble chewing or swallowing. Scheduled f/up appointment on 4/23 with Dr. Aleman, but would like to know if there are any office recommendations in between.    Please advise.

## 2024-04-06 VITALS
RESPIRATION RATE: 20 BRPM | SYSTOLIC BLOOD PRESSURE: 130 MMHG | TEMPERATURE: 97 F | OXYGEN SATURATION: 97 % | BODY MASS INDEX: 23 KG/M2 | WEIGHT: 153.5 LBS | DIASTOLIC BLOOD PRESSURE: 78 MMHG | HEART RATE: 100 BPM

## 2024-04-06 NOTE — PROGRESS NOTES
Brenden Cardenas, 1945, 78 year old, male    Chief Complaint   Patient presents with    Follow - Up     Sacral Ulcer, Poor Appetite        Subjective:  Brenden Cardenas  : 1945, Age 78 year old  male 78-year-old man with multiple sclerosis. He has developed a sacral decubitus and has been admitted. This was debrided a couple of days ago, and it is the hope according to the patient that enzymatic debridement will be all that is necessary moving forward. Wound culture: Staph aureus, pseudomonas, enterococcus; back on  there was also Morganella, group B strep, and E. coli and proteus but no pseudomonas. Urinalysis, active sediment. Urine culture, no growth. Started on IV Unasyn, then switched to IV Cefepime, Vanco and PO Flagyl.  Tolerated well.  Discharged in stable condition to [facility] for rehabilitation and medical management.  Per Hospitalist,transitioned to oral Amoxicillin and Cipro.  Will continue to monitor.     Today:  Seen in room with wound care team.  Assessed sacral wound, improving.  Left hand swelling resolved.  Patient reported that he is eating, but still has a poor appetite.  Remeron has been increased to 15 mg at bedtime by in-house PCP.  Will continue to monitor.    Denies insomnia, fatigue, fever/chills, cough, SOB, dyspnea, angina, palpitations, n/v, diarrhea, constipation, and urinary sxs-->noriega, clear urine noted.    Objective:  /78   Pulse 100   Temp 97.1 °F (36.2 °C)   Resp 20   Wt 153 lb 8 oz (69.6 kg)   SpO2 97%   BMI 22.67 kg/m²   PHYSICAL EXAM:  GENERAL HEALTH: well developed, well nourished, in no apparent distress  LINES, TUBES, DRAINS:  +Noriega  SKIN: pale, warm, dry  WOUND: +Coccyx wound-->8 x 4 x 3 cm; surface area 32.00 cm; undermining 3.5 cm at 2 o'clock; exudate heavy serous; Slough 15%; Granulation: 60%; other visible tissues (Fascia, Muscle, Bone) 25% -->Improved evidenced by decreased surface area and decreased slough  EYES: PERRLA, conjunctiva normal;  no drainage from eyes  HENT: normocephalic; normal nose, no nasal drainage, mucous membranes pink, moist  NECK: full range of motion observed  RESPIRATORY:clear to percussion and auscultation, No wheezing/cough/accessory muscle use; on room air  CARDIOVASCULAR: S1, S2 normal, RRR; no S3, no S4; , no click, no murmur  ABDOMEN: normal active BS+, soft, non-distended; no apparent masses; observed, non-tender, no guarding during physical exam  : Deferred-->+[place]--clear yellow urine present  LYMPHATIC: no lymphedema  MUSCULOSKELETAL: no acute synovitis upper or lower extremity.  Weakness R/T recent hospitalization/diagnoses/sequelae; will undergo therapies to rehab and improve strength, endurance and independence w/ ADLs as able  EXTREMITIES/VASCULAR: no cyanosis, clubbing or edema, radial pulses 2+, and dorsalis pedal pulses 2+  NEUROLOGIC: intact; no sensorimotor deficit, reflexes normal, cranial nerves intact II-XII, follows commands  PSYCHIATRIC: alert and oriented x 3; affect appropriate    Medications reviewed: Yes    Diagnostics reviewed: dated: 4/3/24  CBC W/DIFF  WBC 12.0 10'3/uL 3.5-10.5 H Final  RBC 3.35 10'6/uL (Based on  documented  legal sex) 4.30-  5.80  L Final  HGB 9.9 g/dL (Based on  documented  legal sex) 13.0-  17.5  L Final  HCT 31.6 % (Based on  documented  legal sex) 38.0-  50.0  L Final  MCV 94.3 fL 80.0-99.0 Final  MCH 29.6 pg 27.0-34.0 Final  MCHC 31.3 g/dL 32.0-35.5 L Final  RDW 19.0 % 11.0-15.0 H Final   10'3/uL 150-400 H Final  MPV 9.5 fL 8.8-12.1 Final  NRBC's 0.0 % 0.0 Final  Absolute NRBCs 0.0 10'3/uL No reference  range  established  Final  Neutrophils 65.3 % 34.0-73.0 Final  Lymphocytes 19.3 % 15.0-50.0 Final  Monocytes 14.4 % 1.0-15.0 Final  Eosinophils 0.0 % 0.0-8.0 Final  Basophils 0.1 % 0.0-2.0 Final  Immature Granulocytes 0.9 % No defined  reference range  Final  Absolute Neutrophils 7.9 10'3/uL 1.5-8.0 Final  Absolute Lymphocytes 2.3 10'3/uL 1.0-4.0 Final  Absolute  Monocytes 1.7 10'3/uL 0.2-1.0 H Final  Absolute Eosinophils 0.0 10'3/uL 0.0-0.6 Final  Absolute Basophils 0.0 10'3/uL 0.0-0.3 Final  Absolute Immature Granulocytes 0.1 10'3/uL 0.00-0.10 Final    Uric Acid 4.8 mg/dL 4.4-7.6 Final    Assessment and plan:  Localized Edema, Left hand-resolved  -H/o Gout  -On Allopurinol daily and Colchcine prn  -X-ray of left hand   -Uric Acid Level wnl    Leukocytosis  -Trending down  -WBCs:  9.9-->13.8-->16.2-->12.2-->11.6-->13.2-->12.0  -ID following    Depression/Anxiety  -Increase Remeron to 15 mg at bedtime, per in-house PCP  -Xanax 0.5 mg daily as needed    MRSA in sacral decub  -Redness around wound and scrotum and groin-->stable  -Wound Culture-->negative  -Bactrim-->Completed  -Wound Care Consult  -Low Air Mattress with alternating air pressure  -Dressings per wound care team  -S/p Debridment  -In House ID consult  -Jah bid  -Completed n hospital Unasyn, Cefepime and Flagyl  -Cipro 500-->completed  -Amoxicillin-->completed  -Continue Clotrimazole-Betamethasone 0.05% cream bid to groin and scotum    Poor Appetite  -Continue Remeron as listed above  -Patient and wife to make a followup appointment with neuro and PCP  -Lost weight  -Dietary following    Upset stomach-resolved  -Hold MVI, Cholecalciferol, Zinc and Vitamin C at this time  -Omeprazole as listed below  -Simethicone 80 mg prn up to 4 tabs every day  -Zofran 4 mg q8h prn for nausea    Right Foot Pain/H/o Gout  -X-ray negative for fracture  -Uric Acid Level 5.4-->4.8  -Continue Allopurinol 300 mg every day  -Repeat Uric Acid Level-->wnl  -Taking Colchicine 0.6 mg every day prn, per patient request  -Will not take Prednisone    DJD/H/o Cervical Surgeries/Weakness/Physical Deconditioning/Impaired mobility and ADLs/At risk for falling  -Fall Precautions  -Tylenol 1 g every 6 hours as needed for fever or pain if given for fever notify MD/NP  -Norco 5/325 mg every 8 hours as needed  -PT/OT/ST to evaluate and treat  -[facility]  team to establish care plan meeting with patient and POA/family as appropriate  -Anticipate DC on or before TBD; SW to assist patient/family w/ DC planning  -DC Plan:  Home with wife     Noriega status  -Has Buttock Ulcer  -Monitor q shift and prn for s/s of infection  -Meticulous care to noriega q shift and prn     MS  -Prednisone 20 mg every day prn-->does not want at this time d/t sacral ulcer  -Dalfampridine ER 10 mg every day  -Ocrelizumab 600 mg q 6 months  -IVIG infusions completed  -F/u with Dr. Aleman, Neurology as directed    Insomnia  -Remeron 15 mg qhs  -Melatonin-->discontinued, per patient's request  -Xanax 0.5 mg tid prn    Recent Covid  -Isolation Precautions-completed  -No symptoms     H/o Renal Stones  -No treatment     HTN  -Vitals every shift  -Losartan 100 mg daily, hold for systolic blood pressure less than 110 or heart rate less than 60     H/o CP Atypical  -No treatment     Bladder Emptying Issue d/t MS/Sacral Wound  -Insert noriega for now until more mobile d/t sacral ulcer  -Monitor for s/s infection q shift and prn     DVT Prophylaxis  -Heparin 5000 units q8h  -Encourage early mobilization and participation in PT/OT as able     Bowel Management Regimen/Constipation   -Miralax 17 g every day prn  -Senna S 8.6/50 mg bid     Supplements  -Probiotic twice daily  -Discontinued Vitamin C, Zinc, Vitamin D and B12 d/t stomach upset  -Magnesium 400 mg daily     LABS  -CBC/CMP weekly while in [facility]  -Repeat CBC on 4/3/24  -Uric Acid Level on 4/3/24     Follow Up Appointments  -Dr. Napoleon Ovalles, PCP within 1-2 weeks following [facility] discharge.   -Dr. Galindo, neurology as directed     Talib Hubbard, APRN  4/5/24

## 2024-04-09 ENCOUNTER — SNF VISIT (OUTPATIENT)
Dept: INTERNAL MEDICINE CLINIC | Age: 79
End: 2024-04-09

## 2024-04-09 DIAGNOSIS — R53.1 WEAKNESS: ICD-10-CM

## 2024-04-09 DIAGNOSIS — Z97.8 FOLEY CATHETER STATUS: ICD-10-CM

## 2024-04-09 DIAGNOSIS — Z87.39 H/O: GOUT: ICD-10-CM

## 2024-04-09 DIAGNOSIS — F32.A DEPRESSION, UNSPECIFIED DEPRESSION TYPE: ICD-10-CM

## 2024-04-09 DIAGNOSIS — L98.429 SKIN ULCER OF SACRUM, UNSPECIFIED ULCER STAGE (HCC): ICD-10-CM

## 2024-04-09 DIAGNOSIS — D72.829 LEUKOCYTOSIS, UNSPECIFIED TYPE: Primary | ICD-10-CM

## 2024-04-09 DIAGNOSIS — G35 MS (MULTIPLE SCLEROSIS) (HCC): ICD-10-CM

## 2024-04-09 DIAGNOSIS — R60.0 LOCALIZED EDEMA: ICD-10-CM

## 2024-04-09 PROCEDURE — 99308 SBSQ NF CARE LOW MDM 20: CPT | Performed by: NURSE PRACTITIONER

## 2024-04-10 ENCOUNTER — TELEPHONE (OUTPATIENT)
Dept: INTERNAL MEDICINE CLINIC | Age: 79
End: 2024-04-10

## 2024-04-10 VITALS
RESPIRATION RATE: 20 BRPM | DIASTOLIC BLOOD PRESSURE: 91 MMHG | BODY MASS INDEX: 24 KG/M2 | HEART RATE: 92 BPM | OXYGEN SATURATION: 98 % | SYSTOLIC BLOOD PRESSURE: 140 MMHG | WEIGHT: 165 LBS | TEMPERATURE: 96 F

## 2024-04-10 NOTE — PROGRESS NOTES
Brenden Cardenas, 1945, 78 year old, male    Chief Complaint   Patient presents with    Follow - Up     Left hand swelling, Sacral Ulcer, MS, appetite        Subjective:  Brenden Cardenas  : 1945, Age 78 year old  male 78-year-old man with multiple sclerosis. He has developed a sacral decubitus and has been admitted. This was debrided a couple of days ago, and it is the hope according to the patient that enzymatic debridement will be all that is necessary moving forward. Wound culture: Staph aureus, pseudomonas, enterococcus; back on  there was also Morganella, group B strep, and E. coli and proteus but no pseudomonas. Urinalysis, active sediment. Urine culture, no growth. Started on IV Unasyn, then switched to IV Cefepime, Vanco and PO Flagyl.  Tolerated well.  Discharged in stable condition to [facility] for rehabilitation and medical management.  Per Hospitalist,transitioned to oral Amoxicillin and Cipro.  Will continue to monitor.     Today:  Seen in room.  Doing better with appetite.  Had a weight gain.  Remeron has been increased to 15 mg at bedtime by in-house PCP.  Left hand swelling back.  Patient feels it is when he lays on the left side.  Will continue to monitor.      Patient did state that he and his wife are following up with neuro for further workup regarding his MS.      Denies insomnia, fatigue, fever/chills, cough, SOB, dyspnea, angina, palpitations, n/v, diarrhea, constipation, and urinary sxs-->noriega, clear urine noted.    Objective:  BP (!) 140/91   Pulse 92   Temp (!) 96.4 °F (35.8 °C)   Resp 20   Wt 165 lb (74.8 kg)   SpO2 98%   BMI 24.37 kg/m²   PHYSICAL EXAM:  GENERAL HEALTH: well developed, well nourished, in no apparent distress  LINES, TUBES, DRAINS:  +Noriega  SKIN: pale, warm, dry  WOUND: +Coccyx wound-->8 x 4 x 3 cm; surface area 32.00 cm; undermining 3.5 cm at 2 o'clock; exudate heavy serous; Slough 15%; Granulation: 60%; other visible tissues (Fascia, Muscle, Bone) 25%  -->Improved evidenced by decreased surface area and decreased slough  EYES: PERRLA, conjunctiva normal; no drainage from eyes  HENT: normocephalic; normal nose, no nasal drainage, mucous membranes pink, moist  NECK: full range of motion observed  RESPIRATORY:clear to percussion and auscultation, No wheezing/cough/accessory muscle use; on room air  CARDIOVASCULAR: S1, S2 normal, RRR; no S3, no S4; , no click, no murmur  ABDOMEN: normal active BS+, soft, non-distended; no apparent masses; observed, non-tender, no guarding during physical exam  : Deferred-->+[place]--clear yellow urine present  LYMPHATIC: no lymphedema  MUSCULOSKELETAL: no acute synovitis upper or lower extremity.  Weakness R/T recent hospitalization/diagnoses/sequelae; will undergo therapies to rehab and improve strength, endurance and independence w/ ADLs as able  EXTREMITIES/VASCULAR: no cyanosis, clubbing or edema, radial pulses 2+, and dorsalis pedal pulses 2+  NEUROLOGIC: intact; no sensorimotor deficit, reflexes normal, cranial nerves intact II-XII, follows commands  PSYCHIATRIC: alert and oriented x 3; affect appropriate    Medications reviewed: Yes    Diagnostics reviewed: dated: 4/8/24  CBC W/DIFF  WBC 12.3 10'3/uL 3.5-10.5 H Final  RBC 3.53 10'6/uL (Based on  documented  legal sex) 4.30-  5.80  L Final  HGB 10.5 g/dL (Based on  documented  legal sex) 13.0-  17.5  L Final  HCT 33.9 % (Based on  documented  legal sex) 38.0-  50.0  L Final  MCV 96.0 fL 80.0-99.0 Final  MCH 29.7 pg 27.0-34.0 Final  MCHC 31.0 g/dL 32.0-35.5 L Final  RDW 18.0 % 11.0-15.0 H Final   10'3/uL 150-400 H Final  MPV 9.8 fL 8.8-12.1 Final  NRBC's 0.0 % 0.0 Final  Absolute NRBCs 0.0 10'3/uL No reference  range  established  Final  Neutrophils 67.4 % 34.0-73.0 Final  Lymphocytes 18.2 % 15.0-50.0 Final  Monocytes 13.4 % 1.0-15.0 Final  Eosinophils 0.0 % 0.0-8.0 Final  Basophils 0.1 % 0.0-2.0 Final  Immature Granulocytes 0.9 % No defined  reference  range  Final  Absolute Neutrophils 8.3 10'3/uL 1.5-8.0 H Final  Absolute Lymphocytes 2.2 10'3/uL 1.0-4.0 Final  Absolute Monocytes 1.6 10'3/uL 0.2-1.0 H Final  Absolute Eosinophils 0.0 10'3/uL 0.0-0.6 Final  Absolute Basophils 0.0 10'3/uL 0.0-0.3 Final  Absolute Immature Granulocytes 0.1 10'3/uL 0.00-0.10 Final    CMP(COMPREHENSIVE METABOLIC PANEL)  Sodium 137 mmol/L 133-146 Final  Potassium 4.3 mmol/L 3.5-5.1 Final  Chloride 100 mmol/L  Final  Carbon Dioxide 28 mmol/L 21-31 Final  Anion Gap 9 mmol/L 4-13 Final  Blood Urea Nitrogen 16 mg/dL 7-25 Final  Creatinine 0.57 mg/dL 0.60-1.30 L Final  eGFRcr (CKD-EPI 2021) >90 mL/min/1.73  m2  >=60 Final  Calcium 8.7 mg/dL 8.3-10.5 Final  Glucose 101 mg/dL  H Final  Protein, Total 5.6 g/dL 6.4-8.3 L Final  Albumin 2.8 g/dL 3.5-5.0 L Final  ALT 14 units/L 11-51 Final  Alkaline Phosphatase 33 units/L  L Final  AST 18 units/L 13-39 Final  Bilirubin, Total 0.4 mg/dL 0.2-1.2 Mildred      Assessment and plan:  Localized Edema, Left hand  -??Dependent edema  -H/o Gout  -On Allopurinol daily and Colchcine prn  -X-ray of left hand   -Uric Acid Level wnl  -Continue to monitor    Leukocytosis  -Stable  -WBCs:  9.9-->13.8-->16.2-->12.2-->11.6-->13.2-->12.0-->12.3  -ID following    Depression/Anxiety  -Remeron to 15 mg at bedtime, per in-house PCP  -Xanax 0.5 mg daily as needed    MRSA in sacral decub  -Redness around wound and scrotum and groin-->stable  -Wound Culture-->negative  -Bactrim-->Completed  -Wound Care Consult  -Low Air Mattress with alternating air pressure  -Dressings per wound care team  -S/p Debridment  -In House ID consult  -Jah bid  -Completed n [facility] Unasyn, Cefepime and Flagyl  -Cipro 500-->completed  -Amoxicillin-->completed  -Continue Clotrimazole-Betamethasone 0.05% cream bid to groin and scotum    Poor Appetite  -Continue Remeron as listed above  -Patient and wife to make a followup appointment with neuro and PCP  -Weight improved  -Dietary  following    Upset stomach-resolved  -Hold MVI, Cholecalciferol, Zinc and Vitamin C at this time  -Omeprazole as listed below  -Simethicone 80 mg prn up to 4 tabs every day  -Zofran 4 mg q8h prn for nausea    Right Foot Pain/H/o Gout  -X-ray negative for fracture  -Uric Acid Level 5.4-->4.8  -Continue Allopurinol 300 mg every day  -Repeat Uric Acid Level-->wnl  -Taking Colchicine 0.6 mg every day prn, per patient request  -Will not take Prednisone    DJD/H/o Cervical Surgeries/Weakness/Physical Deconditioning/Impaired mobility and ADLs/At risk for falling  -Fall Precautions  -Tylenol 1 g every 6 hours as needed for fever or pain if given for fever notify MD/NP  -Norco 5/325 mg every 8 hours as needed  -PT/OT/ST to evaluate and treat  -[facility] team to establish care plan meeting with patient and POA/family as appropriate  -Anticipate DC on or before TBD; SW to assist patient/family w/ DC planning  -DC Plan:  Home with wife     Noriega status  -Has Buttock Ulcer  -Monitor q shift and prn for s/s of infection  -Meticulous care to noriega q shift and prn     MS  -Prednisone 20 mg every day prn-->does not want at this time d/t sacral ulcer  -Dalfampridine ER 10 mg every day  -Ocrelizumab 600 mg q 6 months  -IVIG infusions completed  -F/u with Dr. Aleman, Neurology as directed    Insomnia  -Remeron 15 mg qhs  -Melatonin-->discontinued, per patient's request  -Xanax 0.5 mg tid prn    Recent Covid  -Isolation Precautions-completed  -No symptoms     H/o Renal Stones  -No treatment     HTN  -Vitals every shift  -Losartan 100 mg daily, hold for systolic blood pressure less than 110 or heart rate less than 60     H/o CP Atypical  -No treatment     Bladder Emptying Issue d/t MS/Sacral Wound  -Insert noriega for now until more mobile d/t sacral ulcer  -Monitor for s/s infection q shift and prn     DVT Prophylaxis  -Heparin 5000 units q8h  -Encourage early mobilization and participation in PT/OT as able     Bowel Management  Regimen/Constipation   -Miralax 17 g every day prn  -Senna S 8.6/50 mg bid     Supplements  -Probiotic twice daily  -Discontinued Vitamin C, Zinc, Vitamin D and B12 d/t stomach upset  -Magnesium 400 mg daily     LABS  -CBC/CMP weekly while in [facility]     Follow Up Appointments  -Dr. Napoleon Ovalles, PCP within 1-2 weeks following [facility] discharge.   -Dr. Galindo, neurology as directed     Talib Hubbard, APRN  4/9/24

## 2024-04-12 ENCOUNTER — SNF VISIT (OUTPATIENT)
Dept: INTERNAL MEDICINE CLINIC | Age: 79
End: 2024-04-12

## 2024-04-12 VITALS
RESPIRATION RATE: 18 BRPM | OXYGEN SATURATION: 95 % | DIASTOLIC BLOOD PRESSURE: 72 MMHG | SYSTOLIC BLOOD PRESSURE: 126 MMHG | TEMPERATURE: 98 F | HEART RATE: 93 BPM

## 2024-04-12 DIAGNOSIS — R53.1 WEAKNESS: ICD-10-CM

## 2024-04-12 DIAGNOSIS — R60.0 LOCALIZED EDEMA: Primary | ICD-10-CM

## 2024-04-12 DIAGNOSIS — L98.429 SKIN ULCER OF SACRUM, UNSPECIFIED ULCER STAGE (HCC): ICD-10-CM

## 2024-04-12 DIAGNOSIS — G35 MS (MULTIPLE SCLEROSIS) (HCC): ICD-10-CM

## 2024-04-12 DIAGNOSIS — D72.829 LEUKOCYTOSIS, UNSPECIFIED TYPE: ICD-10-CM

## 2024-04-12 DIAGNOSIS — R63.0 POOR APPETITE: ICD-10-CM

## 2024-04-12 PROCEDURE — 99309 SBSQ NF CARE MODERATE MDM 30: CPT | Performed by: NURSE PRACTITIONER

## 2024-04-12 NOTE — PROGRESS NOTES
Brenden Cardenas, 1945, 78 year old, male    Chief Complaint   Patient presents with    Follow - Up     Sacral Wound, MS, Poor Appetite and arm swelling        Subjective:  Brenden Cardenas  : 1945, Age 78 year old  male 78-year-old man with multiple sclerosis. He has developed a sacral decubitus and has been admitted. This was debrided a couple of days ago, and it is the hope according to the patient that enzymatic debridement will be all that is necessary moving forward. Wound culture: Staph aureus, pseudomonas, enterococcus; back on  there was also Morganella, group B strep, and E. coli and proteus but no pseudomonas. Urinalysis, active sediment. Urine culture, no growth. Started on IV Unasyn, then switched to IV Cefepime, Vanco and PO Flagyl.  Tolerated well.  Discharged in stable condition to [facility] for rehabilitation and medical management.  Per Hospitalist,transitioned to oral Amoxicillin and Cipro.  Will continue to monitor.     Today:  Seen in room with wound care team.  Doing better with appetite.  Had a weight gain.  Continues on the Remeron.  Left hand swelling back.  Patient feels it is when he lays on the left side.  Had a Doppler venous and arterial done.  Negative for DVT.  Will continue to monitor.      Patient does report that he is eating more since the increase in the Remeron.      Patient did state that he and his wife are following up with neuro for further workup regarding his MS.      Denies insomnia, fatigue, fever/chills, cough, SOB, dyspnea, angina, palpitations, n/v, diarrhea, constipation, and urinary sxs-->noriega, clear urine noted.    Objective:  /72   Pulse 93   Temp 98.1 °F (36.7 °C)   Resp 18   SpO2 95%   PHYSICAL EXAM:  GENERAL HEALTH: well developed, well nourished, in no apparent distress  LINES, TUBES, DRAINS:  +Noriega  SKIN: pale, warm, dry  WOUND: +Coccyx wound-->8 x 4 x 3 cm; undermining 3.5 cm at 2 o'clock; exudate moder serous-->improved; Slough 15%;  Granulation: 50%; other visible tissues (Fascia, Muscle, Bone) 25% -->Not at goal  EYES: PERRLA, conjunctiva normal; no drainage from eyes  HENT: normocephalic; normal nose, no nasal drainage, mucous membranes pink, moist  NECK: full range of motion observed  RESPIRATORY:clear to percussion and auscultation, No wheezing/cough/accessory muscle use; on room air  CARDIOVASCULAR: S1, S2 normal, RRR; no S3, no S4; , no click, no murmur  ABDOMEN: normal active BS+, soft, non-distended; no apparent masses; observed, non-tender, no guarding during physical exam  : Deferred-->+De [place]ente--clear yellow urine present  LYMPHATIC: no lymphedema  MUSCULOSKELETAL: no acute synovitis upper or lower extremity.  Weakness R/T recent hospitalization/diagnoses/sequelae; will undergo therapies to rehab and improve strength, endurance and independence w/ ADLs as able  EXTREMITIES/VASCULAR: no cyanosis, clubbing or edema, radial pulses 2+, and dorsalis pedal pulses 2+  NEUROLOGIC: intact; no sensorimotor deficit, reflexes normal, cranial nerves intact II-XII, follows commands  PSYCHIATRIC: alert and oriented x 3; affect appropriate    Medications reviewed: Yes    Diagnostics reviewed:   Dated:  4/10/24  PROCEDURE: Status:  See Attachment  If you are having trouble viewing these results, please refer to the attached PDF file   [id number]-(LT) Duplex Scan,Upper Extremity Arteries/Arterial Bypass Grafts,Unilat/Limited Study  ([id number]) Report  Indication: pain, swelling  Findings: Left upper extremity Arterial Doppler examination was performed with real time  imaging. The subclavian, axillary, brachial, radial, and ulnar arteries of the left upper  extremity were obtained.  Velocities are normal. Mildly abnormal waveforms are noted.  Impression: No significant arterial stenosis in the left upper extremity.  Signed by: Bonny Barrett MD  Signed at: 04/10/2024 10:05:45 PM    PROCEDURE: [id number]-(LT) Duplex Scan,Veins, Extremity, Unilat/Limited Study  Status: Final  See Attachment  If you are having trouble viewing these results, please refer to the attached PDF file   [id number]-(LT) Duplex Scan,Veins, Extremity, Unilat/Limited Study ([id number]) Report  Indication: pain, swelling  Findings: Left upper extremity venous Doppler examination was performed with real time  imaging. The jugular vein, subclavian vein, axillary vein, brachial vein, radial vein,  ulnar vein, basilic vein, and cephalic vein of the left upper extremity were visualized.  Examination reveals spontaneous phasic flow and good augmentation throughout the  visualized deep venous system. All the deep vessels appear patent without any  evidence of intraluminal filling defect.  Impression: No evidence of deep vein thrombosis in the left upper extremity.  Signed by: Bonny Barrett MD  Signed at: 04/10/2024 10:07:04 PM    Assessment and plan:  Localized Edema, Left hand-->improved  -Doppler venous and arterial negative for dvt  -??Dependent edema  -H/o Gout  -On Allopurinol daily and Colchcine prn  -X-ray of left hand   -Uric Acid Level wnl  -Continue to monitor    Leukocytosis  -Stable  -WBCs:  9.9-->13.8-->16.2-->12.2-->11.6-->13.2-->12.0-->12.3  -ID following    Depression/Anxiety  -Remeron to 15 mg at bedtime, per in-house PCP  -Xanax 0.5 mg daily as needed    MRSA in sacral decub  -Redness around wound and scrotum and groin-->stable  -Wound Culture-->negative  -Bactrim-->Completed  -Wound Care Consult  -Low Air Mattress with alternating air pressure  -Dressings per wound care team  -S/p Debridment  -In House ID consult  -Jah bid  -Completed n hospital Unasyn, Cefepime and Flagyl  -Cipro 500-->completed  -Amoxicillin-->completed  -Continue Clotrimazole-Betamethasone 0.05% cream bid to groin and scotum    Poor Appetite  -Continue Remeron as listed above  -Patient and wife to make a followup appointment with neuro and PCP  -Weight improved  -Dietary following    Upset stomach-resolved  -Hold MVI,  Cholecalciferol, Zinc and Vitamin C at this time  -Omeprazole as listed below  -Simethicone 80 mg prn up to 4 tabs every day  -Zofran 4 mg q8h prn for nausea    Right Foot Pain/H/o Gout  -X-ray negative for fracture  -Uric Acid Level 5.4-->4.8  -Continue Allopurinol 300 mg every day  -Repeat Uric Acid Level-->wnl  -Taking Colchicine 0.6 mg every day prn, per patient request  -Will not take Prednisone    DJD/H/o Cervical Surgeries/Weakness/Physical Deconditioning/Impaired mobility and ADLs/At risk for falling  -Fall Precautions  -Tylenol 1 g every 6 hours as needed for fever or pain if given for fever notify MD/NP  -Norco 5/325 mg every 8 hours as needed  -PT/OT/ST to evaluate and treat  -[facility] team to establish care plan meeting with patient and POA/family as appropriate  -Anticipate DC on or before TBD; SW to assist patient/family w/ DC planning  -DC Plan:  Home with wife     Noriega status  -Has Buttock Ulcer  -Monitor q shift and prn for s/s of infection  -Meticulous care to noriega q shift and prn     MS  -Ordered from hospital-->Prednisone 20 mg every day prn-->does not want at this time d/t sacral ulcer  -Dalfampridine ER 10 mg every day  -Ocrelizumab 600 mg q 6 months  -IVIG infusions completed  -F/u with Dr. Aleman, Neurology as directed    Insomnia  -Remeron 15 mg qhs  -Melatonin-->discontinued, per patient's request  -Xanax 0.5 mg tid prn    Recent Covid  -Isolation Precautions-completed  -No symptoms     H/o Renal Stones  -No treatment     HTN  -Vitals every shift  -Losartan 100 mg daily, hold for systolic blood pressure less than 110 or heart rate less than 60     H/o CP Atypical  -No treatment     Bladder Emptying Issue d/t MS/Sacral Wound  -Insert noriega for now until more mobile d/t sacral ulcer  -Monitor for s/s infection q shift and prn     DVT Prophylaxis  -Heparin 5000 units q8h  -Encourage early mobilization and participation in PT/OT as able     Bowel Management Regimen/Constipation   -Miralax 17 g  every day prn  -Senna S 8.6/50 mg bid     Supplements  -Probiotic twice daily  -Discontinued Vitamin C, Zinc, Vitamin D and B12 d/t stomach upset  -Magnesium 400 mg daily     LABS  -CBC/CMP weekly while in [facility]     Follow Up Appointments  -Dr. Napoleon Ovalles, PCP within 1-2 weeks following [facility] discharge.   -Dr. Galindo, neurology as directed     Talib Hubbard, APRN  4/12/24

## 2024-04-14 ENCOUNTER — PATIENT MESSAGE (OUTPATIENT)
Dept: NEUROLOGY | Facility: CLINIC | Age: 79
End: 2024-04-14

## 2024-04-15 NOTE — TELEPHONE ENCOUNTER
From: Brenden Cardenas  To: Samuel Aleman  Sent: 4/14/2024 7:58 PM CDT  Subject: question regarding infusion therapies    The diagnostic center at [facility] asked me to confirm if the two infusions therapies could possibly account for my worsening physical condition, including significant weight loss and a continued decline in muscle strength.

## 2024-04-16 ENCOUNTER — SNF VISIT (OUTPATIENT)
Dept: INTERNAL MEDICINE CLINIC | Age: 79
End: 2024-04-16

## 2024-04-16 DIAGNOSIS — G35 MS (MULTIPLE SCLEROSIS) (HCC): ICD-10-CM

## 2024-04-16 DIAGNOSIS — R00.0 INCREASED HEART RATE: Primary | ICD-10-CM

## 2024-04-16 DIAGNOSIS — R63.0 POOR APPETITE: ICD-10-CM

## 2024-04-16 DIAGNOSIS — Z87.39 H/O: GOUT: ICD-10-CM

## 2024-04-16 DIAGNOSIS — Z97.8 FOLEY CATHETER STATUS: ICD-10-CM

## 2024-04-16 DIAGNOSIS — R53.1 WEAKNESS: ICD-10-CM

## 2024-04-16 DIAGNOSIS — L98.429 SKIN ULCER OF SACRUM, UNSPECIFIED ULCER STAGE (HCC): ICD-10-CM

## 2024-04-16 PROCEDURE — 99309 SBSQ NF CARE MODERATE MDM 30: CPT | Performed by: NURSE PRACTITIONER

## 2024-04-17 VITALS
HEART RATE: 103 BPM | RESPIRATION RATE: 18 BRPM | TEMPERATURE: 98 F | SYSTOLIC BLOOD PRESSURE: 139 MMHG | DIASTOLIC BLOOD PRESSURE: 82 MMHG | OXYGEN SATURATION: 95 %

## 2024-04-17 NOTE — PROGRESS NOTES
Brenden Cardenas, 1945, 78 year old, male    Chief Complaint   Patient presents with    Follow - Up     Sacral Ulcer, Poor Appetite        Subjective:  Brenden Cardenas  : 1945, Age 78 year old  male 78-year-old man with multiple sclerosis. He has developed a sacral decubitus and has been admitted. This was debrided a couple of days ago, and it is the hope according to the patient that enzymatic debridement will be all that is necessary moving forward. Wound culture: Staph aureus, pseudomonas, enterococcus; back on  there was also Morganella, group B strep, and E. coli and proteus but no pseudomonas. Urinalysis, active sediment. Urine culture, no growth. Started on IV Unasyn, then switched to IV Cefepime, Vanco and PO Flagyl.  Tolerated well.  Discharged in stable condition to [facility] for rehabilitation and medical management.  Per Hospitalist,transitioned to oral Amoxicillin and Cipro.  Will continue to monitor.     Today:  Seen in room.  Doing better with appetite.  Continues on the Remeron.  Left hand swelling resolved.  Will continue to monitor.      Patient does report that he is eating more since the increase in the Remeron.      D/w patient regarding family education on wound care when appropriate.  He did state that his wife had been doing his wound care prior to hospitalization and to [facility].  He did agree to having his wife present for wound care.  Discussed the possibility of taking the noriega out.  The patient was not agreeable to that at this time.  Will continue to monitor.    RN staff reported elevated hear rate in the 100s.  On Losartan.  Ordered in-house cardiology consult.  Will continue to monitor.    Denies insomnia, fatigue, fever/chills, cough, SOB, dyspnea, angina, palpitations, n/v, diarrhea, constipation, and urinary sxs-->noriega, clear urine noted.    Objective:  /82   Pulse 103   Temp 98 °F (36.7 °C)   Resp 18   SpO2 95%   PHYSICAL EXAM:  GENERAL HEALTH: well  developed, well nourished, in no apparent distress  LINES, TUBES, DRAINS:  +[place]  SKIN: pale, warm, dry  WOUND: +Coccyx wound-->8 x 4 x 3 cm; undermining 3.5 cm at 2 o'clock; exudate moder serous-->improved; Slough 15%; Granulation: 50%; other visible tissues (Fascia, Muscle, Bone) 25% -->Not seen on this visit; patient not at goal as of yet  EYES: PERRLA, conjunctiva normal; no drainage from eyes  HENT: normocephalic; normal nose, no nasal drainage, mucous membranes pink, moist  NECK: full range of motion observed  RESPIRATORY:clear to percussion and auscultation, No wheezing/cough/accessory muscle use; on room air  CARDIOVASCULAR: S1, S2 normal, RRR; no S3, no S4; , no click, no murmur  ABDOMEN: normal active BS+, soft, non-distended; no apparent masses; observed, non-tender, no guarding during physical exam  : Deferred-->+De La Fuente--clear yellow urine present  LYMPHATIC: no lymphedema  MUSCULOSKELETAL: no acute synovitis upper or lower extremity.  Weakness R/T recent hospitalization/diagnoses/sequelae; will undergo therapies to rehab and improve strength, endurance and independence w/ ADLs as able  EXTREMITIES/VASCULAR: no cyanosis, clubbing or edema, radial pulses 2+, and dorsalis pedal pulses 2+  NEUROLOGIC: intact; no sensorimotor deficit, reflexes normal, cranial nerves intact II-XII, follows commands  PSYCHIATRIC: alert and oriented x 3; affect appropriate    Medications reviewed: Yes    Diagnostics reviewed: Dated:  4/15/24  CBC W/DIFF  WBC 10.2 10'3/uL 3.5-10.5 Final  RBC 3.65 10'6/uL (Based on  documented  legal sex) 4.30-  5.80  L Final  HGB 10.7 g/dL (Based on  documented  legal sex) 13.0-  17.5  L Final  HCT 34.7 % (Based on  documented  legal sex) 38.0-  50.0  L Final  MCV 95.1 fL 80.0-99.0 Final  MCH 29.3 pg 27.0-34.0 Final  MCHC 30.8 g/dL 32.0-35.5 L Final  RDW 17.0 % 11.0-15.0 H Final   10'3/uL 150-400 H Final  MPV 9.6 fL 8.8-12.1 Final  NRBC's 0.0 % 0.0 Final  Absolute NRBCs 0.0 10'3/uL No  reference  range  established  Final  Neutrophils 64.6 % 34.0-73.0 Final  Lymphocytes 21.6 % 15.0-50.0 Final  Monocytes 12.2 % 1.0-15.0 Final  Eosinophils 0.0 % 0.0-8.0 Final  Basophils 0.1 % 0.0-2.0 Final  Immature Granulocytes 1.5 % No defined  reference range  Final  Absolute Neutrophils 6.6 10'3/uL 1.5-8.0 Final  Absolute Lymphocytes 2.2 10'3/uL 1.0-4.0 Final  Absolute Monocytes 1.2 10'3/uL 0.2-1.0 H Final  Absolute Eosinophils 0.0 10'3/uL 0.0-0.6 Final  Absolute Basophils 0.0 10'3/uL 0.0-0.3 Final  Absolute Immature Granulocytes 0.2 10'3/uL 0.00-0.10 H Final    CMP(COMPREHENSIVE METABOLIC PANEL)  Sodium 134 mmol/L 133-146 Final  Potassium 4.4 mmol/L 3.5-5.1 Final  Chloride 100 mmol/L  Final  Carbon Dioxide 27 mmol/L 21-31 Final  Anion Gap 7 mmol/L 4-13 Final  Blood Urea Nitrogen 15 mg/dL 7-25 Final  Creatinine 0.46 mg/dL 0.60-1.30 L Final  eGFRcr (CKD-EPI 2021) >90 mL/min/1.73  m2  >=60 Final  Calcium 8.7 mg/dL 8.3-10.5 Final  Glucose 92 mg/dL  Final  Protein, Total 5.4 g/dL 6.4-8.3 L Final  Albumin 2.9 g/dL 3.5-5.0 L Final  ALT 14 units/L 11-51 Final  Alkaline Phosphatase 35 units/L  Final  AST 11 units/L 13-39 L Final  Bilirubin, Total 0.3 mg/dL 0.2-1.2 Final    Assessment and plan:  Elevated Heart Rate  -Heart rate in the 100s to 111s  -On Lorsartan 100 mg every day for hypertension  -Add In-house Cardiology Consult    HTN  -Vitals every shift  -Losartan 100 mg daily, hold for systolic blood pressure less than 110 or heart rate less than 60    Localized Edema, Left hand-->resolved  -Doppler venous and arterial negative for dvt  -H/o Gout  -On Allopurinol daily and Colchcine prn  -X-ray of left hand   -Uric Acid Level wnl  -Continue to monitor    Leukocytosis-resolved  -Stable  -WBCs:  9.9-->13.8-->16.2-->12.2-->11.6-->13.2-->12.0-->12.3-->10.2  -ID following    Depression/Anxiety  -Remeron to 15 mg at bedtime, per in-house PCP  -Xanax 0.5 mg daily as needed    MRSA in sacral decub  -Redness  around wound and scrotum and groin-->stable  -Wound Culture-->negative  -Bactrim-->Completed  -Wound Care Consult  -Low Air Mattress with alternating air pressure  -Dressings per wound care team  -S/p Debridment  -In House ID consult  -Jah bid  -Completed n hospital Unasyn, Cefepime and Flagyl  -Cipro 500-->completed  -Amoxicillin-->completed  -Continue Clotrimazole-Betamethasone 0.05% cream bid to groin and scotum    Poor Appetite  -Continue Remeron as listed above  -Patient and wife to make a followup appointment with neuro and PCP  -Weight improved  -Dietary following    Upset stomach-resolved  -Hold MVI, Cholecalciferol, Zinc and Vitamin C at this time  -Omeprazole as listed below  -Simethicone 80 mg prn up to 4 tabs every day  -Zofran 4 mg q8h prn for nausea    Right Foot Pain/H/o Gout  -X-ray negative for fracture  -Uric Acid Level 5.4-->4.8  -Continue Allopurinol 300 mg every day  -Repeat Uric Acid Level-->wnl  -Taking Colchicine 0.6 mg every day prn, per patient request  -Will not take Prednisone    DJD/H/o Cervical Surgeries/Weakness/Physical Deconditioning/Impaired mobility and ADLs/At risk for falling  -Fall Precautions  -Tylenol 1 g every 6 hours as needed for fever or pain if given for fever notify MD/NP  -Norco 5/325 mg every 8 hours as needed  -PT/OT/ST to evaluate and treat  -[facility] team to establish care plan meeting with patient and POA/family as appropriate  -Anticipate DC on or before TBD; SW to assist patient/family w/ DC planning  -DC Plan:  Home with wife     Noriega status  -Has Buttock Ulcer  -Monitor q shift and prn for s/s of infection  -Meticulous care to noriega q shift and prn  -D/w patient discontinuing noriega.  Declined at this time     MS  -Ordered from hospital-->Prednisone 20 mg every day prn-->does not want Prednisone d/t SEs of sacral ulcer  -Dalfampridine ER 10 mg every day  -Ocrelizumab 600 mg q 6 months  -IVIG infusions completed  -F/u with Dr. Aleman, Neurology as  directed    Insomnia  -Remeron 15 mg qhs  -Melatonin-->discontinued, per patient's request  -Xanax 0.5 mg tid prn    Recent Covid  -Isolation Precautions-completed  -No symptoms     H/o Renal Stones  -No treatment     H/o CP Atypical  -No treatment     Bladder Emptying Issue d/t MS/Sacral Wound  -Insert noriega for now until more mobile d/t sacral ulcer  -Monitor for s/s infection q shift and prn     DVT Prophylaxis  -Heparin 5000 units q8h  -Encourage early mobilization and participation in PT/OT as able     Bowel Management Regimen/Constipation   -Miralax 17 g every day prn  -Senna S 8.6/50 mg bid     Supplements  -Probiotic twice daily  -Discontinued Vitamin C, Zinc, Vitamin D and B12 d/t stomach upset  -Magnesium 400 mg daily     LABS  -CBC/CMP weekly while in [facility]     Follow Up Appointments  -Dr. Napoleon Ovalles, PCP within 1-2 weeks following [facility] discharge.   -Dr. Galindo, neurology as directed     Talib Hubbard, APRN  4/16/24

## 2024-04-19 ENCOUNTER — SNF VISIT (OUTPATIENT)
Dept: INTERNAL MEDICINE CLINIC | Age: 79
End: 2024-04-19

## 2024-04-19 DIAGNOSIS — R53.1 WEAKNESS: ICD-10-CM

## 2024-04-19 DIAGNOSIS — Z97.8 FOLEY CATHETER STATUS: ICD-10-CM

## 2024-04-19 DIAGNOSIS — G35 MS (MULTIPLE SCLEROSIS) (HCC): ICD-10-CM

## 2024-04-19 DIAGNOSIS — R00.0 INCREASED HEART RATE: Primary | ICD-10-CM

## 2024-04-19 DIAGNOSIS — L98.429 SKIN ULCER OF SACRUM, UNSPECIFIED ULCER STAGE (HCC): ICD-10-CM

## 2024-04-19 DIAGNOSIS — R63.0 POOR APPETITE: ICD-10-CM

## 2024-04-19 PROCEDURE — 99309 SBSQ NF CARE MODERATE MDM 30: CPT | Performed by: NURSE PRACTITIONER

## 2024-04-20 VITALS
TEMPERATURE: 96 F | WEIGHT: 154 LBS | SYSTOLIC BLOOD PRESSURE: 150 MMHG | HEART RATE: 106 BPM | OXYGEN SATURATION: 99 % | DIASTOLIC BLOOD PRESSURE: 78 MMHG | RESPIRATION RATE: 20 BRPM | BODY MASS INDEX: 23 KG/M2

## 2024-04-20 RX ORDER — METOPROLOL SUCCINATE 25 MG/1
25 TABLET, EXTENDED RELEASE ORAL 2 TIMES DAILY
COMMUNITY

## 2024-04-20 NOTE — PROGRESS NOTES
Brenden Cardenas, 1945, 78 year old, male    Chief Complaint   Patient presents with    Follow - Up     Sacral wound, Tachycardia        Subjective:  Brenden Cardenas  : 1945, Age 78 year old  male 78-year-old man with multiple sclerosis. He has developed a sacral decubitus and has been admitted. This was debrided a couple of days ago, and it is the hope according to the patient that enzymatic debridement will be all that is necessary moving forward. Wound culture: Staph aureus, pseudomonas, enterococcus; back on  there was also Morganella, group B strep, and E. coli and proteus but no pseudomonas. Urinalysis, active sediment. Urine culture, no growth. Started on IV Unasyn, then switched to IV Cefepime, Vanco and PO Flagyl.  Tolerated well.  Discharged in stable condition to [facility] for rehabilitation and medical management.  Per Hospitalist,transitioned to oral Amoxicillin and Cipro.  Will continue to monitor.     Today:  Seen in room.  Doing better with appetite.  Continues on the Remeron.  Left hand swelling resolved.  Will continue to monitor.      RN staff reported elevated hear rate in the 100s.  On Losartan. Seen by In-house cardiology.  Started the patient on Metoprolol as listed below.  Will continue to monitor.      Assessed wound with the wound care team.  See below.  Wife to be educated on wound care prior to discharge from facility.  Patient to continue with noriega catheter at this time.      Of note, patient had office visits with a diagnostician for his MS, pending results.  He will continue to follow.    Denies insomnia, fatigue, fever/chills, cough, SOB, dyspnea, angina, palpitations, n/v, diarrhea, constipation, and urinary sxs-->noriega, clear urine noted.    Objective:  /78   Pulse 106   Temp (!) 96.4 °F (35.8 °C)   Resp 20   Wt 154 lb (69.9 kg)   SpO2 99%   BMI 22.74 kg/m²   PHYSICAL EXAM:  GENERAL HEALTH: well developed, well nourished, in no apparent distress  LINES,  TUBES, DRAINS:  +De [place] Fuente  SKIN: pale, warm, dry  WOUND: +Coccyx wound-->9 x 5 x 3 cm; undermining 4 cm at 2 o'clock; exudate moderate serous-->stable; Slough 5%; Granulation: 60%; other visible tissues (Fascia, Muscle, Bone) 25%   EYES: PERRLA, conjunctiva normal; no drainage from eyes  HENT: normocephalic; normal nose, no nasal drainage, mucous membranes pink, moist  NECK: full range of motion observed  RESPIRATORY:clear to percussion and auscultation, No wheezing/cough/accessory muscle use; on room air  CARDIOVASCULAR: S1, S2 normal, RRR; no S3, no S4; , no click, no murmur  ABDOMEN: normal active BS+, soft, non-distended; no apparent masses; observed, non-tender, no guarding during physical exam  : Deferred-->+De [place]ente--clear yellow urine present  LYMPHATIC: no lymphedema  MUSCULOSKELETAL: no acute synovitis upper or lower extremity.  Weakness R/T recent hospitalization/diagnoses/sequelae; will undergo therapies to rehab and improve strength, endurance and independence w/ ADLs as able  EXTREMITIES/VASCULAR: no cyanosis, clubbing or edema, radial pulses 2+, and dorsalis pedal pulses 2+  NEUROLOGIC: intact; no sensorimotor deficit, reflexes normal, cranial nerves intact II-XII, follows commands  PSYCHIATRIC: alert and oriented x 3; affect appropriate    Medications reviewed: Yes    Diagnostics reviewed: Dated:  4/18/24  T4, Total 10.32 ug/dL 6.09-12.23 Final  This assay is susceptible to interference from high levels of biotin which may  falsely elevate results. Please correlate with clinical findings.  TSH 2.49 uIU/mL 0.30-5.33 Final    Assessment and plan:  HTN/Tachycardia  -Vitals every shift  -Losartan 100 mg daily, hold for systolic blood pressure less than 110 or heart rate less than 60  -Start Metoprolol 25 mg bid, hold for sbp<100 or hr<60  -In-house Cardiology following    Localized Edema, Left hand-->resolved  -Doppler venous and arterial negative for dvt  -H/o Gout  -On Allopurinol daily and Colchcine  prn  -X-ray of left hand   -Uric Acid Level wnl  -Continue to monitor    Leukocytosis-resolved  -Stable  -WBCs:  9.9-->13.8-->16.2-->12.2-->11.6-->13.2-->12.0-->12.3-->10.2  -ID following    Depression/Anxiety  -Remeron to 15 mg at bedtime, per in-house PCP  -Xanax 0.5 mg daily as needed    MRSA in sacral decub  -Redness around wound and scrotum and groin-->stable  -Wound Culture-->negative  -Bactrim-->Completed  -Wound Care Consult  -Low Air Mattress with alternating air pressure  -Dressings per wound care team  -S/p Debridment  -In House ID consult  -Jah bid  -Completed n hospital Unasyn, Cefepime and Flagyl  -Cipro 500-->completed  -Amoxicillin-->completed  -Continue Clotrimazole-Betamethasone 0.05% cream bid to groin and scotum    Poor Appetite  -Continue Remeron as listed above  -Patient and wife to make a followup appointment with neuro and PCP  -Weight improved  -Dietary following    Upset stomach-resolved  -Hold MVI, Cholecalciferol, Zinc and Vitamin C at this time  -Omeprazole as listed below  -Simethicone 80 mg prn up to 4 tabs every day  -Zofran 4 mg q8h prn for nausea    Right Foot Pain/H/o Gout  -X-ray negative for fracture  -Uric Acid Level 5.4-->4.8  -Continue Allopurinol 300 mg every day  -Repeat Uric Acid Level-->wnl  -Taking Colchicine 0.6 mg every day prn, per patient request  -Will not take Prednisone    DJD/H/o Cervical Surgeries/Weakness/Physical Deconditioning/Impaired mobility and ADLs/At risk for falling  -Fall Precautions  -Tylenol 1 g every 6 hours as needed for fever or pain if given for fever notify MD/NP  -Norco 5/325 mg every 8 hours as needed  -PT/OT/ST to evaluate and treat  -[facility] team to establish care plan meeting with patient and POA/family as appropriate  -Anticipate DC on or before TBD; SW to assist patient/family w/ DC planning  -DC Plan:  Home with wife     Noriega status  -Has Buttock Ulcer  -Monitor q shift and prn for s/s of infection  -Meticulous care to noriega q shift and  prn  -D/w patient discontinuing noriega.  Declined at this time     MS  -Ordered from hospital-->Prednisone 20 mg every day prn-->does not want Prednisone d/t SEs of sacral ulcer  -Dalfampridine ER 10 mg every day  -Ocrelizumab 600 mg q 6 months  -IVIG infusions completed  -F/u with Dr. Aleman, Neurology as directed    Insomnia  -Remeron 15 mg qhs  -Melatonin-->discontinued, per patient's request  -Xanax 0.5 mg tid prn    Recent Covid  -Isolation Precautions-completed  -No symptoms     H/o Renal Stones  -No treatment     H/o CP Atypical  -No treatment     Bladder Emptying Issue d/t MS/Sacral Wound  -Insert noriega for now until more mobile d/t sacral ulcer  -Monitor for s/s infection q shift and prn     DVT Prophylaxis  -Heparin 5000 units q8h  -Encourage early mobilization and participation in PT/OT as able     Bowel Management Regimen/Constipation   -Miralax 17 g every day prn  -Senna S 8.6/50 mg bid     Supplements  -Probiotic twice daily  -Discontinued Vitamin C, Zinc, Vitamin D and B12 d/t stomach upset  -Magnesium 400 mg daily     LABS  -CBC/CMP weekly while in [facility]     Follow Up Appointments  -Dr. Napoleon Ovalles, PCP within 1-2 weeks following [facility] discharge.   -Dr. Galindo, neurology as directed  -Dr. Willow Mcmahon MD on 5/2/24     Talib Hubbard, APRN  4/19/24

## 2024-04-23 ENCOUNTER — TELEPHONE (OUTPATIENT)
Dept: NEUROLOGY | Facility: CLINIC | Age: 79
End: 2024-04-23

## 2024-04-23 ENCOUNTER — OFFICE VISIT (OUTPATIENT)
Dept: NEUROLOGY | Facility: CLINIC | Age: 79
End: 2024-04-23
Payer: MEDICARE

## 2024-04-23 VITALS
DIASTOLIC BLOOD PRESSURE: 72 MMHG | RESPIRATION RATE: 16 BRPM | BODY MASS INDEX: 24 KG/M2 | HEART RATE: 106 BPM | WEIGHT: 161 LBS | SYSTOLIC BLOOD PRESSURE: 114 MMHG

## 2024-04-23 DIAGNOSIS — R29.898 WEAKNESS OF BOTH LEGS: ICD-10-CM

## 2024-04-23 DIAGNOSIS — G35 MS (MULTIPLE SCLEROSIS) (HCC): Primary | ICD-10-CM

## 2024-04-23 PROCEDURE — 99214 OFFICE O/P EST MOD 30 MIN: CPT | Performed by: OTHER

## 2024-04-23 RX ORDER — MIRTAZAPINE 15 MG/1
15 TABLET, FILM COATED ORAL NIGHTLY
COMMUNITY

## 2024-04-23 RX ORDER — SIMETHICONE 80 MG
80 TABLET ORAL
COMMUNITY
Start: 2024-04-19

## 2024-04-23 RX ORDER — ONDANSETRON 4 MG/1
1 TABLET, FILM COATED ORAL EVERY 8 HOURS PRN
COMMUNITY
Start: 2024-04-19

## 2024-04-23 RX ORDER — MAGNESIUM OXIDE 400 MG/1
400 TABLET ORAL DAILY
COMMUNITY
Start: 2024-04-17

## 2024-04-23 NOTE — TELEPHONE ENCOUNTER
Received and reviewed lab results from Agusto Salamanca during pts ov on 4/23/24.  DOS:  4/15/24    Copy to scanning

## 2024-04-23 NOTE — PROGRESS NOTES
NEUROLOGY  [facility]       Brenden Cardenas  5/6/1945  Primary Care Provider:  Napoleon Ovalles DO    4/23/2024  78 year old yo,  was last seen on:: October 2023    Seen for/plans last visit:  MS advanced    Previous visit and existing record notes reviewed in preparation for the face to face visit.  Relevant labs and studies reviewed and will be noted in relevant areas of this record.  Accompanied visit: wife    () No.      Present condition:  Patient had first doses work release December 29 in January for the loading doses.  After the second infusion, Jan 29,he went on to have reduced mobility, becoming bedridden and particularly lost function in the left arm and both legs.   Few prednisone treatment helped however because of his infected decubitus ulcer,   Was hospitalized and had COVID.    Because of persistent weakness, we infused him with IVIG 5 days in March.  From there, he went to [facility] since Feb    Since then the patient has lost weight with reduced appetite.  Meanwhile decubiti has been showing improvement.    Prior to this, monthly Solumedrol x 2 after the October infusion helped.    Prior to February he was able to at least help support his weight so that he can pivot his buttocks when he needed wound cleaning of his decubiti.  Now he could not even stand up on both legs.    Past History update/new problem(s): as above    Review of Systems:  Review of Systems:  Denies systemic symptoms     No CP or SOB.  No GI or  symptoms. Relevant Neuro as noted above.      Medications:      Current Outpatient Medications:     magnesium oxide 400 MG Oral Tab, Take 1 tablet (400 mg total) by mouth daily., Disp: , Rfl:     Melatonin 3 MG Oral Cap, Take 1 capsule (3 mg total) by mouth every evening., Disp: , Rfl:     Ocrelizumab 300 MG/10ML Intravenous Solution, Inject 20 mL (600 mg total) into the vein., Disp: , Rfl:     ondansetron (ZOFRAN) 4 mg tablet, Take 1 tablet (4 mg total) by mouth  every 8 (eight) hours as needed., Disp: , Rfl:     Simethicone (BICARSIM) 80 MG Oral Tab, Take 80 mg by mouth daily as needed., Disp: , Rfl:     mirtazapine 15 MG Oral Tab, Take 1 tablet (15 mg total) by mouth nightly., Disp: , Rfl:     metoprolol succinate ER 25 MG Oral Tablet 24 Hr, Take 1 tablet (25 mg total) by mouth in the morning and 1 tablet (25 mg total) before bedtime., Disp: , Rfl:     allopurinol 300 MG Oral Tab, Take 1 tablet (300 mg total) by mouth daily., Disp: , Rfl:     colchicine 0.6 MG Oral Tab, Take 1 tablet (0.6 mg total) by mouth daily as needed., Disp: , Rfl:     senna-docusate 8.6-50 MG Oral Tab, Take 1 tablet by mouth in the morning and 1 tablet before bedtime., Disp: , Rfl:     clotrimazole-betamethasone 1-0.05 % External Cream, Apply 1 Application topically 2 (two) times daily. To groin and buttock, Disp: , Rfl:     omeprazole 20 MG Oral Capsule Delayed Release, Take 1 capsule (20 mg total) by mouth 2 (two) times daily before meals., Disp: , Rfl:     HYDROcodone-acetaminophen 5-325 MG Oral Tab, Take 1 tablet by mouth every 8 (eight) hours as needed., Disp: 10 tablet, Rfl: 0    sodium chloride 0.9% SOLN 480 mL with Ocrelizumab 300 MG/10ML SOLN 600 mg, Inject 600 mg into the vein one time. Every 6 months. Last infusion January, 2024, Disp: , Rfl:     predniSONE 20 MG Oral Tab, Take 1 tablet (20 mg total) by mouth as needed., Disp: , Rfl:     Dalfampridine ER 10 MG Oral Tablet 12 Hr, Take 1 tablet (10 mg total) by mouth in the morning and 1 tablet (10 mg total) before bedtime., Disp: , Rfl:     LOSARTAN 100 MG Oral Tab, TAKE 1 TABLET BY MOUTH EVERY DAY, Disp: 90 tablet, Rfl: 0    acetaminophen 500 MG Oral Tab, Take 2 tablets (1,000 mg total) by mouth every 6 (six) hours as needed for Pain., Disp: , Rfl:     Multiple Vitamins-Minerals (CENTRUM) Oral Tab, Take 1 tablet by mouth daily., Disp: , Rfl:     Vitamin D3, Cholecalciferol, 50 MCG (2000 UT) Oral Tab, Take 1 tablet (2,000 Units total) by  mouth daily., Disp: , Rfl:     cyanocobalamine 1000 MCG Oral Tab, Take 1 tablet (1,000 mcg total) by mouth daily., Disp: , Rfl:   PRN:     Allergies:  No Known Allergies       EXAM:  /72 (BP Location: Left arm, Patient Position: Sitting, Cuff Size: adult)   Pulse 106   Resp 16   Wt 161 lb (73 kg)   BMI 23.78 kg/m²   Looks stated age  General Exam:  HENT:  pink conjunctiva anicteric sclerae  Neck no adenopathy, thyroid normal  Heart and Lungs:  normal  Picture of sacral decubitus noted         NEURO  Right oriented and has fluent speech and intact comprehension as well as good attention.  Cranial nerve functions were grossly normal including shoulder shrug.  The left arm however has very minimal movement.  Both legs have no movement although he claims he felt muscles twitching in his ankles but no visibile movement seen  DTrS zero in legs  WC ambulation only      INTERPRETATION of RELEVANT LABS and other DATA:          Problem/s Identified this visit:   1. MS (multiple sclerosis) (HCC)    2. 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    I am not certain as to the cause of his loss of appetite and weight loss.    I am not sure whether the B-cell treatment he received really was effective because it did not seem to impact on any obvious white cell counts and therefore lymphocyte subset panel is ordered to guide us.      (x) Discussed potential side effects of any treatment relevant to above.  Includes explanation of tests as necessary.    Return  in about 3 months (around 7/23/2024).      Patient understands that if needed, based on condition and or test results, follow up will be readjusted      Samuel Aleman MD  Vascular & General Neurology  Director, Multiple Sclerosis Program  [facility]  4/23/2024, Time completed 10:16 AM    Decision making:  ( x ) labs reviewed/ordered - 1  (  ) new diagnosis: - 1  ( x) Images & studies independently reviewed -non F2F  (  ) Case/studies discussed with other caregivers - -non F2F  (  ) Telephone time with patiern or authorized Fam member--non F2F  ( x ) other records reviewed --non F2F including consultations  (  ) [facility] meetings - patient not present --non F2F  (  ) Independent Historian obtained    Non Face to Face CPT code 83601/61476 applies as documented above    PROCEDURE DONE     (   ) see notes        After visit, patient was escorted out and handed-off discharge process and instructions to the check out desk.  No additional issues relevant to visit were raised to staff at this time interval.        This document is to be interpreted as my current opinion regarding the case as of the stated date of service based on the information available to me at this time and may supersedes any prior opinion expressed either orally or in writing.  Services rendered are only within the scope of direct medical care  Sometimes, reports may have been prepared partially using a speech recognition software technology.  If a word or phrase is confusing or out of context, please do not hesitate to call for clarification.

## 2024-04-23 NOTE — PATIENT INSTRUCTIONS
Refill policies:    Allow 2-3 business days for refills; controlled substances may take longer.  Contact your pharmacy at least 5 days prior to running out of medication and have them send an electronic request or submit request through the “request refill” option in your [employer] account.  Refills are not addressed on weekends; covering physicians do not authorize routine medications on weekends.  No narcotics or controlled substances are refilled after noon on Fridays or by on call physicians.  By law, narcotics must be electronically prescribed.  A 30 day supply with no refills is the maximum allowed.  If your prescription is due for a refill, you may be due for a follow up appointment.  To best provide you care, patients receiving routine medications need to be seen at least once a year.  Patients receiving narcotic/controlled substance medications need to be seen at least once every 3 months.  In the event that your preferred pharmacy does not have the requested medication in stock (e.g. Backordered), it is your responsibility to find another pharmacy that has the requested medication available.  We will gladly send a new prescription to that pharmacy at your request.    Scheduling Tests:    If your physician has ordered radiology tests such as MRI or CT scans, please contact Central Scheduling at [phone] right away to schedule the test.  Once scheduled, the [facility] Centralized Referral Team will work with your insurance carrier to obtain pre-certification or prior authorization.  Depending on your insurance carrier, approval may take 3-10 days.  It is highly recommended patients assure they have received an authorization before having a test performed.  If test is done without insurance authorization, patient may be responsible for the entire amount billed.      Precertification and Prior Authorizations:  If your physician has recommended that you have a procedure or additional testing performed the [facility]  Centralized Referral Team will contact your insurance carrier to obtain pre-certification or prior authorization.    You are strongly encouraged to contact your insurance carrier to verify that your procedure/test has been approved and is a COVERED benefit.  Although the [facility] Centralized Referral Team does its due diligence, the insurance carrier gives the disclaimer that \"Although the procedure is authorized, this does not guarantee payment.\"    Ultimately the patient is responsible for payment.   Thank you for your understanding in this matter.  Paperwork Completion:  If you require FMLA or disability paperwork for your recovery, please make sure to either drop it off or have it faxed to our office at [phone]. Be sure the form has your name and date of birth on it.  The form will be faxed to our Forms Department and they will complete it for you.  There is a 25$ fee for all forms that need to be filled out.  Please be aware there is a 10-14 day turnaround time.  You will need to sign a release of information (DARIO) form if your paperwork does not come with one.  You may call the Forms Department with any questions at [phone].  Their fax number is [phone].

## 2024-04-24 ENCOUNTER — SNF VISIT (OUTPATIENT)
Dept: INTERNAL MEDICINE CLINIC | Age: 79
End: 2024-04-24

## 2024-04-24 DIAGNOSIS — L98.429 SKIN ULCER OF SACRUM, UNSPECIFIED ULCER STAGE (HCC): Primary | ICD-10-CM

## 2024-04-24 DIAGNOSIS — G35 MS (MULTIPLE SCLEROSIS) (HCC): ICD-10-CM

## 2024-04-24 DIAGNOSIS — F41.9 ANXIETY: ICD-10-CM

## 2024-04-24 DIAGNOSIS — F32.A DEPRESSION, UNSPECIFIED DEPRESSION TYPE: ICD-10-CM

## 2024-04-24 DIAGNOSIS — R53.1 WEAKNESS: ICD-10-CM

## 2024-04-24 DIAGNOSIS — Z97.8 FOLEY CATHETER STATUS: ICD-10-CM

## 2024-04-24 DIAGNOSIS — R63.0 POOR APPETITE: ICD-10-CM

## 2024-04-24 PROCEDURE — 99307 SBSQ NF CARE SF MDM 10: CPT | Performed by: NURSE PRACTITIONER

## 2024-04-26 ENCOUNTER — SNF DISCHARGE (OUTPATIENT)
Dept: INTERNAL MEDICINE CLINIC | Age: 79
End: 2024-04-26

## 2024-04-26 VITALS
TEMPERATURE: 97 F | SYSTOLIC BLOOD PRESSURE: 131 MMHG | RESPIRATION RATE: 20 BRPM | OXYGEN SATURATION: 97 % | DIASTOLIC BLOOD PRESSURE: 62 MMHG | HEART RATE: 111 BPM

## 2024-04-26 VITALS
BODY MASS INDEX: 24 KG/M2 | DIASTOLIC BLOOD PRESSURE: 5 MMHG | HEART RATE: 95 BPM | OXYGEN SATURATION: 96 % | SYSTOLIC BLOOD PRESSURE: 148 MMHG | WEIGHT: 161.5 LBS | RESPIRATION RATE: 18 BRPM | TEMPERATURE: 98 F

## 2024-04-26 DIAGNOSIS — Z97.8 FOLEY CATHETER STATUS: ICD-10-CM

## 2024-04-26 DIAGNOSIS — R53.1 WEAKNESS: ICD-10-CM

## 2024-04-26 DIAGNOSIS — G35 MS (MULTIPLE SCLEROSIS) (HCC): ICD-10-CM

## 2024-04-26 DIAGNOSIS — R00.0 INCREASED HEART RATE: ICD-10-CM

## 2024-04-26 DIAGNOSIS — L98.429 SKIN ULCER OF SACRUM, UNSPECIFIED ULCER STAGE (HCC): Primary | ICD-10-CM

## 2024-04-26 DIAGNOSIS — F41.9 ANXIETY: ICD-10-CM

## 2024-04-26 DIAGNOSIS — R63.0 POOR APPETITE: ICD-10-CM

## 2024-04-26 DIAGNOSIS — G35 MULTIPLE SCLEROSIS (HCC): ICD-10-CM

## 2024-04-26 DIAGNOSIS — F32.A DEPRESSION, UNSPECIFIED DEPRESSION TYPE: ICD-10-CM

## 2024-04-26 PROCEDURE — 99316 NF DSCHRG MGMT 30 MIN+: CPT | Performed by: NURSE PRACTITIONER

## 2024-04-26 NOTE — PROGRESS NOTES
Brenden Cardenas, 1945, 78 year old, male is being discharged from Facility: [facility]      DISCHARGE SUMMARY    Date of Admission:24    Date of Anticipated Discharge: 24                           Admitting Diagnoses: Sacral Ulcer    Subjective: Brenden Cardenas  : 1945, Age 78 year old  male 78-year-old man with multiple sclerosis. He has developed a sacral decubitus and has been admitted. This was debrided a couple of days ago, and it is the hope according to the patient that enzymatic debridement will be all that is necessary moving forward. Wound culture: Staph aureus, pseudomonas, enterococcus; back on  there was also Morganella, group B strep, and E. coli and proteus but no pseudomonas. Urinalysis, active sediment. Urine culture, no growth. Started on IV Unasyn, then switched to IV Cefepime, Vanco and PO Flagyl.  Tolerated well.  Discharged in stable condition to [facility] ([place] for rehabilitation and medical management.  Per Hospitalist,transitioned to oral Amoxicillin and Cipro.  Will continue to monitor.     Vital signs:  /62   Pulse 111   Temp 96.8 °F (36 °C)   Resp 20   SpO2 97%     ROS and Physical Exam:    REVIEW OF SYSTEMS:  GENERAL HEALTH:feels well otherwise  SKIN: denies any unusual skin lesions or rashes  WOUNDS: +Coccyx Ulcer   EYES:no visual complaints or deficits  HENT: denies nasal congestion, sinus pain or sore throat; and hearing loss negative  RESPIRATORY: denies shortness of breath, wheezing or cough   CARDIOVASCULAR:denies chest pain, no palpitations , denies syncope, denies orthopnea, denies cough  GI: denies nausea, vomiting, constipation, diarrhea; no rectal bleeding; no heartburn  :+Trouble emptying bladder-->+De La Fuente present  MUSCULOSKELETAL:no joint complaints upper or lower extremities  NEURO:no sensory or motor complaint, denies seizures, denies vertigo, denies tinnitus, and denies tremors  PSYCHE: no symptoms of depression or anxiety  HEMATOLOGY:denies hx  anemia, denies bruising, denies excessive bleeding  ENDOCRINE: denies excessive thirst or urination; denies unexpected wt gain or wt loss  ALLERGY/IMM.: denies food or seasonal allergies    PHYSICAL EXAM:  GENERAL HEALTH: well developed, well nourished, in no apparent distress  LINES, TUBES, DRAINS:  +Noriega  SKIN: pale, warm, dry  WOUND: +Coccyx wound-->9 x 4.7 x 3 cm; undermining 3.5 cm at 12 o'clock; exudate moderate serous sanguinous, improved; Slough 5%; Granulation: 60%; other visible tissues (Fascia, Muscle, Bone) 25%   EYES: PERRLA, conjunctiva normal; no drainage from eyes  HENT: normocephalic; normal nose, no nasal drainage, mucous membranes pink, moist  NECK: full range of motion observed  RESPIRATORY:clear to percussion and auscultation, No wheezing/cough/accessory muscle use; on room air  CARDIOVASCULAR: S1, S2 normal, RRR; no S3, no S4; , no click, no murmur  ABDOMEN: normal active BS+, soft, non-distended; no apparent masses; observed, non-tender, no guarding during physical exam  : Deferred-->+Noriega--clear yellow urine present  LYMPHATIC: no lymphedema  MUSCULOSKELETAL: no acute synovitis upper or lower extremity.  Weakness R/T recent hospitalization/diagnoses/sequelae; will undergo therapies to rehab and improve strength, endurance and independence w/ ADLs as able  EXTREMITIES/VASCULAR: no cyanosis, clubbing or edema, radial pulses 2+, and dorsalis pedal pulses 2+  NEUROLOGIC: intact; no sensorimotor deficit, reflexes normal, cranial nerves intact II-XII, follows commands  PSYCHIATRIC: alert and oriented x 3; affect appropriate      DC Plan: Home with wife, who will obtain training for noriega catheter, dressing changes and using yeison lift    Skilled Nursing Facility Course:    Progressed poorly well with PT.  He does continue with OT who has been training the patient for upper body strength  Patient does have tachycardia, in-house cardiology recommending, started on Metoprolol, will need to follow  up with PCP for further management.  Patient seen by GI d/t Poor Appetite; continue to eat at least 3 meals a day; try to maintain weight; continue Remeron; f/u with dietician; at least two ensures per day.  Medically cleared for anticipated discharge  Home with home care services RN/PT/OT/ST/Bath Aide  Equipment per PT recommendations: Mazin Lift, and Alternating Low Air Mattress for Home    Most recent lab results:  Diagnostics reviewed: 4/22/24  CBC W/DIFF  WBC 10.5 10'3/uL 3.5-10.5 Final  RBC 3.87 10'6/uL (Based on  documented  legal sex) 4.30-  5.80  L Final  HGB 11.3 g/dL (Based on  documented  legal sex) 13.0-  17.5  L Final  HCT 36.5 % (Based on  documented  legal sex) 38.0-  50.0  L Final  MCV 94.3 fL 80.0-99.0 Final  MCH 29.2 pg 27.0-34.0 Final  MCHC 31.0 g/dL 32.0-35.5 L Final  RDW 16.3 % 11.0-15.0 H Final   10'3/uL 150-400 H Final  MPV 9.6 fL 8.8-12.1 Final  NRBC's 0.0 % 0.0 Final  Absolute NRBCs 0.0 10'3/uL No reference  range  established  Final  Neutrophils 63.0 % 34.0-73.0 Final  Lymphocytes 23.3 % 15.0-50.0 Final  Monocytes 12.8 % 1.0-15.0 Final  Eosinophils 0.0 % 0.0-8.0 Final  Basophils 0.1 % 0.0-2.0 Final  Immature Granulocytes 0.8 % No defined  reference range  Final  Absolute Neutrophils 6.6 10'3/uL 1.5-8.0 Final  Absolute Lymphocytes 2.4 10'3/uL 1.0-4.0 Final  Absolute Monocytes 1.3 10'3/uL 0.2-1.0 H Final  Absolute Eosinophils 0.0 10'3/uL 0.0-0.6 Final  Absolute Basophils 0.0 10'3/uL 0.0-0.3 Final  Absolute Immature Granulocytes 0.1 10'3/uL 0.00-0.10 Final     CMP(COMPREHENSIVE METABOLIC PANEL)  Sodium 137 mmol/L 133-146 Final  Potassium 4.5 mmol/L 3.5-5.1 Final  Chloride 102 mmol/L  Final  Carbon Dioxide 27 mmol/L 21-31 Final  Anion Gap 8 mmol/L 4-13 Final  Blood Urea Nitrogen 13 mg/dL 7-25 Final  Creatinine 0.51 mg/dL 0.60-1.30 L Final  eGFRcr (CKD-EPI 2021) >90 mL/min/1.73  m2  >=60 Final  Calcium 8.7 mg/dL 8.3-10.5 Final  Glucose 102 mg/dL  H Final  Protein, Total 5.2  g/dL 6.4-8.3 L Final  Albumin 2.9 g/dL 3.5-5.0 L Final  ALT 13 units/L 11-51 Final  Alkaline Phosphatase 31 units/L  L Final  AST 10 units/L 13-39 L Final  Bilirubin, Total 0.3 mg/dL 0.2-1.2 Final    Discharge Diagnoses w/ current management:  HTN/Tachycardia  -Vitals every shift  -Losartan 100 mg daily, hold for systolic blood pressure less than 110 or heart rate less than 60  -Metoprolol 25 mg bid, hold for sbp<100 or hr<60     Localized Edema, Left hand-->resolved  -Doppler venous and arterial negative for dvt  -H/o Gout  -On Allopurinol daily and Colchcine prn  -X-ray of left hand   -Uric Acid Level wnl  -Continue to monitor     Leukocytosis-resolved     Depression/Anxiety  -Remeron to 15 mg at bedtime, per in-house PCP  -Xanax 0.5 mg daily as needed     MRSA in sacral decub  -Redness around wound and scrotum and groin-->stable  -Wound Culture-->negative  -Bactrim-->Completed  -Wound Care Consult  -Low Air Mattress with alternating air pressure  -Daily Dressings   -S/p Debridment  -Jah bid  -Completed n hospital Unasyn, Cefepime and Flagyl  -Cipro 500-->completed  -Amoxicillin-->completed  -Continue Clotrimazole-Betamethasone 0.05% cream bid to groin and scotum     Poor Appetite  -Weight improved  -Continue to eat at least 3 meals a day  -try to maintain weight; continue Remeron  -f/u with dietician  -at least two ensures per day.     Upset stomach-resolved  -Hold MVI, Cholecalciferol, Zinc and Vitamin C at this time  -Omeprazole as listed below  -Simethicone 80 mg prn up to 4 tabs every day  -Zofran 4 mg q8h prn for nausea     Right Foot Pain/H/o Gout  -X-ray negative for fracture  -Uric Acid Level 5.4-->4.8  -Continue Allopurinol 300 mg every day  -Repeat Uric Acid Level-->wnl  -Taking Colchicine 0.6 mg every day prn, per patient request  -Will not take Prednisone     DJD/H/o Cervical Surgeries/Weakness/Physical Deconditioning/Impaired mobility and ADLs/At risk for falling  -Fall Precautions  -Tylenol  1 g every 6 hours as needed for fever or pain if given for fever notify MD/NP  -Norco 5/325 mg every 8 hours as needed  -PT/OT/ST to evaluate and treat  -[facility] team to establish care plan meeting with patient and POA/family as appropriate  -Anticipate DC on or before 4/29/24; SW to assist patient/family w/ DC planning  -DC Plan:  Home with wife     Noriega status/Neurogenic Bladder  -Unable to empty bladder fully  -Has Buttock Ulcer  -Monitor q shift and prn for s/s of infection  -Meticulous care to noriega q shift and prn  -D/w patient discontinuing noriega.  Declined at this time  -Education for wife on maintaining noriega catheter     MS  -Ordered from hospital-->Prednisone 20 mg every day prn-->does not want Prednisone d/t SEs of sacral ulcer  -Dalfampridine ER 10 mg every day  -Ocrelizumab 600 mg q 6 months  -IVIG infusions completed     Insomnia  -Remeron 15 mg qhs  -Melatonin-->discontinued, per patient's request  -Xanax 0.5 mg tid prn     Recent Covid  -Isolation Precautions-completed  -No symptoms     H/o Renal Stones  -No treatment     H/o CP Atypical  -No treatment     DVT Prophylaxis  -Heparin 5000 units q8h  -Encourage early mobilization and participation in PT/OT as able     Bowel Management Regimen/Constipation   -Miralax 17 g every day prn  -Senna S 8.6/50 mg bid     Supplements  -Probiotic twice daily  -Discontinued Vitamin C, Zinc, Vitamin D and B12 d/t stomach upset  -Magnesium 400 mg daily     LABS  -CBC/CMP weekly while in [facility]     Follow Up Appointments  -Dr. Napoleon Ovalles, PCP within 1-2 weeks following [facility] discharge.   -Dr. Galindo, neurology as directed  -Dr. Willow Mcmahon MD on 5/2/24    Medication Reconciliation Completed:  Yes    *Greater than 30 minutes spent in coordination of care in preparation for discharge.    Talib Hubbard, APRN  4/26/2024  4:08 PM

## 2024-04-26 NOTE — PROGRESS NOTES
Brenden Cardenas, 1945, 78 year old, male    Chief Complaint   Patient presents with    Follow - Up     Sacral Ulcer, MS        Subjective:  Brenden Cardenas  : 1945, Age 78 year old  male 78-year-old man with multiple sclerosis. He has developed a sacral decubitus and has been admitted. This was debrided a couple of days ago, and it is the hope according to the patient that enzymatic debridement will be all that is necessary moving forward. Wound culture: Staph aureus, pseudomonas, enterococcus; back on  there was also Morganella, group B strep, and E. coli and proteus but no pseudomonas. Urinalysis, active sediment. Urine culture, no growth. Started on IV Unasyn, then switched to IV Cefepime, Vanco and PO Flagyl.  Tolerated well.  Discharged in stable condition to [facility] for rehabilitation and medical management.  Per Hospitalist,transitioned to oral Amoxicillin and Cipro.  Will continue to monitor.     Today:  Seen in room.  Doing better with appetite.  Continues on the Remeron.  Left hand swelling resolved.  Will continue to monitor.      RN staff reported hear rate improved since starting Metoprolol with the Losartan.  In-house cardiology following.  Will continue to monitor.      Patient reported that he saw the neurologist and will start on steroids after discharge.  This writer wrote for training of wife for wound care, noriega care and using Mazin Lift.  Will probably discharge on 24.      Denies insomnia, fatigue, fever/chills, cough, SOB, dyspnea, angina, palpitations, n/v, diarrhea, constipation, and urinary sxs-->noriega, clear urine noted.    Objective:  BP (!) 148/5   Pulse 95   Temp 97.7 °F (36.5 °C)   Resp 18   Wt 161 lb 8 oz (73.3 kg)   SpO2 96%   BMI 23.85 kg/m²   PHYSICAL EXAM:  GENERAL HEALTH: well developed, well nourished, in no apparent distress  LINES, TUBES, DRAINS:  +Noriega  SKIN: pale, warm, dry  WOUND: +Coccyx wound-->9 x 4.7 x 3 cm; undermining 3.5 cm at 12 o'clock;  exudate moderate serous sanguinous, improved; Slough 5%; Granulation: 60%; other visible tissues (Fascia, Muscle, Bone) 25%   EYES: PERRLA, conjunctiva normal; no drainage from eyes  HENT: normocephalic; normal nose, no nasal drainage, mucous membranes pink, moist  NECK: full range of motion observed  RESPIRATORY:clear to percussion and auscultation, No wheezing/cough/accessory muscle use; on room air  CARDIOVASCULAR: S1, S2 normal, RRR; no S3, no S4; , no click, no murmur  ABDOMEN: normal active BS+, soft, non-distended; no apparent masses; observed, non-tender, no guarding during physical exam  : Deferred-->+De La Fuente--clear yellow urine present  LYMPHATIC: no lymphedema  MUSCULOSKELETAL: no acute synovitis upper or lower extremity.  Weakness R/T recent hospitalization/diagnoses/sequelae; will undergo therapies to rehab and improve strength, endurance and independence w/ ADLs as able  EXTREMITIES/VASCULAR: no cyanosis, clubbing or edema, radial pulses 2+, and dorsalis pedal pulses 2+  NEUROLOGIC: intact; no sensorimotor deficit, reflexes normal, cranial nerves intact II-XII, follows commands  PSYCHIATRIC: alert and oriented x 3; affect appropriate    Medications reviewed: Yes    Diagnostics reviewed: 4/22/24  CBC W/DIFF  WBC 10.5 10'3/uL 3.5-10.5 Final  RBC 3.87 10'6/uL (Based on  documented  legal sex) 4.30-  5.80  L Final  HGB 11.3 g/dL (Based on  documented  legal sex) 13.0-  17.5  L Final  HCT 36.5 % (Based on  documented  legal sex) 38.0-  50.0  L Final  MCV 94.3 fL 80.0-99.0 Final  MCH 29.2 pg 27.0-34.0 Final  MCHC 31.0 g/dL 32.0-35.5 L Final  RDW 16.3 % 11.0-15.0 H Final   10'3/uL 150-400 H Final  MPV 9.6 fL 8.8-12.1 Final  NRBC's 0.0 % 0.0 Final  Absolute NRBCs 0.0 10'3/uL No reference  range  established  Final  Neutrophils 63.0 % 34.0-73.0 Final  Lymphocytes 23.3 % 15.0-50.0 Final  Monocytes 12.8 % 1.0-15.0 Final  Eosinophils 0.0 % 0.0-8.0 Final  Basophils 0.1 % 0.0-2.0 Final  Immature Granulocytes  0.8 % No defined  reference range  Final  Absolute Neutrophils 6.6 10'3/uL 1.5-8.0 Final  Absolute Lymphocytes 2.4 10'3/uL 1.0-4.0 Final  Absolute Monocytes 1.3 10'3/uL 0.2-1.0 H Final  Absolute Eosinophils 0.0 10'3/uL 0.0-0.6 Final  Absolute Basophils 0.0 10'3/uL 0.0-0.3 Final  Absolute Immature Granulocytes 0.1 10'3/uL 0.00-0.10 Final    CMP(COMPREHENSIVE METABOLIC PANEL)  Sodium 137 mmol/L 133-146 Final  Potassium 4.5 mmol/L 3.5-5.1 Final  Chloride 102 mmol/L  Final  Carbon Dioxide 27 mmol/L 21-31 Final  Anion Gap 8 mmol/L 4-13 Final  Blood Urea Nitrogen 13 mg/dL 7-25 Final  Creatinine 0.51 mg/dL 0.60-1.30 L Final  eGFRcr (CKD-EPI 2021) >90 mL/min/1.73  m2  >=60 Final  Calcium 8.7 mg/dL 8.3-10.5 Final  Glucose 102 mg/dL  H Final  Protein, Total 5.2 g/dL 6.4-8.3 L Final  Albumin 2.9 g/dL 3.5-5.0 L Final  ALT 13 units/L 11-51 Final  Alkaline Phosphatase 31 units/L  L Final  AST 10 units/L 13-39 L Final  Bilirubin, Total 0.3 mg/dL 0.2-1.2 Final    Assessment and plan:  HTN/Tachycardia-improved  -Vitals every shift  -Losartan 100 mg daily, hold for systolic blood pressure less than 110 or heart rate less than 60  -Metoprolol 25 mg bid, hold for sbp<100 or hr<60  -In-house Cardiology following    Localized Edema, Left hand-->resolved  -Doppler venous and arterial negative for dvt  -H/o Gout  -On Allopurinol daily and Colchcine prn  -X-ray of left hand   -Uric Acid Level wnl  -Continue to monitor    Leukocytosis-resolved    Depression/Anxiety  -Remeron to 15 mg at bedtime, per in-house PCP  -Xanax 0.5 mg daily as needed    MRSA in sacral decub  -Redness around wound and scrotum and groin-->stable  -Wound Culture-->negative  -Bactrim-->Completed  -Wound Care Consult  -Low Air Mattress with alternating air pressure  -Dressings per wound care team  -S/p Debridment  -In House ID consult  -Jah bid  -Completed n [facility] Unasyn, Cefepime and Flagyl  -Cipro  500-->completed  -Amoxicillin-->completed  -Continue Clotrimazole-Betamethasone 0.05% cream bid to groin and scotum    Poor Appetite  -Continue Remeron as listed above  -Weight improved  -Dietary following    Upset stomach-resolved  -Hold MVI, Cholecalciferol, Zinc and Vitamin C at this time  -Omeprazole as listed below  -Simethicone 80 mg prn up to 4 tabs every day  -Zofran 4 mg q8h prn for nausea    Right Foot Pain/H/o Gout  -X-ray negative for fracture  -Uric Acid Level 5.4-->4.8  -Continue Allopurinol 300 mg every day  -Repeat Uric Acid Level-->wnl  -Taking Colchicine 0.6 mg every day prn, per patient request  -Will not take Prednisone    DJD/H/o Cervical Surgeries/Weakness/Physical Deconditioning/Impaired mobility and ADLs/At risk for falling  -Fall Precautions  -Tylenol 1 g every 6 hours as needed for fever or pain if given for fever notify MD/NP  -Norco 5/325 mg every 8 hours as needed  -PT/OT/ST to evaluate and treat  -[facility] team to establish care plan meeting with patient and POA/family as appropriate  -Anticipate DC on or before 4/29/24; SW to assist patient/family w/ DC planning  -DC Plan:  Home with wife     Noriega status  -Has Buttock Ulcer  -Monitor q shift and prn for s/s of infection  -Meticulous care to noriega q shift and prn  -D/w patient discontinuing noriega.  Declined at this time     MS  -Ordered from hospital-->Prednisone 20 mg every day prn-->does not want Prednisone d/t SEs of sacral ulcer  -Dalfampridine ER 10 mg every day  -Ocrelizumab 600 mg q 6 months  -IVIG infusions completed  -F/u with Dr. Aleman, Neurology as directed    Insomnia  -Remeron 15 mg qhs  -Melatonin-->discontinued, per patient's request  -Xanax 0.5 mg tid prn    Recent Covid  -Isolation Precautions-completed  -No symptoms     H/o Renal Stones  -No treatment     H/o CP Atypical  -No treatment     Bladder Emptying Issue d/t MS/Sacral Wound  -Insert noriega for now until more mobile d/t sacral ulcer  -Monitor for s/s infection q  shift and prn     DVT Prophylaxis  -Heparin 5000 units q8h  -Encourage early mobilization and participation in PT/OT as able     Bowel Management Regimen/Constipation   -Miralax 17 g every day prn  -Senna S 8.6/50 mg bid     Supplements  -Probiotic twice daily  -Discontinued Vitamin C, Zinc, Vitamin D and B12 d/t stomach upset  -Magnesium 400 mg daily     LABS  -CBC/CMP weekly while in [facility]     Follow Up Appointments  -Dr. Napoleon Ovalles, PCP within 1-2 weeks following [facility] discharge.   -Dr. Galindo, neurology as directed  -Dr. Willow Mcmahon MD on 5/2/24     Talib Hubbard, APRN  4/24/24

## 2024-04-27 ENCOUNTER — HOSPITAL ENCOUNTER (INPATIENT)
Facility: HOSPITAL | Age: 79
LOS: 1 days | Discharge: NURSING, REHAB OR LONG-TERM CARE | DRG: 864 | End: 2024-04-28
Attending: EMERGENCY MEDICINE | Admitting: HOSPITALIST

## 2024-04-27 ENCOUNTER — HOSPITAL ENCOUNTER (INPATIENT)
Facility: HOSPITAL | Age: 79
LOS: 1 days | Discharge: NURSING, REHAB OR LONG-TERM CARE | End: 2024-04-28
Attending: EMERGENCY MEDICINE | Admitting: HOSPITALIST

## 2024-04-27 ENCOUNTER — APPOINTMENT (OUTPATIENT)
Dept: GENERAL RADIOLOGY | Facility: HOSPITAL | Age: 79
DRG: 864 | End: 2024-04-27
Attending: EMERGENCY MEDICINE

## 2024-04-27 ENCOUNTER — APPOINTMENT (OUTPATIENT)
Dept: GENERAL RADIOLOGY | Facility: HOSPITAL | Age: 79
End: 2024-04-27
Attending: EMERGENCY MEDICINE

## 2024-04-27 DIAGNOSIS — N39.0 URINARY TRACT INFECTION WITHOUT HEMATURIA, SITE UNSPECIFIED: Primary | ICD-10-CM

## 2024-04-27 DIAGNOSIS — L89.159 PRESSURE INJURY OF SKIN OF SACRAL REGION, UNSPECIFIED INJURY STAGE: ICD-10-CM

## 2024-04-27 LAB
ALBUMIN SERPL-MCNC: 2.2 G/DL (ref 3.4–5)
ALBUMIN/GLOB SERPL: 0.6 {RATIO} (ref 1–2)
ALP LIVER SERPL-CCNC: 35 U/L
ALT SERPL-CCNC: 17 U/L
ANION GAP SERPL CALC-SCNC: 6 MMOL/L (ref 0–18)
AST SERPL-CCNC: 10 U/L (ref 15–37)
BASOPHILS # BLD AUTO: 0.01 X10(3) UL (ref 0–0.2)
BASOPHILS NFR BLD AUTO: 0.1 %
BILIRUB SERPL-MCNC: 0.3 MG/DL (ref 0.1–2)
BILIRUB UR QL STRIP.AUTO: NEGATIVE
BUN BLD-MCNC: 12 MG/DL (ref 9–23)
CALCIUM BLD-MCNC: 8.5 MG/DL (ref 8.5–10.1)
CHLORIDE SERPL-SCNC: 105 MMOL/L (ref 98–112)
CO2 SERPL-SCNC: 27 MMOL/L (ref 21–32)
COLOR UR AUTO: YELLOW
CREAT BLD-MCNC: 0.55 MG/DL
EGFRCR SERPLBLD CKD-EPI 2021: 103 ML/MIN/1.73M2 (ref 60–?)
EOSINOPHIL # BLD AUTO: 0 X10(3) UL (ref 0–0.7)
EOSINOPHIL NFR BLD AUTO: 0 %
ERYTHROCYTE [DISTWIDTH] IN BLOOD BY AUTOMATED COUNT: 15.6 %
FLUAV + FLUBV RNA SPEC NAA+PROBE: NEGATIVE
FLUAV + FLUBV RNA SPEC NAA+PROBE: NEGATIVE
GLOBULIN PLAS-MCNC: 3.9 G/DL (ref 2.8–4.4)
GLUCOSE BLD-MCNC: 138 MG/DL (ref 70–99)
GLUCOSE UR STRIP.AUTO-MCNC: NORMAL MG/DL
HCT VFR BLD AUTO: 34.8 %
HGB BLD-MCNC: 11.4 G/DL
IMM GRANULOCYTES # BLD AUTO: 0.08 X10(3) UL (ref 0–1)
IMM GRANULOCYTES NFR BLD: 0.6 %
KETONES UR STRIP.AUTO-MCNC: NEGATIVE MG/DL
LACTATE SERPL-SCNC: 1.2 MMOL/L (ref 0.4–2)
LEUKOCYTE ESTERASE UR QL STRIP.AUTO: 500
LYMPHOCYTES # BLD AUTO: 1.86 X10(3) UL (ref 1–4)
LYMPHOCYTES NFR BLD AUTO: 13.5 %
MCH RBC QN AUTO: 29.4 PG (ref 26–34)
MCHC RBC AUTO-ENTMCNC: 32.8 G/DL (ref 31–37)
MCV RBC AUTO: 89.7 FL
MONOCYTES # BLD AUTO: 1.8 X10(3) UL (ref 0.1–1)
MONOCYTES NFR BLD AUTO: 13.1 %
NEUTROPHILS # BLD AUTO: 9.99 X10 (3) UL (ref 1.5–7.7)
NEUTROPHILS # BLD AUTO: 9.99 X10(3) UL (ref 1.5–7.7)
NEUTROPHILS NFR BLD AUTO: 72.7 %
OSMOLALITY SERPL CALC.SUM OF ELEC: 288 MOSM/KG (ref 275–295)
PH UR STRIP.AUTO: 5.5 [PH] (ref 5–8)
PLATELET # BLD AUTO: 377 10(3)UL (ref 150–450)
POTASSIUM SERPL-SCNC: 3.7 MMOL/L (ref 3.5–5.1)
PROT SERPL-MCNC: 6.1 G/DL (ref 6.4–8.2)
PROT UR STRIP.AUTO-MCNC: 20 MG/DL
RBC # BLD AUTO: 3.88 X10(6)UL
RSV RNA SPEC NAA+PROBE: NEGATIVE
SARS-COV-2 RNA RESP QL NAA+PROBE: NOT DETECTED
SODIUM SERPL-SCNC: 138 MMOL/L (ref 136–145)
SP GR UR STRIP.AUTO: 1.03 (ref 1–1.03)
UROBILINOGEN UR STRIP.AUTO-MCNC: NORMAL MG/DL
WBC # BLD AUTO: 13.7 X10(3) UL (ref 4–11)
WBC #/AREA URNS AUTO: >50 /HPF
WBC CLUMPS UR QL AUTO: PRESENT /HPF

## 2024-04-27 PROCEDURE — 81001 URINALYSIS AUTO W/SCOPE: CPT | Performed by: EMERGENCY MEDICINE

## 2024-04-27 PROCEDURE — 85025 COMPLETE CBC W/AUTO DIFF WBC: CPT | Performed by: EMERGENCY MEDICINE

## 2024-04-27 PROCEDURE — 71045 X-RAY EXAM CHEST 1 VIEW: CPT | Performed by: EMERGENCY MEDICINE

## 2024-04-27 PROCEDURE — 80053 COMPREHEN METABOLIC PANEL: CPT | Performed by: EMERGENCY MEDICINE

## 2024-04-27 PROCEDURE — 83605 ASSAY OF LACTIC ACID: CPT | Performed by: EMERGENCY MEDICINE

## 2024-04-27 PROCEDURE — 87086 URINE CULTURE/COLONY COUNT: CPT | Performed by: EMERGENCY MEDICINE

## 2024-04-27 PROCEDURE — 87077 CULTURE AEROBIC IDENTIFY: CPT | Performed by: EMERGENCY MEDICINE

## 2024-04-27 PROCEDURE — 85652 RBC SED RATE AUTOMATED: CPT | Performed by: HOSPITALIST

## 2024-04-27 PROCEDURE — 99285 EMERGENCY DEPT VISIT HI MDM: CPT

## 2024-04-27 PROCEDURE — 96365 THER/PROPH/DIAG IV INF INIT: CPT

## 2024-04-27 PROCEDURE — 0241U SARS-COV-2/FLU A AND B/RSV BY PCR (GENEXPERT): CPT | Performed by: EMERGENCY MEDICINE

## 2024-04-27 PROCEDURE — 87040 BLOOD CULTURE FOR BACTERIA: CPT | Performed by: EMERGENCY MEDICINE

## 2024-04-27 RX ORDER — SODIUM CHLORIDE 9 MG/ML
125 INJECTION, SOLUTION INTRAVENOUS CONTINUOUS
Status: DISCONTINUED | OUTPATIENT
Start: 2024-04-27 | End: 2024-04-28

## 2024-04-28 VITALS
OXYGEN SATURATION: 96 % | TEMPERATURE: 98 F | DIASTOLIC BLOOD PRESSURE: 43 MMHG | RESPIRATION RATE: 20 BRPM | HEART RATE: 94 BPM | SYSTOLIC BLOOD PRESSURE: 105 MMHG | BODY MASS INDEX: 23 KG/M2 | WEIGHT: 156.38 LBS

## 2024-04-28 PROBLEM — L89.159 PRESSURE INJURY OF SKIN OF SACRAL REGION, UNSPECIFIED INJURY STAGE: Status: ACTIVE | Noted: 2024-04-28

## 2024-04-28 PROBLEM — L89.159 PRESSURE INJURY OF SKIN OF SACRAL REGION, UNSPECIFIED INJURY STAGE: Status: ACTIVE | Noted: 2024-01-01

## 2024-04-28 PROBLEM — N39.0 URINARY TRACT INFECTION WITHOUT HEMATURIA, SITE UNSPECIFIED: Status: ACTIVE | Noted: 2024-04-28

## 2024-04-28 PROBLEM — N39.0 URINARY TRACT INFECTION WITHOUT HEMATURIA, SITE UNSPECIFIED: Status: ACTIVE | Noted: 2024-01-01

## 2024-04-28 LAB
ANION GAP SERPL CALC-SCNC: 6 MMOL/L (ref 0–18)
BUN BLD-MCNC: 9 MG/DL (ref 9–23)
CALCIUM BLD-MCNC: 8.6 MG/DL (ref 8.5–10.1)
CHLORIDE SERPL-SCNC: 106 MMOL/L (ref 98–112)
CO2 SERPL-SCNC: 25 MMOL/L (ref 21–32)
CREAT BLD-MCNC: 0.44 MG/DL
CRP SERPL-MCNC: 8.38 MG/DL (ref ?–0.3)
EGFRCR SERPLBLD CKD-EPI 2021: 110 ML/MIN/1.73M2 (ref 60–?)
ERYTHROCYTE [SEDIMENTATION RATE] IN BLOOD: 77 MM/HR
GLUCOSE BLD-MCNC: 104 MG/DL (ref 70–99)
OSMOLALITY SERPL CALC.SUM OF ELEC: 283 MOSM/KG (ref 275–295)
POTASSIUM SERPL-SCNC: 3.8 MMOL/L (ref 3.5–5.1)
PROCALCITONIN SERPL-MCNC: 0.1 NG/ML (ref ?–0.16)
SODIUM SERPL-SCNC: 137 MMOL/L (ref 136–145)

## 2024-04-28 PROCEDURE — 80048 BASIC METABOLIC PNL TOTAL CA: CPT | Performed by: INTERNAL MEDICINE

## 2024-04-28 PROCEDURE — 86140 C-REACTIVE PROTEIN: CPT | Performed by: HOSPITALIST

## 2024-04-28 PROCEDURE — 84145 PROCALCITONIN (PCT): CPT | Performed by: HOSPITALIST

## 2024-04-28 PROCEDURE — 96365 THER/PROPH/DIAG IV INF INIT: CPT

## 2024-04-28 RX ORDER — ALLOPURINOL 300 MG/1
300 TABLET ORAL DAILY
Status: DISCONTINUED | OUTPATIENT
Start: 2024-04-28 | End: 2024-04-28

## 2024-04-28 RX ORDER — ENOXAPARIN SODIUM 100 MG/ML
40 INJECTION SUBCUTANEOUS DAILY
Status: DISCONTINUED | OUTPATIENT
Start: 2024-04-28 | End: 2024-04-28

## 2024-04-28 RX ORDER — DALFAMPRIDINE 10 MG/1
1 TABLET, FILM COATED, EXTENDED RELEASE ORAL 2 TIMES DAILY
Status: DISCONTINUED | OUTPATIENT
Start: 2024-04-28 | End: 2024-04-28

## 2024-04-28 RX ORDER — METOPROLOL SUCCINATE 25 MG/1
25 TABLET, EXTENDED RELEASE ORAL
Status: DISCONTINUED | OUTPATIENT
Start: 2024-04-28 | End: 2024-04-28

## 2024-04-28 RX ORDER — MIRTAZAPINE 7.5 MG/1
15 TABLET, FILM COATED ORAL NIGHTLY
Status: DISCONTINUED | OUTPATIENT
Start: 2024-04-28 | End: 2024-04-28

## 2024-04-28 RX ORDER — ONDANSETRON 2 MG/ML
4 INJECTION INTRAMUSCULAR; INTRAVENOUS EVERY 6 HOURS PRN
Status: DISCONTINUED | OUTPATIENT
Start: 2024-04-28 | End: 2024-04-28

## 2024-04-28 RX ORDER — PANTOPRAZOLE SODIUM 40 MG/1
40 TABLET, DELAYED RELEASE ORAL
Status: DISCONTINUED | OUTPATIENT
Start: 2024-04-28 | End: 2024-04-28

## 2024-04-28 RX ORDER — ACETAMINOPHEN 325 MG/1
650 TABLET ORAL EVERY 6 HOURS PRN
Status: DISCONTINUED | OUTPATIENT
Start: 2024-04-28 | End: 2024-04-28

## 2024-04-28 RX ORDER — SODIUM HYPOCHLORITE 2.5 MG/ML
1 SOLUTION TOPICAL AS NEEDED
COMMUNITY

## 2024-04-28 NOTE — PLAN OF CARE
Pt. A&O x4. VSS. Afebrile. No c/o pain. De La Fuente catheter patent draining clear yellow urine. Dressing to sacral wound changed. Pt. Repositioned for comfort. IV antibiotics continued. Call light within reach.    Problem: SKIN/TISSUE INTEGRITY - ADULT  Goal: Skin integrity remains intact  Description: INTERVENTIONS  - Assess and document risk factors for pressure ulcer development  - Assess and document skin integrity  - Monitor for areas of redness and/or skin breakdown  - Initiate interventions, skin care algorithm/standards of care as needed  Outcome: Progressing  Goal: Incision(s), wounds(s) or drain site(s) healing without S/S of infection  Description: INTERVENTIONS:  - Assess and document risk factors for pressure ulcer development  - Assess and document skin integrity  - Assess and document dressing/incision, wound bed, drain sites and surrounding tissue  - Implement wound care per orders  - Initiate isolation precautions as appropriate  - Initiate Pressure Ulcer prevention bundle as indicated  Outcome: Progressing     Problem: RISK FOR INFECTION - ADULT  Goal: Absence of fever/infection during anticipated neutropenic period  Description: INTERVENTIONS  - Monitor WBC  - Administer growth factors as ordered  - Implement neutropenic guidelines  Outcome: Progressing

## 2024-04-28 NOTE — H&P
[facility] History and Physical      Chief Complaint   Patient presents with    Fever        PCP: Napoleon Ovalles DO      History of Present Illness: Patient is a 78 year old male with PMH sig for multiple sclerosis with lower extremity paraplegia, chronic sacral decubitus ulcer with polymicrobial cultures, hypertension, depression/anxiety, neurogenic bladder with chronic De La Fuente presented from [facility] with a fever.  Fever was noticed yesterday and he received some Tylenol.  Denies any chest pain, cough or congestion or sore throat. He says he was covered in blankets in a self-made 'coccoon' and he was sweating when they took his temperature. He doesn't feel sick and thinks they overreacted to his elevated temperature.   In the ER vitals are stable.  Labs significant for leukocytosis and urinalysis with WBCs and bacteria.  Chest x-ray unremarkable.  Started on Vanco and Zosyn and admitted for further evaluation and treatment.    Past Medical History:    Anxiety state, unspecified    BACK PAIN    Calculus of kidney    Cervical stenosis of spine    COVID-19    Gout    Gout    High blood pressure    HYPERTENSION    Multiple sclerosis (HCC)    Osteoarthrosis, unspecified whether generalized or localized, unspecified site    OTHER DISEASES    GOUT    Seborrheic dermatitis    Skin cancer    Unspecified essential hypertension    Visual impairment    glasses      Past Surgical History:   Procedure Laterality Date    Debride wound      x7    Other accessory      \"urolift\"    Other accessory      multiple skin Bx's/removals    Other surgical history  02/2011    LUMBAR SX    Other surgical history      cervical surgery    Spine surgery procedure unlisted          ALL:  No Known Allergies     No current outpatient medications on file.       Social History     Tobacco Use    Smoking status: Never    Smokeless tobacco: Never   Substance Use Topics    Alcohol use: Yes     Comment: once every few days        Fam Hx  Family  History   Problem Relation Age of Onset    Other (Other) Father         industrial lung abn    Other (gout) Father     Other (Other) Brother         colitis    Hypertension Mother        Review of Systems  Comprehensive ROS reviewed and negative except for what is stated in HPI.      OBJECTIVE:  /55 (BP Location: Right arm)   Pulse 94   Temp 98.1 °F (36.7 °C) (Oral)   Resp 20   Wt 156 lb 6.4 oz (70.9 kg)   SpO2 96%   BMI 23.10 kg/m²   General:  Alert, no distress, appears stated age. \   Head:  Normocephalic, without obvious abnormality, atraumatic.   Eyes:  Sclera anicteric, No conjunctival pallor, EOMs intact.    Nose: Nares normal. Septum midline. Mucosa normal. No drainage.   Throat: Lips, mucosa, and tongue normal. Teeth and gums normal.   Neck: Supple, symmetrical, trachea midline, no cervical or supraclavicular lymph adenopathy, thyroid: no enlargment/tenderness/nodules appreciated   Lungs:   Clear to auscultation bilaterally. Normal effort   Chest wall:  No tenderness or deformity.   Heart:  Regular rate and rhythm, S1, S2 normal, no murmur, rub or gallop appreciated   Abdomen:   Soft, non-tender. Bowel sounds normal. No masses,  No organomegaly. Non distended   Extremities: Extremities normal, atraumatic, no cyanosis or edema.   Skin: Skin color, texture, turgor normal. No rashes or lesions.    Neurologic: Normal strength, no focal deficit appreciated     Data Review:    LABS:   Lab Results   Component Value Date    WBC 13.7 04/27/2024    HGB 11.4 04/27/2024    HCT 34.8 04/27/2024    .0 04/27/2024    CREATSERUM 0.44 04/28/2024    BUN 9 04/28/2024     04/28/2024    K 3.8 04/28/2024     04/28/2024    CO2 25.0 04/28/2024     04/28/2024    CA 8.6 04/28/2024    ALB 2.2 04/27/2024    ALKPHO 35 04/27/2024    BILT 0.3 04/27/2024    TP 6.1 04/27/2024    AST 10 04/27/2024    ALT 17 04/27/2024       CXR: All imaging personally reviewed.      Radiology: XR CHEST AP PORTABLE   (CPT=71045)    Result Date: 4/27/2024            PROCEDURE:  XR CHEST AP PORTABLE  (CPT=71045)  TECHNIQUE:  AP chest radiograph was obtained.  COMPARISON:  [facility] , XR, XR CHEST AP PORTABLE  (CPT=71045), 2/02/2024, 6:41 PM.  INDICATIONS:  fever  PATIENT STATED HISTORY: (As transcribed by Technologist)  Patient shares that he had a fever earlier over 100 degrees.   FINDINGS: Cardiac silhouette and pulmonary vasculature are unremarkable. Small left pleural effusion.  No consolidation.  No pneumothorax. IMPRESSION: Small left pleural effusion.  No consolidation.   LOCATION:  [facility]      Dictated by (CST): Oscar Aleman MD on 4/27/2024 at 11:56 PM     Finalized by (CST): Oscar Aleman MD on 4/27/2024 at 11:56 PM          Assessment/Plan:     78 year old male with PMH sig for multiple sclerosis with lower extremity paraplegia, chronic sacral decubitus ulcer with polymicrobial cultures, hypertension, depression/anxiety, neurogenic bladder with chronic Noriega presented from [facility] with a fever.    Fever, leukocytosis  - likely from warm blankets, low suspicion for active infection   - vanc/zosyn started in ED  - ID consulted  - given chronic sacral wound, he'll always have elevated inflammatory markers (CRP/ESR)  - chronic noriega will always reveal urine sediment     MS with paraplegia  - stable     Essential HTN  - metoprolol    Depression/Anxiety  - remeron    Neurogenic bladder with chronic noriega  - routine noriega cares    FEN: regular diet, PT/OT  Proph: SCDs, heparin  Code status: Full code     Outpatient records or previous hospital records reviewed.   [facility] hospitalist to continue to follow patient while in house    Dispo - discussed with ID, unlikely to be true sepsis, will dc back to [facility]. If blood cx return positive he'll need to return to ER, he understands this     Amber Obrien MD  [facility]  Hospitalist  Message over [employer]  Pager: [phone]        **Certification      PHYSICIAN Certification of Need for Inpatient Hospitalization - Initial Certification    Patient will require inpatient services that will reasonably be expected to span two midnight's based on the clinical documentation in H+P.   Based on patients current state of illness, I anticipate that, after discharge, patient will require TBD.

## 2024-04-28 NOTE — ED QUICK NOTES
Orders for admission, patient is aware of plan and ready to go upstairs. Any questions, please call ED RN Court at extension [phone].     Patient Covid vaccination status: Fully vaccinated     COVID Test Ordered in ED: SARS-CoV-2/Flu A and B/RSV by PCR (GeneXpert)    COVID Suspicion at Admission: N/A    Running Infusions:    sodium chloride 125 mL/hr (04/27/24 8612)        Mental Status/LOC at time of transport: a/ox3    Other pertinent information:   CIWA score: N/A   NIH score:  N/A

## 2024-04-28 NOTE — ED PROVIDER NOTES
Patient Seen in: [facility] Emergency Department      History     Chief Complaint   Patient presents with    Fever     Stated Complaint:     Subjective:   HPI    78-year-old male presenting to the emergency department for fever.  Patient had a fever with unknown etiology began this afternoon.  Patient is no chest pain cough congestion sore throat chest pain shortness of breath abdominal pain discoloration of his urine or any other exacerbating factors or associated symptoms.    Objective:   Past Medical History:    Anxiety state, unspecified    BACK PAIN    Calculus of kidney    Cervical stenosis of spine    COVID-19    Gout    Gout    High blood pressure    HYPERTENSION    Multiple sclerosis (HCC)    Osteoarthrosis, unspecified whether generalized or localized, unspecified site    OTHER DISEASES    GOUT    Seborrheic dermatitis    Skin cancer    Unspecified essential hypertension    Visual impairment    glasses              Past Surgical History:   Procedure Laterality Date    Debride wound      x7    Other accessory      \"urolift\"    Other accessory      multiple skin Bx's/removals    Other surgical history  02/2011    LUMBAR SX    Other surgical history      cervical surgery    Spine surgery procedure unlisted                  Social History     Socioeconomic History    Marital status:    Tobacco Use    Smoking status: Never    Smokeless tobacco: Never   Vaping Use    Vaping status: Never Used   Substance and Sexual Activity    Alcohol use: Yes     Comment: once every few days    Drug use: Never   Other Topics Concern    Caffeine Concern No    Exercise Yes     Comment: Full therapy 2 x week     Social Determinants of Health     Food Insecurity: No Food Insecurity (2/2/2024)    Food Insecurity     Food Insecurity: Never true   Transportation Needs: No Transportation Needs (2/2/2024)    Transportation Needs     Lack of Transportation: No   Housing Stability: Low Risk  (2/2/2024)    Housing Stability      Housing Instability: No              Review of Systems    Positive for stated complaint:   Other systems are as noted in HPI.  Constitutional and vital signs reviewed.      All other systems reviewed and negative except as noted above.    Physical Exam     ED Triage Vitals [04/27/24 2238]   BP 98/59   Pulse 83   Resp 18   Temp 97.5 °F (36.4 °C)   Temp src Temporal   SpO2 98 %   O2 Device None (Room air)       Current:/59   Pulse 75   Temp 97.5 °F (36.4 °C) (Temporal)   Resp 24   SpO2 98%         Physical Exam  Awake alert patient appears no distress HEENT exam normal lungs are clear cardiovascular exam regular rhythm and Rodríguez soft nontender extremities no clubbing cyanosis or edema no rash back exam is normal skin is nondiaphoretic no focal neurologic deficits indwelling De La Fuente catheter sacral region decubitus ulcer present with surrounding erythema       ED Course     Labs Reviewed   COMP METABOLIC PANEL (14) - Abnormal; Notable for the following components:       Result Value    Glucose 138 (*)     Creatinine 0.55 (*)     AST 10 (*)     Alkaline Phosphatase 35 (*)     Total Protein 6.1 (*)     Albumin 2.2 (*)     A/G Ratio 0.6 (*)     All other components within normal limits   URINALYSIS WITH CULTURE REFLEX - Abnormal; Notable for the following components:    Clarity Urine Turbid (*)     Blood Urine 1+ (*)     Protein Urine 20 (*)     Nitrite Urine 1+ (*)     Leukocyte Esterase Urine 500 (*)     WBC Urine >50 (*)     RBC Urine 6-10 (*)     Bacteria Urine Rare (*)     Squamous Epi. Cells Few (*)     WBC Clump Present (*)     All other components within normal limits   CBC W/ DIFFERENTIAL - Abnormal; Notable for the following components:    WBC 13.7 (*)     HGB 11.4 (*)     HCT 34.8 (*)     Neutrophil Absolute Prelim 9.99 (*)     Neutrophil Absolute 9.99 (*)     Monocyte Absolute 1.80 (*)     All other components within normal limits   LACTIC ACID, PLASMA - Normal   SARS-COV-2/FLU A AND B/RSV BY PCR  (GENEXPERT) - Normal    Narrative:     This test is intended for the qualitative detection and differentiation of SARS-CoV-2, influenza A, influenza B, and respiratory syncytial virus (RSV) viral RNA in nasopharyngeal or nares swabs from individuals suspected of respiratory viral infection consistent with COVID-19 by their healthcare provider. Signs and symptoms of respiratory viral infection due to SARS-CoV-2, influenza, and RSV can be similar.    Test performed using the Xpert Xpress SARS-CoV-2/FLU/RSV (real time RT-PCR)  assay on the GeneXpert instrument, [employer], [place] [address].   This test is being used under the Food and Drug Administration's Emergency Use Authorization.    The authorized Fact Sheet for Healthcare Providers for this assay is available upon request from the laboratory.   CBC WITH DIFFERENTIAL WITH PLATELET    Narrative:     The following orders were created for panel order CBC With Differential With Platelet.  Procedure                               Abnormality         Status                     ---------                               -----------         ------                     CBC W/ DIFFERENTIAL[[id number]]          Abnormal            Final result                 Please view results for these tests on the individual orders.   RAINBOW DRAW BLUE   RAINBOW DRAW GOLD   BLOOD CULTURE   BLOOD CULTURE   URINE CULTURE, ROUTINE             Differential diagnosis includes pneumonia, sepsis, COVID         MDM        Admission disposition: 4/28/2024 12:10 AM                                        Medical Decision Making  78-year-old male presenting to the emergency department for fever.  IV established cardiac monitor shows a sinus rhythm pulse ox shows no signs of hypoxia.  White cell count shows an elevation lactic acidosis was within acceptable limits.  Urinalysis signs of infection metabolic panel stable renal function independent interpretation by ED physician of chest x-ray shows no focal  consolidation patient will be admitted for IV antibiotics and further evaluation    Problems Addressed:  Pressure injury of skin of sacral region, unspecified injury stage: acute illness or injury  Urinary tract infection without hematuria, site unspecified: acute illness or injury    Amount and/or Complexity of Data Reviewed  Labs: ordered. Decision-making details documented in ED Course.  Radiology: ordered and independent interpretation performed. Decision-making details documented in ED Course.  ECG/medicine tests: ordered and independent interpretation performed. Decision-making details documented in ED Course.        Disposition and Plan     Clinical Impression:  1. Urinary tract infection without hematuria, site unspecified    2. Pressure injury of skin of sacral region, unspecified injury stage         Disposition:  Admit  4/28/2024 12:10 am    Follow-up:  No follow-up provider specified.        Medications Prescribed:  Current Discharge Medication List                            Hospital Problems       Present on Admission  Date Reviewed: 4/27/2024            ICD-10-CM Noted POA    * (Principal) Urinary tract infection without hematuria, site unspecified N39.0 4/28/2024 Unknown

## 2024-04-28 NOTE — PROGRESS NOTES
[facility] Dosing Service      Initial Pharmacokinetic Consult for Vancomycin Dosing     Brenden Cardenas is a 78 year old male who is being initiated on vancomycin therapy for cellulitis.  Pharmacy has been asked to dose vancomycin by Dr Wise.  The initial treatment and monitoring approach will be steady state AUC strategy.        Weight and Temperature:    Wt Readings from Last 1 Encounters:   24 70.9 kg (156 lb 6.4 oz)        Temp Readings from Last 1 Encounters:   24 98.1 °F (36.7 °C) (Oral)      Labs:   Recent Labs   Lab 24  2254   CREATSERUM 0.55*      Estimated Creatinine Clearance: 114.3 mL/min (A) (based on SCr of 0.55 mg/dL (L)).     Recent Labs   Lab 24  2254   WBC 13.7*          The Pharmacokinetic Target is:     to 600 mg-h/L and trough <=15 mg/L    Renal Dosing Considerations:    None     Assessment/Plan:   Initial/Loading dose: Will receive 1000 mg IV (15 mg/kg, capped at 2250 mg) x 1 initial dose.      Maintenance dose: Pharmacy will dose vancomycin at 1000 mg IV every 12 hours    Monitorin) Plan for vancomycin peak and trough to be obtained at steady state    2) Pharmacy will order SCr as clinically indicated to assess renal function.    3) Pharmacy will monitor for toxicity and efficacy, adjust vancomycin dose and/or frequency, and order vancomycin levels as appropriate per the Pharmacy and Therapeutics Committee approved protocol until discontinuation of the medication.       We appreciate the opportunity to assist in the care of this patient.     Jamel Ponce [facility]  2024  2:06 AM  [facility] Extension: [phone]

## 2024-04-28 NOTE — CONSULTS
[facility]   part of [facility] Infectious Disease  Report of Consultation    Brenden Cardenas Patient Status:  Inpatient    1945 MRN [id number]   Location [facility] 4NW-A Attending Eris Obrien*   Hosp Day # 0 PCP Napoleon Ovalles DO     Date of Admission:  2024  Date of Consult:  2024    Reason for Consultation:  Fever    History of Present Illness:  Brenden Cardenas is a a(n) 78 year old male being seen at your request regarding a fever he experienced at [facility].  Patient has a h/o chronic De La Fuente and chronic sacral wound which has been progressing well with wound care.  He was feeling well and without complaints but spiked a temp at [facility] prompting ED presentation.      Patient with some mild active urine sediment given chronic De La Fuente.  He did have some elevated inflammatory markers but not unexpected given his chronic wound process.  Flu/COVID/RSV negative.  CXR with small L pleural effusion only.    Patient has has no further temps.  He has no focal complaints.  Feels well enough for return to [facility].    History:  Past Medical History:    Anxiety state, unspecified    BACK PAIN    Calculus of kidney    Cervical stenosis of spine    COVID-19    Gout    Gout    High blood pressure    HYPERTENSION    Multiple sclerosis (HCC)    Osteoarthrosis, unspecified whether generalized or localized, unspecified site    OTHER DISEASES    GOUT    Seborrheic dermatitis    Skin cancer    Unspecified essential hypertension    Visual impairment    glasses     Past Surgical History:   Procedure Laterality Date    Debride wound      x7    Other accessory      \"urolift\"    Other accessory      multiple skin Bx's/removals    Other surgical history  2011    LUMBAR SX    Other surgical history      cervical surgery    Spine surgery procedure unlisted       Family History   Problem Relation Age of Onset    Other (Other) Father         industrial lung abn     Other (gout) Father     Other (Other) Brother         colitis    Hypertension Mother       reports that he has never smoked. He has never used smokeless tobacco. He reports current alcohol use. He reports that he does not use drugs.    Allergies:  No Known Allergies    Medications:    Current Facility-Administered Medications:     allopurinol (Zyloprim) tab 300 mg, 300 mg, Oral, Daily    [Held by provider] Dalfampridine ER TB12 10 mg, 1 tablet, Oral, BID    metoprolol succinate ER (Toprol XL) 24 hr tab 25 mg, 25 mg, Oral, 2x Daily(Beta Blocker)    mirtazapine (Remeron) tab 15 mg, 15 mg, Oral, Nightly    pantoprazole (Protonix) DR tab 40 mg, 40 mg, Oral, BID AC    vancomycin (Vancocin) 1,000 mg in sodium chloride 0.9% 250 mL IVPB-ADDV, 15 mg/kg, Intravenous, Q12H    enoxaparin (Lovenox) 40 MG/0.4ML SUBQ injection 40 mg, 40 mg, Subcutaneous, Daily    acetaminophen (Tylenol) tab 650 mg, 650 mg, Oral, Q6H PRN    ondansetron (Zofran) 4 MG/2ML injection 4 mg, 4 mg, Intravenous, Q6H PRN    piperacillin-tazobactam (Zosyn) 3.375 g in dextrose 5% 100 mL IVPB-ADDV, 3.375 g, Intravenous, Q8H    sodium chloride 0.9% infusion, 125 mL/hr, Intravenous, Continuous    Review of Systems:    Constitutional:  Fever.   HEENT:  No visual changes, oral ulcers, sore throat, difficulty swallowing.   Respiratory: Negative for cough, sputum, hemoptysis, chest pain, wheezing, dyspnea on exertion, or stridor.   Cardiovascular: Negative for chest pain, palpitations, irregular heart beats.   Gastrointestinal:  No abdominal pain, nausea, vomiting, diarrhea, or constipation.   Genitourinary:  Chronic De La Fuente, neurogenic bladder.   Integument/breast: Negative for rash, skin lesions, and pruritus.   Hematologic/lymphatic: Negative for easy bruising, bleeding, and lymphadenopathy.   Musculoskeletal: Chronic sacral wound.   Neurological: No focal neurologic deficits, seizures, tremors.   Psych:  No h/o anxiety, depression, other psych d/o.   Endocrine: No  history of of diabetes, thyroid disorder.    Remainder of 12 point review of systems otherwise negative.    Vital signs in last 24 hours:  Patient Vitals for the past 24 hrs:   BP Temp Temp src Pulse Resp SpO2 Weight   04/28/24 1138 105/43 98.1 °F (36.7 °C) Oral -- -- -- --   04/28/24 0500 113/55 98.1 °F (36.7 °C) Oral 94 20 96 % --   04/28/24 0150 128/54 98.1 °F (36.7 °C) Oral 82 -- 98 % 156 lb 6.4 oz (70.9 kg)   04/28/24 0045 130/63 -- -- 79 24 98 % --   04/27/24 2330 112/59 -- -- 75 24 98 % --   04/27/24 2326 112/59 -- -- 76 18 98 % --   04/27/24 2238 98/59 97.5 °F (36.4 °C) Temporal 83 18 98 % --       Intake/Output:  No intake/output data recorded.    Physical Exam:   General: Awake, alert, non-tox and in NAD.   Head: Normocephalic, without obvious abnormality, atraumatic.   Eyes: Conjunctivae/corneas clear.  No scleral icterus.  No conjunctival     hemorrhage.   Nose: Nares normal.   Throat:  Oropharynx clear, MMs moist.   Neck: Trachea ML, no masses.   Lungs: CTA b/l no rhonchi, rales, wheezes.   Chest wall: No tenderness or deformity.   Heart: Regular rate and rhythm, normal S1S2, no murmurs.   Abdomen: Soft, NT/ND.  Bowel sounds present.  No organomegaly.   Extremity: No edema.   Skin: No rashes or lesions.   Neurological: No focal neurologic deficits.    Lab Data Review:  Lab Results   Component Value Date    WBC 13.7 04/27/2024    HGB 11.4 04/27/2024    HCT 34.8 04/27/2024    .0 04/27/2024    CREATSERUM 0.44 04/28/2024    BUN 9 04/28/2024     04/28/2024    K 3.8 04/28/2024     04/28/2024    CO2 25.0 04/28/2024     04/28/2024    CA 8.6 04/28/2024    ALB 2.2 04/27/2024    ALKPHO 35 04/27/2024    BILT 0.3 04/27/2024    TP 6.1 04/27/2024    AST 10 04/27/2024    ALT 17 04/27/2024    ESRML 77 04/27/2024    CRP 8.38 04/28/2024      Cultures:   Blood cultures pending  Urine cultures pending    Flu/COVID/RSV negative    Radiology:  IMPRESSION:   Small left pleural effusion.  No  consolidation.       Assessment and Plan:    Single temperature spike in this gentleman with a h/o advanced MS and neurogenic bladder but without focal complaints at the time of presentation  - No further temperatures  - CXR negative, flu/COVID/RSV negative  - Mild active urine sediment not unexpected in the context of a chronic De La Fuente  - Blood cultures negative thus far  - Empiric IV vancomycin and zosyn started    2.  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  Low grade leukocytosis due to the above  - At 13K today    4.  Disposition - stable from ID standpoint without focal complaints and no further fevers reported.  Cultures are pending and can be followed as an outpatient.  He has a very similar UA to his 2/4/24 sample which was NG on cultures.  No pulmonary source.  Wound is stable.  From ID standpoint OK for return to ECF as planned off all antibiotics.  Patient is aware he may have to return to the hospital if temps return or any focal findings are noted.  He agrees to this plan.  D/w patient.  D/w Dr. Alberto.    Ginna Ragland [facility] Infectious Disease  [phone]    4/28/2024  11:52 AM

## 2024-04-28 NOTE — PLAN OF CARE
NURSING ADMISSION NOTE    Patient admitted via Cart  Oriented to room.  Safety precautions initiated.  Bed in low position.  Call light in reach.    Pt received from ER. Pt a/o x4. VSS on RA. Afebrile. No c/o pain at the moment. Noriega & sacrum wound present on admission. Picture taken, cleansed. Per pt & SNF ([facility]) paperwork, pt on noriega for neurogenic bladder, MS hx, sacral wound. Meds per MAR. IVF per order. ID & wound team to [facility].     Fall risk protocol followed, call light within reach.     ID consulted.

## 2024-04-28 NOTE — CM/SW NOTE
04/28/24 1326   Discharge disposition   Expected discharge disposition [facility] Term [facility]   Post Acute Care Provider [facility] Nursing   Discharge transportation [facility]     Pt is from [facility]. Per RN, pt cleared to return today. DANETTE spoke w/ MREARY Burris from [facility], who stated they'll be ready for pt's return at 1600.     [facility] (bed bound, complete) arranged for 1600 . DANETTE updated pt's spouse, Alicia via phone about pt's return today.     RN to call report to [facility] [phone]    Bouchra Wright, [facility] - [phone]

## 2024-04-28 NOTE — PROGRESS NOTES
NURSING DISCHARGE NOTE    Discharged Nursing home via Ambulance.  Accompanied by Support staff  Belongings Taken by patient/family.    Pt. Cleared for discharge per MDs. Report given to receiving RN. IV removed.

## 2024-04-28 NOTE — ED INITIAL ASSESSMENT (HPI)
Patient from [facility] arrives with [place] ambulance for fever of 103, 2045 patient received 500mg tylenol. Patient received 400mls 0.9 enroute. Dexi 161. Patient with no complaints, per ems patient was started on metoprolol for tachycardia.

## 2024-04-29 ENCOUNTER — TELEPHONE (OUTPATIENT)
Dept: INTERNAL MEDICINE CLINIC | Age: 79
End: 2024-04-29

## 2024-06-18 ENCOUNTER — HOSPITAL ENCOUNTER (OUTPATIENT)
Dept: MRI IMAGING | Facility: HOSPITAL | Age: 79
Discharge: HOME | End: 2024-06-18
Attending: Other

## 2024-06-18 ENCOUNTER — LAB ENCOUNTER (OUTPATIENT)
Dept: LAB | Facility: HOSPITAL | Age: 79
End: 2024-06-18
Attending: Other

## 2024-06-18 DIAGNOSIS — G35 MS (MULTIPLE SCLEROSIS) (HCC): Primary | ICD-10-CM

## 2024-06-18 DIAGNOSIS — R29.898 WEAKNESS OF BOTH LEGS: ICD-10-CM

## 2024-06-18 DIAGNOSIS — G35 MS (MULTIPLE SCLEROSIS) (HCC): ICD-10-CM

## 2024-06-18 PROCEDURE — 86360 T CELL ABSOLUTE COUNT/RATIO: CPT

## 2024-06-18 PROCEDURE — 86359 T CELLS TOTAL COUNT: CPT

## 2024-06-18 PROCEDURE — 70553 MRI BRAIN STEM W/O & W/DYE: CPT | Performed by: OTHER

## 2024-06-18 PROCEDURE — 72156 MRI NECK SPINE W/O & W/DYE: CPT | Performed by: OTHER

## 2024-06-18 PROCEDURE — 86355 B CELLS TOTAL COUNT: CPT

## 2024-06-18 PROCEDURE — 86357 NK CELLS TOTAL COUNT: CPT

## 2024-06-18 PROCEDURE — 36415 COLL VENOUS BLD VENIPUNCTURE: CPT

## 2024-06-18 PROCEDURE — A9575 INJ GADOTERATE MEGLUMI 0.1ML: HCPCS | Performed by: OTHER

## 2024-06-18 RX ORDER — GADOTERATE MEGLUMINE 376.9 MG/ML
20 INJECTION INTRAVENOUS
Status: COMPLETED | OUTPATIENT
Start: 2024-06-18 | End: 2024-06-18

## 2024-06-18 RX ADMIN — GADOTERATE MEGLUMINE 14 ML: 376.9 INJECTION INTRAVENOUS at 19:07:00

## 2024-06-19 ENCOUNTER — TELEPHONE (OUTPATIENT)
Dept: NEUROLOGY | Facility: CLINIC | Age: 79
End: 2024-06-19

## 2024-06-19 LAB
CD19 CELLS NFR SPEC: <1 %
CD3 CELLS NFR SPEC: 86 %
CD3+CD4+ CELLS # BLD: 1038 /MM3
CD3+CD4+ CELLS NFR BLD: 53 %
CD3+CD4+ CELLS/CD3+CD8+ CLL SPEC: 1.5
CD3+CD8+ CELLS NFR SPEC: 35 %
CD3-CD16+CD56+ CELLS NFR SPEC: 12 %

## 2024-06-19 NOTE — PROGRESS NOTES
The MRI of BRAIN showed the same extensive lesions but there seems to be only minor worsening but no new lesions seen    T and B cell indicates low B cell which means we are achieving goal of depleting the B cells which the aim of using Ocrevus infusion    Dr. SKYLER Aleman  e

## 2024-06-21 ENCOUNTER — PATIENT MESSAGE (OUTPATIENT)
Dept: NEUROLOGY | Facility: CLINIC | Age: 79
End: 2024-06-21

## 2024-06-21 DIAGNOSIS — G35 MS (MULTIPLE SCLEROSIS) (HCC): Primary | ICD-10-CM

## 2024-06-21 NOTE — TELEPHONE ENCOUNTER
From: Brenden Cardenas  To: Samuel Aleman  Sent: 6/21/2024 11:20 AM CDT  Subject: Test results    Dear Dr. Aleman,    Interesting! I wouldn't have thought. What diseases do MRI results such as these rule out? One of interest to men of my age is dementia.     We discussed a short term aggressive prednisone regimen be used going forward. Is this still viable?    Sim

## 2024-06-21 NOTE — TELEPHONE ENCOUNTER
From: Brenden Cardenas  To: Samuel Aleman  Sent: 6/21/2024 11:27 AM CDT  Subject: Physical therapy    Hi again,    I need a referral to [facility] therapy for my MS treatment which will focus on leg mobility and core strengthening. Additionally work has to be done on my left arm use.    Contact information for [facility] [address]

## 2024-06-27 ENCOUNTER — APPOINTMENT (OUTPATIENT)
Dept: GENERAL RADIOLOGY | Facility: HOSPITAL | Age: 79
End: 2024-06-27
Attending: EMERGENCY MEDICINE
Payer: MEDICARE

## 2024-06-27 ENCOUNTER — HOSPITAL ENCOUNTER (INPATIENT)
Facility: HOSPITAL | Age: 79
LOS: 6 days | Discharge: HOME HEALTH | End: 2024-07-03
Attending: EMERGENCY MEDICINE | Admitting: INTERNAL MEDICINE
Payer: MEDICARE

## 2024-06-27 DIAGNOSIS — I95.9 SEPSIS WITH HYPOTENSION (HCC): Primary | ICD-10-CM

## 2024-06-27 DIAGNOSIS — N30.00 ACUTE CYSTITIS WITHOUT HEMATURIA: ICD-10-CM

## 2024-06-27 DIAGNOSIS — A41.9 SEPSIS WITH HYPOTENSION (HCC): Primary | ICD-10-CM

## 2024-06-27 DIAGNOSIS — L89.90 INFECTED DECUBITUS ULCER, UNSPECIFIED ULCER STAGE: ICD-10-CM

## 2024-06-27 DIAGNOSIS — L08.9 INFECTED DECUBITUS ULCER, UNSPECIFIED ULCER STAGE: ICD-10-CM

## 2024-06-27 PROBLEM — R62.51 FAILURE TO THRIVE (CHILD): Status: ACTIVE | Noted: 2024-01-01

## 2024-06-27 PROBLEM — L89.304 PRESSURE INJURY OF BUTTOCK, STAGE 4 (HCC): Status: ACTIVE | Noted: 2024-01-01

## 2024-06-27 PROBLEM — D72.829 LEUKOCYTOSIS: Status: ACTIVE | Noted: 2024-01-01

## 2024-06-27 PROBLEM — D72.829 LEUKOCYTOSIS: Status: ACTIVE | Noted: 2024-06-27

## 2024-06-27 PROBLEM — R62.51 FAILURE TO THRIVE (CHILD): Status: ACTIVE | Noted: 2024-06-27

## 2024-06-27 PROBLEM — L89.304 PRESSURE INJURY OF BUTTOCK, STAGE 4 (HCC): Status: ACTIVE | Noted: 2024-02-02

## 2024-06-27 LAB
ALBUMIN SERPL-MCNC: 1.6 G/DL (ref 3.4–5)
ALBUMIN/GLOB SERPL: 0.4 {RATIO} (ref 1–2)
ALP LIVER SERPL-CCNC: 61 U/L
ALT SERPL-CCNC: 42 U/L
ANION GAP SERPL CALC-SCNC: 10 MMOL/L (ref 0–18)
APTT PPP: 33.5 SECONDS (ref 23–36)
AST SERPL-CCNC: 34 U/L (ref 15–37)
BASOPHILS # BLD: 0 X10(3) UL (ref 0–0.2)
BASOPHILS NFR BLD: 0 %
BILIRUB SERPL-MCNC: 0.5 MG/DL (ref 0.1–2)
BILIRUB UR QL STRIP.AUTO: NEGATIVE
BUN BLD-MCNC: 13 MG/DL (ref 9–23)
CALCIUM BLD-MCNC: 9 MG/DL (ref 8.5–10.1)
CHLORIDE SERPL-SCNC: 104 MMOL/L (ref 98–112)
CO2 SERPL-SCNC: 23 MMOL/L (ref 21–32)
COLOR UR AUTO: YELLOW
CREAT BLD-MCNC: 0.34 MG/DL
EGFRCR SERPLBLD CKD-EPI 2021: 118 ML/MIN/1.73M2 (ref 60–?)
EOSINOPHIL # BLD: 0 X10(3) UL (ref 0–0.7)
EOSINOPHIL NFR BLD: 0 %
ERYTHROCYTE [DISTWIDTH] IN BLOOD BY AUTOMATED COUNT: 15.1 %
FLUAV + FLUBV RNA SPEC NAA+PROBE: NEGATIVE
FLUAV + FLUBV RNA SPEC NAA+PROBE: NEGATIVE
GLOBULIN PLAS-MCNC: 3.8 G/DL (ref 2.8–4.4)
GLUCOSE BLD-MCNC: 77 MG/DL (ref 70–99)
GLUCOSE UR STRIP.AUTO-MCNC: NORMAL MG/DL
HCT VFR BLD AUTO: 34.9 %
HGB BLD-MCNC: 11.6 G/DL
INR BLD: 1.31 (ref 0.8–1.2)
KETONES UR STRIP.AUTO-MCNC: NEGATIVE MG/DL
LACTATE SERPL-SCNC: 1.4 MMOL/L (ref 0.4–2)
LEUKOCYTE ESTERASE UR QL STRIP.AUTO: 500
LYMPHOCYTES NFR BLD: 0.77 X10(3) UL (ref 1–4)
LYMPHOCYTES NFR BLD: 2 %
MCH RBC QN AUTO: 27.4 PG (ref 26–34)
MCHC RBC AUTO-ENTMCNC: 33.2 G/DL (ref 31–37)
MCV RBC AUTO: 82.5 FL
MONOCYTES # BLD: 0.38 X10(3) UL (ref 0.1–1)
MONOCYTES NFR BLD: 1 %
MORPHOLOGY: NORMAL
NEUTROPHILS # BLD AUTO: 34.65 X10 (3) UL (ref 1.5–7.7)
NEUTROPHILS NFR BLD: 95 %
NEUTS BAND NFR BLD: 2 %
NEUTS HYPERSEG # BLD: 37.25 X10(3) UL (ref 1.5–7.7)
OSMOLALITY SERPL CALC.SUM OF ELEC: 283 MOSM/KG (ref 275–295)
PH UR STRIP.AUTO: 6.5 [PH] (ref 5–8)
PLATELET # BLD AUTO: 465 10(3)UL (ref 150–450)
PLATELET MORPHOLOGY: NORMAL
POTASSIUM SERPL-SCNC: 4.1 MMOL/L (ref 3.5–5.1)
PROT SERPL-MCNC: 5.4 G/DL (ref 6.4–8.2)
PROT UR STRIP.AUTO-MCNC: 100 MG/DL
PROTHROMBIN TIME: 16.4 SECONDS (ref 11.6–14.8)
RBC # BLD AUTO: 4.23 X10(6)UL
RBC #/AREA URNS AUTO: >10 /HPF
RSV RNA SPEC NAA+PROBE: NEGATIVE
SARS-COV-2 RNA RESP QL NAA+PROBE: NOT DETECTED
SODIUM SERPL-SCNC: 137 MMOL/L (ref 136–145)
SP GR UR STRIP.AUTO: 1.02 (ref 1–1.03)
TOTAL CELLS COUNTED BLD: 100
UROBILINOGEN UR STRIP.AUTO-MCNC: 2 MG/DL
WBC # BLD AUTO: 38.4 X10(3) UL (ref 4–11)
WBC #/AREA URNS AUTO: >50 /HPF
WBC CLUMPS UR QL AUTO: PRESENT /HPF

## 2024-06-27 PROCEDURE — 71045 X-RAY EXAM CHEST 1 VIEW: CPT | Performed by: EMERGENCY MEDICINE

## 2024-06-27 PROCEDURE — 99291 CRITICAL CARE FIRST HOUR: CPT | Performed by: NURSE PRACTITIONER

## 2024-06-27 RX ORDER — VANCOMYCIN 1.75 GRAM/500 ML IN 0.9 % SODIUM CHLORIDE INTRAVENOUS
25 ONCE
Status: DISCONTINUED | OUTPATIENT
Start: 2024-06-27 | End: 2024-06-28

## 2024-06-27 RX ORDER — ENOXAPARIN SODIUM 100 MG/ML
40 INJECTION SUBCUTANEOUS DAILY
Status: DISCONTINUED | OUTPATIENT
Start: 2024-06-28 | End: 2024-07-03

## 2024-06-27 RX ORDER — LIDOCAINE HYDROCHLORIDE 20 MG/ML
JELLY TOPICAL
Status: COMPLETED
Start: 2024-06-27 | End: 2024-06-27

## 2024-06-27 RX ORDER — METOCLOPRAMIDE HYDROCHLORIDE 5 MG/ML
10 INJECTION INTRAMUSCULAR; INTRAVENOUS EVERY 8 HOURS PRN
Status: DISCONTINUED | OUTPATIENT
Start: 2024-06-27 | End: 2024-07-03

## 2024-06-27 RX ORDER — BISACODYL 10 MG
10 SUPPOSITORY, RECTAL RECTAL
Status: DISCONTINUED | OUTPATIENT
Start: 2024-06-27 | End: 2024-07-03

## 2024-06-27 RX ORDER — SENNOSIDES 8.6 MG
17.2 TABLET ORAL NIGHTLY PRN
Status: DISCONTINUED | OUTPATIENT
Start: 2024-06-27 | End: 2024-07-03

## 2024-06-27 RX ORDER — ACETAMINOPHEN 500 MG
500 TABLET ORAL EVERY 4 HOURS PRN
Status: DISCONTINUED | OUTPATIENT
Start: 2024-06-27 | End: 2024-07-03

## 2024-06-27 RX ORDER — ENEMA 19; 7 G/133ML; G/133ML
1 ENEMA RECTAL ONCE AS NEEDED
Status: DISCONTINUED | OUTPATIENT
Start: 2024-06-27 | End: 2024-07-03

## 2024-06-27 RX ORDER — SODIUM CHLORIDE 9 MG/ML
INJECTION, SOLUTION INTRAVENOUS CONTINUOUS
Status: DISCONTINUED | OUTPATIENT
Start: 2024-06-27 | End: 2024-07-03

## 2024-06-27 RX ORDER — POLYETHYLENE GLYCOL 3350 17 G/17G
17 POWDER, FOR SOLUTION ORAL DAILY PRN
Status: DISCONTINUED | OUTPATIENT
Start: 2024-06-27 | End: 2024-07-03

## 2024-06-27 RX ORDER — LIDOCAINE HYDROCHLORIDE 20 MG/ML
10 JELLY TOPICAL ONCE
Status: COMPLETED | OUTPATIENT
Start: 2024-06-27 | End: 2024-06-27

## 2024-06-27 RX ORDER — MELATONIN
3 NIGHTLY PRN
Status: DISCONTINUED | OUTPATIENT
Start: 2024-06-27 | End: 2024-07-03

## 2024-06-27 RX ORDER — ONDANSETRON 2 MG/ML
4 INJECTION INTRAMUSCULAR; INTRAVENOUS EVERY 6 HOURS PRN
Status: DISCONTINUED | OUTPATIENT
Start: 2024-06-27 | End: 2024-07-03

## 2024-06-27 RX ORDER — SODIUM CHLORIDE 9 MG/ML
INJECTION, SOLUTION INTRAVENOUS CONTINUOUS
Status: DISCONTINUED | OUTPATIENT
Start: 2024-06-27 | End: 2024-06-27

## 2024-06-27 RX ORDER — ALBUMIN (HUMAN) 12.5 G/50ML
25 SOLUTION INTRAVENOUS ONCE
Status: COMPLETED | OUTPATIENT
Start: 2024-06-27 | End: 2024-06-27

## 2024-06-27 NOTE — ED PROVIDER NOTES
Patient Seen in: [facility] Emergency Department      History     Chief Complaint   Patient presents with    Hypotension     Stated Complaint:     Subjective:   HPI    Patient is a 79-year-old male, MS bedbound with a known large sacral decub with osteomyelitis presents with evidence of sepsis.  Tachycardic and hypotensive at the nursing home.  Has sediment, infected appearing urine and a chronic [place].  Denies other specific complaints.    Objective:   Past Medical History:    Anxiety state, unspecified    BACK PAIN    Calculus of kidney    Cervical stenosis of spine    COVID-19    Gout    Gout    High blood pressure    HYPERTENSION    Multiple sclerosis (HCC)    Osteoarthrosis, unspecified whether generalized or localized, unspecified site    OTHER DISEASES    GOUT    Seborrheic dermatitis    Skin cancer    Unspecified essential hypertension    Visual impairment    glasses              Past Surgical History:   Procedure Laterality Date    Debride wound      x7    Other accessory      \"urolift\"    Other accessory      multiple skin Bx's/removals    Other surgical history  02/2011    LUMBAR SX    Other surgical history      cervical surgery    Spine surgery procedure unlisted                  Social History     Socioeconomic History    Marital status:    Tobacco Use    Smoking status: Never    Smokeless tobacco: Never   Vaping Use    Vaping status: Never Used   Substance and Sexual Activity    Alcohol use: Yes     Comment: once every few days    Drug use: Never   Other Topics Concern    Caffeine Concern No    Exercise Yes     Comment: Full therapy 2 x week     Social Determinants of Health     Financial Resource Strain: Low Risk  (5/2/2024)    Received from [facility]    Overall Financial Resource Strain (CARDIA)     Difficulty of Paying Living Expenses: Not hard at all   Food Insecurity: No Food Insecurity (5/2/2024)    Received from [facility]    Hunger Vital Sign     Worried  About Running Out of Food in the Last Year: Never true     Ran Out of Food in the Last Year: Never true   Transportation Needs: Unmet Transportation Needs (5/2/2024)    Received from [facility]    PRAPARE - Transportation     Lack of Transportation (Medical): Yes     Lack of Transportation (Non-Medical): Yes   Physical Activity: Inactive (5/2/2024)    Received from [facility]    Exercise Vital Sign     Days of Exercise per Week: 0 days     Minutes of Exercise per Session: 0 min   Stress: No Stress Concern Present (5/2/2024)    Received from [facility]    [employer] [place] [employer] - Occupational Stress Questionnaire     Feeling of Stress : Not at all   Social Connections: Unknown (5/2/2024)    Received from [facility]    Social Connection and Isolation Panel [NHANES]     Frequency of Social Gatherings with Friends and Family: Never     Attends Buddhist Services: Never     Active Member of Clubs or Organizations: No     Attends Club or Organization Meetings: Never     Marital Status:    Housing Stability: Low Risk  (5/2/2024)    Received from [facility]    Housing Stability Vital Sign     Unable to Pay for Housing in the Last Year: No     Number of Places Lived in the Last Year: 1     Unstable Housing in the Last Year: No              Review of Systems    Positive for stated Chief Complaint: Hypotension    Other systems are as noted in HPI.  Constitutional and vital signs reviewed.      All other systems reviewed and negative except as noted above.    Physical Exam     ED Triage Vitals   BP 06/27/24 1700 (!) 76/50   Pulse 06/27/24 1700 110   Resp 06/27/24 1700 (!) 32   Temp 06/27/24 1755 97.9 °F (36.6 °C)   Temp src 06/27/24 1755 Oral   SpO2 06/27/24 1700 96 %   O2 Device 06/27/24 1700 None (Room air)       Current Vitals:   Vital Signs  BP: 120/65  Pulse: 87  Resp: 20  Temp: 97.9 °F (36.6 °C)  Temp src: Oral  MAP (mmHg): 82    Oxygen  Therapy  SpO2: 99 %  O2 Device: None (Room air)      Prior        Physical Exam  General: Patient is chronically ill-appearing 79-year-old gentleman cachectic  HEENT: Normal cephalic atraumatic.  Nonicteric sclera.  Dry mucous membranes.  No meningismus.  No adenopathy  Lungs: No tachypnea.  Lungs clear to auscultation bilaterally without rales/rhonchi.  Equal breath sounds bilaterally  Cardiac: tachycardia.  No murmurs.  Regular rate and rhythm.  Abdomen: Soft and nontender throughout.  No rebound or guarding  Extremities: No clubbing/cyanosis/edema.  Skin: No rashes, no pallor  Neuro: Awake and alert, bedbound with diffuse weakness.  At baseline  Sacral: There is a large deep decubitus with foul smell, discharge      ED Course     Labs Reviewed   COMP METABOLIC PANEL (14) - Abnormal; Notable for the following components:       Result Value    Creatinine 0.34 (*)     Total Protein 5.4 (*)     Albumin 1.6 (*)     A/G Ratio 0.4 (*)     All other components within normal limits   CBC W/ DIFFERENTIAL - Abnormal; Notable for the following components:    WBC 38.4 (*)     HGB 11.6 (*)     HCT 34.9 (*)     .0 (*)     Neutrophil Absolute Prelim 34.65 (*)     All other components within normal limits   LACTIC ACID, PLASMA - Normal   CBC WITH DIFFERENTIAL WITH PLATELET    Narrative:     The following orders were created for panel order CBC With Differential With Platelet.  Procedure                               Abnormality         Status                     ---------                               -----------         ------                     CBC W/ DIFFERENTIAL[[id number]]          Abnormal            Final result                 Please view results for these tests on the individual orders.   URINALYSIS WITH CULTURE REFLEX   MANUAL DIFFERENTIAL   BLOOD CULTURE   BLOOD CULTURE   SARS-COV-2/FLU A AND B/RSV BY PCR (GENEXPERT)     EKG    Rate, intervals and axes as noted on EKG Report.  Rate: 116  Chest  Rhythm: Sinus  Rhythm  Reading: Sinus tachycardia with PVCs.  Nonspecific ST-T wave changes.  Axis intervals noted.                 Blood work reviewed.  Creatinine 0.34.  White count 38,000.  Lactic 1.4.  Hemoglobin 11.6.       Chest x-ray:  MDM      Patient is a chronically ill gentleman who presents with sepsis.  Got a large foul-smelling decubiti with history of osteomyelitis.  Has infected appearing urine with sediment.  Will culture including blood urine and wound.  Will start Zosyn/Vanco for broad-spectrum.  Discussed with the ED pharmacist.  30/kg IV fluids.  Will consider pressors if remains hypotensive's with a MAP below 65.  Will reassess after fluids and antibiotics.  Will need admission.  Admission disposition: 6/27/2024  6:10 PM             Patient initially responded IV fluids though became hypotensive again.  Was started on Levophed.  Broad-spectrum antibiotics initiated with Zosyn and Vanco.  Antibiotics were discussed with the ED pharmacist.  Patient be admitted to the ICU.  Was started on Levophed to maintain a MAP of 65, systolic of 90.  Source of infection possible decubitus ulcer/skin versus urinary tract infection.  Both were cultured.  May need debridement that is the decubitus is quite deep                        [facility]   [facility] of [facility]      Sepsis Reassessment Note    There were no vitals taken for this visit.     I completed the sepsis reassessment at 6:15 PM    Cardiac:  Regularity: Regular  Rate: Normal  Heart Sounds: S1,S2    Lungs:   Right: Clear  Left: Clear    Peripheral Pulses:  Radial: Right 1+ or Left 1+      Capillary Refill:  <3 Secs    Skin:  Temp/Moisture: Warm and Dry  Color: Normal      Kaden Pittman MD  6/27/2024  4:50 PM            Medical Decision Making      Disposition and Plan     Clinical Impression:  1. Sepsis with hypotension (HCC)    2. Infected decubitus ulcer, unspecified ulcer stage    3. Acute cystitis without hematuria          Disposition:  Admit  6/27/2024  6:10 pm    Follow-up:  No follow-up provider specified.        Medications Prescribed:  Current Discharge Medication List        A total of 45 minutes of critical care time (exclusive of billable procedures) was administered to manage the patient's unstable vital signs due to @HIS@hypotension secondary to sepsis.  Patient was treated with IV fluids, broad-spectrum antibiotics, IV Levophed.  Discussion with consultants and multiple reevaluations..  This involved direct patient intervention, complex decision making, and/or extensive discussions with the patient, family, and clinical staff.                      Hospital Problems       Present on Admission  Date Reviewed: 4/27/2024            ICD-10-CM Noted POA    * (Principal) Sepsis with hypotension (HCC) A41.9, I95.9 6/27/2024 Unknown    Leukocytosis D72.829 6/27/2024 Yes

## 2024-06-27 NOTE — CONSULTS
Pharmacy consulted to dose vancomycin in the ED. Patient is being treated for suspected sacral osteomyelitis. Vancomycin 1750 mg x1 dose (~25 mg/kg) has been ordered. Please re-consult pharmacy for further dosing if patient is to continue vancomycin after admission.    Damien Prajapati, PharmD  06/27/24 4:50 PM  Clinical Pharmacist Specialist- Emergency Medicine  [facility] [phone]

## 2024-06-27 NOTE — CONSULTS
ICU  Critical Care APRN Progress Note    NAME: Brenden Cardenas - ROOM: C6/C6 - MRN: [id number] - Age: 79 year old - :1945    History Of Present Illness:  Brenden Cardenas is a 79 year old male with PMHx significant for anxiety, large sacral wound with osteomyelitis, gout, HTN, MS, functional paraplegic, bed bound, OA, and seborrheic dermatitis was sent to ED by home health nurse for worsening coccyx wound and tachycardia.  Patient was found to have a foul smelling wound and hypotension.  Pt also reports a poor appetite for the last several months due to food intolerance with significant weight loss.  He denies trouble swallowing or coughing with eating.  He denies chest pain, abdominal pain, fever, cough, nausea, vomiting or diarrhea.  He was treated for sepsis with IVF bolus and broad spectrum antibiotics.  He admits to ICU on pressors.      PMH:  Past Medical History:    Anxiety state, unspecified    BACK PAIN    Calculus of kidney    Cervical stenosis of spine    COVID-19    Gout    Gout    High blood pressure    HYPERTENSION    Multiple sclerosis (HCC)    Osteoarthrosis, unspecified whether generalized or localized, unspecified site    OTHER DISEASES    GOUT    Seborrheic dermatitis    Skin cancer    Unspecified essential hypertension    Visual impairment    glasses       Social Hx:  Social History     Socioeconomic History    Marital status:    Tobacco Use    Smoking status: Never    Smokeless tobacco: Never   Vaping Use    Vaping status: Never Used   Substance and Sexual Activity    Alcohol use: Yes     Comment: once every few days    Drug use: Never   Other Topics Concern    Caffeine Concern No    Exercise Yes     Comment: Full therapy 2 x week     Social Determinants of Health     Financial Resource Strain: Low Risk  (2024)    Received from [facility]    Overall Financial Resource Strain (CARDI)     Difficulty of Paying Living Expenses: Not hard at all   Food Insecurity: No Food  Insecurity (5/2/2024)    Received from [facility]    Hunger Vital Sign     Worried About Running Out of Food in the Last Year: Never true     Ran Out of Food in the Last Year: Never true   Transportation Needs: Unmet Transportation Needs (5/2/2024)    Received from [facility]    PRAPARE - Transportation     Lack of Transportation (Medical): Yes     Lack of Transportation (Non-Medical): Yes   Physical Activity: Inactive (5/2/2024)    Received from [facility]    Exercise Vital Sign     Days of Exercise per Week: 0 days     Minutes of Exercise per Session: 0 min   Stress: No Stress Concern Present (5/2/2024)    Received from [facility]    [employer] [place] [employer] - Occupational Stress Questionnaire     Feeling of Stress : Not at all   Social Connections: Unknown (5/2/2024)    Received from [facility]    Social Connection and Isolation Panel [NHANES]     Frequency of Social Gatherings with Friends and Family: Never     Attends Orthodoxy Services: Never     Active Member of Clubs or Organizations: No     Attends Club or Organization Meetings: Never     Marital Status:    Housing Stability: Low Risk  (5/2/2024)    Received from [facility]    Housing Stability Vital Sign     Unable to Pay for Housing in the Last Year: No     Number of Places Lived in the Last Year: 1     Unstable Housing in the Last Year: No       Family Hx:  Family History   Problem Relation Age of Onset    Other (Other) Father         industrial lung abn    Other (gout) Father     Other (Other) Brother         colitis    Hypertension Mother          Review of Systems:   A comprehensive 10 point review of systems was completed.  Pertinent positives and negatives noted in the HPI.    OBJECTIVE  Vitals:  /62   Pulse 85   Temp 97.9 °F (36.6 °C) (Oral)   Resp 20   SpO2 98%              Physical Exam:    General Appearance: Alert, oriented, , cooperative, no distress,  appears stated age  Neck: No JVD, neck supple, no adenopathy, trachea midline, no carotid bruits  Lungs: Clear to auscultation bilaterally, respirations unlabored  Heart: Regular rate and rhythm, S1 and S2 normal, no murmur, rub or gallop  Abdomen: Soft, non-tender, bowel sounds active all four quadrants, no masses, no organomegaly  Extremities: atraumatic, no cyanosis or edema, muscle wasting to bilateral legs, capillary refill <3 sec.    Pulses: 2+ and symmetric all extremities  Skin: Skin color normal, texture dry, turgor poor, no rashes or lesions, warm and dry  Neurologic: CNII-XII intact, 4/5 strength to right upper ext, 0/5 to bilateral legs and left arm, good equal sensation in all ext    Data this admission:  MRI SPINE CERVICAL (W+WO) (CPT=72156)    Result Date: 6/19/2024  CONCLUSION:   1. Degenerative and postoperative changes of the cervical spine, as detailed above.  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  Similar findings were present in 2015.   2. Degenerative changes result in moderate/severe multilevel foraminal stenosis, as detailed above.  3. No well-defined areas of chronic or active plaque demyelination, given limitations imposed by motion artifact and susceptibility artifact from fusion hardware.   LOCATION:  [facility]   Dictated by (CST): Eleni Miranda MD on 6/19/2024 at 11:49 AM     Finalized by (CST): Eleni Miranda MD on 6/19/2024 at 12:01 PM       MRI BRAIN (W+WO) (CPT=70553)    Result Date: 6/19/2024  CONCLUSION:   1. No evidence for restricted diffusion or other features for acute infarct.  No abnormal enhancement identified.  2. Nonenhancing white matter lesions redemonstrated, the largest of which is located in the right frontal lobe centrum semiovale white matter, minimally larger when compared to  prior exam from 2015.  The patient has a given history of multiple sclerosis,  and these may be plaque lesions related to MS. No frankly new lesion identified.  3. Prominent perivascular spaces are present, seen on the prior exam from 2015, mildly more prominent.  4. Increased signal within the globus pallidus on T2/FLAIR images, especially on the left side.  Perivascular spaces can also become prominent within the basal ganglia, and this may be the etiology for these changes, although the increased signal would be expected on T2 weighted images, but not on FLAIR images; the signal changes on FLAIR may be related to gliosis, chronic white matter changes, or potentially areas of demyelination in the basal ganglia.    LOCATION:  [facility]    Dictated by (CST): Alexi Hernandez MD on 6/19/2024 at 8:41 AM     Finalized by (CST): Alexi Hernandez MD on 6/19/2024 at 8:59 AM         Labs:  Lab Results   Component Value Date    WBC 38.4 06/27/2024    HGB 11.6 06/27/2024    HCT 34.9 06/27/2024    .0 06/27/2024    CREATSERUM 0.34 06/27/2024    BUN 13 06/27/2024     06/27/2024    K 4.1 06/27/2024     06/27/2024    CO2 23.0 06/27/2024    GLU 77 06/27/2024    CA 9.0 06/27/2024    ALB 1.6 06/27/2024    ALKPHO 61 06/27/2024    BILT 0.5 06/27/2024    TP 5.4 06/27/2024    AST 34 06/27/2024    ALT 42 06/27/2024       Assessment/Plan:  Sepsis - possible wound infection, UA positive   -Lactic acid less than 2  -IVF bolus given in ED, continue IVF  -Pressors to maintain SBP above 90 and MAP 65  -Zosyn and Vancomycin  -Blood and urine cultures pending    2.  Coccyx decubitus ulcer  -Wound consult  -Images reviewed  -Vancomycin    3.  Weight loss, failure to thrive  -Albumin 1.6, Protein 5.4  -Dietary consult  -Albumin 25% 25 gm one time dose    4. HTN  -Hold home medications given hypotension    5. MS, functional paraplegic  -Frequent repositioning    F/E/N:    NS IVF  Electrolyte protocol  General diet - nutrition consult  for weight loss and food intolerance    Proph:  -SQ Lovenox  -SCD    Dispo:   -Full code    Plan of care discussed with intensivist on-call, Dr. Davidson.      Carolyne HERRING  Critical Care Nurse Practitioner  Spec [id number]    A total of 35 minutes of critical care time (exclusive of billable procedures) was administered. This involved direct patient intervention, complex decision making, and/or extensive discussions with the patient, family, and clinical staff.

## 2024-06-27 NOTE — ED INITIAL ASSESSMENT (HPI)
Pt presents to ER after home health stated they were concerned with worsening wound and pt was tachycardic and lowering blood pressure. Pt denies fevers.

## 2024-06-28 ENCOUNTER — APPOINTMENT (OUTPATIENT)
Dept: CT IMAGING | Facility: HOSPITAL | Age: 79
End: 2024-06-28
Attending: INTERNAL MEDICINE
Payer: MEDICARE

## 2024-06-28 LAB
ALBUMIN SERPL-MCNC: 1.9 G/DL (ref 3.4–5)
ALBUMIN/GLOB SERPL: 0.7 {RATIO} (ref 1–2)
ALP LIVER SERPL-CCNC: 43 U/L
ALT SERPL-CCNC: 31 U/L
ANION GAP SERPL CALC-SCNC: 8 MMOL/L (ref 0–18)
AST SERPL-CCNC: 17 U/L (ref 15–37)
BASOPHILS # BLD AUTO: 0.02 X10(3) UL (ref 0–0.2)
BASOPHILS NFR BLD AUTO: 0.1 %
BILIRUB SERPL-MCNC: 0.4 MG/DL (ref 0.1–2)
BUN BLD-MCNC: 10 MG/DL (ref 9–23)
CALCIUM BLD-MCNC: 8.4 MG/DL (ref 8.5–10.1)
CHLORIDE SERPL-SCNC: 108 MMOL/L (ref 98–112)
CO2 SERPL-SCNC: 26 MMOL/L (ref 21–32)
CREAT BLD-MCNC: 0.24 MG/DL
EGFRCR SERPLBLD CKD-EPI 2021: 131 ML/MIN/1.73M2 (ref 60–?)
EOSINOPHIL # BLD AUTO: 0.01 X10(3) UL (ref 0–0.7)
EOSINOPHIL NFR BLD AUTO: 0 %
ERYTHROCYTE [DISTWIDTH] IN BLOOD BY AUTOMATED COUNT: 15.1 %
ERYTHROCYTE [DISTWIDTH] IN BLOOD BY AUTOMATED COUNT: 15.1 %
GLOBULIN PLAS-MCNC: 2.6 G/DL (ref 2.8–4.4)
GLUCOSE BLD-MCNC: 96 MG/DL (ref 70–99)
HCT VFR BLD AUTO: 28.7 %
HCT VFR BLD AUTO: 36.1 %
HGB BLD-MCNC: 11.4 G/DL
HGB BLD-MCNC: 9.2 G/DL
IMM GRANULOCYTES # BLD AUTO: 0.16 X10(3) UL (ref 0–1)
IMM GRANULOCYTES NFR BLD: 0.8 %
LYMPHOCYTES # BLD AUTO: 1.1 X10(3) UL (ref 1–4)
LYMPHOCYTES NFR BLD AUTO: 5.3 %
MCH RBC QN AUTO: 27.1 PG (ref 26–34)
MCH RBC QN AUTO: 27.8 PG (ref 26–34)
MCHC RBC AUTO-ENTMCNC: 31.6 G/DL (ref 31–37)
MCHC RBC AUTO-ENTMCNC: 32.1 G/DL (ref 31–37)
MCV RBC AUTO: 84.7 FL
MCV RBC AUTO: 88 FL
MONOCYTES # BLD AUTO: 1.19 X10(3) UL (ref 0.1–1)
MONOCYTES NFR BLD AUTO: 5.7 %
MRSA DNA SPEC QL NAA+PROBE: POSITIVE
NEUTROPHILS # BLD AUTO: 18.43 X10 (3) UL (ref 1.5–7.7)
NEUTROPHILS # BLD AUTO: 18.43 X10(3) UL (ref 1.5–7.7)
NEUTROPHILS NFR BLD AUTO: 88.1 %
OSMOLALITY SERPL CALC.SUM OF ELEC: 293 MOSM/KG (ref 275–295)
PLATELET # BLD AUTO: 380 10(3)UL (ref 150–450)
PLATELET # BLD AUTO: 392 10(3)UL (ref 150–450)
POTASSIUM SERPL-SCNC: 3.8 MMOL/L (ref 3.5–5.1)
PROCALCITONIN SERPL-MCNC: 0.61 NG/ML (ref ?–0.16)
PROT SERPL-MCNC: 4.5 G/DL (ref 6.4–8.2)
RBC # BLD AUTO: 3.39 X10(6)UL
RBC # BLD AUTO: 4.1 X10(6)UL
SODIUM SERPL-SCNC: 142 MMOL/L (ref 136–145)
WBC # BLD AUTO: 18.1 X10(3) UL (ref 4–11)
WBC # BLD AUTO: 20.9 X10(3) UL (ref 4–11)

## 2024-06-28 PROCEDURE — 99232 SBSQ HOSP IP/OBS MODERATE 35: CPT | Performed by: INTERNAL MEDICINE

## 2024-06-28 PROCEDURE — 74177 CT ABD & PELVIS W/CONTRAST: CPT | Performed by: INTERNAL MEDICINE

## 2024-06-28 RX ORDER — VANCOMYCIN HYDROCHLORIDE
25 ONCE
Status: COMPLETED | OUTPATIENT
Start: 2024-06-28 | End: 2024-06-28

## 2024-06-28 RX ORDER — MIRTAZAPINE 15 MG/1
15 TABLET, FILM COATED ORAL NIGHTLY
Status: DISCONTINUED | OUTPATIENT
Start: 2024-06-28 | End: 2024-06-28

## 2024-06-28 RX ORDER — POTASSIUM CHLORIDE 14.9 MG/ML
20 INJECTION INTRAVENOUS ONCE
Status: COMPLETED | OUTPATIENT
Start: 2024-06-28 | End: 2024-06-28

## 2024-06-28 RX ORDER — DALFAMPRIDINE 10 MG/1
1 TABLET, FILM COATED, EXTENDED RELEASE ORAL 2 TIMES DAILY
Status: DISCONTINUED | OUTPATIENT
Start: 2024-06-28 | End: 2024-06-28

## 2024-06-28 RX ORDER — ALLOPURINOL 300 MG/1
300 TABLET ORAL DAILY
Status: DISCONTINUED | OUTPATIENT
Start: 2024-06-28 | End: 2024-07-03

## 2024-06-28 RX ORDER — MULTIPLE VITAMINS W/ MINERALS TAB 9MG-400MCG
1 TAB ORAL DAILY
Status: DISCONTINUED | OUTPATIENT
Start: 2024-06-29 | End: 2024-07-03

## 2024-06-28 NOTE — PLAN OF CARE
Pt a/o x4. Denies pain. NSR/ST observed to bedside monitor. RA. Poor appetite. Pt eating approximately one bite from each meal. Nutrition supplements TID. Incontinent of bowel. mL urine output to noriega catheter. Large sacral wound. Wound RN at bedside this afternoon, see orders. CT abd/pelvis this evening, see results. Active transfer orders in place. Awaiting bed placement. Plan of care discussed.

## 2024-06-28 NOTE — DIETARY MALNUTRITION NOTE
[facility]   part of [facility]  NUTRITION ASSESSMENT    Pt meets severe malnutrition criteria at this time.    CRITERIA FOR MALNUTRITION DIAGNOSIS:  Criteria for severe malnutrition diagnosis: chronic illness related to wt loss greater than 10% in 6 months and energy intake less than 75% for greater than 1 month    NUTRITION INTERVENTION:    RD nutrition Care Plan- Initiated ONS (oral nutritional supplements), Provided written information for optimizing nutrient intake at home, Ordered multivitamin, and See RD nutrition assessment for additional recommendations  Meal and Snacks - Continue Regular Diet as tolerated; monitor patient po intake. Encourage adequate po of appropriate diet.  Medical Food Supplements - RD added Chester strawberry daily, Magic Shake strawberry daily and Magic Cup berry daily per pt request. Rationale/use for oral supplements discussed.  Nutrition Education - High Calorie, High Protein Diet and Recipes. Pt/family receptive to education, no barriers noted. Handouts provided.   Vitamin and Mineral Supplements - Recommend adding Multivitamin with minerals    PATIENT STATUS: 79 year old male admitted on 6/27 presents with hypotension. Pt screened d/t MST score 3 and consult for \"weight loss, food intolerance\". Visited pt at bedside with wife present. Pt reports decreased appetite/PO intake since getting COVID in Feb. Since then he reports he has become \"picky\", avoiding most meals and eating mostly soups, pasta, cereal, yogurt and eggs. Has experienced taste changes (states food is \"repulsive\") and now prefers acidic/tangy flavors. Wife reports pt only has sips/bites of food at a time. Has tried drinking some ONS at home. Denies N/V but goes back and forth between constipation and diarrhea. No chewing or swallowing difficulties and NKFA. Offered ONS and pt would like to try 3 different ones - Chester strawberry, Magic Cup berry, Magic Shake strawberry. Also provided high kcal/pro  diet and recipes handouts to wife. Continued to encourage PO intake; all questions answered at this time.    PMH: Anxiety, Calculus of kidney, Cervical stenosis of spine, COVID-19, Gout, Multiple sclerosis, Osteoarthrosis, Seborrheic dermatitis, Skin cancer, essential hypertension    ANTHROPOMETRICS:  Ht:  5'9\"  Wt: 61.8 kg (136 lb 3.9 oz).   BMI: Body mass index is 20.12 kg/m².  IBW: 72.7 kg    WEIGHT HISTORY: Per chart, pt with ~39 lb wt loss x 4 months (22%, significant per standards).  Patient Weight(s) for the past 336 hrs:   Weight   06/27/24 1949 61.8 kg (136 lb 3.9 oz)       Wt Readings from Last 15 Encounters:   06/27/24 61.8 kg (136 lb 3.9 oz)   04/28/24 70.9 kg (156 lb 6.4 oz)   04/24/24 73.3 kg (161 lb 8 oz)   04/23/24 73 kg (161 lb)   04/19/24 69.9 kg (154 lb)   04/09/24 74.8 kg (165 lb)   04/05/24 69.6 kg (153 lb 8 oz)   03/29/24 74.1 kg (163 lb 6.4 oz)   03/19/24 70.8 kg (156 lb 1.6 oz)   03/11/24 71.2 kg (157 lb)   03/05/24 71.3 kg (157 lb 1.6 oz)   02/16/24 79.4 kg (175 lb)   02/08/24 79.4 kg (175 lb)   02/02/24 79.4 kg (175 lb)   10/23/23 81.6 kg (180 lb)        NUTRITION:  Diet:       Procedures    Regular/General diet Is Patient on Accuchecks? No        Percent Meals Eaten (last 3 days)       None            Food Allergies: No  Cultural/Ethnic/Confucianism Preferences Addressed: Yes    GI SYSTEM REVIEW: WNL; last BM 6/27  Skin/Wounds: coccyx wound    NUTRITION RELATED PHYSICAL FINDINGS:     1. Body Fat/Muscle Mass: moderate depletion body fat Buccal fat pad and moderate muscle depletion Temple region, Clavicle region, Thigh region, and Calf region     2. Fluid Accumulation: none per RN documentation     NUTRITION PRESCRIPTION: 61.8 kg  Calories: 3442-3335 calories/day (30-35 kcal/kg)  Protein: 74-93 grams protein/day (1.2-1.5 gm/kg)  Fluid: ~1 ml/kcal or per MD discretion    NUTRITION DIAGNOSIS/PROBLEM:  Malnutrition related to insufficient appetite resulting in inadequate nutrition intake as  evidenced by documented/reported insufficient oral intake, documented/reported unintentional weight loss, loss of fat mass, and loss of muscle mass    MONITOR AND EVALUATE/NUTRITION GOALS:  PO intake of 75% of meals TID - New  PO intake of 75% of oral nutrition supplement/s - New  Weight stable within 1 to 2 lbs during admission - New      MEDICATIONS:  Remeron, abx  Gtt: NS at 75 ml/hr    LABS:  Reviewed     Pt is at High nutrition risk    Josselyn Bravo RD, LDN, CNSC  Clinical Dietitian  Spectra: [phone]

## 2024-06-28 NOTE — PLAN OF CARE
Assumed care of patient following shift report. Pt alert and oriented. Follows commands. Denies pain. RA. NSR on tele monitor. Levo gtt for BP support, able to turn gtt off. Chronic noriega. See MAR and flowsheets for additional information.

## 2024-06-28 NOTE — H&P
[facility] History and Physical     Brenden Cardenas Patient Status:  Inpatient    1945 MRN [id number]   [facility] 4SW-A Attending Napoleon Stevenson MD   Hosp Day # 1 PCP Willow Mcmahon DO     Chief Complaint:  hypotension    Subjective:    Brenden Cardenas is a 79 year old male with mmp including  but not limited to MS, HTN, general anxiety, stage IV sacral decub, gout, protein calorie malnutrition, physical deconditioning, chronic noriega, is admitted for hypotension. Found to be in septic shock d/t infection of malodorous sacral wound. Started on empiric abx, pressors, admitted to ICU. When seen, pt off pressors. Sitting up in bed. Does not like needle pokes.    History/Other:        History/Other:    Past Medical History:  Past Medical History:    Anxiety state, unspecified    BACK PAIN    Calculus of kidney    Cervical stenosis of spine    COVID-19    Gout    Gout    High blood pressure    HYPERTENSION    Multiple sclerosis (HCC)    Osteoarthrosis, unspecified whether generalized or localized, unspecified site    OTHER DISEASES    GOUT    Seborrheic dermatitis    Skin cancer    Unspecified essential hypertension    Visual impairment    glasses     Past Surgical History:   Past Surgical History:   Procedure Laterality Date    Debride wound      x7    Other accessory      \"urolift\"    Other accessory      multiple skin Bx's/removals    Other surgical history  2011    LUMBAR SX    Other surgical history      cervical surgery    Spine surgery procedure unlisted        Family History:   Family History   Problem Relation Age of Onset    Other (Other) Father         industrial lung abn    Other (gout) Father     Other (Other) Brother         colitis    Hypertension Mother      Social History:    reports that he has never smoked. He has never used smokeless tobacco. He reports current alcohol use. He reports that he does not use drugs.       Allergies: No Known  Allergies    Medications:    Current Facility-Administered Medications on File Prior to Encounter   Medication Dose Route Frequency Provider Last Rate Last Admin    [COMPLETED] gadoterate meglumine (Dotarem) 10 MMOL/20ML injection 20 mL  20 mL Intravenous ONCE PRN Samuel Aleman MD   14 mL at 06/18/24 1907    [COMPLETED] cefTRIAXone (Rocephin) 1 g in D5W 100 mL IVPB-ADD  1 g Intravenous Once Linus Cristobal MD   Stopped at 04/28/24 0107    [COMPLETED] piperacillin-tazobactam (Zosyn) 4.5 g in dextrose 5% 100 mL IVPB-ADDV  4.5 g Intravenous Once Linus Cristobal  mL/hr at 04/28/24 0037 4.5 g at 04/28/24 0037     Current Outpatient Medications on File Prior to Encounter   Medication Sig Dispense Refill    sodium hypochlorite 0.25 % External Solution Apply 1 Bottle topically as needed (wound care).      metoprolol succinate ER 25 MG Oral Tablet 24 Hr Take 1 tablet (25 mg total) by mouth in the morning and 1 tablet (25 mg total) before bedtime.      allopurinol 300 MG Oral Tab Take 1 tablet (300 mg total) by mouth daily.      LOSARTAN 100 MG Oral Tab TAKE 1 TABLET BY MOUTH EVERY DAY 90 tablet 0    acetaminophen 500 MG Oral Tab Take 2 tablets (1,000 mg total) by mouth every 6 (six) hours as needed for Pain.      Multiple Vitamins-Minerals (CENTRUM) Oral Tab Take 1 tablet by mouth daily.      Vitamin D3, Cholecalciferol, 50 MCG (2000 UT) Oral Tab Take 1 tablet (2,000 Units total) by mouth daily.      cyanocobalamine 1000 MCG Oral Tab Take 1 tablet (1,000 mcg total) by mouth daily.      magnesium oxide 400 MG Oral Tab Take 1 tablet (400 mg total) by mouth daily.      Melatonin 3 MG Oral Cap Take 2 capsules (6 mg total) by mouth every evening.      Ocrelizumab 300 MG/10ML Intravenous Solution Inject 20 mL (600 mg total) into the vein. (Patient not taking: Reported on 4/26/2024)      ondansetron (ZOFRAN) 4 mg tablet Take 1 tablet (4 mg total) by mouth every 8 (eight) hours as needed.      Simethicone (BICARSIM)  80 MG Oral Tab Take 80 mg by mouth daily as needed.      mirtazapine 15 MG Oral Tab Take 1 tablet (15 mg total) by mouth nightly.      colchicine 0.6 MG Oral Tab Take 1 tablet (0.6 mg total) by mouth daily as needed.      senna-docusate 8.6-50 MG Oral Tab Take 1 tablet by mouth in the morning and 1 tablet before bedtime.      clotrimazole-betamethasone 1-0.05 % External Cream Apply 1 Application topically 2 (two) times daily. To groin and buttock      omeprazole 20 MG Oral Capsule Delayed Release Take 1 capsule (20 mg total) by mouth 2 (two) times daily before meals.      HYDROcodone-acetaminophen 5-325 MG Oral Tab Take 1 tablet by mouth every 8 (eight) hours as needed. 10 tablet 0    sodium chloride 0.9% SOLN 480 mL with Ocrelizumab 300 MG/10ML SOLN 600 mg Inject 600 mg into the vein one time. Every 6 months. Last infusion January, 2024 (Patient not taking: Reported on 4/26/2024)      predniSONE 20 MG Oral Tab Take 1 tablet (20 mg total) by mouth as needed. (Patient not taking: Reported on 4/26/2024)      Dalfampridine ER 10 MG Oral Tablet 12 Hr Take 1 tablet (10 mg total) by mouth in the morning and 1 tablet (10 mg total) before bedtime.         Review of Systems:   A comprehensive 14 point review of systems was completed.    Pertinent positives and negatives noted in the HPI.    Objective:     /51   Pulse 83   Temp 98.5 °F (36.9 °C) (Temporal)   Resp 20   Wt 136 lb 3.9 oz (61.8 kg)   SpO2 97%   BMI 20.12 kg/m²      General:  Alert, NAD, appears stated age, no accessory m use, nontoxic appearing   Head:  Normocephalic, without obvious abnormality, atraumatic.   Eyes:  Sclera anicteric,  EOMs intact. Lids wnl.    Ears, nose, throat:  external ears and nose within normal limits, hearing intact       Neck: Supple, symmetrical   Lungs:   Clear to auscultation bilaterally. ok effort   Chest wall:  No tenderness or deformity.   Heart:  Regular rate  no LE edema   Abdomen:   Soft, non-tender. Bowel sounds  normal. . Non distended, no peritoneal signs   Extremities: Extremities no edema.   Skin: Skin color, texture, turgor normal. No visible rashes. Pictures reviewed in media from 4/2024. Pt in bed, difficult to turn   Neurologic:  Psychiatric: No facial droop or dysarthria  appropriate affect, answering questions ok       Results:         CBC/Chem  Recent Labs   Lab 06/27/24  1711 06/27/24 2000 06/28/24  0422   WBC 38.4*  --  20.9*   HGB 11.6*  --  9.2*   MCV 82.5  --  84.7   .0*  --  380.0   BAND 2  --   --    INR  --  1.31*  --        Recent Labs   Lab 06/27/24  1721 06/28/24  0421    142   K 4.1 3.8    108   CO2 23.0 26.0   BUN 13 10   CREATSERUM 0.34* 0.24*   GLU 77 96   CA 9.0 8.4*       Recent Labs   Lab 06/27/24  1721 06/28/24  0421   ALT 42 31   AST 34 17   ALB 1.6* 1.9*        Additional Diagnostics: ECG:  sinus tach with pvcs,     CXR: image personally reviewed  no consolidation or edema        COVID-19  Lab Results   Component Value Date    COVID19 Not Detected 06/27/2024    COVID19 Not Detected 04/27/2024    COVID19 Detected (A) 02/02/2024       Pro-Calcitonin  Recent Labs   Lab 06/27/24  1721   PCT 0.61*          Assessment & Plan:      79 year old male with mmp including  but not limited to MS, HTN, general anxiety, stage IV sacral decub, gout, protein calorie malnutrition, physical deconditioning, chronic noriega, is admitted for hypotension. Found to be in septic shock d/t infection of malodorous sacral wound.    **septic shock d/t  **infected sacral decub  **less likely UTI- suspect more so colonization  -wean pressors. Empiric abx. UC, BC pending. Pct mildly elevated. No lactic acidosis.. covid neg  -critical care and ID on, appreciate    Acute on chronic anemia- suspect reactive on top of AOCD    Stable chronic illnesses:  MS, HTN, general anxiety-hold bp meds. Home meds  Protein calorie malnutrition-monitor, dietian consult  Chronic noriega  Physical  deconditioning    PPx-scds, lovenox    Dw pt, RN Ninfa    Thank You,  Nai Song MD    [facility]  Internal Medicine  Answering Service number: [phone]    6/28/2024    Outpatient records or previous hospital records reviewed.     Further recommendations pending patient's clinical course.  [facility] hospitalist to continue to follow patient while in house    Patient and/or patient's family given opportunity to ask questions and note understanding and agreeing with therapeutic plan as outlined    Supplementary Documentation:

## 2024-06-28 NOTE — PROGRESS NOTES
ICU  Critical Care APRN Progress Note    NAME: Brenden Cardenas - ROOM: C6/C6 - MRN: [id number] - Age: 79 year old - :1945    Subjective: No acute events overnight.  Levophed weaned off.  Patient states he feels ok, wished he had a better appetite.     OBJECTIVE  Vitals:  /51   Pulse 83   Temp 98.5 °F (36.9 °C) (Temporal)   Resp 20   Wt 136 lb 3.9 oz (61.8 kg)   SpO2 97%   BMI 20.12 kg/m²              Physical Exam:    General Appearance: Alert, oriented, , cooperative, no distress, appears stated age  Neck: No JVD, neck supple, no adenopathy, trachea midline  Lungs: Clear to auscultation bilaterally, respirations unlabored  Heart: Regular rate and rhythm, S1 and S2 normal, no murmur, rub or gallop  Abdomen: Soft, non-tender, bowel sounds active all four quadrants, no masses, no organomegaly  Extremities: atraumatic, no cyanosis or edema, muscle wasting to bilateral legs, capillary refill <3 sec.    Pulses: 2+ and symmetric all extremities  Skin: Skin color normal, texture dry, turgor poor, no rashes or lesions, warm and dry  Neurologic: CNII-XII intact, 4/5 strength to right upper ext, 0/5 to bilateral legs and left arm, good equal sensation in all ext    Data this admission:  MRI SPINE CERVICAL (W+WO) (CPT=72156)    Result Date: 2024  CONCLUSION:   1. Degenerative and postoperative changes of the cervical spine, as detailed above.  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  Similar findings were present in .   2. Degenerative changes result in moderate/severe multilevel foraminal stenosis, as detailed above.  3. No well-defined areas of chronic or active plaque demyelination, given limitations imposed by motion artifact and susceptibility artifact from fusion hardware.   LOCATION:  [place]    Dictated by (CST): Eleni Miranda MD on 6/19/2024 at 11:49 AM     Finalized by (CST): Eleni Miranda MD on 6/19/2024 at 12:01 PM       MRI BRAIN (W+WO) (CPT=70553)    Result Date: 6/19/2024  CONCLUSION:   1. No evidence for restricted diffusion or other features for acute infarct.  No abnormal enhancement identified.  2. Nonenhancing white matter lesions redemonstrated, the largest of which is located in the right frontal lobe centrum semiovale white matter, minimally larger when compared to prior exam from 2015.  The patient has a given history of multiple sclerosis,  and these may be plaque lesions related to MS. No frankly new lesion identified.  3. Prominent perivascular spaces are present, seen on the prior exam from 2015, mildly more prominent.  4. Increased signal within the globus pallidus on T2/FLAIR images, especially on the left side.  Perivascular spaces can also become prominent within the basal ganglia, and this may be the etiology for these changes, although the increased signal would be expected on T2 weighted images, but not on FLAIR images; the signal changes on FLAIR may be related to gliosis, chronic white matter changes, or potentially areas of demyelination in the basal ganglia.    LOCATION:  [facility]    Dictated by (CST): Alexi Hernandez MD on 6/19/2024 at 8:41 AM     Finalized by (CST): Alexi Hernandez MD on 6/19/2024 at 8:59 AM         Labs:  Lab Results   Component Value Date    WBC 20.9 06/28/2024    HGB 9.2 06/28/2024    HCT 28.7 06/28/2024    .0 06/28/2024    CREATSERUM 0.24 06/28/2024    BUN 10 06/28/2024     06/28/2024    K 3.8 06/28/2024     06/28/2024    CO2 26.0 06/28/2024    GLU 96 06/28/2024    CA 8.4 06/28/2024    ALB 1.9 06/28/2024    ALKPHO 43 06/28/2024    BILT 0.4 06/28/2024    TP 4.5 06/28/2024    AST 17 06/28/2024    ALT 31 06/28/2024    PTT 33.5 06/27/2024    INR 1.31 06/27/2024       Assessment/Plan:  Sepsis - possible wound infection, UA positive   -IVF bolus  given in ED, continue IVF  -Pressors to maintain SBP above 90 and MAP 65, currently off  -Continue Zosyn(6/27-) and Vancomycin(6/27-)  -Blood and urine cultures pending    Coccyx decubitus ulcer  -Wound consult  -Images reviewed  -continue Vancomycin (6/27-)  -wound culture pending    Weight loss, failure to thrive  -Albumin 1.6, Protein 5.4  -Dietary consult    HTN  Hold home medications given hypotension    MS, functional paraplegic  -Frequent repositioning    F/E/N  -IVF  -General diet  -Dietary consult  -replete electrolytes as needed    Proph  - subcutaneous Lovenox  -SCD    Dispo  -full code  -ICU, consider transfer to floor if can remain off vasopressors      Plan of care discussed with intensivist on-call, Dr. Shreyas Phan, [facility]  Critical Care NP  Phone [phone]      Intensivist addendum:   APN note reviewed. I have independently seen and evaluated the patient. He has no complaints. He has been weaned off vasopressors. Continue antibiotics for presumed sarcal decub infection and possible UTI. Follow-up cultures. CT abdo/pelvis pending. Transfer to the floor. Will follow while ICU status.    Milton Pritchett MD  Pulmonary and Critical Care Medicine

## 2024-06-28 NOTE — CONSULTS
[facility] Pharmacy Dosing Service     Initial Pharmacokinetic Consult for Vancomycin Dosing     Brenden Cardenas is a 79 year old male who is being initiated on vancomycin therapy for cellulitis.  Pharmacy has been asked to dose vancomycin by Dr. Max.  The initial treatment and monitoring approach will be steady state AUC strategy.        Weight and Temperature:    Wt Readings from Last 1 Encounters:   24 61.8 kg (136 lb 3.9 oz)        Temp Readings from Last 1 Encounters:   24 99.2 °F (37.3 °C) (Temporal)      Labs:   Recent Labs   Lab 24  1721 24  0421   CREATSERUM 0.34* 0.24*      Estimated Creatinine Clearance: 225.3 mL/min (A) (based on SCr of 0.24 mg/dL (L)).     Recent Labs   Lab 24  1711 24  0422 24  1231   WBC 38.4* 20.9* 18.1*          The Pharmacokinetic Target is:     to 600 mg-h/L and trough <=15 mg/L    Renal Dosing Considerations:    None     Assessment/Plan:   Initial/Loading dose: Will receive 1500 mg IV (25 mg/kg, capped at 2250 mg) x 1 initial dose.      Maintenance dose: Pharmacy will dose vancomycin at 1000 mg IV every 24 hours    Monitorin) Plan for vancomycin peak and trough to be obtained at steady state    2) Pharmacy will order SCr as clinically indicated to assess renal function.    3) Pharmacy will monitor for toxicity and efficacy, adjust vancomycin dose and/or frequency, and order vancomycin levels as appropriate per the Pharmacy and Therapeutics Committee approved protocol until discontinuation of the medication.       We appreciate the opportunity to assist in the care of this patient.     Ysabel Hogue, PharmD  2024  3:30 PM  [facility]  Pharmacy Extension: [phone]

## 2024-06-28 NOTE — CONSULTS
.[facility]  Report of Inpatient Wound Care Consultation    Brenden Cardenas Patient Status:  Inpatient    1945 MRN [id number]   Location [facility] 4SW-A Attending Nai Song, *   Hosp Day # 1 PCP Willow Mcmahon DO     Reason for Consultation:    coccyx wound       History of Present Illness:  Brenden Cardenas is a a(n) 79 year old male. Patient assessed lying in bed, fellow wound care staff members present during assessment (x4 staff). Pt states he lives at home with wife currently, has home health care for wound care services. Pt denies any pain in sacral wound at this time, complaints of pain in left arm. Patient with multiple comorbidities, with skin breakdown described below.       History:  Past Medical History:    Anxiety state, unspecified    BACK PAIN    Calculus of kidney    Cervical stenosis of spine    COVID-19    Gout    Gout    High blood pressure    HYPERTENSION    Multiple sclerosis (HCC)    Osteoarthrosis, unspecified whether generalized or localized, unspecified site    OTHER DISEASES    GOUT    Seborrheic dermatitis    Skin cancer    Unspecified essential hypertension    Visual impairment    glasses     Past Surgical History:   Procedure Laterality Date    Debride wound      x7    Other accessory      \"urolift\"    Other accessory      multiple skin Bx's/removals    Other surgical history  2011    LUMBAR SX    Other surgical history      cervical surgery    Spine surgery procedure unlisted        reports that he has never smoked. He has never used smokeless tobacco. He reports current alcohol use. He reports that he does not use drugs.      Allergies:  @ALLERGY    Laboratory Data:    Recent Labs   Lab 24  1711 24  1721 24  0421 24  0422 24  1231   WBC 38.4*  --   --   --  20.9* 18.1*   HGB 11.6*  --   --   --  9.2* 11.4*   HCT 34.9*  --   --   --  28.7* 36.1*   .0*  --   --   --  380.0 392.0   CREATSERUM  --  0.34*  --   0.24*  --   --    BUN  --  13  --  10  --   --    GLU  --  77  --  96  --   --    CA  --  9.0  --  8.4*  --   --    ALB  --  1.6*  --  1.9*  --   --    TP  --  5.4*  --  4.5*  --   --    PTT  --   --  33.5  --   --   --    INR  --   --  1.31*  --   --   --          ASSESSMENT:  Wound 08/18/23 #3 Coccyx (Active)   Date First Assessed/Time First Assessed: 08/18/23 1042    Wound Number (Wound Clinic Only): #3  Primary Wound Type: Pressure Injury  Location: Coccyx  Wound Description (Comments): (c)       Assessments 6/28/2024  2:51 PM   Wound Image      Drainage Amount Large   Drainage Description Serosanguineous   Wound Length (cm) 10 cm   Wound Width (cm) 8 cm   Wound Surface Area (cm^2) 80 cm^2   Wound Depth (cm) 4 cm   Wound Volume (cm^3) 320 cm^3   Wound Healing % -[id number]   Margins Well-defined edges   Non-staged Wound Description Full thickness   Ashlee-wound Assessment Blanchable erythema;Edema;Excoriated   Wound Granulation Tissue Pink;Firm   Wound Bed Granulation (%) 70 %   Wound Bed Slough (%) 5 %   Wound Odor None   Exposed Structure Bone   Shape undermining 7 to 3 deepest at 1 @ 8cm : 25% bone & adipose   Pressure Injury Stage Stage 4        Sacral:  Wound Cleaning and Dressings:  Wound cleansing:  Cleanse with normal saline or wound cleanser  Wound cleaning frequency: Daily  Wound product: Other Cleanse wound with normal saline, appy generous amount of Remedy zinc based barrier cream to periwound , packing silver aquacell (x2) into undermining and wound base, covered with fluffed 4x4 gauze, ABD pad (x2), medipore tape   Dressing change frequency:  Change dressing daily and/or PRN    ALL WOUND CARE SUPPLIES CAN BE OBTAINED FROM [facility]     Negative Pressure Wound Therapy:  NPWT: awaiting CT results - need to rule out any bone infection since bone is present in wound bed --- if negative wound care can reassess for possible wound vac placement     ALL VAC SUPPLIES CAN BE OBTAINED FROM [facility] ([facility])     STAFF RN'S ARE RESPONSIBLE TO ASSESS THE WOUND VAC EACH SHIFT TO ENSURE THAT IT IS PLUGGED IN, TURNED ON, AND THE CANISTER IS NOT FILLED WITH MARY GRACE BLOOD. REPORT ISSUES TO SURGEON/ATTENDING DOCTOR OR WOUND CARE STAFF IF AVAILABLE.    Miscellaneous/Additional Orders:  Off loading: Off loading device: Heel off-loading boot and Pressure-relief seat cushionTurn Schedules: Specialty bed- fill in [place]   Heels elevated using pillows, heel wedge or heel boots to float heels off the bed  To prevent sliding: decrease head of bed and elevate foot of bed as medical condition tolerates  Prompt incontinent care  Glucose control to help promote wound healing    If patient is Diabetic: want to make sure blood sugars are within a controled range for wound healing     Protein intake: depending on providers recommendations and patients kidney functions - if kidneys are good then recommend patient to increase protein intake (Boost, Jah, Ensure, Premiere Protein)       Additional Notes:    -CT abdomen/pelvis - awaiting results   -wound culture 6/27/24    Recommendations:  -Veblen bed / air loss mattress   -Turning and repositioning every 2 hours   -Waffle cushion to all surfaces when up and out of bed   -If patient is discharged home recommending home health wound care services  -CT to sacral area   -Follow up with Infectious disease post discharger for ABT therapy management      Thank you for this consultation and for allowing me to participate in the care of your patient.  Please call [phone] if you have any questions about this consultation and plan of care.     Time Spent 45 Minutes.    Thank you,  Adrian Varner RN  Wound/Ostomy/Continence nurse    6/28/2024  4:07 PM

## 2024-06-28 NOTE — SPIRITUAL CARE NOTE
Spiritual Care Visit Note    Patient Name: Brenden Cardenas Date of Spiritual Care Visit: 24   : 1945 Primary Dx: Sepsis with hypotension (HCC)       Referred By: Referral From: Nurse    Spiritual Care Taxonomy:    Intended Effects: Aligning care plan with patient's values    Methods: Encourage self reflection;Encourage sharing of feelings;Encourage story-telling;Explore spiritual/Jainism beliefs;Offer emotional support;Offer spiritual/Jainism support    Interventions: Acknowledge current situation;Active listening;Explain  role;Share words of hope and inspiration    Visit Type/Summary:     - Spiritual Care: Consulted with RN prior to visit. Offered empathic listening and emotional support. Provided information regarding how to contact Spiritual Care and left a Spiritual Care information card. Provided support for Patient's spiritual/Jainism requests.  remains available for follow up.  - PoA: Identified Existing PoAH - No Updates Needed: Visited patient in response to POA for Healthcare consult/request. Prior to visit, reviewed the patient's EHR and paper chart to identify any existing PoAH documentation. A previously completed PoAH document was located. The document was reviewed with the patient during the visit. Patient states that the current document is accurate. No changes or updates are required. Confirmed that the PoAH primary agent name and contact information have been entered into the Epic, Advance Directives section. A copy of the existing document was printed and given to the patient. A copy of the document has been placed on the patient's chart.     Spiritual Care support can be requested via an Epic consult.   For urgent/immediate needs, please contact the On Call  at: Edward: ext [phone]

## 2024-06-28 NOTE — CONSULTS
Infectious Disease Initial Consultation      Date of admission: 6/27/2024  4:38 PM     Date of service: 06/28/24 12:07 PM    Consult requested by: Nai Song, *     Reason for consult: Sepsis    Chief complaint: Sepsis    History of present illness: Brenden Cardenas is a 79 year old male with history of paraplegia, chronic decubitus ulcer of the sacrum, hypertension, MS, who presents here with sepsis with tachycardia in the setting of an infected sacral decubitus ulcer with foul-smelling drainage and redness.  He also was found to have UTI; however, he has a chronic De La Fuente catheter.    In the emergency room, the patient was afebrile, hypotensive requiring vasopressor support.  Labs revealed leukocytosis white count of 38.4 with left shift.  Pro-Ramses was elevated 0.61.  CMP otherwise unremarkable.  UA showed pyuria.  Swab culture obtained from his wound is currently growing gram-negative rods.  His MRSA screen was positive.  COVID-19 PCR was negative.  Blood cultures obtained, remain negative to date.  Patient was started on IV Zosyn and IV vancomycin and admitted to the medical ICU.    Review of systems:  All other components of the review of systems are negative, except those described in the history of present illness.     Past Medical History:    Anxiety state, unspecified    BACK PAIN    Calculus of kidney    Cervical stenosis of spine    COVID-19    Gout    Gout    High blood pressure    HYPERTENSION    Multiple sclerosis (HCC)    Osteoarthrosis, unspecified whether generalized or localized, unspecified site    OTHER DISEASES    GOUT    Seborrheic dermatitis    Skin cancer    Unspecified essential hypertension    Visual impairment    glasses     Past Surgical History:   Procedure Laterality Date    Debride wound      x7    Other accessory      \"urolift\"    Other accessory      multiple skin Bx's/removals    Other surgical history  02/2011    LUMBAR SX    Other surgical history      cervical surgery     Spine surgery procedure unlisted       Social History     Socioeconomic History    Marital status:    Tobacco Use    Smoking status: Never    Smokeless tobacco: Never   Vaping Use    Vaping status: Never Used   Substance and Sexual Activity    Alcohol use: Yes     Comment: once every few days    Drug use: Never   Other Topics Concern    Caffeine Concern No    Exercise Yes     Comment: Full therapy 2 x week     Social Determinants of Health     Financial Resource Strain: Low Risk  (5/2/2024)    Received from [facility]    Overall Financial Resource Strain (CARDIA)     Difficulty of Paying Living Expenses: Not hard at all   Food Insecurity: No Food Insecurity (6/27/2024)    Food Insecurity     Food Insecurity: Never true   Transportation Needs: No Transportation Needs (6/27/2024)    Transportation Needs     Lack of Transportation: No   Recent Concern: Transportation Needs - Unmet Transportation Needs (5/2/2024)    Received from [facility]    PRAPARE - Transportation     Lack of Transportation (Medical): Yes     Lack of Transportation (Non-Medical): Yes   Physical Activity: Inactive (5/2/2024)    Received from [facility]    Exercise Vital Sign     Days of Exercise per Week: 0 days     Minutes of Exercise per Session: 0 min   Stress: No Stress Concern Present (5/2/2024)    Received from [facility]    [employer] - Occupational Stress Questionnaire     Feeling of Stress : Not at all   Social Connections: Unknown (5/2/2024)    Received from [facility]    Social Connection and Isolation Panel [NHANES]     Frequency of Social Gatherings with Friends and Family: Never     Attends Pentecostal Services: Never     Active Member of Clubs or Organizations: No     Attends Club or Organization Meetings: Never     Marital Status:    Housing Stability: Low Risk  (6/27/2024)    Housing Stability     Housing Instability: No     Family History    Problem Relation Age of Onset    Other (Other) Father         industrial lung abn    Other (gout) Father     Other (Other) Brother         colitis    Hypertension Mother      Reviewed, see above    Medications:    allopurinol    Dalfampridine ER    mirtazapine    [START ON 6/29/2024] multivitamin with minerals    vancomycin    norepinephrine    vasopressin (Vasostrict) 20 Units in sodium chloride 0.9% 100 mL infusion for septic shock    sodium chloride    piperacillin-tazobactam    enoxaparin    acetaminophen    melatonin    polyethylene glycol (PEG 3350)    sennosides    bisacodyl    fleet enema    ondansetron    metoclopramide    benzocaine-menthol     Allergies:  No Known Allergies    Physical Exam:  Vitals:    06/28/24 1100   BP: 123/37   Pulse: 101   Resp: 25   Temp:      Vitals signs and nursing note reviewed.   Constitutional:       Appearance: Normal appearance.   HENT:      Head: Normocephalic and atraumatic.      Mouth: Mucous membranes are moist.   Neck:      Musculoskeletal: Neck supple.   Cardiovascular:      Rate and Rhythm: Normal rate.      Heart sounds: Normal heart sounds. No murmur. No friction rub. No gallop.    Pulmonary:      Effort: Pulmonary effort is normal. No respiratory distress.      Breath sounds: Normal breath sounds. No stridor. No wheezing, rhonchi or rales.   Chest:      Chest wall: No tenderness.   Abdominal:      General: Abdomen is flat. There is no distension.      Palpations: Abdomen is soft. There is no mass.      Tenderness: There is no tenderness. There is no guarding or rebound.      Hernia: No hernia is present.   Musculoskeletal:      Right lower leg: No edema.      Left lower leg: No edema.   Skin:     General: Skin is warm and dry.  Unable to evaluate sacral decubitus ulcer given the patient immobile state at this point  Neurological:      General: No focal deficit present.      Mental Status: Alert and oriented to person, place, and time.     Laboratory data:  I have  independently reviewed all lab results; including old microbiological results.  Lab Results   Component Value Date    WBC 20.9 06/28/2024    HGB 9.2 06/28/2024    HCT 28.7 06/28/2024    .0 06/28/2024    CREATSERUM 0.24 06/28/2024    BUN 10 06/28/2024     06/28/2024    K 3.8 06/28/2024     06/28/2024    CO2 26.0 06/28/2024    GLU 96 06/28/2024    CA 8.4 06/28/2024    ALB 1.9 06/28/2024    ALKPHO 43 06/28/2024    BILT 0.4 06/28/2024    TP 4.5 06/28/2024    AST 17 06/28/2024    ALT 31 06/28/2024    PTT 33.5 06/27/2024    INR 1.31 06/27/2024        Recent Labs   Lab 06/27/24  1711 06/28/24  0422   RBC 4.23 3.39*   HGB 11.6* 9.2*   HCT 34.9* 28.7*   MCV 82.5 84.7   MCH 27.4 27.1   MCHC 33.2 32.1   RDW 15.1 15.1   NEPRELIM 34.65* 18.43*   WBC 38.4* 20.9*   .0* 380.0   NEUT 95  --    LYMPH 2  --    MON 1  --    EOS 0  --        Microbiology data:  Hospital Encounter on 06/27/24   1. Aerobic Bacterial Culture     Status: Abnormal (Preliminary result)    Collection Time: 06/27/24  6:38 PM    Specimen: Sacrum; Other   Result Value Ref Range    Aerobic Culture Result 4+ growth Gram Negative Sim (A) N/A    Aerobic Smear No WBCs seen N/A    Aerobic Smear 2+ Gram Positive Cocci N/A         Radiology:  I have reviewed all imaging data available independently.   Chest x-ray:  No evidence of    Impression:  Brenden Cardenas is a 79 year old male with     Sepsis with septic shock with threat to life  Most likely source is his infected decubitus ulcer  Cultures with gram-negative rods  MRSA screen positive  Would benefit from a CT to rule out necrotizing soft tissue infection versus abscesses and might need debridement  Wound care consult  Another potential source would be his urine  However, no  symptoms  The patient has a chronic De La Fuente catheter and that would always cause persistent pyuria with positive urine culture  Paraplegic  Severe leukocytosis  Reactive  Will continue to trend    Recommendations:      Check a CT of the abdomen pelvis with contrast to rule out any underlying abscesses or complicated soft tissue infection  Continue IV Zosyn and IV vancomycin  Continue to follow-up on microbiological data  Wound care consult  Please obtain pictures and document them in the chart for review  Continue to monitor daily labs for antibiotic toxicity  Further recommendations will depend on the above work-up and clinical progress     The plan of care was discussed with the primary hospital team, Nai Song, [place]     Recommendations were also discussed with the patient; all questions were answered.     Thank you for this consultation. Please don't hesitate to call the ID team for questions or any acute changes in patient's clinical condition.    Please note that this report has been produced using speech recognition software and may contain errors related to that system including, but not limited to, errors in grammar, punctuation, and spelling, as well as words and phrases that possibly may have been recognized inappropriately.  If there are any questions or concerns, contact the dictating provider for clarification.    The  [employer] C[employer] Act makes medical notes like these available to patients in the interest of transparency. Please be advised this is a medical document. Medical documents are intended to carry relevant information, facts as evident, and the clinical opinion of the practitioner. The medical note is intended as peer to peer communication and may appear blunt or direct. It is written in medical language and may contain abbreviations or verbiage that are unfamiliar.     Brendan Max MD  [facility]. Tel: [phone]. Fax: [phone].     Brenden JOSE Cardenas : 1945 MRN: [id number] [facility]: [id number]

## 2024-06-29 LAB
ALBUMIN SERPL-MCNC: 1.6 G/DL (ref 3.4–5)
ALBUMIN/GLOB SERPL: 0.6 {RATIO} (ref 1–2)
ALP LIVER SERPL-CCNC: 41 U/L
ALT SERPL-CCNC: 25 U/L
ANION GAP SERPL CALC-SCNC: 8 MMOL/L (ref 0–18)
AST SERPL-CCNC: 18 U/L (ref 15–37)
ATRIAL RATE: 116 BPM
BASOPHILS # BLD AUTO: 0.01 X10(3) UL (ref 0–0.2)
BASOPHILS NFR BLD AUTO: 0.1 %
BILIRUB SERPL-MCNC: 0.4 MG/DL (ref 0.1–2)
BUN BLD-MCNC: 7 MG/DL (ref 9–23)
CALCIUM BLD-MCNC: 8 MG/DL (ref 8.5–10.1)
CHLORIDE SERPL-SCNC: 105 MMOL/L (ref 98–112)
CO2 SERPL-SCNC: 24 MMOL/L (ref 21–32)
CREAT BLD-MCNC: 0.23 MG/DL
CREAT BLD-MCNC: 0.23 MG/DL
EGFRCR SERPLBLD CKD-EPI 2021: 133 ML/MIN/1.73M2 (ref 60–?)
EGFRCR SERPLBLD CKD-EPI 2021: 133 ML/MIN/1.73M2 (ref 60–?)
EOSINOPHIL # BLD AUTO: 0 X10(3) UL (ref 0–0.7)
EOSINOPHIL NFR BLD AUTO: 0 %
ERYTHROCYTE [DISTWIDTH] IN BLOOD BY AUTOMATED COUNT: 14.9 %
GLOBULIN PLAS-MCNC: 2.7 G/DL (ref 2.8–4.4)
GLUCOSE BLD-MCNC: 86 MG/DL (ref 70–99)
HCT VFR BLD AUTO: 28.4 %
HGB BLD-MCNC: 9.1 G/DL
IMM GRANULOCYTES # BLD AUTO: 0.11 X10(3) UL (ref 0–1)
IMM GRANULOCYTES NFR BLD: 0.8 %
LYMPHOCYTES # BLD AUTO: 1.15 X10(3) UL (ref 1–4)
LYMPHOCYTES NFR BLD AUTO: 8.6 %
MAGNESIUM SERPL-MCNC: 1.7 MG/DL (ref 1.6–2.6)
MCH RBC QN AUTO: 26.9 PG (ref 26–34)
MCHC RBC AUTO-ENTMCNC: 32 G/DL (ref 31–37)
MCV RBC AUTO: 84 FL
MONOCYTES # BLD AUTO: 1.12 X10(3) UL (ref 0.1–1)
MONOCYTES NFR BLD AUTO: 8.4 %
NEUTROPHILS # BLD AUTO: 10.92 X10 (3) UL (ref 1.5–7.7)
NEUTROPHILS # BLD AUTO: 10.92 X10(3) UL (ref 1.5–7.7)
NEUTROPHILS NFR BLD AUTO: 82.1 %
OSMOLALITY SERPL CALC.SUM OF ELEC: 281 MOSM/KG (ref 275–295)
P AXIS: 61 DEGREES
P-R INTERVAL: 140 MS
PLATELET # BLD AUTO: 403 10(3)UL (ref 150–450)
POTASSIUM SERPL-SCNC: 3.3 MMOL/L (ref 3.5–5.1)
POTASSIUM SERPL-SCNC: 3.3 MMOL/L (ref 3.5–5.1)
POTASSIUM SERPL-SCNC: 4.5 MMOL/L (ref 3.5–5.1)
PROT SERPL-MCNC: 4.3 G/DL (ref 6.4–8.2)
Q-T INTERVAL: 340 MS
QRS DURATION: 80 MS
QTC CALCULATION (BEZET): 472 MS
R AXIS: 41 DEGREES
RBC # BLD AUTO: 3.38 X10(6)UL
SODIUM SERPL-SCNC: 137 MMOL/L (ref 136–145)
T AXIS: 65 DEGREES
VENTRICULAR RATE: 116 BPM
WBC # BLD AUTO: 13.3 X10(3) UL (ref 4–11)

## 2024-06-29 RX ORDER — POTASSIUM CHLORIDE 20 MEQ/1
40 TABLET, EXTENDED RELEASE ORAL EVERY 4 HOURS
Status: COMPLETED | OUTPATIENT
Start: 2024-06-29 | End: 2024-06-29

## 2024-06-29 RX ORDER — MAGNESIUM OXIDE 400 MG/1
400 TABLET ORAL ONCE
Status: DISCONTINUED | OUTPATIENT
Start: 2024-06-29 | End: 2024-06-29

## 2024-06-29 NOTE — PLAN OF CARE
Assumed care of patient following shift report. Pt alert and oriented. Follows commands, denies pain. RA. NSR/ST on tele monitor. Normotensive. Chronic noriega in place. Transfer orders in place, waiting for available bed. See MAR and flowsheets for additional information.     Report given to Med/Onc RN. Pt transferred to room 423 around 0430.

## 2024-06-29 NOTE — PROGRESS NOTES
[facility]   part of [facility] Infectious Disease  Progress Note    Brenden Cardenas Patient Status:  Inpatient    1945 MRN [id number]   Location [facility] 4NW-A Attending Nai Song, *   Hosp Day # 2 PCP Willow Mcmahon,      Subjective:    Patient seen and examined resting in bed, he is tolerating the IV abx no new complaints or concerns, Chart reviewed, Tmax 101.2    Review of Systems:  Review of systems reviewed and negative except as mentioned    Objective:  Blood pressure 123/65, pulse 100, temperature 97.8 °F (36.6 °C), temperature source Oral, resp. rate 20, weight 152 lb (68.9 kg), SpO2 97%.        Physical Exam:  General: Awake, alert, non-tox, NAD.  HEENT:  Oropharynx clear, trachea ML.  Heart: RRR S1S2 no murmurs.  Lungs: Essentially CTA b/l, no rhonchi, rales, wheezes.  Abdomen: Soft, NT/ND.  BS present.  No organomegaly.  Extremity: +left hand edema- per pt this is his baseline   Neurological: No focal deficits.  Derm:  Warm, dry, free from rashes. +sacral decub as below               Lab Data Review:  Lab Results   Component Value Date    WBC 13.3 2024    HGB 9.1 2024    HCT 28.4 2024    .0 2024    CREATSERUM 0.23 2024    CREATSERUM 0.23 2024    BUN 7 2024     2024    K 3.3 2024    K 3.3 2024     2024    CO2 24.0 2024    GLU 86 2024    CA 8.0 2024    ALB 1.6 2024    ALKPHO 41 2024    BILT 0.4 2024    TP 4.3 2024    AST 18 2024    ALT 25 2024    MG 1.7 2024        Cultures:  Hospital Encounter on 24   1. Aerobic Bacterial Culture     Status: Abnormal (Preliminary result)    Collection Time: 24  6:38 PM    Specimen: Sacrum; Other   Result Value Ref Range    Aerobic Culture Result 4+ growth Klebsiella oxytoca (A) N/A    Aerobic Culture Result 4+ growth Staphylococcus aureus (A) N/A    Aerobic  Smear No WBCs seen N/A    Aerobic Smear 2+ Gram Positive Cocci N/A       Susceptibility    Klebsiella oxytoca -  (no method available)     Ampicillin >=32 Resistant      Ampicillin + Sulbactam 8 Sensitive      Cefazolin >=64 Resistant      Cefepime <=1 Sensitive      Ceftriaxone <=1 Sensitive      Ciprofloxacin <=0.25 Sensitive      Gentamicin <=1 Sensitive      Meropenem <=0.25 Sensitive      Levofloxacin 1 Sensitive      Piperacillin + Tazobactam <=4 Sensitive      Trimethoprim/Sulfa <=20 Sensitive    2. Urine Culture, Routine     Status: None    Collection Time: 06/27/24  5:29 PM    Specimen: Urine, clean catch   Result Value Ref Range    Urine Culture  N/A     >100,000 cfu/ml Multiple species present- probable contamination.   3. Blood Culture     Status: None (Preliminary result)    Collection Time: 06/27/24  5:11 PM    Specimen: Blood,peripheral   Result Value Ref Range    Blood Culture Result No Growth 1 Day N/A        Radiology:  CT ABDOMEN+PELVIS(CONTRAST ONLY)(CPT=74177)    Result Date: 6/28/2024  CONCLUSION:  Large sacral decubitus ulcer with chronic appearing bony changes of the underlying sacrum in this region.  This may represent osteomyelitis with which may be chronic.  There is no evidence of an abscess or drainable fluid collection at this time.  Prominent circumferential mural thickening of the rectum.  Consider correlation for proctitis.  A neoplastic process cannot be entirely excluded.  There is circumferential mural thickening of the urinary bladder.  Consider correlation for cystitis.   LOCATION:  [facility]   Dictated by (CST): Ray Gonzalez MD on 6/28/2024 at 7:41 PM     Finalized by (CST): Ray Gonzalez MD on 6/28/2024 at 7:46 PM       XR CHEST AP/PA (1 VIEW) (CPT=71045)    Result Date: 6/27/2024  CONCLUSION:  No evidence of active cardiopulmonary disease.   LOCATION:  [facility]      Dictated by (CST): Yury Munguia MD on 6/27/2024 at 6:41 PM     Finalized by (CST): Yury Munguia MD on 6/27/2024 at 6:41  PM       MRI SPINE CERVICAL (W+WO) (CPT=72156)    Result Date: 6/19/2024  CONCLUSION:   1. Degenerative and postoperative changes of the cervical spine, as detailed above.  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  Similar findings were present in 2015.   2. Degenerative changes result in moderate/severe multilevel foraminal stenosis, as detailed above.  3. No well-defined areas of chronic or active plaque demyelination, given limitations imposed by motion artifact and susceptibility artifact from fusion hardware.   LOCATION:  [facility]   Dictated by (CST): Eleni Miranda MD on 6/19/2024 at 11:49 AM     Finalized by (CST): Eleni Miranda MD on 6/19/2024 at 12:01 PM       MRI BRAIN (W+WO) (CPT=70553)    Result Date: 6/19/2024  CONCLUSION:   1. No evidence for restricted diffusion or other features for acute infarct.  No abnormal enhancement identified.  2. Nonenhancing white matter lesions redemonstrated, the largest of which is located in the right frontal lobe centrum semiovale white matter, minimally larger when compared to prior exam from 2015.  The patient has a given history of multiple sclerosis,  and these may be plaque lesions related to MS. No frankly new lesion identified.  3. Prominent perivascular spaces are present, seen on the prior exam from 2015, mildly more prominent.  4. Increased signal within the globus pallidus on T2/FLAIR images, especially on the left side.  Perivascular spaces can also become prominent within the basal ganglia, and this may be the etiology for these changes, although the increased signal would be expected on T2 weighted images, but not on FLAIR images; the signal changes on FLAIR may be related to gliosis, chronic white matter changes, or potentially areas of demyelination in the basal  ganglia.    LOCATION:  [facility]    Dictated by (CST): Alexi Hernandez MD on 6/19/2024 at 8:41 AM     Finalized by (CST): Alexi Hernandez MD on 6/19/2024 at 8:59 AM          Assessment and Plan:    Sepsis with septic shock with threat to life  - Blood cul now 1/2 from 6/27 with GNR  - Repeat blood cul ordered and pending   - Most likely source is his infected decubitus ulcer  - Cultures with Staph Aureus and klebsiella and oxytoca  - MRSA screen positive  - CT A/P as above OM no e/o abscess   - Another potential source would be his urine, however, no  symptoms, he has a chronic De La Fuente catheter and that would always cause persistent pyuria with positive urine culture.  - UC consistent with contamination   - IV Vancomycin and Zosyn ongoing     Hx MS  - paraplegic    Severe leukocytosis  - Reactive  - trending down  - Will continue to trend    Recommendations  - Continue IV Vancomycin and Zosyn as Rx  - Follow pending cultures  - Monitor fever curve   - Local wound care ongoing per wound care team  - Daily labs on IV abx  - Further recommendations to follow       If you have any questions or concerns please call [facility] Infectious Disease at [phone].     BERNARDA Tiwari    6/29/2024  10:10 AM

## 2024-06-29 NOTE — PROGRESS NOTES
Assumed care of patient at 0700. Wound care done on shift to stage 4 sacral wound. Clinatron bed arrived on shift and patient moved to therapy bed. Continues on x2 iv abx and iv fluids. Worked with PT on shift but unable to get out of bed due to profound weakness. Updated on plan of care and condition update. All needs met on shift.

## 2024-06-29 NOTE — PLAN OF CARE
Pt received from ICU. Pt a/o x4. VSS on RA. No c/o pain. IVF per order. De La Fuente patent & draining. Per ICU RN waiting on clinitron bed to be delivered.     Fall risk protocol followed, call light within reach.

## 2024-06-29 NOTE — PROGRESS NOTES
[facility] Progress Note     Brendenodell Cardenas Patient Status:  Inpatient    1945 MRN [id number]   [facility] 4NW-A Attending Nai Song, *   Hosp Day # 2 PCP Willow Mcmahon DO     Chief Complaint: f/u sacral wound    Subjective:   S:  sitting up in bed, wife at bedside.  Currently no complaints. No f/c/cp/sob/n/v    Review of Systems:   10 point ROS completed and was negative, except for pertinent positive and negatives stated in subjective.    Objective:   Vital signs:  Temp:  [97.8 °F (36.6 °C)-101.2 °F (38.4 °C)] 97.8 °F (36.6 °C)  Pulse:  [] 100  Resp:  [15-31] 20  BP: (112-125)/(50-65) 123/65  SpO2:  [94 %-99 %] 97 %    Wt Readings from Last 6 Encounters:   24 152 lb (68.9 kg)   24 156 lb 6.4 oz (70.9 kg)   24 161 lb 8 oz (73.3 kg)   24 161 lb (73 kg)   24 154 lb (69.9 kg)   24 165 lb (74.8 kg)         Physical Exam:    Gen: No acute distress, alert and oriented , no accessory m use  Pulm: Lungs clear, ok respiratory effort  CV: Heart with regular rate and rhythm, no edema  Abd: Abdomen soft, nontender, nondistended, bowel sounds present, no peritoneal signs  MSK:   no clubbing, no cyanosis  Skin: no visible rashes    Psych:   appropriate affect,   answering questions ok    Results:   Diagnostic Data:      Labs:       Recent Labs   Lab 24  1711 24  0422 24  1231 24  0353   WBC 38.4*  --  20.9* 18.1* 13.3*   HGB 11.6*  --  9.2* 11.4* 9.1*   MCV 82.5  --  84.7 88.0 84.0   .0*  --  380.0 392.0 403.0   BAND 2  --   --   --   --    INR  --  1.31*  --   --   --        Recent Labs   Lab 24  035    142 137   K 4.1 3.8 3.3*  3.3*    108 105   CO2 23.0 26.0 24.0   BUN 13 10 7*   CREATSERUM 0.34* 0.24* 0.23*  0.23*   CA 9.0 8.4* 8.0*   MG  --   --  1.7   GLU 77 96 86       Recent Labs   Lab 24  0356    ALT 42 31 25   AST 34 17 18   ALB 1.6* 1.9* 1.6*          COVID-19  Lab Results   Component Value Date    COVID19 Not Detected 06/27/2024    COVID19 Not Detected 04/27/2024    COVID19 Detected (A) 02/02/2024       Pro-Calcitonin  Recent Labs   Lab 06/27/24  1721   PCT 0.61*          Medications:    allopurinol  300 mg Oral Daily    multivitamin with minerals  1 tablet Oral Daily    vancomycin  15 mg/kg Intravenous Q24H    piperacillin-tazobactam  3.375 g Intravenous Q8H    enoxaparin  40 mg Subcutaneous Daily      norepinephrine Stopped (06/28/24 0146)    vasopressin (Vasostrict) 20 Units in sodium chloride 0.9% 100 mL infusion for septic shock      sodium chloride 75 mL/hr at 06/29/24 0015       norepinephrine    vasopressin (Vasostrict) 20 Units in sodium chloride 0.9% 100 mL infusion for septic shock    acetaminophen    melatonin    polyethylene glycol (PEG 3350)    sennosides    bisacodyl    fleet enema    ondansetron    metoclopramide    benzocaine-menthol          ASSESSMENT / PLAN:   79 year old male with mmp including  but not limited to MS, HTN, general anxiety, stage IV sacral decub, gout, protein calorie malnutrition, physical deconditioning, chronic noriega, is admitted for hypotension. Found to be in septic shock d/t infection of malodorous sacral wound.     **septic shock d/t  **infected sacral decub ulcer stage IV  **less likely UTI- suspect more so colonization  -wean pressors. Empiric abx. UC, BC pending. Pct mildly elevated. No lactic acidosis.. covid neg  -critical care and ID on, appreciate  -CT noted with chronic bony changes suggestive of OM, thickened bladder and thickened rectum. On abx as above  -wound care     Acute on chronic anemia- suspect reactive on top of AOCD    Hypokalemia-replete per protocol     Stable chronic illnesses:  MS, HTN, general anxiety-hold bp meds. Home meds  Protein calorie malnutrition-monitor, dietian consult  Chronic noriega  Physical deconditioning     PPx-scds,  lovenox           Thank You,  Nai Song MD    [facility]  Internal Medicine  Answering Service number: [phone]

## 2024-06-29 NOTE — PHYSICAL THERAPY NOTE
PHYSICAL THERAPY EVALUATION - INPATIENT     Room Number: 423/423-A  Evaluation Date: 6/29/2024  Type of Evaluation: Initial  Physician Order: PT Eval and Treat    Presenting Problem: hypotension; septic shock  Co-Morbidities : MS, HTN, general anxiety, stage IV sacral decub, gout, chronic noriega, protein calorie malnutrition, physical deconditioning  Reason for Therapy: Mobility Dysfunction and Discharge Planning    PHYSICAL THERAPY ASSESSMENT   Patient is currently functioning near recent baseline with bed mobility and transfers.  Prior to admission, patient's baseline since 2/2024 is max/total assist with bed mobility and transfers. Prior to 2/2024, pt was able to stand and complete SPT bed to/from w/c.  Patient is requiring maximum assist and dependent as a result of the following impairments: decreased functional strength, decreased endurance/aerobic capacity, pain, decreased muscular endurance, medical status, and limited L UE ROM.  Physical Therapy will continue to follow for duration of hospitalization.    Patient will benefit from continued skilled PT Services at discharge to promote functional independence and safety with additional support and return home with home health PT.    PLAN  PT Treatment Plan: Bed mobility;Body mechanics;Patient education;Family education;Energy conservation;Endurance;Strengthening;Range of motion;Transfer training;Balance training  Rehab Potential : Guarded  Frequency (Obs): 3x/week  Number of Visits to Meet Established Goals: 3      CURRENT GOALS    Goal #1 Patient is able to demonstrate supine - sit EOB @ level: dependent assistance     Goal #2 Patient is able to complete B LE, UE, and cervical AAROM exercises to maintain and/or improve available ROM in order increase independence in bed mobility     Goal #3   Assess sitting balance   Goal #4    Goal #5    Goal #6    Goal Comments: Goals established on 6/29/2024      PHYSICAL THERAPY MEDICAL/SOCIAL HISTORY  History  related to current admission: Patient is a 79 year old male admitted on 6/27/2024 from home for hypotension.  Pt diagnosed with septic shock d/t sacral wound infection.      HOME SITUATION  Type of Home: House   Home Layout: One level;Ramped entrance                Lives With: Spouse;Son (daughter-in-law)  Drives: No  Patient Owned Equipment: Rolling walker;Wheelchair;Mechanical lift;Hospital bed       Prior Level of [place]: Prior to 2/2024, pt was able to SPT bed to/from wheelchair with very little assist from wife (per wife). Pt was spending most of the day in w/c.  Pt was at [facility] for a few months and is now home with wife and son. Wife and/or son use yeison lift for OOB mobility at this time; pt requires total assist for bed mobility, bathing and dressing. Pt is able to feed himself at times. Pt was receiving HHPT/OT services - last OT session was last week.       SUBJECTIVE  Pt states he wants to get more therapy so he can get stronger and be able to get out of bed without using yeison lift.      OBJECTIVE  Precautions: Bed/chair alarm  Fall Risk: High fall risk    WEIGHT BEARING RESTRICTION  Weight Bearing Restriction: None                PAIN ASSESSMENT  Rating: Unable to rate  Location: left shoulder, left hand with movement  Management Techniques: Activity promotion;Repositioning    COGNITION  Orientation Level:  oriented x4  Following Commands:  follows one step commands consistently  Awareness of Deficits:  decreased awareness of deficits    RANGE OF MOTION AND STRENGTH ASSESSMENT  Upper extremity ROM and strength- left shoulder elevation <80 degrees (PROM); right shoulder elevation < 90 degrees (AAROM); STRENGTH: left shoulder 0-1/5, left elbow flexion 2-/5;     Lower extremity ROM is within functional limits     Lower extremity strength - 0-1/5 throughout B LE      BALANCE  Static Sitting: Not tested  Dynamic Sitting: Not tested  Static Standing: Not tested  Dynamic Standing: Not  tested    ADDITIONAL TESTS                                    ACTIVITY TOLERANCE                         O2 WALK       NEUROLOGICAL FINDINGS  Neurological Findings: Sensation           Sensation: impaired light touch B LE         AM-PAC '6-Clicks' INPATIENT SHORT FORM - BASIC MOBILITY  How much difficulty does the patient currently have...  Patient Difficulty: Turning over in bed (including adjusting bedclothes, sheets and blankets)?: A Lot   Patient Difficulty: Sitting down on and standing up from a chair with arms (e.g., wheelchair, bedside commode, etc.): Unable   Patient Difficulty: Moving from lying on back to sitting on the side of the bed?: Unable   How much help from another person does the patient currently need...   Help from Another: Moving to and from a bed to a chair (including a wheelchair)?: Total   Help from Another: Need to walk in hospital room?: Total   Help from Another: Climbing 3-5 steps with a railing?: Total       AM-PAC Score:  Raw Score: 7   Approx Degree of Impairment: 92.36%   Standardized Score (AM-PAC Scale): 26.42   CMS Modifier (G-Code): CM    FUNCTIONAL ABILITY STATUS  Gait Assessment   Functional Mobility/Gait Assessment  Gait Assistance: Not tested  Distance (ft): 0    Skilled Therapy Provided     Bed Mobility:  Rolling: max A/total assist  Supine to sit: NT   Sit to supine: NT     Transfer Mobility:  Sit to stand: NT   Stand to sit: NT  Gait = NT    Therapist's Comments: Pt lying in bed and agreeable to PT. Wife present throughout session. Pt initially reported pain with  movement of left shoulder and left hand/wrist; however, wrist/hand pain decreased with AAROM; no pain with gentle PROM left shoulder flex/ext, abd/add, and limited ER. Completed AA/PROM  L UE, A/AAROM R UE, PROM B LE, and AAROM cervical rotation x 8-10 reps of each; gentle stretching right side of neck to allow pt to maintain more neutral head position as pt's head side-bent to the right. Worked on rolling to  right and attempted to have pt hold onto bed rail with L hand but pt reported increased shoulder pain when attempting to do so.     Exercise/Education Provided:  Bed mobility  Functional activity tolerated  Lower therapeutic exercise:  PROM ankles, knees,hips  Upper therapeutic exercise:  Elbow flex/ext,  - open/close, PROM to shoulder (flex, abd, er), and Wrist flex/ext    Patient End of Session: In bed;Needs met;Call light within reach;RN aware of session/findings;All patient questions and concerns addressed;Alarm set;Family present      Patient Evaluation Complexity Level:  History Moderate - 1 or 2 personal factors and/or co-morbidities   Examination of body systems Low - addressing 1-2 elements   Clinical Presentation Low - Stable   Clinical Decision Making Low - Stable       PT Session Time: 43 minutes    Therapeutic Activity: 5 minutes    Therapeutic Exercise: 25 minutes

## 2024-06-30 LAB
BASOPHILS # BLD AUTO: 0.01 X10(3) UL (ref 0–0.2)
BASOPHILS NFR BLD AUTO: 0.1 %
CREAT BLD-MCNC: 0.26 MG/DL
EGFRCR SERPLBLD CKD-EPI 2021: 128 ML/MIN/1.73M2 (ref 60–?)
EOSINOPHIL # BLD AUTO: 0.01 X10(3) UL (ref 0–0.7)
EOSINOPHIL NFR BLD AUTO: 0.1 %
ERYTHROCYTE [DISTWIDTH] IN BLOOD BY AUTOMATED COUNT: 15.1 %
HCT VFR BLD AUTO: 30.2 %
HGB BLD-MCNC: 9.9 G/DL
IMM GRANULOCYTES # BLD AUTO: 0.19 X10(3) UL (ref 0–1)
IMM GRANULOCYTES NFR BLD: 1.8 %
LYMPHOCYTES # BLD AUTO: 1.07 X10(3) UL (ref 1–4)
LYMPHOCYTES NFR BLD AUTO: 10 %
MAGNESIUM SERPL-MCNC: 1.8 MG/DL (ref 1.6–2.6)
MCH RBC QN AUTO: 27.5 PG (ref 26–34)
MCHC RBC AUTO-ENTMCNC: 32.8 G/DL (ref 31–37)
MCV RBC AUTO: 83.9 FL
MONOCYTES # BLD AUTO: 0.88 X10(3) UL (ref 0.1–1)
MONOCYTES NFR BLD AUTO: 8.2 %
NEUTROPHILS # BLD AUTO: 8.58 X10 (3) UL (ref 1.5–7.7)
NEUTROPHILS # BLD AUTO: 8.58 X10(3) UL (ref 1.5–7.7)
NEUTROPHILS NFR BLD AUTO: 79.8 %
PLATELET # BLD AUTO: 401 10(3)UL (ref 150–450)
RBC # BLD AUTO: 3.6 X10(6)UL
WBC # BLD AUTO: 10.7 X10(3) UL (ref 4–11)

## 2024-06-30 NOTE — PROGRESS NOTES
[facility]   part of [facility] Infectious Disease  Progress Note    Brenden Cardenas Patient Status:  Inpatient    1945 MRN [id number]   Location [facility] 4NW-A Attending Nai Song, *   Hosp Day # 3 PCP Willow Mcmahon,      Subjective:    Patient seen and examined resting in bed, tolerating the IV abx no new complaints or concerns.     Review of Systems:  Review of systems reviewed and negative except as mentioned    Objective:  Blood pressure 135/76, pulse 96, temperature 97.9 °F (36.6 °C), temperature source Oral, resp. rate 18, weight 130 lb 8.2 oz (59.2 kg), SpO2 97%.      Physical Exam:  General: Awake, alert, non-tox, NAD.  HEENT:  Oropharynx clear, trachea ML.  Heart: RRR S1S2 no murmurs.  Lungs: Essentially CTA b/l, no rhonchi, rales, wheezes.  Abdomen: Soft, NT/ND.  BS present.  No organomegaly.  Extremity: No edema.  Neurological: No focal deficits.  Derm:  Warm, dry, free from rashes. +sacral wound          Lab Data Review:  Lab Results   Component Value Date    K 4.5 2024        Cultures:  Hospital Encounter on 24   1. Aerobic Bacterial Culture     Status: Abnormal (Preliminary result)    Collection Time: 24  6:38 PM    Specimen: Sacrum; Other   Result Value Ref Range    Aerobic Culture Result 4+ growth Klebsiella oxytoca (A) N/A    Aerobic Culture Result 4+ growth Staphylococcus aureus (A) N/A    Aerobic Smear No WBCs seen N/A    Aerobic Smear 2+ Gram Positive Cocci N/A       Susceptibility    Klebsiella oxytoca -  (no method available)     Ampicillin >=32 Resistant      Ampicillin + Sulbactam 8 Sensitive      Cefazolin >=64 Resistant      Cefepime <=1 Sensitive      Ceftriaxone <=1 Sensitive      Ciprofloxacin <=0.25 Sensitive      Gentamicin <=1 Sensitive      Meropenem <=0.25 Sensitive      Levofloxacin 1 Sensitive      Piperacillin + Tazobactam <=4 Sensitive      Trimethoprim/Sulfa <=20 Sensitive    2. Urine Culture,  Routine     Status: None    Collection Time: 06/27/24  5:29 PM    Specimen: Urine, clean catch   Result Value Ref Range    Urine Culture  N/A     >100,000 cfu/ml Multiple species present- probable contamination.   3. Blood Culture     Status: Abnormal (Preliminary result)    Collection Time: 06/27/24  5:11 PM    Specimen: Blood,peripheral   Result Value Ref Range    Blood Culture Result No Growth 2 Days N/A    Blood Smear Gram Negative Rods (A) N/A        Radiology:  CT ABDOMEN+PELVIS(CONTRAST ONLY)(CPT=74177)    Result Date: 6/28/2024  CONCLUSION:  Large sacral decubitus ulcer with chronic appearing bony changes of the underlying sacrum in this region.  This may represent osteomyelitis with which may be chronic.  There is no evidence of an abscess or drainable fluid collection at this time.  Prominent circumferential mural thickening of the rectum.  Consider correlation for proctitis.  A neoplastic process cannot be entirely excluded.  There is circumferential mural thickening of the urinary bladder.  Consider correlation for cystitis.   LOCATION:  [facility]   Dictated by (CST): Ray Gonzalez MD on 6/28/2024 at 7:41 PM     Finalized by (CST): Ray Gonzalez MD on 6/28/2024 at 7:46 PM       XR CHEST AP/PA (1 VIEW) (CPT=71045)    Result Date: 6/27/2024  CONCLUSION:  No evidence of active cardiopulmonary disease.   LOCATION:  [facility]      Dictated by (CST): Yury Munguia MD on 6/27/2024 at 6:41 PM     Finalized by (CST): Yury Munguia MD on 6/27/2024 at 6:41 PM       MRI SPINE CERVICAL (W+WO) (CPT=72156)    Result Date: 6/19/2024  CONCLUSION:   1. Degenerative and postoperative changes of the cervical spine, as detailed above.  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  Similar findings were present in 2015.   2. Degenerative changes  result in moderate/severe multilevel foraminal stenosis, as detailed above.  3. No well-defined areas of chronic or active plaque demyelination, given limitations imposed by motion artifact and susceptibility artifact from fusion hardware.   LOCATION:  [facility]   Dictated by ([facility]): Eleni Miranda MD on 6/19/2024 at 11:49 AM     Finalized by (CST): Eleni Miranda MD on 6/19/2024 at 12:01 PM       MRI BRAIN (W+WO) (CPT=70553)    Result Date: 6/19/2024  CONCLUSION:   1. No evidence for restricted diffusion or other features for acute infarct.  No abnormal enhancement identified.  2. Nonenhancing white matter lesions redemonstrated, the largest of which is located in the right frontal lobe centrum semiovale white matter, minimally larger when compared to prior exam from 2015.  The patient has a given history of multiple sclerosis,  and these may be plaque lesions related to MS. No frankly new lesion identified.  3. Prominent perivascular spaces are present, seen on the prior exam from 2015, mildly more prominent.  4. Increased signal within the globus pallidus on T2/FLAIR images, especially on the left side.  Perivascular spaces can also become prominent within the basal ganglia, and this may be the etiology for these changes, although the increased signal would be expected on T2 weighted images, but not on FLAIR images; the signal changes on FLAIR may be related to gliosis, chronic white matter changes, or potentially areas of demyelination in the basal ganglia.    LOCATION:  [facility]    Dictated by (CST): Alexi Hernandez MD on 6/19/2024 at 8:41 AM     Finalized by (CST): Alexi Hernandez MD on 6/19/2024 at 8:59 AM          Assessment and Plan:    Sepsis with septic shock with threat to life  - Blood cul now 2/2 from 6/27 with GNR  - Repeat blood cul ordered and pending   - Most likely source is his infected decubitus ulcer  - Cultures with Staph Aureus and klebsiella and oxytoca  - MRSA screen positive  - CT A/P as above OM no  e/o abscess   - Another potential source would be his urine, however, no  symptoms, he has a chronic De La Fuente catheter and that would always cause persistent pyuria with positive urine culture.  - UC consistent with contamination   - IV Vancomycin and Zosyn ongoing      Hx MS  - paraplegic     Severe leukocytosis  - Reactive  - trending down  - Will continue to trend     Recommendations  - Continue IV Vancomycin and Zosyn as Rx  - Follow pending cultures  - Monitor fever curve   - Local wound care ongoing per wound care team  - Daily labs on IV abx  - Further recommendations to follow       If you have any questions or concerns please call [facility] Infectious Disease at [phone].     BERNARDA Tiwari    6/30/2024  6:15 AM

## 2024-06-30 NOTE — OCCUPATIONAL THERAPY NOTE
OCCUPATIONAL THERAPY EVALUATION - INPATIENT    Room Number: 423/423-A  Evaluation Date: 6/30/2024     Type of Evaluation: Initial  Presenting Problem: hypotension; septic shock    Physician Order: IP Consult to Occupational Therapy  Reason for Therapy:  ADL/IADL Dysfunction and Discharge Planning      OCCUPATIONAL THERAPY ASSESSMENT   Patient is a 79 year old male admitted on 6/27/2024 from home for hypotension.  Pt diagnosed with septic shock d/t sacral wound infection.   Co-Morbidities : MS, HTN, general anxiety, stage IV sacral decub, gout, chronic noriega, protein calorie malnutrition, physical deconditioning      Patient is currently functioning at baseline with  ADLs and mobility .  Prior to admission, patient's baseline since 2/2024 is max/total assist with bed mobility and transfers. Prior to 2/2024, pt was able to stand and complete SPT bed to/from w/c. Pt can perform some aspects of self-feeding, has assist for all other ADLs.  Patient reports no further questions/concerns at this time.     Patient will benefit from continued skilled OT Services at discharge.  Anticipate patient will return home with home health OT      WEIGHT BEARING RESTRICTION  Weight Bearing Restriction: None                Recommendations for nursing staff:   Transfers: yeison lift   Toileting location: Toilet    EVALUATION SESSION:  Patient at start of session: semi-supine on clinitron bed   FUNCTIONAL TRANSFER ASSESSMENT     BED MOBILITY  Rolling: Maximum Assist    BALANCE ASSESSMENT     FUNCTIONAL ADL ASSESSMENT  Eating: Moderate Assist  Grooming Seated: Moderate Assist  LB Dressing Seated: Dependent      ACTIVITY TOLERANCE: Pt on room air and denies SOB, dizziness or lightheadedness throughout session. No significant change in vitals noted.                          O2 SATURATIONS       COGNITION  Arousal/Alertness:  appropriate responses to stimuli  Following Commands:  follows all commands and directions without  difficulty  COGNITION ASSESSMENTS       Upper Extremity:   ROM/strength: impaired   Shoulder flexion: RUE 1/5, LUE 0/5  Elbow flexion: RUE 3/5, LUE 1/5  Digit flexion/extension: RUE 2/5, 1/5    Coordination:  Gross motor: impaired   Fine motor: impaired    EDUCATION PROVIDED  Patient: Role of Occupational Therapy; Plan of Care; Posture/Positioning; UE HEP for ROM  Patient's Response to Education: Verbalized Understanding; Returned Demonstration  Family/Caregiver: Role of Occupational Therapy; Plan of Care; UE HEP for ROM; Posture/Positioning  Family/Caregiver's Response to Education: Verbalized Understanding    Equipment used: bed controls    Therapist comments: Pt is pleasant and agreeable to therapy. Supportive spouse present and included. Patient and wife demonstrated a good understanding re: BUE HEP they have been given by the HHOT. Pt tolerates 10-20 reps of RUE AROM and LUE AAROM in all planes with gentle stretching at end range where appropriate.     Patient End of Session: In bed;Needs met;Call light within reach;RN aware of session/findings;All patient questions and concerns addressed;Family present    OCCUPATIONAL PROFILE    HOME SITUATION  Type of Home: House  Home Layout: One level;Ramped entrance  Lives With: Spouse;Son (daughter-in-law)       Shower/Tub and Equipment:  (sponge bathes)  Other Equipment: Hospital bed (mechanical lift. RW, w/c)    Occupation/Status: retired     Drives: No       Prior Level of Function: Pt lives at home with his Wife and son. Family uses yeison lift for OOB mobility at this time; pt requires total assist for bed mobility, bathing and dressing. Pt is able to feed himself at times. Pt was receiving HHPT/OT services - last OT session was last week. Pt/wife report they had run out of sessions for the next 60 days.     SUBJECTIVE  \"She's the best therapist I've had.\" Re: HHOT     PAIN ASSESSMENT  Rating: Unable to rate  Location: reports general joint pain and stiffness to UEs  with A/AAROM  Management Techniques: Activity promotion;Relaxation;Repositioning    OBJECTIVE  Precautions: Bed/chair alarm  Fall Risk: High fall risk    WEIGHT BEARING RESTRICTION  Weight Bearing Restriction: None                AM-PAC ‘6-Clicks’ Inpatient Daily Activity Short Form  -   Putting on and taking off regular lower body clothing?: Total  -   Bathing (including washing, rinsing, drying)?: Total  -   Toileting, which includes using toilet, bedpan or urinal? : Total  -   Putting on and taking off regular upper body clothing?: A Lot  -   Taking care of personal grooming such as brushing teeth?: A Lot  -   Eating meals?: A Lot    AM-PAC Score:  Score: 9  Approx Degree of Impairment: 79.59%  Standardized Score (AM-PAC Scale): 25.33      ADDITIONAL TESTS     NEUROLOGICAL FINDINGS        PLAN   Patient has been evaluated and presents with no skilled Occupational Therapy needs at this time.  Patient discharged from Occupational Therapy services.  Please re-order if a new functional limitation presents during this admission.      Patient Evaluation Complexity Level:   Occupational Profile/Medical History MODERATE - Expanded review of history including review of medical or therapy record   Specific performance deficits impacting engagement in ADL/IADL HIGH  5+ performance deficits    Client Assessment/Performance Deficits MODERATE - Comorbidities and min to mod modifications of tasks    Clinical Decision Making LOW - Analysis of occupational profile, problem-focused assessments, limited treatment options    Overall Complexity LOW     OT Session Time: 25 minutes  Therapeutic Exercise: 15 minutes

## 2024-06-30 NOTE — PROGRESS NOTES
Assumed care of patient at 0700. Continues on zosyn and vanco. Isolation in place for MRSA in wound. Dressing changed to sacral wound on shift. Continues with clinatron bed. Worked with OT on shift with in bed range of motion. Updated on plan of care and condition update. All needs met on shift.

## 2024-06-30 NOTE — PLAN OF CARE
Pt a/o x4. VSS on RA. No c/o pain. Meds per MAR. IVF per order. De La Fuente patent & draining yellow urine. Pt turned per pt request. No acute change overnight.     Fall risk protocol followed, call light within reach.

## 2024-06-30 NOTE — PROGRESS NOTES
[facility] Progress Note     Brenden Cardenas Patient Status:  Inpatient    1945 MRN [id number]   [facility] 4NW-A Attending Nai Song, *   Hosp Day # 3 PCP Willow Mcmahon DO     Chief Complaint: f/u sacral wound    Subjective:   S:  sitting up in bed,   Currently no complaints. No f/c/cp/sob/n/v, noriega in place with yellow urine       Objective:   Vital signs:  Temp:  [97.8 °F (36.6 °C)-98.9 °F (37.2 °C)] 98.2 °F (36.8 °C)  Pulse:  [] 101  Resp:  [18-20] 18  BP: ()/(61-82) 133/74  SpO2:  [96 %-98 %] 96 %    Wt Readings from Last 6 Encounters:   24 130 lb 8.2 oz (59.2 kg)   24 156 lb 6.4 oz (70.9 kg)   24 161 lb 8 oz (73.3 kg)   24 161 lb (73 kg)   24 154 lb (69.9 kg)   24 165 lb (74.8 kg)         Physical Exam:    Gen: No acute distress, alert and oriented , no accessory m use  Pulm: Lungs clear, ok respiratory effort  CV: Heart with regular rate and rhythm, no edema  Abd: Abdomen soft, nontender, nondistended, bowel sounds present, no peritoneal signs  MSK:   no clubbing, no cyanosis  Skin: no visible rashes. Media pictures reviewed of sacral decub ulcer  Psych:   appropriate affect,   answering questions ok    Results:   Diagnostic Data:      Labs:       Recent Labs   Lab 24  1711 24  0422 24  1231 24  0353 24  0612   WBC 38.4*  --  20.9* 18.1* 13.3* 10.7   HGB 11.6*  --  9.2* 11.4* 9.1* 9.9*   MCV 82.5  --  84.7 88.0 84.0 83.9   .0*  --  380.0 392.0 403.0 401.0   BAND 2  --   --   --   --   --    INR  --  1.31*  --   --   --   --        Recent Labs   Lab 24  1721 24  0421 24  0356 24  1540 24  0612    142 137  --   --    K 4.1 3.8 3.3*  3.3* 4.5  --     108 105  --   --    CO2 23.0 26.0 24.0  --   --    BUN 13 10 7*  --   --    CREATSERUM 0.34* 0.24* 0.23*  0.23*  --  0.26*   CA 9.0 8.4* 8.0*  --   --    MG  --   --  1.7   --  1.8   GLU 77 96 86  --   --        Recent Labs   Lab 06/27/24  1721 06/28/24  0421 06/29/24  0356   ALT 42 31 25   AST 34 17 18   ALB 1.6* 1.9* 1.6*          COVID-19  Lab Results   Component Value Date    COVID19 Not Detected 06/27/2024    COVID19 Not Detected 04/27/2024    COVID19 Detected (A) 02/02/2024       Pro-Calcitonin  Recent Labs   Lab 06/27/24  1721   PCT 0.61*          Medications:    allopurinol  300 mg Oral Daily    multivitamin with minerals  1 tablet Oral Daily    vancomycin  15 mg/kg Intravenous Q24H    piperacillin-tazobactam  3.375 g Intravenous Q8H    enoxaparin  40 mg Subcutaneous Daily      norepinephrine Stopped (06/28/24 0146)    vasopressin (Vasostrict) 20 Units in sodium chloride 0.9% 100 mL infusion for septic shock      sodium chloride 75 mL/hr at 06/30/24 0124       norepinephrine    vasopressin (Vasostrict) 20 Units in sodium chloride 0.9% 100 mL infusion for septic shock    acetaminophen    melatonin    polyethylene glycol (PEG 3350)    sennosides    bisacodyl    fleet enema    ondansetron    metoclopramide    benzocaine-menthol          ASSESSMENT / PLAN:   79 year old male with mmp including  but not limited to MS, HTN, general anxiety, stage IV sacral decub, gout, protein calorie malnutrition, physical deconditioning, chronic noriega, is admitted for hypotension. Found to be in septic shock d/t infection of malodorous sacral wound.     **septic shock (resolved) now sepsis (fever, tachycardia) d/t  **infected sacral decub ulcer stage IV  **less likely UTI- suspect more so colonization  **gram negative bacteremia  -wean pressors. Empiric abx. UC contaminated, BC GNR. Pct mildly elevated. No lactic acidosis.. covid neg  -critical care and ID on, appreciate  -CT noted with chronic bony changes suggestive of OM, thickened bladder and thickened rectum. On abx as above  -wound care  -BC 6/27 GNR x2. PCR without speciation. Repeat BC pending     Acute on chronic anemia- suspect reactive  on top of AOCD    Hypokalemia-replete per protocol     Stable chronic illnesses:  MS, HTN, general anxiety-hold bp meds. Home meds  Protein calorie malnutrition-monitor, dietian consult  Chronic noriega  Physical deconditioning     PPx-scds, lovenox           Thank You,  Nai Song MD    [facility]  [facility]  Answering Service number: [phone]

## 2024-07-01 LAB
ATRIAL RATE: 131 BPM
CREAT BLD-MCNC: 0.25 MG/DL
EGFRCR SERPLBLD CKD-EPI 2021: 130 ML/MIN/1.73M2 (ref 60–?)
ERYTHROCYTE [DISTWIDTH] IN BLOOD BY AUTOMATED COUNT: 15.3 %
HCT VFR BLD AUTO: 32.2 %
HGB BLD-MCNC: 10.5 G/DL
MAGNESIUM SERPL-MCNC: 1.7 MG/DL (ref 1.6–2.6)
MCH RBC QN AUTO: 27.3 PG (ref 26–34)
MCHC RBC AUTO-ENTMCNC: 32.6 G/DL (ref 31–37)
MCV RBC AUTO: 83.9 FL
P AXIS: 49 DEGREES
P-R INTERVAL: 140 MS
PLATELET # BLD AUTO: 403 10(3)UL (ref 150–450)
POTASSIUM SERPL-SCNC: 3.5 MMOL/L (ref 3.5–5.1)
POTASSIUM SERPL-SCNC: 3.9 MMOL/L (ref 3.5–5.1)
Q-T INTERVAL: 292 MS
QRS DURATION: 78 MS
QTC CALCULATION (BEZET): 431 MS
R AXIS: 4 DEGREES
RBC # BLD AUTO: 3.84 X10(6)UL
T AXIS: 92 DEGREES
VANCOMYCIN PEAK SERPL-MCNC: 19.1 UG/ML (ref 30–50)
VENTRICULAR RATE: 131 BPM
WBC # BLD AUTO: 10.3 X10(3) UL (ref 4–11)

## 2024-07-01 RX ORDER — POTASSIUM CHLORIDE 20 MEQ/1
40 TABLET, EXTENDED RELEASE ORAL EVERY 4 HOURS
Status: COMPLETED | OUTPATIENT
Start: 2024-07-01 | End: 2024-07-01

## 2024-07-01 RX ORDER — MAGNESIUM OXIDE 400 MG/1
400 TABLET ORAL ONCE
Status: COMPLETED | OUTPATIENT
Start: 2024-07-01 | End: 2024-07-01

## 2024-07-01 NOTE — DIETARY MALNUTRITION NOTE
[facility]   part of [facility]  NUTRITION ASSESSMENT    Pt meets severe malnutrition criteria at this time.    CRITERIA FOR MALNUTRITION DIAGNOSIS:  Criteria for severe malnutrition diagnosis: chronic illness related to wt loss greater than 10% in 6 months and energy intake less than 75% for greater than 1 month    NUTRITION INTERVENTION:    RD nutrition Care Plan- Initiated ONS (oral nutritional supplements), Provided written information for optimizing nutrient intake at home, Ordered multivitamin, and See RD nutrition assessment for additional recommendations  Meal and Snacks - Continue Regular Diet as tolerated; monitor patient po intake. Encourage adequate po of appropriate diet.  Medical Food Supplements - RD adjusted ONS, approved strawberry fairlife from cafe daily, Magic Shake strawberry daily and Magic Cup huizar daily to continue. Rationale/use for oral supplements discussed.  Nutrition Education - High Calorie, High Protein Diet and Recipes. Pt/family receptive to education, no barriers noted. Handouts provided.   Vitamin and Mineral Supplements - Recommend adding Multivitamin with minerals    PATIENT STATUS:   7/1- Pt states appetite is fair. Had breakfast this am. Does not like carnation drink, RD canceled and switched to [place] Fairlife from cafe. Pt able to tolerate Magic Shake and Magic Cup. Pt firmly declined Jah. Encouraged continued po intakes as tolerated with high protein choices. Follow per protocol.     6/28-79 year old male admitted on 6/27 presents with hypotension. Pt screened d/t MST score 3 and consult for \"weight loss, food intolerance\". Visited pt at bedside with wife present. Pt reports decreased appetite/PO intake since getting COVID in Feb. Since then he reports he has become \"picky\", avoiding most meals and eating mostly soups, pasta, cereal, yogurt and eggs. Has experienced taste changes (states food is \"repulsive\") and now prefers acidic/tangy flavors. Wife reports  pt only has sips/bites of food at a time. Has tried drinking some ONS at home. Denies N/V but goes back and forth between constipation and diarrhea. No chewing or swallowing difficulties and NKFA. Offered ONS and pt would like to try 3 different ones - [place] strawberry, Magic Cup berry, Magic Shake strawberry. Also provided high kcal/pro diet and recipes handouts to wife. Continued to encourage PO intake; all questions answered at this time.    PMH: Anxiety, Calculus of kidney, Cervical stenosis of spine, COVID-19, Gout, Multiple sclerosis, Osteoarthrosis, Seborrheic dermatitis, Skin cancer, essential hypertension    ANTHROPOMETRICS:  Ht:  5'9\"  Wt: 54.8 kg (120 lb 13 oz).   BMI: Body mass index is 17.84 kg/m².  IBW: 72.7 kg    WEIGHT HISTORY: Per chart, pt with ~39 lb wt loss x 4 months (22%, significant per standards).  Patient Weight(s) for the past 336 hrs:   Weight   07/01/24 0452 54.8 kg (120 lb 13 oz)   06/30/24 0500 59.2 kg (130 lb 8.2 oz)   06/29/24 0448 68.9 kg (152 lb)   06/27/24 1949 61.8 kg (136 lb 3.9 oz)       Wt Readings from Last 15 Encounters:   07/01/24 54.8 kg (120 lb 13 oz)   04/28/24 70.9 kg (156 lb 6.4 oz)   04/24/24 73.3 kg (161 lb 8 oz)   04/23/24 73 kg (161 lb)   04/19/24 69.9 kg (154 lb)   04/09/24 74.8 kg (165 lb)   04/05/24 69.6 kg (153 lb 8 oz)   03/29/24 74.1 kg (163 lb 6.4 oz)   03/19/24 70.8 kg (156 lb 1.6 oz)   03/11/24 71.2 kg (157 lb)   03/05/24 71.3 kg (157 lb 1.6 oz)   02/16/24 79.4 kg (175 lb)   02/08/24 79.4 kg (175 lb)   02/02/24 79.4 kg (175 lb)   10/23/23 81.6 kg (180 lb)      NUTRITION:  Diet:       Procedures    Regular/General diet Is Patient on Accuchecks? No        Percent Meals Eaten (last 3 days)       Date/Time Percent Meals Eaten (%)    06/29/24 0832 75 %          Food Allergies: No  Cultural/Ethnic/Sabianist Preferences Addressed: Yes    GI SYSTEM REVIEW: WNL; last BM 7/1  Skin/Wounds: coccyx wound    NUTRITION RELATED PHYSICAL FINDINGS:     1. Body Fat/Muscle  Mass: moderate depletion body fat Buccal fat pad and moderate muscle depletion Temple region, Clavicle region, Thigh region, and Calf region     2. Fluid Accumulation: none per RN documentation     NUTRITION PRESCRIPTION: 61.8 kg  Calories: 1274-3607 calories/day (30-35 kcal/kg)  Protein: 74-93 grams protein/day (1.2-1.5 gm/kg)  Fluid: ~1 ml/kcal or per MD discretion    NUTRITION DIAGNOSIS/PROBLEM:  Malnutrition related to insufficient appetite resulting in inadequate nutrition intake as evidenced by documented/reported insufficient oral intake, documented/reported unintentional weight loss, loss of fat mass, and loss of muscle mass    MONITOR AND EVALUATE/NUTRITION GOALS:  PO intake of 75% of meals TID - Met, continues  PO intake of 75% of oral nutrition supplement/s - Met, continues  Weight stable within 1 to 2 lbs during admission - Ongoing    MEDICATIONS:  abx, MV  Gtt: NS at 75 ml/hr    LABS:  Reviewed     Pt is at Moderate nutrition risk    Kelli Lea RD, LDN  Clinical Dietitian     [de-identified] : FEVER, THROAT PAIN, HEADACHE

## 2024-07-01 NOTE — PLAN OF CARE
Alert and oriented, fair appetite, had a small BM, blood culture shows Gram + cocci, and MRSA in the sacral wound. Still on antibiotics, Zosyn and Vancomycin, seen by ID this morning, to continue with present treatment, awaits for sensitivity.  [place]  Problem: GASTROINTESTINAL - ADULT  Goal: Maintains adequate nutritional intake (undernourished)  Description: INTERVENTIONS:  - Monitor percentage of each meal consumed  - Identify factors contributing to decreased intake, treat as appropriate  - Assist with meals as needed  - Monitor I&O, WT and lab values  - Obtain nutritional consult as needed  - Optimize oral hygiene and moisture  - Encourage food from home; allow for food preferences  - Enhance eating environment  7/1/2024 1321 by Aleena Mclaughlin RN  Outcome: Not Progressing  7/1/2024 1320 by Aleena Mclaughlin RN  Outcome: Not Progressing  7/1/2024 1305 by Aleena Mclaughlin RN  Outcome: Not Progressing     Problem: METABOLIC/FLUID AND ELECTROLYTES - ADULT  Goal: Electrolytes maintained within normal limits  Description: INTERVENTIONS:  - Monitor labs and rhythm and assess patient for signs and symptoms of electrolyte imbalances  - Administer electrolyte replacement as ordered  - Monitor response to electrolyte replacements, including rhythm and repeat lab results as appropriate  - Fluid restriction as ordered  - Instruct patient on fluid and nutrition restrictions as appropriate  7/1/2024 1321 by Aleena Mclaughlin RN  Outcome: Not Progressing  7/1/2024 1320 by Aleena Mclaughlin RN  Outcome: Not Progressing  7/1/2024 1305 by Aleena Mclaughlin RN  Outcome: Progressing     Problem: SKIN/TISSUE INTEGRITY - ADULT  Goal: Incision(s), wounds(s) or drain site(s) healing without S/S of infection  Description: INTERVENTIONS:  - Assess and document risk factors for pressure ulcer development  - Assess and document skin integrity  - Assess and document dressing/incision, wound bed, drain sites and surrounding tissue  - Implement  wound care per orders  - Initiate isolation precautions as appropriate  - Initiate Pressure Ulcer prevention bundle as indicated  7/1/2024 1321 by Aleena Mclaughlin, RN  Outcome: Not Progressing  7/1/2024 1320 by Aleena Mclaughlin, RN  Outcome: Not Progressing  7/1/2024 1305 by Aleena Mclaughlin, RN  Outcome: Not Progressing     Problem: RISK FOR INFECTION - ADULT  Goal: Absence of fever/infection during anticipated neutropenic period  Description: INTERVENTIONS  - Monitor WBC  - Administer growth factors as ordered  - Implement neutropenic guidelines  Outcome: Not Progressing

## 2024-07-01 NOTE — PLAN OF CARE
No change when compared to prior night.     Fall risk protocol followed, call light within reach.

## 2024-07-01 NOTE — PROGRESS NOTES
[facility] Progress Note     Brendenodell Cardenas Patient Status:  Inpatient    1945 MRN [id number]   [facility] 4NW-A Attending Nai Song, *   Hosp Day # 4 PCP Willow Mcmahon DO     Chief Complaint: f/u sacral wound    Subjective:   S:  sitting up in bed,   Currently no complaints. No f/c/cp/sob/n/v, RN at bedside       Objective:   Vital signs:  Temp:  [97.9 °F (36.6 °C)-98.4 °F (36.9 °C)] 98.3 °F (36.8 °C)  Pulse:  [] 100  Resp:  [18-22] 18  BP: (132-143)/(72-82) 134/76  SpO2:  [95 %-98 %] 97 %    Wt Readings from Last 6 Encounters:   24 120 lb 13 oz (54.8 kg)   24 156 lb 6.4 oz (70.9 kg)   24 161 lb 8 oz (73.3 kg)   24 161 lb (73 kg)   24 154 lb (69.9 kg)   24 165 lb (74.8 kg)         Physical Exam:    Gen: No acute distress, alert and oriented , no accessory m use  Pulm: Lungs clear, ok respiratory effort  CV: Heart with regular rate and rhythm, no edema  Abd: Abdomen soft, nontender, nondistended, bowel sounds present, no peritoneal signs  MSK:   no clubbing, no cyanosis  Skin: no visible rashes. Media pictures reviewed of sacral decub ulcer  Psych:   appropriate affect,   answering questions ok    Results:   Diagnostic Data:      Labs:       Recent Labs   Lab 24  1711 24  0422 24  1231 24  0353 24  0612 24  0607   WBC 38.4*  --  20.9* 18.1* 13.3* 10.7 10.3   HGB 11.6*  --  9.2* 11.4* 9.1* 9.9* 10.5*   MCV 82.5  --  84.7 88.0 84.0 83.9 83.9   .0*  --  380.0 392.0 403.0 401.0 403.0   BAND 2  --   --   --   --   --   --    INR  --  1.31*  --   --   --   --   --        Recent Labs   Lab 24  1721 24  0421 24  0356 24  1540 24  0612 24  0607    142 137  --   --   --    K 4.1 3.8 3.3*  3.3* 4.5  --  3.5    108 105  --   --   --    CO2 23.0 26.0 24.0  --   --   --    BUN 13 10 7*  --   --   --    CREATSERUM 0.34* 0.24* 0.23*   0.23*  --  0.26* 0.25*   CA 9.0 8.4* 8.0*  --   --   --    MG  --   --  1.7  --  1.8 1.7   GLU 77 96 86  --   --   --        Recent Labs   Lab 06/27/24  1721 06/28/24  0421 06/29/24  0356   ALT 42 31 25   AST 34 17 18   ALB 1.6* 1.9* 1.6*          COVID-19  Lab Results   Component Value Date    COVID19 Not Detected 06/27/2024    COVID19 Not Detected 04/27/2024    COVID19 Detected (A) 02/02/2024       Pro-Calcitonin  Recent Labs   Lab 06/27/24  1721   PCT 0.61*          Medications:    allopurinol  300 mg Oral Daily    multivitamin with minerals  1 tablet Oral Daily    vancomycin  15 mg/kg Intravenous Q24H    piperacillin-tazobactam  3.375 g Intravenous Q8H    enoxaparin  40 mg Subcutaneous Daily      norepinephrine Stopped (06/28/24 0146)    vasopressin (Vasostrict) 20 Units in sodium chloride 0.9% 100 mL infusion for septic shock      sodium chloride 75 mL/hr at 07/01/24 0451       norepinephrine    vasopressin (Vasostrict) 20 Units in sodium chloride 0.9% 100 mL infusion for septic shock    acetaminophen    melatonin    polyethylene glycol (PEG 3350)    sennosides    bisacodyl    fleet enema    ondansetron    metoclopramide    benzocaine-menthol          ASSESSMENT / PLAN:   79 year old male with mmp including  but not limited to MS, HTN, general anxiety, stage IV sacral decub, gout, protein calorie malnutrition, physical deconditioning, chronic noriega, is admitted for hypotension. Found to be in septic shock d/t infection of malodorous sacral wound.     **septic shock (resolved) now sepsis improving(fever, tachycardia) d/t  **infected sacral decub ulcer stage IV  **less likely UTI- suspect more so colonization  **gram negative bacteremia  -wean pressors. Empiric abx. UC contaminated, BC GNR. Pct mildly elevated. No lactic acidosis.. covid neg  -critical care and ID on, appreciate  -CT noted with chronic bony changes suggestive of OM, thickened bladder and thickened rectum. On abx as above  -wound care  -BC 6/27  GNR x2. PCR without speciation. Repeat BC pending     Acute on chronic anemia- suspect reactive on top of AOCD    Hypokalemia-replete per protocol     Stable chronic illnesses:  MS, HTN, general anxiety-hold bp meds. Home meds  Protein calorie malnutrition-monitor, dietian consult  Chronic noriega  Physical deconditioning     PPx-scds, lovenox    Dw pt and RN Aleena           Thank You,  Nai Song MD    [facility]  [facility]  Answering Service number: [phone]

## 2024-07-02 LAB
CREAT BLD-MCNC: 0.38 MG/DL
EGFRCR SERPLBLD CKD-EPI 2021: 114 ML/MIN/1.73M2 (ref 60–?)
MAGNESIUM SERPL-MCNC: 1.8 MG/DL (ref 1.6–2.6)
VANCOMYCIN TROUGH SERPL-MCNC: 6.9 UG/ML (ref 10–20)

## 2024-07-02 RX ORDER — VANCOMYCIN HYDROCHLORIDE
1500 EVERY 24 HOURS
Status: DISCONTINUED | OUTPATIENT
Start: 2024-07-03 | End: 2024-07-03

## 2024-07-02 RX ORDER — MAGNESIUM OXIDE 400 MG/1
400 TABLET ORAL ONCE
Status: COMPLETED | OUTPATIENT
Start: 2024-07-02 | End: 2024-07-02

## 2024-07-02 NOTE — PLAN OF CARE
Pt received A&Ox4. VSS. RA. Tele. Afebrile. Denies pain. Medications given per MAR, receiving Vanco & Zosyn. IVF. Call light within reach. Fall & contact precautions in place. [place] in place.     Problem: CARDIOVASCULAR - ADULT  Goal: Maintains optimal cardiac output and hemodynamic stability  Description: INTERVENTIONS:  - Monitor vital signs, rhythm, and trends  - Monitor for bleeding, hypotension and signs of decreased cardiac output  - Evaluate effectiveness of vasoactive medications to optimize hemodynamic stability  - Monitor arterial and/or venous puncture sites for bleeding and/or hematoma  - Assess quality of pulses, skin color and temperature  - Assess for signs of decreased coronary artery perfusion - ex. Angina  - Evaluate fluid balance, assess for edema, trend weights  Outcome: Progressing     Problem: GASTROINTESTINAL - ADULT  Goal: Maintains adequate nutritional intake (undernourished)  Description: INTERVENTIONS:  - Monitor percentage of each meal consumed  - Identify factors contributing to decreased intake, treat as appropriate  - Assist with meals as needed  - Monitor I&O, WT and lab values  - Obtain nutritional consult as needed  - Optimize oral hygiene and moisture  - Encourage food from home; allow for food preferences  - Enhance eating environment  Outcome: Progressing     Problem: METABOLIC/FLUID AND ELECTROLYTES - ADULT  Goal: Electrolytes maintained within normal limits  Description: INTERVENTIONS:  - Monitor labs and rhythm and assess patient for signs and symptoms of electrolyte imbalances  - Administer electrolyte replacement as ordered  - Monitor response to electrolyte replacements, including rhythm and repeat lab results as appropriate  - Fluid restriction as ordered  - Instruct patient on fluid and nutrition restrictions as appropriate  Outcome: Progressing     Problem: SKIN/TISSUE INTEGRITY - ADULT  Goal: Incision(s), wounds(s) or drain site(s) healing without S/S of  infection  Description: INTERVENTIONS:  - Assess and document risk factors for pressure ulcer development  - Assess and document skin integrity  - Assess and document dressing/incision, wound bed, drain sites and surrounding tissue  - Implement wound care per orders  - Initiate isolation precautions as appropriate  - Initiate Pressure Ulcer prevention bundle as indicated  Outcome: Progressing     Problem: RISK FOR INFECTION - ADULT  Goal: Absence of fever/infection during anticipated neutropenic period  Description: INTERVENTIONS  - Monitor WBC  - Administer growth factors as ordered  - Implement neutropenic guidelines  Outcome: Progressing

## 2024-07-02 NOTE — PROGRESS NOTES
[facility] Progress Note     Brendenodell Cardenas Patient Status:  Inpatient    1945 MRN [id number]   [facility] 4NW-A Attending Nai Song, *   Hosp Day # 5 PCP Willow Mcmahon DO     Chief Complaint: f/u sacral wound    Subjective:   S:  sitting up in bed,   Currently no complaints. No f/c/cp/sob/n/v, wife at bedside       Objective:   Vital signs:  Temp:  [97.8 °F (36.6 °C)-98.6 °F (37 °C)] 98.2 °F (36.8 °C)  Pulse:  [100-107] 105  Resp:  [16-22] 16  BP: (122-148)/() 122/70  SpO2:  [96 %-98 %] 98 %    Wt Readings from Last 6 Encounters:   24 126 lb 12.2 oz (57.5 kg)   24 156 lb 6.4 oz (70.9 kg)   24 161 lb 8 oz (73.3 kg)   24 161 lb (73 kg)   24 154 lb (69.9 kg)   24 165 lb (74.8 kg)         Physical Exam:    Gen: No acute distress, alert and oriented , no accessory m use  Pulm: Lungs clear, ok respiratory effort  CV: Heart with regular rate and rhythm, no edema  Abd: Abdomen soft, nontender, nondistended, bowel sounds present, no peritoneal signs  MSK:   no clubbing, no cyanosis  Skin: no visible rashes. Media pictures reviewed of sacral decub ulcer  Psych:   appropriate affect,   answering questions ok    Results:   Diagnostic Data:      Labs:       Recent Labs   Lab 24  1711 24  0422 24  1231 24  0353 24  0612 24  0607   WBC 38.4*  --  20.9* 18.1* 13.3* 10.7 10.3   HGB 11.6*  --  9.2* 11.4* 9.1* 9.9* 10.5*   MCV 82.5  --  84.7 88.0 84.0 83.9 83.9   .0*  --  380.0 392.0 403.0 401.0 403.0   BAND 2  --   --   --   --   --   --    INR  --  1.31*  --   --   --   --   --        Recent Labs   Lab 24  1721 24  0421 24  0356 24  1540 24  0612 24  0607 24  1802 24  0631    142 137  --   --   --   --   --    K 4.1 3.8 3.3*  3.3* 4.5  --  3.5 3.9  --     108 105  --   --   --   --   --    CO2 23.0 26.0 24.0  --   --    --   --   --    BUN 13 10 7*  --   --   --   --   --    CREATSERUM 0.34* 0.24* 0.23*  0.23*  --  0.26* 0.25*  --  0.38*   CA 9.0 8.4* 8.0*  --   --   --   --   --    MG  --   --  1.7  --  1.8 1.7  --  1.8   GLU 77 96 86  --   --   --   --   --        Recent Labs   Lab 06/27/24  1721 06/28/24  0421 06/29/24  0356   ALT 42 31 25   AST 34 17 18   ALB 1.6* 1.9* 1.6*          COVID-19  Lab Results   Component Value Date    COVID19 Not Detected 06/27/2024    COVID19 Not Detected 04/27/2024    COVID19 Detected (A) 02/02/2024       Pro-Calcitonin  Recent Labs   Lab 06/27/24  1721   PCT 0.61*          Medications:    allopurinol  300 mg Oral Daily    multivitamin with minerals  1 tablet Oral Daily    vancomycin  15 mg/kg Intravenous Q24H    piperacillin-tazobactam  3.375 g Intravenous Q8H    enoxaparin  40 mg Subcutaneous Daily      norepinephrine Stopped (06/28/24 0146)    vasopressin (Vasostrict) 20 Units in sodium chloride 0.9% 100 mL infusion for septic shock      sodium chloride 75 mL/hr at 07/01/24 1858       norepinephrine    vasopressin (Vasostrict) 20 Units in sodium chloride 0.9% 100 mL infusion for septic shock    acetaminophen    melatonin    polyethylene glycol (PEG 3350)    sennosides    bisacodyl    fleet enema    ondansetron    metoclopramide    benzocaine-menthol          ASSESSMENT / PLAN:   79 year old male with mmp including  but not limited to MS, HTN, general anxiety, stage IV sacral decub, gout, protein calorie malnutrition, physical deconditioning, chronic noriega, is admitted for hypotension. Found to be in septic shock d/t infection of malodorous sacral wound.     **septic shock (resolved) now sepsis improving(fever, tachycardia) d/t  **infected sacral decub ulcer stage IV  **less likely UTI- suspect more so colonization  **gram negative bacteremia  -wean pressors. Empiric abx. UC contaminated, BC GNR. Pct mildly elevated. No lactic acidosis.. covid neg  -critical care and ID on, appreciate  -CT  noted with chronic bony changes suggestive of OM, thickened bladder and thickened rectum. On abx as above  -wound care  -BC 6/27 GNR x2--> final pending. PCR without speciation. Repeat BC NG     Acute on chronic anemia- suspect reactive on top of AOCD    Hypokalemia-replete per protocol     Stable chronic illnesses:  MS, HTN, general anxiety-hold bp meds. Home meds  Protein calorie malnutrition-monitor, dietian consult  Chronic noriega  Physical deconditioning     PPx-scds, lovenox    Dw pt, wife, RN               Thank You,  Nai Song MD    [facility]  Internal Medicine  Answering Service number: [phone]

## 2024-07-02 NOTE — PLAN OF CARE
Pt Aox4. VSS. RA.   Reported pain of L shoulder related to positioning per pt. Pt turned appropriately.   Wound dressing changed.  Good appetite. Needs assistance with setting up the food.   Fall precautions in place.     Problem: GASTROINTESTINAL - ADULT  Goal: Maintains adequate nutritional intake (undernourished)  Description: INTERVENTIONS:  - Monitor percentage of each meal consumed  - Identify factors contributing to decreased intake, treat as appropriate  - Assist with meals as needed  - Monitor I&O, WT and lab values  - Obtain nutritional consult as needed  - Optimize oral hygiene and moisture  - Encourage food from home; allow for food preferences  - Enhance eating environment  Outcome: Progressing     Problem: METABOLIC/FLUID AND ELECTROLYTES - ADULT  Goal: Electrolytes maintained within normal limits  Description: INTERVENTIONS:  - Monitor labs and rhythm and assess patient for signs and symptoms of electrolyte imbalances  - Administer electrolyte replacement as ordered  - Monitor response to electrolyte replacements, including rhythm and repeat lab results as appropriate  - Fluid restriction as ordered  - Instruct patient on fluid and nutrition restrictions as appropriate  Outcome: Progressing     Problem: SKIN/TISSUE INTEGRITY - ADULT  Goal: Incision(s), wounds(s) or drain site(s) healing without S/S of infection  Description: INTERVENTIONS:  - Assess and document risk factors for pressure ulcer development  - Assess and document skin integrity  - Assess and document dressing/incision, wound bed, drain sites and surrounding tissue  - Implement wound care per orders  - Initiate isolation precautions as appropriate  - Initiate Pressure Ulcer prevention bundle as indicated  Outcome: Progressing

## 2024-07-02 NOTE — DISCHARGE INSTRUCTIONS
Sometimes managing your health at home requires assistance.  The [facility] team has recognized your preference to use [facility] [phone].  A representative from the home health agency will contact you or your family to schedule your first visit.        -[employer] [place] - [phone] [email or web address]  -[facility] [place] [phone] or [phone] [email or web address]  -[employer] [phone] [facility]/[email or web address]    -[place] Ride Around [facility] [phone]  -[facility] [phone]  -[facility] [phone]. [phone]   -[facility] Dial-a-Ride Service  Reservations: [phone]  -[facility] [employer] Bus Line at [phone]  -[facility] [employer] Bus Service [phone]  -[facility] [phone].  -[facility] Transit [phone]-HVB-[phone]  -[facility] [phone]   -[employer] Transportation [phone]  -[employer] Transportation [phone] [email or web address]  -[employer] Transport [phone]  -Disabled [employer] the Go [phone]  -[employer] Transfer [phone]  -[facility] Wheelchair Transport [phone]   -[employer]: [phone]  -Connections thro mobile Lisa [phone]  -[employer] [phone]  -[employer] [facility], [facility] [place], and [facility]. [phone] [email or web address]  -[employer] Transportation [facility] and surrounding communities [phone] [email or web address]

## 2024-07-02 NOTE — CM/SW NOTE
07/02/24 1200   CM/SW Referral Data   Referral Source Social Work (self-referral)   Reason for Referral Discharge planning   Informant EMR;Patient;Spouse/Significant Other   Medical Hx   Does patient have an established PCP? Yes  (Willow Osorio MD)   Significant Past Medical/Mental Health Hx MS, HTN, general anxiety, stage IV sacral decub, gout, protein calorie malnutrition, physical deconditioning, chronic noriega   Patient Info   Advanced directives? Yes  (Pt's sps Alicia Cardenas is HCPOA)   Patient's Current Mental Status at Time of Assessment Alert   Patient's Home Environment House   Number of Levels in Home 1   Number of Stair in Home ramped entrance   Patient lives with Spouse/Significant other;Son;Other  (daughter-in-law)   Patient Status Prior to Admission   Independent with ADLs and Mobility No   Pt. requires assistance with Housework;Driving;Meals;Bathing;Ambulating;Dressing;Medications;Toileting   Services in place prior to admission DME/Supplies at home   Home Health Provider Info [facility]   DME Provider/Supplier [employer]   Type of DME/Supplies Wheelchair;Ramp;Hospital Bed;[employer] Lift   Discharge Needs   Anticipated D/C needs Home health care     Met w/ pt and his sps Alicia to assess pt's home environment and identify needs for DC. Pt admitted for sepsis and infected decubitus ulcer. Pt reports he has had his sacral decubitus ulcer for \"at least 7 yrs.\" Per pt/wife, the wound had improved and was closed over until October, 2023 it then reopened and has worsened. Pt lives with his sps, son and DIL. He has HHC with [facility], for wound care only at this time. Per wife, Tika RN comes 2x/wk and a nurse from [facility] comes 1 x wk. Pt's sps provides wound care on the remaining 3 days/wk. Pt is w/c dependent and requires a mechanical lift for transfers. Pt's sps is able to transfer pt using lift, her son/DIL also assist at times. Pt does not have additional [facility] caregiver services.  Pt/sps decline information for caregiver at this time.     Pt relies on [employer] par[employer]ransit for transportation and at times has difficulty d[place] CM will explore any available transportation services available on care everywhere.     Pt/ sps report all DME is in working order at this time. Wife unsure of what type of mattress pt's hospital bed has and is curious if pt would qualify for a specialty overlay d/t  his extensive wound. Pt's bed was ordered by Agusto Salamanca w/ his last admission, (wife believes from [facility] medical). D/w pt and sps that CM will follow up w/ DME company and assess if pt has the appropriate overlay for his wound needs.     Pt/sps provided contact information for both CM and Unit SW. Pt/sps encouraged to reach out with questions or concerns.     Cheryl Segura, MARLYN, CMSRN    [id number]

## 2024-07-02 NOTE — PROGRESS NOTES
Infectious Disease Progress Note      Date of admission: 6/27/2024  4:38 PM     Reason for consult: Sepsis in the setting of an infected decubitus ulcer    Subjective: Feels better.  Sacral pain improving.  No nausea or vomiting.  No diarrhea.  No shortness of breath.  No cough or sputum production.    The rest of the systems were reviewed and found to be negative except was mentioned above    Interval events: This is a 79-year-old male patient with history of paraplegia, chronic sacral decubitus ulcer with chronic osteomyelitis, who presents here with sepsis in the setting of an infected ulcer.  The patient has a chronic De La Fuente catheter, pyuria noted; however, cultures consistent with colonization.  CT showing chronic osteomyelitis without any necrotizing soft tissue infection or abscess.  Cultures growing MRSA along with Klebsiella oxytoca.  Blood cultures growing gram-negative rods, no target by PCR    Medications:    allopurinol    multivitamin with minerals    vancomycin    norepinephrine    vasopressin (Vasostrict) 20 Units in sodium chloride 0.9% 100 mL infusion for septic shock    sodium chloride    piperacillin-tazobactam    enoxaparin    acetaminophen    melatonin    polyethylene glycol (PEG 3350)    sennosides    bisacodyl    fleet enema    ondansetron    metoclopramide    benzocaine-menthol     Allergies:  No Known Allergies    Physical Exam:  Vitals:    07/02/24 0900   BP: 122/70   Pulse: 106   Resp: 16   Temp: 98.2 °F (36.8 °C)     Vitals signs and nursing note reviewed.   Constitutional:       Appearance: Normal appearance.   HENT:      Head: Normocephalic and atraumatic.      Mouth: Mucous membranes are moist.   Neck:      Musculoskeletal: Neck supple.   Cardiovascular:      Rate and Rhythm: Normal rate.   Pulmonary:      Effort: Pulmonary effort is normal. No respiratory distress.   Musculoskeletal:      Right lower leg: No edema.      Left lower leg: No edema.   Neurological:      Mental Status:  Alert and oriented to person, place, and time.       Laboratory data:  I have reviewed all the lab results independently.  Lab Results   Component Value Date    CREATSERUM 0.38 07/02/2024    K 3.9 07/01/2024    MG 1.8 07/02/2024      Recent Labs   Lab 06/27/24  1711 06/28/24  0422 06/30/24  0612 07/01/24  0607   RBC 4.23   < > 3.60* 3.84   HGB 11.6*   < > 9.9* 10.5*   HCT 34.9*   < > 30.2* 32.2*   MCV 82.5   < > 83.9 83.9   MCH 27.4   < > 27.5 27.3   MCHC 33.2   < > 32.8 32.6   RDW 15.1   < > 15.1 15.3   NEPRELIM 34.65*   < > 8.58*  --    WBC 38.4*   < > 10.7 10.3   .0*   < > 401.0 403.0   NEUT 95  --   --   --    LYMPH 2  --   --   --    MON 1  --   --   --    EOS 0  --   --   --     < > = values in this interval not displayed.      Microbiology data:  Hospital Encounter on 06/27/24   1. Blood Culture     Status: None (Preliminary result)    Collection Time: 06/29/24  3:41 PM    Specimen: Blood,peripheral   Result Value Ref Range    Blood Culture Result No Growth 2 Days N/A   2. Aerobic Bacterial Culture     Status: Abnormal    Collection Time: 06/27/24  6:38 PM    Specimen: Sacrum; Other   Result Value Ref Range    Aerobic Culture Result 4+ growth Klebsiella oxytoca (A) N/A    Aerobic Culture Result 4+ growth Staphylococcus aureus, MRSA (A) N/A    Aerobic Smear No WBCs seen N/A    Aerobic Smear 2+ Gram Positive Cocci N/A       Susceptibility    Klebsiella oxytoca -  (no method available)     Ampicillin >=32 Resistant      Ampicillin + Sulbactam 8 Sensitive      Cefazolin >=64 Resistant      Cefepime <=1 Sensitive      Ceftriaxone <=1 Sensitive      Ciprofloxacin <=0.25 Sensitive      Gentamicin <=1 Sensitive      Meropenem <=0.25 Sensitive      Levofloxacin 1 Sensitive      Piperacillin + Tazobactam <=4 Sensitive      Trimethoprim/Sulfa <=20 Sensitive     Staphylococcus aureus, MRSA -  (no method available)     Cefazolin  Resistant      Clindamycin >=8 Resistant      Erythromycin >=8 Resistant       Gentamicin 1 Sensitive      Levofloxacin >=8 Resistant      Oxacillin >=4 Resistant      Tetracycline >=16 Resistant      Trimethoprim/Sulfa 20 Sensitive      Vancomycin <=0.5 Sensitive      Minocycline* 13 Resistant       * This antibiotic was tested by [employer] disk diffusion and result is not an SUSI.   3. Urine Culture, Routine     Status: None    Collection Time: 06/27/24  5:29 PM    Specimen: Urine, clean catch   Result Value Ref Range    Urine Culture  N/A     >100,000 cfu/ml Multiple species present- probable contamination.        Radiology:  I have reviewed all imagining data available independently.   CT abdomen pelvis on 6/28:  Large sacral decubitus ulcer with chronic osteomyelitis.  No evidence of abscess or drainable fluid collection.  Proctitis also noted.    Impression:  Brenden Cardenas is a 79 year old male with    Gram-negative sepsis, presenting here with septic shock with threat to life  Most likely source is his infected decubitus ulcer  Cultures from his ulcer growing MRSA and Klebsiella oxytoca  Wound care following  CT without necrotizing soft tissue infection or abscess  Currently on IV vancomycin and IV Zosyn  Improving  Infected sacral decubitus ulcer  CT with chronic osteomyelitis, no abscess or necrotizing soft tissue infection  As per the above  Pyuria  In the setting of chronic De La Fuente  No UTI  Paraplegia  Severe leukocytosis   Reactive  Will continue to trend    Recommendations:    Continue to follow-up on blood cultures for final speciation and susceptibilities  Continue IV Zosyn and IV vancomycin in the meantime  Aggressive wound care  Continue to monitor daily labs for antibiotic toxicities  Further recommendations will depend on the above work-up and clinical progress     The plan of care was discussed with the primary hospital team, Nai Song, *     Recommendations were also discussed with the patient; all questions were answered.     Thank you for this consultation.  Please don't hesitate to call the ID team for questions or any acute changes in patient's clinical condition.    Please note that this report has been produced using speech recognition software and may contain errors related to that system including, but not limited to, errors in grammar, punctuation, and spelling, as well as words and phrases that possibly may have been recognized inappropriately.  If there are any questions or concerns, contact the dictating provider for clarification.    The  Century Cures Act makes medical notes like these available to patients in the interest of transparency. Please be advised this is a medical document. Medical documents are intended to carry relevant information, facts as evident, and the clinical opinion of the practitioner. The medical note is intended as peer to peer communication and may appear blunt or direct. It is written in medical language and may contain abbreviations or verbiage that are unfamiliar.     Brendan Max MD  [facility]. Tel: [phone]. Fax: [phone].     Brenden Cardenas : 1945 MRN: [id number] [facility]: [id number]

## 2024-07-03 VITALS
BODY MASS INDEX: 19 KG/M2 | DIASTOLIC BLOOD PRESSURE: 68 MMHG | WEIGHT: 131.19 LBS | TEMPERATURE: 97 F | OXYGEN SATURATION: 92 % | RESPIRATION RATE: 16 BRPM | HEART RATE: 92 BPM | SYSTOLIC BLOOD PRESSURE: 132 MMHG

## 2024-07-03 LAB
CREAT BLD-MCNC: 0.31 MG/DL
EGFRCR SERPLBLD CKD-EPI 2021: 121 ML/MIN/1.73M2 (ref 60–?)
MAGNESIUM SERPL-MCNC: 1.8 MG/DL (ref 1.6–2.6)

## 2024-07-03 RX ORDER — METRONIDAZOLE 500 MG/1
500 TABLET ORAL 2 TIMES DAILY
Qty: 28 TABLET | Refills: 0 | Status: SHIPPED | OUTPATIENT
Start: 2024-07-03 | End: 2024-07-17

## 2024-07-03 RX ORDER — SULFAMETHOXAZOLE AND TRIMETHOPRIM 800; 160 MG/1; MG/1
1 TABLET ORAL 2 TIMES DAILY
Qty: 28 TABLET | Refills: 0 | Status: SHIPPED | OUTPATIENT
Start: 2024-07-03 | End: 2024-07-17

## 2024-07-03 RX ORDER — METOPROLOL SUCCINATE 25 MG/1
25 TABLET, EXTENDED RELEASE ORAL
Status: DISCONTINUED | OUTPATIENT
Start: 2024-07-03 | End: 2024-07-03

## 2024-07-03 RX ORDER — MAGNESIUM OXIDE 400 MG/1
400 TABLET ORAL ONCE
Status: COMPLETED | OUTPATIENT
Start: 2024-07-03 | End: 2024-07-03

## 2024-07-03 NOTE — PROGRESS NOTES
Received message from  stating family had questions regarding wound. RN spoke with  and floor RN, wound care RN and fellow wound care staff member went to patients room to speak with patient and wife. Wife has questions regarding periwound, wife states she is noticing  breakdown. RN educated wife on need to use generous amount of zinc based barrier cream to periwound - educated wife that layer needs to be thick enough to cover entire excoriated skin & that when cleansing patient with each dressing change staff is recommending NO scrubbing - to gently remove soiled stool or soiled urine and reapply generous amounts of zinc cream to periwound with each brief change. Educated wife that scrubbing the area can cause more breakdown, wife states understanding. Wife and patient also had questions about importance of off loading air mattress.  made statement that bed \"this bed is not doing anything\". RN educated in detail the need and importance for off loading mechanism, this is gold standard. Wife and pt questioned about amount of time patient is spending in wheelchair, staff educated wife that gold standard is every 2 hours patient need to be readjusted or repositioned.

## 2024-07-03 NOTE — CM/SW NOTE
BLS scheduled for 4pm pickup. RN aware. SW notified [facility] of patient DC and requested that they schedule resume of care appointment.     SW will continue to follow for plan of care changes and remain available for any additional DC needs or concerns.     Lorelei Duncan MSW, LSW  Discharge Planner   [id number]

## 2024-07-03 NOTE — PLAN OF CARE
Pt received A&Ox4. VSS. RA. Tele. Afebrile. Denies pain. Medications given per MAR. IVF. Call light within reach. Fall precautions in place. De La Fuente intact & draining.     Problem: CARDIOVASCULAR - ADULT  Goal: Maintains optimal cardiac output and hemodynamic stability  Description: INTERVENTIONS:  - Monitor vital signs, rhythm, and trends  - Monitor for bleeding, hypotension and signs of decreased cardiac output  - Evaluate effectiveness of vasoactive medications to optimize hemodynamic stability  - Monitor arterial and/or venous puncture sites for bleeding and/or hematoma  - Assess quality of pulses, skin color and temperature  - Assess for signs of decreased coronary artery perfusion - ex. Angina  - Evaluate fluid balance, assess for edema, trend weights  Outcome: Progressing     Problem: GASTROINTESTINAL - ADULT  Goal: Maintains adequate nutritional intake (undernourished)  Description: INTERVENTIONS:  - Monitor percentage of each meal consumed  - Identify factors contributing to decreased intake, treat as appropriate  - Assist with meals as needed  - Monitor I&O, WT and lab values  - Obtain nutritional consult as needed  - Optimize oral hygiene and moisture  - Encourage food from home; allow for food preferences  - Enhance eating environment  Outcome: Progressing     Problem: METABOLIC/FLUID AND ELECTROLYTES - ADULT  Goal: Electrolytes maintained within normal limits  Description: INTERVENTIONS:  - Monitor labs and rhythm and assess patient for signs and symptoms of electrolyte imbalances  - Administer electrolyte replacement as ordered  - Monitor response to electrolyte replacements, including rhythm and repeat lab results as appropriate  - Fluid restriction as ordered  - Instruct patient on fluid and nutrition restrictions as appropriate  Outcome: Progressing     Problem: SKIN/TISSUE INTEGRITY - ADULT  Goal: Incision(s), wounds(s) or drain site(s) healing without S/S of infection  Description: INTERVENTIONS:  -  Assess and document risk factors for pressure ulcer development  - Assess and document skin integrity  - Assess and document dressing/incision, wound bed, drain sites and surrounding tissue  - Implement wound care per orders  - Initiate isolation precautions as appropriate  - Initiate Pressure Ulcer prevention bundle as indicated  Outcome: Progressing     Problem: RISK FOR INFECTION - ADULT  Goal: Absence of fever/infection during anticipated neutropenic period  Description: INTERVENTIONS  - Monitor WBC  - Administer growth factors as ordered  - Implement neutropenic guidelines  Outcome: Progressing

## 2024-07-03 NOTE — CM/SW NOTE
SW called [facility] to discuss with them patient's wife's request for a low air loss mattress. SW was informed that patient already has a fully electric hospital bed with a low air loss mattress. This is the highest level of hospital bed at home that patient can qualify for.     SW will continue to follow for plan of care changes and remain available for any additional DC needs or concerns.     Lorelei Duncan MSW, LSW  Discharge Planner   [id number]

## 2024-07-03 NOTE — PROGRESS NOTES
[facility] Pharmacy Dosing Service      Follow Up Pharmacokinetic Consult for Vancomycin Dosing     Brenden Cardenas is a 79 year old male who is receiving vancomycin therapy for sepsis. Patient is on day 5 of vancomycin and is currently receiving 1000 mg IV every 24 hours. The current treatment and monitoring approach is steady state AUC strategy.        Weight and Temperature:    Wt Readings from Last 1 Encounters:   24 59.5 kg (131 lb 2.8 oz)         Temp Readings from Last 1 Encounters:   24 98.1 °F (36.7 °C) (Temporal)      Labs:   Recent Labs   Lab 24  0612 24  0607 24  0631   CREATSERUM 0.26* 0.25* 0.38*      Estimated Creatinine Clearance: 137 mL/min (A) (based on SCr of 0.38 mg/dL (L)).     Recent Labs   Lab 24  0353 24  0612 24  0607   WBC 13.3* 10.7 10.3        Vancomycin Levels:  Lab Results   Component Value Date/Time    VANCT 6.9 (L) 2024 02:09 PM    VANCP 19.1 (L) 2024 06:02 PM       Corresponding 24 h-AUC:  301 mg-h/L     The Pharmacokinetic Target is:     to 600 mg-h/L and trough <=15 mg/L    Renal Dosing Considerations:    None     Assessment/Plan:   Maintenance Regimen: Adjust vancomycin to 1500 mg IV every 24 hours.  The predicted AUC and trough with this new regimen are 452 mg-h/L and 10.04 mg/L, respectively. Pt will also likely accumulate which will increase these numbers as well.     Monitorin) Plan for vancomycin peak and trough to be obtained at steady state    2) Pharmacy will order SCr as clinically indicated to assess renal function.    3) Pharmacy will monitor for toxicity and efficacy, adjust vancomycin dose and/or frequency, and order vancomycin levels as appropriate per the Pharmacy and Therapeutics Committee approved protocol until discontinuation of the medication.       We appreciate the opportunity to assist in the care of this patient.     Jill White, PharmD, BCPS, BCOP  Clinical  Pharmacist  7/2/2024  8:17 PM  [facility]: [phone]

## 2024-07-03 NOTE — CDS QUERY
Dear Dr. Song:   Please provide a nutritional diagnosis, if known, related to the clinical information below:   [  x ] Severe  Malnutrition  [   ] Other (please specify) :_____________________________      Documentation from the Medical Record:     h&p: septic shock d/t infected sacral decub less likely uti- suspect more so colonization - wean pressors; acute on chronic anemia - suspect reactive on top of aocd; chronic illnesses: ms; ht;n anxiety; protein calorie maln;    6/27consult: sepsis - possible wound infection, ua positive, la less than 2; coccyx decub ulcer; wt loss, failure to thrive, albumin 1.6,protein 5.4- dietary consult; - albumin 25%-25gm one time dose; htn; ms- functional paraplegic - frequent repositioning    Dietary note:   Pt meets severe malnutrition criteria at this time.    CRITERIA FOR MALNUTRITION DIAGNOSIS:    Criteria for severe malnutrition diagnosis: chronic illness related to wt loss  greater than 10% in 6 months and energy intake less than 75% for greater than 1 month    NUTRITION RELATED PHYSICAL FINDINGS:    1.Body Fat/Muscle Mass: moderate depletion body fat Buccal fat pad and  moderate muscle depletion Temple region, Clavicle region, Thigh region, and Calf region    Offered ONS and pt would like to try 3 different ones - [place] strawberry, Magic Cup berry, Magic Shake strawberry. Also provided high kcal/pro diet and recipes handouts to wife. Continued to encourage PO intake;             Use of terms such as suspected, possible, or probable (associated with a specific diagnosis that is being evaluated, monitored, or treated as if it exists) are acceptable and can be coded in the inpatient setting, when documented at the time of discharge.     Please add any additional documentation to your progress note and continue to document this through discharge.    Thank you. For questions regarding this query, please contact Clinical : Yuki Leon RN, CDS at  [phone]  This form is a permanent part of the medical record.

## 2024-07-03 NOTE — PROGRESS NOTES
Infectious Disease Progress Note      Date of admission: 6/27/2024  4:38 PM     Reason for consult: Sepsis in the setting of an infected decubitus ulcer    Subjective: Feels better.  Sacral pain improving.  No nausea or vomiting.  No diarrhea.  No shortness of breath.  No cough or sputum production.    The rest of the systems were reviewed and found to be negative except was mentioned above    Interval events: This is a 79-year-old male patient with history of paraplegia, chronic sacral decubitus ulcer with chronic osteomyelitis, who presents here with sepsis in the setting of an infected ulcer.  The patient has a chronic De La Fuente catheter, pyuria noted; however, cultures consistent with colonization.  CT showing chronic osteomyelitis without any necrotizing soft tissue infection or abscess.  Cultures growing MRSA along with Klebsiella oxytoca.  Blood cultures growing gram-negative rods, no target by PCR    Medications:    metoprolol succinate ER    vancomycin    allopurinol    multivitamin with minerals    norepinephrine    vasopressin (Vasostrict) 20 Units in sodium chloride 0.9% 100 mL infusion for septic shock    sodium chloride    piperacillin-tazobactam    enoxaparin    acetaminophen    melatonin    polyethylene glycol (PEG 3350)    sennosides    bisacodyl    fleet enema    ondansetron    metoclopramide    benzocaine-menthol     Allergies:  No Known Allergies    Physical Exam:  Vitals:    07/03/24 0756   BP: 133/71   Pulse: 109   Resp: 18   Temp: 97.7 °F (36.5 °C)     Vitals signs and nursing note reviewed.   Constitutional:       Appearance: Normal appearance.   HENT:      Head: Normocephalic and atraumatic.      Mouth: Mucous membranes are moist.   Neck:      Musculoskeletal: Neck supple.   Cardiovascular:      Rate and Rhythm: Normal rate.   Pulmonary:      Effort: Pulmonary effort is normal. No respiratory distress.   Musculoskeletal:      Right lower leg: No edema.      Left lower leg: No edema.    Neurological:      Mental Status: Alert and oriented to person, place, and time.       Laboratory data:  I have reviewed all the lab results independently.  Lab Results   Component Value Date    CREATSERUM 0.31 07/03/2024    MG 1.8 07/03/2024      Recent Labs   Lab 06/27/24  1711 06/28/24  0422 06/30/24  0612 07/01/24  0607   RBC 4.23   < > 3.60* 3.84   HGB 11.6*   < > 9.9* 10.5*   HCT 34.9*   < > 30.2* 32.2*   MCV 82.5   < > 83.9 83.9   MCH 27.4   < > 27.5 27.3   MCHC 33.2   < > 32.8 32.6   RDW 15.1   < > 15.1 15.3   NEPRELIM 34.65*   < > 8.58*  --    WBC 38.4*   < > 10.7 10.3   .0*   < > 401.0 403.0   NEUT 95  --   --   --    LYMPH 2  --   --   --    MON 1  --   --   --    EOS 0  --   --   --     < > = values in this interval not displayed.      Microbiology data:  Hospital Encounter on 06/27/24   1. Blood Culture     Status: None (Preliminary result)    Collection Time: 06/29/24  3:41 PM    Specimen: Blood,peripheral   Result Value Ref Range    Blood Culture Result No Growth 3 Days N/A   2. Aerobic Bacterial Culture     Status: Abnormal    Collection Time: 06/27/24  6:38 PM    Specimen: Sacrum; Other   Result Value Ref Range    Aerobic Culture Result 4+ growth Klebsiella oxytoca (A) N/A    Aerobic Culture Result 4+ growth Staphylococcus aureus, MRSA (A) N/A    Aerobic Smear No WBCs seen N/A    Aerobic Smear 2+ Gram Positive Cocci N/A       Susceptibility    Klebsiella oxytoca -  (no method available)     Ampicillin >=32 Resistant      Ampicillin + Sulbactam 8 Sensitive      Cefazolin >=64 Resistant      Cefepime <=1 Sensitive      Ceftriaxone <=1 Sensitive      Ciprofloxacin <=0.25 Sensitive      Gentamicin <=1 Sensitive      Meropenem <=0.25 Sensitive      Levofloxacin 1 Sensitive      Piperacillin + Tazobactam <=4 Sensitive      Trimethoprim/Sulfa <=20 Sensitive     Staphylococcus aureus, MRSA -  (no method available)     Cefazolin  Resistant      Clindamycin >=8 Resistant      Erythromycin >=8  Resistant      Gentamicin 1 Sensitive      Levofloxacin >=8 Resistant      Oxacillin >=4 Resistant      Tetracycline >=16 Resistant      Trimethoprim/Sulfa 20 Sensitive      Vancomycin <=0.5 Sensitive      Minocycline* 13 Resistant       * This antibiotic was tested by [employer] disk diffusion and result is not an SUSI.   3. Urine Culture, Routine     Status: None    Collection Time: 06/27/24  5:29 PM    Specimen: Urine, clean catch   Result Value Ref Range    Urine Culture  N/A     >100,000 cfu/ml Multiple species present- probable contamination.        Radiology:  I have reviewed all imagining data available independently.   CT abdomen pelvis on 6/28:  Large sacral decubitus ulcer with chronic osteomyelitis.  No evidence of abscess or drainable fluid collection.  Proctitis also noted.    Impression:  Brenden Cardenas is a 79 year old male with    Gram-negative sepsis, presenting here with septic shock with threat to life  Most likely source is his infected decubitus ulcer  Cultures from his ulcer growing MRSA and Klebsiella oxytoca  Wound care following  CT without necrotizing soft tissue infection or abscess  Currently on IV vancomycin and IV Zosyn  Improving  The patient received 7 days of IV antibiotics, more than enough to treat his gram-negative bacteremia.  In addition to that, repeat blood cultures remain negative  Infected sacral decubitus ulcer  CT with chronic osteomyelitis, no abscess or necrotizing soft tissue infection  As per the above  Pyuria  In the setting of chronic De La Fuente  No UTI  Paraplegia  Severe leukocytosis   Reactive  Will continue to trend    Recommendations:    Discharge patient on Bactrim DS 1 tablet twice daily for 14 more days along with metronidazole 500 mg twice daily  Follow-up with infectious disease in 10 days  Okay to discharge from ID perspective    The plan of care was discussed with the primary hospital team, Nai Song, *     Recommendations were also discussed  with the patient; all questions were answered.     Thank you for this consultation. Please don't hesitate to call the ID team for questions or any acute changes in patient's clinical condition.    Please note that this report has been produced using speech recognition software and may contain errors related to that system including, but not limited to, errors in grammar, punctuation, and spelling, as well as words and phrases that possibly may have been recognized inappropriately.  If there are any questions or concerns, contact the dictating provider for clarification.    The  Century Cures Act makes medical notes like these available to patients in the interest of transparency. Please be advised this is a medical document. Medical documents are intended to carry relevant information, facts as evident, and the clinical opinion of the practitioner. The medical note is intended as peer to peer communication and may appear blunt or direct. It is written in medical language and may contain abbreviations or verbiage that are unfamiliar.     Brendan Max MD  [facility]. Tel: [phone]. Fax: [phone].     Brenden Cardenas : 1945 MRN: [id number] [facility]: [id number]

## 2024-07-03 NOTE — CM/SW NOTE
Late entry: CM received contact from [employer] 7/2 PM, confirming pt has the highest level of air mattress available with his sacral wound. Pt/sps updated. Pt did voice issues with the length of cord for his remote controlling the bed position. CM notified [employer]. DME company relayed that they cannot provide an extension, as it is a safety concern, but that they will set up a service appointment to see if the placement of remote/ motor can be adjusted or to assist with troubleshooting remote issues.     Pt medically cleared for DC today, Wed 7/3. Ambulance placed on will-call for today. PCS form completed and available for RN.     RN/SW to call [phone] to schedule  when pt is ready for DC.        CM/SW will remain available for DC planning and/or support.     MARLY GarnicaN, CMSRN    [id number]

## 2024-07-03 NOTE — PROGRESS NOTES
[facility] Progress Note     Brenden Cardenas Patient Status:  Inpatient    1945 MRN [id number]   [facility] 4NW-A Attending Nai Song, *   Hosp Day # 6 PCP Willow Mcmahon DO     Chief Complaint: f/u sacral wound    Subjective:   S:  sitting up in bed,   Currently no complaints. No f/c/cp/sob/n/v, +U,       Objective:   Vital signs:  Temp:  [97.7 °F (36.5 °C)-98.7 °F (37.1 °C)] 97.7 °F (36.5 °C)  Pulse:  [] 109  Resp:  [16-20] 18  BP: (121-142)/(71-82) 133/71  SpO2:  [97 %-99 %] 99 %    Wt Readings from Last 6 Encounters:   24 131 lb 2.8 oz (59.5 kg)   24 156 lb 6.4 oz (70.9 kg)   24 161 lb 8 oz (73.3 kg)   24 161 lb (73 kg)   24 154 lb (69.9 kg)   24 165 lb (74.8 kg)         Physical Exam:    Gen: No acute distress, alert and oriented , no accessory m use  Pulm: Lungs clear, ok respiratory effort  CV: Heart with regular rate and rhythm, no edema  Abd: Abdomen soft, nontender, nondistended, bowel sounds present, no peritoneal signs  MSK:   no clubbing, no cyanosis  Skin: no visible rashes. Media pictures reviewed of sacral decub ulcer  Psych:   appropriate affect,   answering questions ok    Results:   Diagnostic Data:      Labs:       Recent Labs   Lab 24  1711 24  0422 24  1231 24  0353 24  0612 24  0607   WBC 38.4*  --  20.9* 18.1* 13.3* 10.7 10.3   HGB 11.6*  --  9.2* 11.4* 9.1* 9.9* 10.5*   MCV 82.5  --  84.7 88.0 84.0 83.9 83.9   .0*  --  380.0 392.0 403.0 401.0 403.0   BAND 2  --   --   --   --   --   --    INR  --  1.31*  --   --   --   --   --        Recent Labs   Lab 24  1721 24  0421 24  0356 24  1540 24  0612 24  0607 24  1802 24  0631 24  0630    142 137  --   --   --   --   --   --    K 4.1 3.8 3.3*  3.3* 4.5  --  3.5 3.9  --   --     108 105  --   --   --   --   --   --    CO2 23.0 26.0  24.0  --   --   --   --   --   --    BUN 13 10 7*  --   --   --   --   --   --    CREATSERUM 0.34* 0.24* 0.23*  0.23*  --  0.26* 0.25*  --  0.38* 0.31*   CA 9.0 8.4* 8.0*  --   --   --   --   --   --    MG  --   --  1.7  --  1.8 1.7  --  1.8 1.8   GLU 77 96 86  --   --   --   --   --   --        Recent Labs   Lab 06/27/24  1721 06/28/24  0421 06/29/24  0356   ALT 42 31 25   AST 34 17 18   ALB 1.6* 1.9* 1.6*          COVID-19  Lab Results   Component Value Date    COVID19 Not Detected 06/27/2024    COVID19 Not Detected 04/27/2024    COVID19 Detected (A) 02/02/2024       Pro-Calcitonin  Recent Labs   Lab 06/27/24  1721   PCT 0.61*          Medications:    metoprolol succinate ER  25 mg Oral BID    vancomycin  1,500 mg Intravenous Q24H    allopurinol  300 mg Oral Daily    multivitamin with minerals  1 tablet Oral Daily    piperacillin-tazobactam  3.375 g Intravenous Q8H    enoxaparin  40 mg Subcutaneous Daily      norepinephrine Stopped (06/28/24 0146)    vasopressin (Vasostrict) 20 Units in sodium chloride 0.9% 100 mL infusion for septic shock      sodium chloride 75 mL/hr at 07/02/24 2244       norepinephrine    vasopressin (Vasostrict) 20 Units in sodium chloride 0.9% 100 mL infusion for septic shock    acetaminophen    melatonin    polyethylene glycol (PEG 3350)    sennosides    bisacodyl    fleet enema    ondansetron    metoclopramide    benzocaine-menthol          ASSESSMENT / PLAN:   79 year old male with mmp including  but not limited to MS, HTN, general anxiety, stage IV sacral decub, gout, protein calorie malnutrition, physical deconditioning, chronic noriega, is admitted for hypotension. Found to be in septic shock d/t infection of malodorous sacral wound.     **septic shock (resolved) now sepsis nearly resolved(fever, tachycardia) d/t  **infected sacral decub ulcer stage IV  **less likely UTI- suspect more so colonization  **gram negative bacteremia  -wean pressors. Empiric abx. UC contaminated, BC GNR. Pct  mildly elevated. No lactic acidosis.. covid neg  -critical care and ID on, appreciate  -CT noted with chronic bony changes suggestive of OM, thickened bladder and thickened rectum. On abx as above  -wound care  -BC 6/27 GNR x2--> final pending. PCR without speciation. Repeat BC NG   -po abx per ID Upon dc    Acute on chronic anemia- suspect reactive on top of AOCD    Hypokalemia-replete per protocol     Stable chronic illnesses:  MS, HTN, general anxiety-resume BB. Home meds  Protein calorie malnutrition-monitor, dietian consult  Chronic norieag  Physical deconditioning     PPx-scds, lovenox    Dw pt, RN    Stable for dc from IM standpoint when ok with all               Thank You,  Nai Song MD    [facility] Hospitalist  Internal Medicine  Answering Service number: [phone]

## 2024-07-03 NOTE — PLAN OF CARE
NURSING DISCHARGE NOTE    Discharged Home with urinary catheter via Ambulance.  Accompanied by Support staff  Belongings Taken by patient/family.  Discharge teaching provided and verbalized understanding. .    Problem: SKIN/TISSUE INTEGRITY - ADULT  Goal: Incision(s), wounds(s) or drain site(s) healing without S/S of infection  Description: INTERVENTIONS:  - Assess and document risk factors for pressure ulcer development  - Assess and document skin integrity  - Assess and document dressing/incision, wound bed, drain sites and surrounding tissue  - Implement wound care per orders  - Initiate isolation precautions as appropriate  - Initiate Pressure Ulcer prevention bundle as indicated  Outcome: Adequate for Discharge

## 2024-07-03 NOTE — PHYSICAL THERAPY NOTE
PHYSICAL THERAPY TREATMENT NOTE - INPATIENT    Room Number: 423/423-A     Session: 1     Number of Visits to Meet Established Goals: 3    Presenting Problem: hypotension; septic shock  Co-Morbidities : MS, HTN, general anxiety, stage IV sacral decub, gout, chronic noriega, protein calorie malnutrition, physical deconditioning    ASSESSMENT   Patient demonstrates limited progress this session, goals  updated to reflect patient performance.    Patient continues to function near baseline with bed mobility.  Contributing factors to remaining limitations include decreased functional strength, pain, impaired sitting balance, impaired motor planning, and limited LE and UE ROM.  Next session anticipate patient to progress bed mobility.  Physical Therapy will continue to follow patient for duration of hospitalization.    Patient continues to benefit from continued skilled PT services: at discharge to promote functional independence and safety with additional support and return home with home health PT.    PLAN  PT Treatment Plan: Bed mobility;Body mechanics;Patient education;Family education;Energy conservation;Endurance;Strengthening;Range of motion;Transfer training;Balance training  Rehab Potential : Guarded  Frequency (Obs): 3x/week    CURRENT GOALS     Goal #1 Patient is able to demonstrate supine - sit EOB @ level: dependent assistance  MET  Updated to supine to sit with mod assist of one.      Goal #2 Patient is able to complete B LE, UE, and cervical AAROM exercises to maintain and/or improve available ROM in order increase independence in bed mobility     Goal #3   Assess sitting balance   Goal #4    Goal #5    Goal #6    Goal Comments: Goals established on 6/29/2024  7/3/2024 all goals ongoing.    SUBJECTIVE  \" I want to be able to get rid of the yeison.\"    OBJECTIVE  Precautions: Bed/chair alarm    WEIGHT BEARING RESTRICTION  Weight Bearing Restriction: None                PAIN ASSESSMENT   Rating: Unable to  rate  Location: Left shoulder,elbow and hand  Management Techniques: Activity promotion;Repositioning    BALANCE                                                                                                                       Static Sitting: Poor  Dynamic Sitting: Dependent           Static Standing: Not tested  Dynamic Standing: Not tested    ACTIVITY TOLERANCE                         O2 WALK         AM-PAC '6-Clicks' INPATIENT SHORT FORM - BASIC MOBILITY  How much difficulty does the patient currently have...  Patient Difficulty: Turning over in bed (including adjusting bedclothes, sheets and blankets)?: A Lot   Patient Difficulty: Sitting down on and standing up from a chair with arms (e.g., wheelchair, bedside commode, etc.): Unable   Patient Difficulty: Moving from lying on back to sitting on the side of the bed?: A Lot   How much help from another person does the patient currently need...   Help from Another: Moving to and from a bed to a chair (including a wheelchair)?: Total   Help from Another: Need to walk in hospital room?: Total   Help from Another: Climbing 3-5 steps with a railing?: Total       AM-PAC Score:  Raw Score: 8   Approx Degree of Impairment: 86.62%   Standardized Score (AM-PAC Scale): 28.58   CMS Modifier (G-Code): CM    FUNCTIONAL ABILITY STATUS  Gait Assessment   Functional Mobility/Gait Assessment  Gait Assistance: Not tested  Distance (ft): 0    Skilled Therapy Provided    Bed Mobility:  Rolling: max A   Supine<>Sit: Dependent on one person assist   Sit<>Supine: Dependent on one assist.      Transfer Mobility: NA  Sit<>Stand: NT   Stand<>Sit: NT   Gait: NT    Therapist's Comments: Patient was received in supine. AAROM to  PROM performed on all 4 extremities. Patient was educated on proper positioning of limbs to prevent contractures and pain.   Patient sat at EOB for 8 min, worked on lateral lean on L and R elbow and push off, pt unable to assist. Patient worked on lumbar stretch,  forward lean, post to ant wt shift with assist. Encouraged to pull with RUE on a bed sheet tied to the foot board to improve strength. Patient with poor .   During ex, pt unable to assist with LE's. KERRI Vazquez performed.     HEP on PROM given to the wife.   Patient End of Session: In bed;Needs met;Call light within reach;RN aware of session/findings;All patient questions and concerns addressed;Family present    PT Session Time: 25 minutes  Gait Trainin minutes  Therapeutic Activity: 25 minutes

## 2024-07-03 NOTE — DISCHARGE SUMMARY
General Medicine Discharge Summary     Patient ID:  Brenden Cardenas  79 year old  [id number]  5/6/1945    Admit date: 6/27/2024    Discharge date and time:  7/3/24    Attending Physician: Nai Song, *     Primary Care Physician: Willow Mcmahon DO     Reason for admission: hypotension      Risk of readmission: Brenden Cardenas has Moderate Risk of readmission after discharge from the hospital.    Hospital Discharge Diagnoses:  infected sacral decub ulcer stage IV, gram neg bacteremia    Lace+ Score: 70  59-90 High Risk  29-58 Medium Risk  0-28   Low Risk.    TCM Follow-Up Recommendation:  LACE 29-58: Moderate Risk of readmission after discharge from the hospital.          Discharge Condition: alive    Important follow up  -PCP Willow Mcmahon DO see appointments listed below    -Labs: prn  -Radiology:  prn         Hospital Course:    79 year old male with mmp including  but not limited to MS, HTN, general anxiety, stage IV sacral decub, gout, protein calorie malnutrition, physical deconditioning, chronic noriega, is admitted for hypotension. Found to be in septic shock d/t infection of malodorous sacral wound.     **septic shock (resolved) now sepsis nearly resolved(fever, tachycardia) d/t  **infected sacral decub ulcer stage IV  **less likely UTI- suspect more so colonization  **gram negative bacteremia  -wean pressors. Empiric abx. UC contaminated, BC GNR. Pct mildly elevated. No lactic acidosis.. covid neg  -critical care and ID on, appreciate  -CT noted with chronic bony changes suggestive of OM, thickened bladder and thickened rectum. On abx as above  -wound care  -BC 6/27 GNR x2--> final pending. PCR without speciation. Repeat BC NG   -po abx per ID Upon dc     Acute on chronic anemia- suspect reactive on top of AOCD     Hypokalemia-replete per protocol     Stable chronic illnesses:  MS, HTN, general anxiety-resume BB. Home meds  Protein calorie malnutrition-monitor, dietian consult  Chronic noriega  Physical  Patient was having pain and discomfort in his abdomen, hoang wasn't draining. Unable to manually irrigate clots. Bladder scan 888. Called RRT nurse for assistance, who was also unable to manually irrigate hoang. Paged Dr. Parr x2 for orders but no answer. Overrode paging system to page Dr. Garcia and he said to reinsert hoang and start CBI if there is severe hematuria. 3 way 22 Syriac hoang was inserted by RRT nurse and patient had instant relief with clots coming out. CBI was started and patient is comfortable and sleeping.    Dr. Combs paged about heparin drip and he said to pause for 4 hours (until 0600) and restart if bleeding has stopped.   deconditioning        Consults: IP CONSULT TO PHARMACY  IP CONSULT TO HOSPITALIST  IP CONSULT TO PULMONOLOGY  CONSULT TO WOUND OSTOMY  IP CONSULT TO FOOD AND NUTRITION SERVICES  IP CONSULT TO SPIRITUAL CARE  IP CONSULT TO SOCIAL WORK  IP CONSULT TO INFECTIOUS DISEASE  IP CONSULT TO PHARMACY    Radiology:  CT ABDOMEN+PELVIS(CONTRAST ONLY)(CPT=74177)    Result Date: 6/28/2024  PROCEDURE:  CT ABDOMEN+PELVIS (CONTRAST ONLY) (CPT=74177)  COMPARISON:  [facility] , CT, ABD(S) / PELVIS(S)-SET, 1/09/2007, 11:00 PM.  INDICATIONS:  Infected sacral decubitus ulcer, rule out abscess, rule out complicated soft tissue infection  TECHNIQUE:  CT scanning was performed from the dome of the diaphragm to the pubic symphysis with non-ionic intravenous contrast material. Post contrast coronal MPR imaging was performed.  Dose reduction techniques were used. Dose information is transmitted to the ACR (American College of Radiology) NRDR (National Radiology Data Registry) which includes the Dose Index Registry.  PATIENT STATED HISTORY:(As transcribed by Technologist)  Infected sacral ulcer with possible abscess. Patient unable to provide recent history   CONTRAST USED:  80cc of Isovue 370  FINDINGS:  Lung bases demonstrate trace bilateral pleural effusions with associated basilar atelectasis.  The liver, spleen, pancreas, adrenal glands have an essentially unremarkable postcontrast appearance.  There is a probable cyst arising from the left kidney measuring up to 2.4 x 2.2 cm.  This has an overall simple appearance.  No specific further follow-up is recommended.  Possible tiny punctate nonobstructing stone of the left kidney measures 2-3 mm on image 39 of series 3.  A trace amount of free pelvic fluid is present.  The urinary bladder is nondistended with a De La Fuente catheter in place.  There is apparent mural thickening of the bladder.  Some of this may be due to incomplete distension.  A nonspecific cystitis is difficult to exclude.  Some loops of  bowel are nondistended which limit assessment.  There is possible circumferential mural thickening in the region of the rectum.  This could represent a nonspecific colitis.  A neoplastic process cannot be excluded.  Correlation with the history is suggested.  Large soft tissue defect noted over the sacrum compatible with the provided history of ulcer down to the bone which is exposed.  The cortex of the bone in this region appears indistinct suggestive of areas of erosion.  This has a chronic appearance with sclerotic margins in some regions as well.  There is no evidence of an abscess or drainable fluid collection at this time.            CONCLUSION:  Large sacral decubitus ulcer with chronic appearing bony changes of the underlying sacrum in this region.  This may represent osteomyelitis with which may be chronic.  There is no evidence of an abscess or drainable fluid collection at this time.  Prominent circumferential mural thickening of the rectum.  Consider correlation for proctitis.  A neoplastic process cannot be entirely excluded.  There is circumferential mural thickening of the urinary bladder.  Consider correlation for cystitis.   LOCATION:  [facility]   Dictated by (CST): Ray Gonzalez MD on 6/28/2024 at 7:41 PM     Finalized by (CST): Ray Gonzalez MD on 6/28/2024 at 7:46 PM       XR CHEST AP/PA (1 VIEW) (CPT=71045)    Result Date: 6/27/2024  PROCEDURE:  XR CHEST AP/PA (1 VIEW) (CPT=71045)  TECHNIQUE:  AP chest radiograph was obtained.  COMPARISON:  [facility] , XR, XR CHEST AP PORTABLE  (CPT=71045), 4/27/2024, 10:59 PM.  INDICATIONS:  sepis  PATIENT STATED HISTORY: (As transcribed by Technologist)  Pt presents to ER after home health stated they were concerned with worsening wound and pt was tachycardic and lowering blood pressure.    FINDINGS:  Normal heart size and pulmonary vascularity.  Tortuous aorta.  No focal pulmonary opacity.  Healed right rib fractures and postsurgical changes in lower cervical spine again  demonstrated.            CONCLUSION:  No evidence of active cardiopulmonary disease.   LOCATION:  [facility]      Dictated by (CST): Yury Munguia MD on 6/27/2024 at 6:41 PM     Finalized by (CST): Yury Munguia MD on 6/27/2024 at 6:41 PM       MRI SPINE CERVICAL (W+WO) (CPT=72156)    Result Date: 6/19/2024  PROCEDURE:  MRI SPINE CERVICAL (W+WO) (CPT=72156)  COMPARISON:  [facility], MR, MRI SPINE THORACIC (CPT=72146), 1/18/2016, 10:12 AM.  INDICATIONS:  R29.898 Weakness of both legs G35 MS (multiple sclerosis) ([facility])  TECHNIQUE:  Multiplanar T1 and T2 weighted images including fat suppression sequences.  Images acquired in sagittal and axial planes.  Intravenous gadolinium was administered followed by multiplanar post-infusion T1 weighted sequences.   PATIENT STATED HISTORY:(As transcribed by Technologist)  Reason for exam: Weakness of both legs G35 MS (multiple sclerosis)   CONTRAST USED:  14 mL of Dotarem  FINDINGS:  The diagnostic quality of the examination is degraded by moderate motion artifact on multiple sequences.   CERVICAL DISC LEVELS: C2-C3:  Posterior disc osteophyte complex abuts the ventral thecal sac without significant central canal stenosis.  In conjunction with facet/uncovertebral hypertrophy there is severe left-sided foraminal stenosis. C3-C4:  Posterior disc osteophyte complex abuts the ventral thecal sac resulting in moderate central canal stenosis, minimal AP sac diameter of 6 mm.  Facet and uncovertebral hypertrophy contribute to severe bilateral foraminal stenosis, left greater than right. C4-C5:  Susceptibility artifact degrades assessment of the central canal.  No high-grade central canal stenosis.  Facet and uncovertebral hypertrophy contribute to moderate/severe bilateral foraminal stenosis. C5-C6:  Susceptibility artifact degrades assessment of the central canal.  No high-grade central canal stenosis.  Facet and uncovertebral hypertrophy contribute to moderate right-sided and severe  left-sided foraminal stenosis. C6-C7:  Susceptibility artifact degrades assessment of the central canal.  No high-grade central canal stenosis.  Facet and uncovertebral hypertrophy contribute to mild bilateral foraminal stenosis. C7-T1:  Susceptibility artifact degrades assessment of the central canal.  No high-grade central canal stenosis.  No significant foraminal stenosis.   CRANIOCERVICAL AREA:  Normal foramen magnum with no Chiari malformation.  PARASPINAL AREA:  No epidural or paraspinal mass or fluid collection. BONY STRUCTURES:  Postoperative changes of ACDF spanning C4-C7 with associated susceptibility artifact degrading assessment of regional osseous and soft tissue structures. 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  Similar findings were present on prior study of 2015. No well-defined areas of chronic or active demyelinating plaques identified.            CONCLUSION:   1. Degenerative and postoperative changes of the cervical spine, as detailed above.  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  Similar findings were present in 2015.   2. Degenerative changes result in moderate/severe multilevel foraminal stenosis, as detailed above.  3. No well-defined areas of chronic or active plaque demyelination, given limitations imposed by motion artifact and susceptibility artifact from fusion hardware.   LOCATION:  [facility]   Dictated by ([facility]): Eleni Miranda MD on 6/19/2024 at 11:49 AM     Finalized by (CST): Eleni Miranda MD on 6/19/2024 at 12:01 PM       MRI BRAIN (W+WO) (CPT=70553)    Result Date: 6/19/2024  PROCEDURE:  MRI BRAIN (W+WO) (CPT=70553)  COMPARISON:  [facility], MR, MRI BRAIN (W+WO)  (CPT=70553), 12/19/2015, 10:36 AM.  INDICATIONS:  R29.898 Weakness of both legs G35 MS (multiple sclerosis) ([facility])  TECHNIQUE:  MRI of the brain was performed with multi-planar T1, T2-weighted images with FLAIR sequences and diffusion weighted images without and with infusion.  PATIENT STATED HISTORY:(As transcribed by Technologist)  Reason for exam: Weakness of both legs G35 MS (multiple sclerosis) ([facility])   CONTRAST USED:  14 mL of Dotarem  FINDINGS:   Mild cerebral atrophy showing some increase since the prior examination from 2015.  No mass-effect, midline shift, hydrocephalus, herniation, extra-axial fluid collections, or signs of acute territorial infarct, , or brain tumor. No sign of restricted diffusion. No pathologic gradient susceptibility pattern.  Flow voids present within the internal carotid and basilar arteries.  Small focal areas of nonenhancing white matter signal hyperintensity, the largest of which is stable in the right frontal centrum semi ovale series 8, image 25 measuring 9 x 6 mm AP transverse dimensions.  This was previously 8 x 5 mm.  A few tiny areas of subcortical and periventricular nonenhancing white matter signal foci are present, all of which were seen on the prior exam from 2015, appearing to be generally stable.   There is increased signal also present within the globus pallidus bilaterally on T2/FLAIR images, especially noted on the left side which appears increased since the prior examination 2015. No enhancement.   Prominent perivascular spaces are present bilaterally somewhat more prominent than on the prior exam from 2015.  With contrast infusion, there is no abnormal enhancement pattern identified.  Sinuses:  No sign of acute sinusitis or mastoiditis.            CONCLUSION:   1. No evidence for restricted diffusion or other features for acute infarct.  No abnormal enhancement identified.  2. Nonenhancing white matter lesions redemonstrated, the largest of which is located in the  right frontal lobe centrum semiovale white matter, minimally larger when compared to prior exam from 2015.  The patient has a given history of multiple sclerosis,  and these may be plaque lesions related to MS. No frankly new lesion identified.  3. Prominent perivascular spaces are present, seen on the prior exam from 2015, mildly more prominent.  4. Increased signal within the globus pallidus on T2/FLAIR images, especially on the left side.  Perivascular spaces can also become prominent within the basal ganglia, and this may be the etiology for these changes, although the increased signal would be expected on T2 weighted images, but not on FLAIR images; the signal changes on FLAIR may be related to gliosis, chronic white matter changes, or potentially areas of demyelination in the basal ganglia.    LOCATION:  [facility]    Dictated by (CST): Alexi Hernandez MD on 6/19/2024 at 8:41 AM     Finalized by (CST): Alexi Hernandez MD on 6/19/2024 at 8:59 AM         Operative Procedures:      Disposition: alive    Patient Instructions: Current and discharge medications reviewed with patient  Current Discharge Medication List        START taking these medications    Details   sulfamethoxazole-trimethoprim -160 MG Oral Tab per tablet Take 1 tablet by mouth 2 (two) times daily for 14 days.      metRONIDAZOLE 500 MG Oral Tab Take 1 tablet (500 mg total) by mouth in the morning and 1 tablet (500 mg total) before bedtime. Do all this for 14 days.           CONTINUE these medications which have NOT CHANGED    Details   sodium hypochlorite 0.25 % External Solution Apply 1 Bottle topically as needed (wound care).      metoprolol succinate ER 25 MG Oral Tablet 24 Hr Take 1 tablet (25 mg total) by mouth in the morning and 1 tablet (25 mg total) before bedtime.      allopurinol 300 MG Oral Tab Take 1 tablet (300 mg total) by mouth daily.      LOSARTAN 100 MG Oral Tab TAKE 1 TABLET BY MOUTH EVERY DAY      acetaminophen 500 MG Oral Tab  Take 2 tablets (1,000 mg total) by mouth every 6 (six) hours as needed for Pain.      Multiple Vitamins-Minerals (CENTRUM) Oral Tab Take 1 tablet by mouth daily.      Vitamin D3, Cholecalciferol, 50 MCG (2000 UT) Oral Tab Take 1 tablet (2,000 Units total) by mouth daily.      cyanocobalamine 1000 MCG Oral Tab Take 1 tablet (1,000 mcg total) by mouth daily.      clotrimazole-betamethasone 1-0.05 % External Cream Apply 1 Application topically 2 (two) times daily. To groin and buttock           STOP taking these medications       HYDROcodone-acetaminophen 5-325 MG Oral Tab              Home Medication Changes:     Activity: as directed  Diet: as directed  Wound Care: as directed  Code Status: Full Code  O2: prn    Follow-up with        Follow up with Willow Mcmahon  Specialty: Internal Medicine  coordinator to call to schedule appt [facility]  [address]  [address]  [place] [id number] [phone]           Schedule an appointment with Brendan Max as soon as possible for a visit in 10 day(s)  Specialty: INFECTIOUS DISEASES [place] [address] [address]  [place] [address]  [phone]              Total Time Coordinating Care: 35 minutes    Patient had opportunity to ask questions and state understand and agree with therapeutic plan as outlined    Thank You,  Nai Song MD    [facility] Hospitalist  Internal Medicine  Answering Service number: [phone]

## 2024-07-05 LAB — BACTERIA BLD CULT: POSITIVE

## 2024-07-29 ENCOUNTER — PATIENT MESSAGE (OUTPATIENT)
Dept: NEUROLOGY | Facility: CLINIC | Age: 79
End: 2024-07-29

## 2024-07-29 DIAGNOSIS — G35 MS (MULTIPLE SCLEROSIS) (HCC): Primary | ICD-10-CM

## 2024-07-31 ENCOUNTER — TELEPHONE (OUTPATIENT)
Dept: NEUROLOGY | Facility: CLINIC | Age: 79
End: 2024-07-31

## 2024-07-31 DIAGNOSIS — G35 MS (MULTIPLE SCLEROSIS) (HCC): Primary | ICD-10-CM

## 2024-07-31 RX ORDER — PREDNISONE 20 MG/1
TABLET ORAL
Qty: 30 TABLET | Refills: 0 | Status: SHIPPED | OUTPATIENT
Start: 2024-07-31

## 2024-07-31 NOTE — TELEPHONE ENCOUNTER
Clarified with [facility] and patient's wife that due to scheduling conflict patient wont be able to have infusion till mid August.     All parties expressed understanding.

## 2024-07-31 NOTE — TELEPHONE ENCOUNTER
Patients spouse Karen has questions about scheduling date of patients infusion.    Call Karen to discuss and advise.

## 2024-08-02 NOTE — TELEPHONE ENCOUNTER
After consulting with Dr Aleman, EMG order placed for R and L arm.  Patient informed to call to schedule EMG with this office.  [facility] aware to schedule patient for infusions in mid August.

## 2024-08-20 ENCOUNTER — TELEPHONE (OUTPATIENT)
Dept: NEUROLOGY | Facility: CLINIC | Age: 79
End: 2024-08-20

## 2024-08-20 DIAGNOSIS — G35 MS (MULTIPLE SCLEROSIS) (HCC): Primary | ICD-10-CM

## 2024-08-20 NOTE — TELEPHONE ENCOUNTER
Dominique from [employer] is calling. She states patient's wife is saying patient will no longer be getting infusions at [facility] and will now be getting infusions at [facility]. Dominique is looking to confirm patient is established at [facility] for this continuation.

## 2024-08-21 NOTE — TELEPHONE ENCOUNTER
Called and spoke with wife regarding patient's upcoming infusions.    Patient is scheduled to have his solumedrol infusions at the cancer center 9/10-9/13.    Discussed difficulty getting transportation for patient to infusion center. May need home infusions.    Informed wife office will look into possibility of home Ocrevus infusions for patient. Voiced understanding and is agreeable.     Will keep patient/spouse updated.

## 2024-09-10 ENCOUNTER — APPOINTMENT (OUTPATIENT)
Dept: HEMATOLOGY/ONCOLOGY | Facility: HOSPITAL | Age: 79
End: 2024-09-10
Attending: Other
Payer: MEDICARE

## 2024-09-11 ENCOUNTER — APPOINTMENT (OUTPATIENT)
Dept: HEMATOLOGY/ONCOLOGY | Facility: HOSPITAL | Age: 79
End: 2024-09-11
Attending: Other
Payer: MEDICARE

## 2024-09-12 ENCOUNTER — APPOINTMENT (OUTPATIENT)
Dept: HEMATOLOGY/ONCOLOGY | Facility: HOSPITAL | Age: 79
End: 2024-09-12
Attending: Other
Payer: MEDICARE

## 2024-09-13 ENCOUNTER — APPOINTMENT (OUTPATIENT)
Dept: HEMATOLOGY/ONCOLOGY | Facility: HOSPITAL | Age: 79
End: 2024-09-13
Attending: Other
Payer: MEDICARE

## 2024-09-17 NOTE — TELEPHONE ENCOUNTER
Referral information for Ocrevus sent to [facility] to initiate home infusions if able to service patient.

## 2024-09-17 NOTE — TELEPHONE ENCOUNTER
Patients spouse has questions about the second ocrevus infusion costs.  Alicia stated past infusions had no costs with patients insurance.    Please call Alicia to discuss and advise.

## 2024-09-19 NOTE — TELEPHONE ENCOUNTER
Called and spoke with patient and spouse. Reason patient requested home infusions is due to mobility and transfer difficulty.    Suggested they contact [employer] to discuss costs of infusions at home vs infusion center.    Will let office know what they decide.

## 2024-10-03 ENCOUNTER — PATIENT MESSAGE (OUTPATIENT)
Dept: NEUROLOGY | Facility: CLINIC | Age: 79
End: 2024-10-03

## 2024-10-09 ENCOUNTER — TELEPHONE (OUTPATIENT)
Dept: NEUROLOGY | Facility: CLINIC | Age: 79
End: 2024-10-09

## 2024-10-09 NOTE — TELEPHONE ENCOUNTER
Left message for Alicia to call back with last OCREVUS infusion, would like to clarify prior to ordering therapy plan to [facility].

## 2024-10-09 NOTE — TELEPHONE ENCOUNTER
Patient to infuse future ocrevus infusions at the [facility].      PA for Ocrevus Infusion at the [facility]    Medication order: Ocrelizumab (Ocrevus) 300mg/10mL  CPT Codes:  / 71856    Subsequent Doses*  Dose: 600mg Ocrevus in 500mL 0.9% Sodium Chloride every 6 months (infusions must be at least 20 weeks apart)    Referral placed. Patient to infuse once he completes his solumedrol infusions for recent MS Flare.

## 2024-10-10 NOTE — TELEPHONE ENCOUNTER
Per patient's wife Sim has not had Ocrevus infusion for a very long time.     Infusion scheduled for 10/15/24 at [facility].     Patient's wife asking how long each infusion will be due to bus schedule.

## 2024-10-11 NOTE — TELEPHONE ENCOUNTER
Informed patient and wife that Dr Aleman recommends having the OCREVUS infusion at this time, and if patient is still experiencing symptoms of R arm weakness to call this office and we will arrange the SOLUMEDROL infusions.  Verbalized understanding, and they will follow up with the [facility] regarding total time for infusion visit so they can inform the transportation company.

## 2024-10-11 NOTE — TELEPHONE ENCOUNTER
Spoke to patient and wife regarding patient's current condition with regard to needing SOLUMEDROL infusions at this time.    Patient is schedule for OCREVUS infusion on 10/15/2024.    Currently, patient still having weakness of R arm with difficulty grasping items. Can start feeding self for meals, but will need to be fed part way through due to fatigue and inability to grasp utensil.  Also having issues with keeping upright for the duration of a meal. Tends to slide to laying position whether in bed or wheel chair.  For this reason, the routine is to get patient up and into chair where he stays until after dinner.  Does complain of bilateral shoulder pain from old injuries.  Does complain of being cold a lot.  Will see PCP in the next couple of days to discuss these issues.

## 2024-10-14 ENCOUNTER — TELEPHONE (OUTPATIENT)
Dept: NEUROLOGY | Facility: CLINIC | Age: 79
End: 2024-10-14

## 2024-10-14 RX ORDER — DIPHENHYDRAMINE HYDROCHLORIDE 50 MG/ML
50 INJECTION INTRAMUSCULAR; INTRAVENOUS ONCE
OUTPATIENT
Start: 2024-10-14

## 2024-10-14 RX ORDER — DIPHENHYDRAMINE HCL 25 MG
25 CAPSULE ORAL ONCE
OUTPATIENT
Start: 2024-10-14

## 2024-10-14 RX ORDER — METHYLPREDNISOLONE SODIUM SUCCINATE 125 MG/2ML
125 INJECTION, POWDER, LYOPHILIZED, FOR SOLUTION INTRAMUSCULAR; INTRAVENOUS ONCE
OUTPATIENT
Start: 2024-10-14

## 2024-10-14 RX ORDER — ACETAMINOPHEN 325 MG/1
650 TABLET ORAL ONCE
OUTPATIENT
Start: 2024-10-14

## 2024-10-14 NOTE — TELEPHONE ENCOUNTER
OCREVUS Therapy plan ordered.  Patient to infuse at [facility] in [place], is schedule for 10/15/2024 with transportation arranged.    Patient is straight Medicare, so the medication is covered under the medical plan which is BUY and BILL.     Spoke with Robb at [facility] who does see therapy plan. She will confirm that they have the OCREVUS on hand and will call this office to inform.    Informed Robb that wife does have question about the total time they will be at the [facility] to inform transportation.

## 2024-10-14 NOTE — TELEPHONE ENCOUNTER
Spoke to Robb who states patient is good to go for infusion of OCREVUS on 10/15/2024 and will be there for 5 hours.  Informed wife Alicia who verbalized understanding and does have concerns about patient's nutrition status and will consult with PCP for advice.  No further questions.

## 2024-10-15 ENCOUNTER — OFFICE VISIT (OUTPATIENT)
Dept: HEMATOLOGY/ONCOLOGY | Facility: HOSPITAL | Age: 79
End: 2024-10-15
Attending: Other
Payer: MEDICARE

## 2024-10-15 VITALS
RESPIRATION RATE: 16 BRPM | TEMPERATURE: 98 F | SYSTOLIC BLOOD PRESSURE: 130 MMHG | DIASTOLIC BLOOD PRESSURE: 75 MMHG | OXYGEN SATURATION: 97 % | HEART RATE: 97 BPM

## 2024-10-15 DIAGNOSIS — G35 MULTIPLE SCLEROSIS (HCC): Primary | ICD-10-CM

## 2024-10-15 PROCEDURE — 96375 TX/PRO/DX INJ NEW DRUG ADDON: CPT

## 2024-10-15 PROCEDURE — 96365 THER/PROPH/DIAG IV INF INIT: CPT

## 2024-10-15 PROCEDURE — 96366 THER/PROPH/DIAG IV INF ADDON: CPT

## 2024-10-15 RX ORDER — METHYLPREDNISOLONE SODIUM SUCCINATE 125 MG/2ML
125 INJECTION, POWDER, LYOPHILIZED, FOR SOLUTION INTRAMUSCULAR; INTRAVENOUS ONCE
Status: COMPLETED | OUTPATIENT
Start: 2024-10-15 | End: 2024-10-15

## 2024-10-15 RX ORDER — DIPHENHYDRAMINE HCL 25 MG
50 CAPSULE ORAL ONCE
Status: COMPLETED | OUTPATIENT
Start: 2024-10-15 | End: 2024-10-15

## 2024-10-15 RX ORDER — ACETAMINOPHEN 325 MG/1
650 TABLET ORAL ONCE
Status: CANCELLED | OUTPATIENT
Start: 2025-04-15

## 2024-10-15 RX ORDER — DIPHENHYDRAMINE HCL 25 MG
50 CAPSULE ORAL ONCE
Status: CANCELLED | OUTPATIENT
Start: 2025-04-15

## 2024-10-15 RX ORDER — METHYLPREDNISOLONE SODIUM SUCCINATE 125 MG/2ML
125 INJECTION, POWDER, LYOPHILIZED, FOR SOLUTION INTRAMUSCULAR; INTRAVENOUS ONCE
Status: CANCELLED | OUTPATIENT
Start: 2025-04-15

## 2024-10-15 RX ORDER — DIPHENHYDRAMINE HCL 25 MG
50 CAPSULE ORAL ONCE
Status: CANCELLED | OUTPATIENT
Start: 2024-10-15

## 2024-10-15 RX ORDER — ACETAMINOPHEN 325 MG/1
650 TABLET ORAL ONCE
Status: COMPLETED | OUTPATIENT
Start: 2024-10-15 | End: 2024-10-15

## 2024-10-15 RX ADMIN — ACETAMINOPHEN 650 MG: 325 TABLET ORAL at 11:39:00

## 2024-10-15 RX ADMIN — DIPHENHYDRAMINE HCL 50 MG: 25 MG CAPSULE ORAL at 11:39:00

## 2024-10-15 RX ADMIN — METHYLPREDNISOLONE SODIUM SUCCINATE 125 MG: 125 INJECTION, POWDER, LYOPHILIZED, FOR SOLUTION INTRAMUSCULAR; INTRAVENOUS at 11:42:00

## 2024-10-15 NOTE — PROGRESS NOTES
Pt here for Ocrevus infusion . Pt denies any issues or concerns.      Ordering Provider: Dr. Samuel Aleman  Order Exp: 1x dose     Pt tolerated infusion without difficulty or complaint. Reviewed next apt date/time: N/A - 1x dose      Education Record  Learner:  Patient and Spouse  Disease / Diagnosis:  MS  Barriers / Limitations:  None  Method:  Discussion  General Topics:  Medication, Side effects and symptom management, Plan of care reviewed, and Fall risk and prevention  Outcome:  Shows understanding     Pt reports last dose of Ocrevus was > 6 months ago. Initial infusion rate used today - pt tolerated well. Discharged in stable condition via wheelchair.

## 2024-10-15 NOTE — TELEPHONE ENCOUNTER
Per Dr. Booth patient should have 50mg PO benadryl. Additional order added.     [facility]cer [facility] RN Monserrat notified.

## 2024-10-16 ENCOUNTER — APPOINTMENT (OUTPATIENT)
Dept: HEMATOLOGY/ONCOLOGY | Facility: HOSPITAL | Age: 79
End: 2024-10-16
Attending: Other
Payer: MEDICARE

## 2024-10-17 ENCOUNTER — APPOINTMENT (OUTPATIENT)
Dept: HEMATOLOGY/ONCOLOGY | Facility: HOSPITAL | Age: 79
End: 2024-10-17
Attending: Other
Payer: MEDICARE

## 2024-10-18 ENCOUNTER — APPOINTMENT (OUTPATIENT)
Dept: HEMATOLOGY/ONCOLOGY | Facility: HOSPITAL | Age: 79
End: 2024-10-18
Attending: Other
Payer: MEDICARE

## 2024-10-25 ENCOUNTER — HOSPITAL ENCOUNTER (INPATIENT)
Facility: HOSPITAL | Age: 79
LOS: 6 days | Discharge: NURSING, REHAB OR LONG-TERM CARE | End: 2024-10-31
Attending: EMERGENCY MEDICINE | Admitting: INTERNAL MEDICINE
Payer: MEDICARE

## 2024-10-25 ENCOUNTER — APPOINTMENT (OUTPATIENT)
Dept: GENERAL RADIOLOGY | Facility: HOSPITAL | Age: 79
End: 2024-10-25
Attending: EMERGENCY MEDICINE
Payer: MEDICARE

## 2024-10-25 DIAGNOSIS — L89.109 PRESSURE INJURY OF SKIN OF BACK, UNSPECIFIED INJURY STAGE: ICD-10-CM

## 2024-10-25 DIAGNOSIS — R79.89 ELEVATED LACTIC ACID LEVEL: ICD-10-CM

## 2024-10-25 DIAGNOSIS — R78.81 BACTEREMIA: Primary | ICD-10-CM

## 2024-10-25 DIAGNOSIS — E44.0 MODERATE PROTEIN-CALORIE MALNUTRITION (HCC): ICD-10-CM

## 2024-10-25 PROBLEM — D64.9 ANEMIA: Status: ACTIVE | Noted: 2024-01-01

## 2024-10-25 PROBLEM — D64.9 ANEMIA: Status: ACTIVE | Noted: 2024-10-25

## 2024-10-25 LAB
ALBUMIN SERPL-MCNC: 3.3 G/DL (ref 3.2–4.8)
ALBUMIN/GLOB SERPL: 1.2 {RATIO} (ref 1–2)
ALP LIVER SERPL-CCNC: 73 U/L
ALT SERPL-CCNC: 25 U/L
ANION GAP SERPL CALC-SCNC: 4 MMOL/L (ref 0–18)
AST SERPL-CCNC: 31 U/L (ref ?–34)
BASOPHILS # BLD AUTO: 0.02 X10(3) UL (ref 0–0.2)
BASOPHILS NFR BLD AUTO: 0.1 %
BILIRUB SERPL-MCNC: 0.3 MG/DL (ref 0.2–1.1)
BILIRUB UR QL STRIP.AUTO: NEGATIVE
BUN BLD-MCNC: 13 MG/DL (ref 9–23)
CALCIUM BLD-MCNC: 9.6 MG/DL (ref 8.7–10.4)
CHLORIDE SERPL-SCNC: 103 MMOL/L (ref 98–112)
CO2 SERPL-SCNC: 30 MMOL/L (ref 21–32)
COLOR UR AUTO: YELLOW
CREAT BLD-MCNC: 0.3 MG/DL
EGFRCR SERPLBLD CKD-EPI 2021: 123 ML/MIN/1.73M2 (ref 60–?)
EOSINOPHIL # BLD AUTO: 0 X10(3) UL (ref 0–0.7)
EOSINOPHIL NFR BLD AUTO: 0 %
ERYTHROCYTE [DISTWIDTH] IN BLOOD BY AUTOMATED COUNT: 16.9 %
GLOBULIN PLAS-MCNC: 2.7 G/DL (ref 2–3.5)
GLUCOSE BLD-MCNC: 98 MG/DL (ref 70–99)
GLUCOSE UR STRIP.AUTO-MCNC: NORMAL MG/DL
HCT VFR BLD AUTO: 35.5 %
HGB BLD-MCNC: 10.9 G/DL
IMM GRANULOCYTES # BLD AUTO: 0.16 X10(3) UL (ref 0–1)
IMM GRANULOCYTES NFR BLD: 0.9 %
KETONES UR STRIP.AUTO-MCNC: NEGATIVE MG/DL
LACTATE SERPL-SCNC: 1.2 MMOL/L (ref 0.5–2)
LACTATE SERPL-SCNC: 2.3 MMOL/L (ref 0.5–2)
LEUKOCYTE ESTERASE UR QL STRIP.AUTO: 500
LYMPHOCYTES # BLD AUTO: 1.51 X10(3) UL (ref 1–4)
LYMPHOCYTES NFR BLD AUTO: 8.9 %
MCH RBC QN AUTO: 24.2 PG (ref 26–34)
MCHC RBC AUTO-ENTMCNC: 30.7 G/DL (ref 31–37)
MCV RBC AUTO: 78.9 FL
MONOCYTES # BLD AUTO: 0.96 X10(3) UL (ref 0.1–1)
MONOCYTES NFR BLD AUTO: 5.7 %
NEUTROPHILS # BLD AUTO: 14.23 X10 (3) UL (ref 1.5–7.7)
NEUTROPHILS # BLD AUTO: 14.23 X10(3) UL (ref 1.5–7.7)
NEUTROPHILS NFR BLD AUTO: 84.4 %
NITRITE UR QL STRIP.AUTO: NEGATIVE
OSMOLALITY SERPL CALC.SUM OF ELEC: 284 MOSM/KG (ref 275–295)
PH UR STRIP.AUTO: 8.5 [PH] (ref 5–8)
PLATELET # BLD AUTO: 484 10(3)UL (ref 150–450)
POTASSIUM SERPL-SCNC: 4.5 MMOL/L (ref 3.5–5.1)
PROT SERPL-MCNC: 6 G/DL (ref 5.7–8.2)
PROT UR STRIP.AUTO-MCNC: 300 MG/DL
RBC # BLD AUTO: 4.5 X10(6)UL
RBC UR QL AUTO: NEGATIVE
SODIUM SERPL-SCNC: 137 MMOL/L (ref 136–145)
SP GR UR STRIP.AUTO: 1.02 (ref 1–1.03)
UROBILINOGEN UR STRIP.AUTO-MCNC: 2 MG/DL
WBC # BLD AUTO: 16.9 X10(3) UL (ref 4–11)

## 2024-10-25 PROCEDURE — 87077 CULTURE AEROBIC IDENTIFY: CPT | Performed by: EMERGENCY MEDICINE

## 2024-10-25 PROCEDURE — 83605 ASSAY OF LACTIC ACID: CPT

## 2024-10-25 PROCEDURE — 87205 SMEAR GRAM STAIN: CPT | Performed by: EMERGENCY MEDICINE

## 2024-10-25 PROCEDURE — 71045 X-RAY EXAM CHEST 1 VIEW: CPT | Performed by: EMERGENCY MEDICINE

## 2024-10-25 PROCEDURE — 96375 TX/PRO/DX INJ NEW DRUG ADDON: CPT

## 2024-10-25 PROCEDURE — 85025 COMPLETE CBC W/AUTO DIFF WBC: CPT

## 2024-10-25 PROCEDURE — 96365 THER/PROPH/DIAG IV INF INIT: CPT

## 2024-10-25 PROCEDURE — 87086 URINE CULTURE/COLONY COUNT: CPT | Performed by: EMERGENCY MEDICINE

## 2024-10-25 PROCEDURE — 81001 URINALYSIS AUTO W/SCOPE: CPT | Performed by: EMERGENCY MEDICINE

## 2024-10-25 PROCEDURE — 87040 BLOOD CULTURE FOR BACTERIA: CPT | Performed by: EMERGENCY MEDICINE

## 2024-10-25 PROCEDURE — 87186 SC STD MICRODIL/AGAR DIL: CPT | Performed by: EMERGENCY MEDICINE

## 2024-10-25 PROCEDURE — 36415 COLL VENOUS BLD VENIPUNCTURE: CPT

## 2024-10-25 PROCEDURE — 87150 DNA/RNA AMPLIFIED PROBE: CPT | Performed by: EMERGENCY MEDICINE

## 2024-10-25 PROCEDURE — 96361 HYDRATE IV INFUSION ADD-ON: CPT

## 2024-10-25 PROCEDURE — 99285 EMERGENCY DEPT VISIT HI MDM: CPT

## 2024-10-25 PROCEDURE — 83605 ASSAY OF LACTIC ACID: CPT | Performed by: EMERGENCY MEDICINE

## 2024-10-25 PROCEDURE — 80053 COMPREHEN METABOLIC PANEL: CPT | Performed by: EMERGENCY MEDICINE

## 2024-10-25 PROCEDURE — 80053 COMPREHEN METABOLIC PANEL: CPT

## 2024-10-25 PROCEDURE — 85025 COMPLETE CBC W/AUTO DIFF WBC: CPT | Performed by: EMERGENCY MEDICINE

## 2024-10-25 RX ORDER — ACETAMINOPHEN 500 MG
1000 TABLET ORAL EVERY 6 HOURS PRN
Status: DISCONTINUED | OUTPATIENT
Start: 2024-10-25 | End: 2024-10-31

## 2024-10-25 RX ORDER — SODIUM CHLORIDE 9 MG/ML
INJECTION, SOLUTION INTRAVENOUS CONTINUOUS
Status: ACTIVE | OUTPATIENT
Start: 2024-10-25 | End: 2024-10-26

## 2024-10-25 RX ORDER — METOPROLOL SUCCINATE 100 MG/1
100 TABLET, EXTENDED RELEASE ORAL
Status: DISCONTINUED | OUTPATIENT
Start: 2024-10-26 | End: 2024-10-31

## 2024-10-25 RX ORDER — HEPARIN SODIUM 5000 [USP'U]/ML
5000 INJECTION, SOLUTION INTRAVENOUS; SUBCUTANEOUS EVERY 8 HOURS SCHEDULED
Status: DISCONTINUED | OUTPATIENT
Start: 2024-10-25 | End: 2024-10-31

## 2024-10-25 RX ORDER — SODIUM CHLORIDE 9 MG/ML
INJECTION, SOLUTION INTRAVENOUS CONTINUOUS
Status: DISCONTINUED | OUTPATIENT
Start: 2024-10-25 | End: 2024-10-31

## 2024-10-25 RX ORDER — ALLOPURINOL 300 MG/1
300 TABLET ORAL DAILY
Status: DISCONTINUED | OUTPATIENT
Start: 2024-10-26 | End: 2024-10-31

## 2024-10-25 NOTE — ED INITIAL ASSESSMENT (HPI)
A&Ox2-3 Patient bib ems from home accompanied by wife    Sent to ed by pmd for elevated WBC's, wants to r/o urosepsis    Patient pmh MS, bed bound, has noriega catheter in place, multiple pressure wounds per wife    Wife endorses increased weakness and confusion over the past week

## 2024-10-26 LAB
ADENOVIRUS PCR:: NOT DETECTED
ANION GAP SERPL CALC-SCNC: 8 MMOL/L (ref 0–18)
B PARAPERT DNA SPEC QL NAA+PROBE: NOT DETECTED
B PERT DNA SPEC QL NAA+PROBE: NOT DETECTED
BASOPHILS # BLD AUTO: 0.02 X10(3) UL (ref 0–0.2)
BASOPHILS NFR BLD AUTO: 0.1 %
BUN BLD-MCNC: 8 MG/DL (ref 9–23)
C PNEUM DNA SPEC QL NAA+PROBE: NOT DETECTED
CALCIUM BLD-MCNC: 8.5 MG/DL (ref 8.7–10.4)
CHLORIDE SERPL-SCNC: 108 MMOL/L (ref 98–112)
CO2 SERPL-SCNC: 23 MMOL/L (ref 21–32)
CORONAVIRUS 229E PCR:: NOT DETECTED
CORONAVIRUS HKU1 PCR:: NOT DETECTED
CORONAVIRUS NL63 PCR:: NOT DETECTED
CORONAVIRUS OC43 PCR:: NOT DETECTED
CREAT BLD-MCNC: 0.19 MG/DL
EGFRCR SERPLBLD CKD-EPI 2021: 141 ML/MIN/1.73M2 (ref 60–?)
EOSINOPHIL # BLD AUTO: 0.01 X10(3) UL (ref 0–0.7)
EOSINOPHIL NFR BLD AUTO: 0.1 %
ERYTHROCYTE [DISTWIDTH] IN BLOOD BY AUTOMATED COUNT: 16.9 %
FLUAV RNA SPEC QL NAA+PROBE: NOT DETECTED
FLUBV RNA SPEC QL NAA+PROBE: NOT DETECTED
GLUCOSE BLD-MCNC: 73 MG/DL (ref 70–99)
HCT VFR BLD AUTO: 28.5 %
HGB BLD-MCNC: 8.7 G/DL
IMM GRANULOCYTES # BLD AUTO: 0.19 X10(3) UL (ref 0–1)
IMM GRANULOCYTES NFR BLD: 1.1 %
LYMPHOCYTES # BLD AUTO: 1.37 X10(3) UL (ref 1–4)
LYMPHOCYTES NFR BLD AUTO: 7.9 %
MCH RBC QN AUTO: 24.2 PG (ref 26–34)
MCHC RBC AUTO-ENTMCNC: 30.5 G/DL (ref 31–37)
MCV RBC AUTO: 79.2 FL
METAPNEUMOVIRUS PCR:: NOT DETECTED
MONOCYTES # BLD AUTO: 1.14 X10(3) UL (ref 0.1–1)
MONOCYTES NFR BLD AUTO: 6.5 %
MRSA DNA SPEC QL NAA+PROBE: POSITIVE
MYCOPLASMA PNEUMONIA PCR:: NOT DETECTED
NEUTROPHILS # BLD AUTO: 14.72 X10 (3) UL (ref 1.5–7.7)
NEUTROPHILS # BLD AUTO: 14.72 X10(3) UL (ref 1.5–7.7)
NEUTROPHILS NFR BLD AUTO: 84.3 %
OSMOLALITY SERPL CALC.SUM OF ELEC: 285 MOSM/KG (ref 275–295)
PARAINFLUENZA 1 PCR:: NOT DETECTED
PARAINFLUENZA 2 PCR:: NOT DETECTED
PARAINFLUENZA 3 PCR:: NOT DETECTED
PARAINFLUENZA 4 PCR:: NOT DETECTED
PLATELET # BLD AUTO: 263 10(3)UL (ref 150–450)
POTASSIUM SERPL-SCNC: 3.6 MMOL/L (ref 3.5–5.1)
PROCALCITONIN SERPL-MCNC: 0.26 NG/ML (ref ?–0.05)
RBC # BLD AUTO: 3.6 X10(6)UL
RHINOVIRUS/ENTERO PCR:: NOT DETECTED
RSV RNA SPEC QL NAA+PROBE: NOT DETECTED
SARS-COV-2 RNA NPH QL NAA+NON-PROBE: NOT DETECTED
SODIUM SERPL-SCNC: 139 MMOL/L (ref 136–145)
WBC # BLD AUTO: 17.5 X10(3) UL (ref 4–11)

## 2024-10-26 PROCEDURE — 80048 BASIC METABOLIC PNL TOTAL CA: CPT | Performed by: INTERNAL MEDICINE

## 2024-10-26 PROCEDURE — 85025 COMPLETE CBC W/AUTO DIFF WBC: CPT | Performed by: INTERNAL MEDICINE

## 2024-10-26 PROCEDURE — 94799 UNLISTED PULMONARY SVC/PX: CPT

## 2024-10-26 PROCEDURE — 84145 PROCALCITONIN (PCT): CPT | Performed by: INTERNAL MEDICINE

## 2024-10-26 PROCEDURE — 0202U NFCT DS 22 TRGT SARS-COV-2: CPT | Performed by: INTERNAL MEDICINE

## 2024-10-26 PROCEDURE — 87641 MR-STAPH DNA AMP PROBE: CPT | Performed by: INTERNAL MEDICINE

## 2024-10-26 NOTE — H&P
[facility]   part of [facility]    History and Physical     Brenden Cardenas Patient Status:  Inpatient    1945 MRN [id number]   Location [facility] 4NW-A Attending Johnathan Trujillo MD   Hosp Day # 1 PCP Willow Mcmahon DO     Chief Complaint: Leukocytosis    History of Present Illness: Brenden Cardenas is a 79 year old male with history of anxiety, kidney stones, gout, cervical stenosis, htn, MS, multiple pressure ulcers presenting with leukocytosis. Patient says that he was feeling like his normal self but recent outpatient labs showed a leukocytosis that lead his PMD to direct him to the ER where he ws found to have continued leukocytosis and a fever. Patient denies any focal symptoms. Patient denies any positive review of systems.     Past Medical History:  Past Medical History:    Anxiety state, unspecified    BACK PAIN    Bedbound    Calculus of kidney    Cervical stenosis of spine    COVID-19    Gout    Gout    High blood pressure    HYPERTENSION    Multiple sclerosis (HCC)    Osteoarthrosis, unspecified whether generalized or localized, unspecified site    OTHER DISEASES    GOUT    Paralysis (HCC)    Left arm and both legs    Pressure ulcer of sacrum    Seborrheic dermatitis    Sepsis with hypotension (HCC)    Skin cancer    Stage 4 decubitus ulcer (HCC)    Unspecified essential hypertension    Visual impairment    glasses        Past Surgical History:   Past Surgical History:   Procedure Laterality Date    Debride wound      x7    Other accessory      \"urolift\"    Other accessory      multiple skin Bx's/removals    Other surgical history  2011    LUMBAR SX    Other surgical history      cervical surgery    Spine surgery procedure unlisted         Social History:  reports that he has never smoked. He has never used smokeless tobacco. He reports current alcohol use. He reports that he does not use drugs.no illegal drugs, , 2 children    Family History:   Family History   Problem  Relation Age of Onset    Other (Other) Father         industrial lung abn    Other (gout) Father     Other (Other) Brother         colitis    Hypertension Mother    Mother passed  Father passed    Allergies: Allergies[1]    Medications:  Medications Ordered Prior to Encounter[2]    Review of Systems:   A comprehensive 14 point review of systems was completed.    Pertinent positives and negatives noted in the HPI.    Physical Exam:    /57 (BP Location: Right arm)   Pulse 90   Temp 99.4 °F (37.4 °C) (Oral)   Resp 20   SpO2 97%   General: No acute distress. Alert and oriented   HEENT: Normocephalic atraumatic. Moist mucous membranes. EOM-I.  Neck: No JVD. No carotid bruits.  Respiratory: Clear to auscultation bilaterally. No wheezes. No crackles  Cardiovascular: S1, S2. Regular rate and rhythm. No murmurs  Chest and Back: No tenderness or deformity.  Abdomen: Soft, nontender, nondistended.  Positive bowel sounds. No rebound, guarding  Neurologic: No new focal neurological deficits. CNII-XII grossly intact. Sensation intact. Strength 4+/5 right UE, pt unable to  left UE and LE  Musculoskeletal: Moves all extremities.  Extremities: + edema  le and no significant tenderness of the LE.  Integument: No new rashes or lesions. Pt with multiple ulceration as documented in picture in chart, non appear to have significant erythema  Psychiatric: Appropriate mood and affect.      Diagnostic Data:      Labs:  Recent Labs   Lab 10/25/24  1853 10/26/24  0647   WBC 16.9* 17.5*   HGB 10.9* 8.7*   MCV 78.9* 79.2*   .0* 263.0       Recent Labs   Lab 10/25/24  1852 10/26/24  0647   GLU 98 73   BUN 13 8*   CREATSERUM 0.30* 0.19*   CA 9.6 8.5*   ALB 3.3  --     139   K 4.5 3.6    108   CO2 30.0 23.0   ALKPHO 73  --    AST 31  --    ALT 25  --    BILT 0.3  --    TP 6.0  --        CrCl cannot be calculated (Unknown ideal weight.).    No results for input(s): \"PTP\", \"INR\" in the last 168 hours.    No results  for input(s): \"TROP\", \"CK\" in the last 168 hours.    Imaging: Imaging data reviewed in Epic.      ASSESSMENT / PLAN:    79 year old male with history of anxiety, kidney stones, gout, cervical stenosis, htn, MS, multiple pressure ulcers presenting with leukocytosis.    Leukocytosis  -etiology uncertain  -blood cx pending  -urine cx pending  -iv abx  -ID consulted  -trend    Hx Gout  -allopurinol    HTN  -sbp stable  -metoprolol     Anemia  -no active bleeding  -trend  -no transfusion at this time    Quality:  DVT Prophylaxis:scd, heparin sq  CODE status: Full per chart  [place]: yes chronic    Plan of care discussed with patient and staff    Dispo: no discharge    Johnathan Trujillo MD  [facility] Hospitalist  [phone]               [1] No Known Allergies  [2]   Current Facility-Administered Medications on File Prior to Encounter   Medication Dose Route Frequency Provider Last Rate Last Admin    [COMPLETED] acetaminophen (Tylenol) tab 650 mg  650 mg Oral Once Samuel Aleman MD   650 mg at 10/15/24 1139    [COMPLETED] methylPREDNISolone sodium succinate (Solu-MEDROL) injection 125 mg  125 mg Intravenous Once Samuel Aleman MD   125 mg at 10/15/24 1142    [COMPLETED] diphenhydrAMINE (Benadryl) cap/tab 50 mg  50 mg Oral Once Samuel Aleman MD   50 mg at 10/15/24 1139    [COMPLETED] Ocrelizumab (Ocrevus) 600 mg in sodium chloride 0.9% 500 mL infusion  600 mg Intravenous Once Samuel Aleman MD   Stopped at 10/15/24 2205     Current Outpatient Medications on File Prior to Encounter   Medication Sig Dispense Refill    metoprolol succinate ER 25 MG Oral Tablet 24 Hr Take 1 tablet (25 mg total) by mouth in the morning and 1 tablet (25 mg total) before bedtime. (Patient taking differently: Take 4 tablets (100 mg total) by mouth daily.)      allopurinol 300 MG Oral Tab Take 1 tablet (300 mg total) by mouth daily.

## 2024-10-26 NOTE — PLAN OF CARE
Patient is alert and oriented x 4. On contact precautions for MRSA in the nares. Patient has chronic indwelling noriega that is draining clear, yellow urine. IVF 0.9 NS running at 100 ml's/hr. IV zosyn for ABX treatment. VSS. No s/s of distress noted. Patient denies pain during comfort rounds. Patient's spouse present at the bedside throughout shift. Frequent repositioning done for patient with numerous wounds on sacrum, BLLE, BL feet, and scrotal area. No acute events noted this shift. Medications given per mar. Safety precautions maintained. Call light within reach.    Problem: SKIN/TISSUE INTEGRITY - ADULT  Goal: Incision(s), wounds(s) or drain site(s) healing without S/S of infection  Description: INTERVENTIONS:  - Assess and document risk factors for pressure ulcer development  - Assess and document skin integrity  - Assess and document dressing/incision, wound bed, drain sites and surrounding tissue  - Implement wound care per orders  - Initiate isolation precautions as appropriate  - Initiate Pressure Ulcer prevention bundle as indicated  Outcome: Progressing  Goal: Oral mucous membranes remain intact  Description: INTERVENTIONS  - Assess oral mucosa and hygiene practices  - Implement preventative oral hygiene regimen  - Implement oral medicated treatments as ordered  Outcome: Progressing     Problem: SAFETY ADULT - FALL  Goal: Free from fall injury  Description: INTERVENTIONS:  - Assess pt frequently for physical needs  - Identify cognitive and physical deficits and behaviors that affect risk of falls.  - [place] fall precautions as indicated by assessment.  - Educate pt/family on patient safety including physical limitations  - Instruct pt to call for assistance with activity based on assessment  - Modify environment to reduce risk of injury  - Provide assistive devices as appropriate  - Consider OT/PT consult to assist with strengthening/mobility  - Encourage toileting schedule  Outcome: Progressing      Problem: PAIN - ADULT  Goal: Verbalizes/displays adequate comfort level or patient's stated pain goal  Description: INTERVENTIONS:  - Encourage pt to monitor pain and request assistance  - Assess pain using appropriate pain scale  - Administer analgesics based on type and severity of pain and evaluate response  - Implement non-pharmacological measures as appropriate and evaluate response  - Consider cultural and social influences on pain and pain management  - Manage/alleviate anxiety  - Utilize distraction and/or relaxation techniques  - Monitor for opioid side effects  - Notify MD/LIP if interventions unsuccessful or patient reports new pain  - Anticipate increased pain with activity and pre-medicate as appropriate  Outcome: Progressing

## 2024-10-26 NOTE — ED QUICK NOTES
Orders for admission, patient is aware of plan and ready to go upstairs. Any questions, please call ED RN Ene at extension [phone].     Patient Covid vaccination status: Fully vaccinated     COVID Test Ordered in ED: None    COVID Suspicion at Admission: N/A    Running Infusions:  per MAR, abx and sepsis bolus, remaining 785mL infusing    Mental Status/LOC at time of transport: A&Ox3    Other pertinent information:   CIWA score: N/A   NIH score:  N/A

## 2024-10-26 NOTE — ED PROVIDER NOTES
Patient Seen in: [facility] Emergency Department      History     Chief Complaint   Patient presents with    Abnormal Labs     Stated Complaint: elevated wbc looks very ill    Subjective:   HPI      79-year-old patient with history of multiple sclerosis has been bedbound for the past 7 years presents to the emergency department with his wife.  Sent in by PCP for elevated white blood cell count and appearing ill.  Patient does have an indwelling De La Fuente catheter and multiple known decubitus ulcers across his backside and left lower extremity.  He saw his primary care doctor yesterday just for a checkup and she did note that he seemed incredibly ill-appearing.  Labs were sent and then he was called and directed to the emergency department today.  He is accompanied by his wife.  Unfortunately has been bedbound for multiple years    Objective:     Past Medical History:    Anxiety state, unspecified    BACK PAIN    Bedbound    Calculus of kidney    Cervical stenosis of spine    COVID-19    Gout    Gout    High blood pressure    HYPERTENSION    Multiple sclerosis (HCC)    Osteoarthrosis, unspecified whether generalized or localized, unspecified site    OTHER DISEASES    GOUT    Paralysis (HCC)    Left arm and both legs    Pressure ulcer of sacrum    Seborrheic dermatitis    Sepsis with hypotension (HCC)    Skin cancer    Stage 4 decubitus ulcer (HCC)    Unspecified essential hypertension    Visual impairment    glasses              Past Surgical History:   Procedure Laterality Date    Debride wound      x7    Other accessory      \"urolift\"    Other accessory      multiple skin Bx's/removals    Other surgical history  02/2011    LUMBAR SX    Other surgical history      cervical surgery    Spine surgery procedure unlisted                  Social History     Socioeconomic History    Marital status:    Tobacco Use    Smoking status: Never    Smokeless tobacco: Never   Vaping Use    Vaping status: Never Used    Substance and Sexual Activity    Alcohol use: Yes     Comment: once every few days    Drug use: Never   Other Topics Concern    Caffeine Concern No    Exercise Yes     Comment: Full therapy 2 x week     Social Drivers of Health     Financial Resource Strain: Low Risk  (5/2/2024)    Received from [facility]    Overall Financial Resource Strain (CARDIA)     Difficulty of Paying Living Expenses: Not hard at all   Food Insecurity: No Food Insecurity (10/26/2024)    Food Insecurity     Food Insecurity: Never true   Transportation Needs: No Transportation Needs (10/26/2024)    Transportation Needs     Lack of Transportation: No   Physical Activity: Inactive (5/2/2024)    Received from [facility]    Exercise Vital Sign     Days of Exercise per Week: 0 days     Minutes of Exercise per Session: 0 min   Stress: No Stress Concern Present (5/2/2024)    Received from [facility]    [employer] [place] [employer] - Occupational Stress Questionnaire     Feeling of Stress : Not at all   Social Connections: Unknown (5/2/2024)    Received from [facility]    Social Connection and Isolation Panel [NHANES]     Frequency of Social Gatherings with Friends and Family: Never     Attends Worship Services: Never     Active Member of Clubs or Organizations: No     Attends Club or Organization Meetings: Never     Marital Status:    Housing Stability: Low Risk  (10/26/2024)    Housing Stability     Housing Instability: No                  Physical Exam     ED Triage Vitals [10/25/24 1836]   /89   Pulse 115   Resp 19   Temp 98.6 °F (37 °C)   Temp src Oral   SpO2 100 %   O2 Device None (Room air)       Current Vitals:   Vital Signs  BP: 127/74  Pulse: 97  Resp: 17  Temp: 98.6 °F (37 °C)  Temp src: Oral  MAP (mmHg): 86    Oxygen Therapy  SpO2: 97 %  O2 Device: None (Room air)  Pulse Oximetry Type: Continuous        Physical Exam  Lactic significantly ill-appearing gentleman lying on  emergency department bed.  He has very dry oral mucosa his neck is supple his lungs are clear to auscultation he is lying somewhat on his right side on the emergency department bed offloading the decubitus ulcers on his flank and across his left buttocks.  These are bandaged.  His wife states that they are looking better than they have in the past.  He is incredibly thin.  His lungs breath sounds on the right.  His abdomen is scaphoid.  His upper and lower extremities are cachectic and he has broken down skin on his posterior back, left buttock left leg.  ED Course     Labs Reviewed   CBC WITH DIFFERENTIAL WITH PLATELET - Abnormal; Notable for the following components:       Result Value    WBC 16.9 (*)     HGB 10.9 (*)     HCT 35.5 (*)     .0 (*)     MCV 78.9 (*)     MCH 24.2 (*)     MCHC 30.7 (*)     Neutrophil Absolute Prelim 14.23 (*)     Neutrophil Absolute 14.23 (*)     All other components within normal limits   COMP METABOLIC PANEL (14) - Abnormal; Notable for the following components:    Creatinine 0.30 (*)     All other components within normal limits   LACTIC ACID, PLASMA - Abnormal; Notable for the following components:    Lactic Acid 2.3 (*)     All other components within normal limits   URINALYSIS WITH CULTURE REFLEX - Abnormal; Notable for the following components:    Clarity Urine Ex.Turbid (*)     pH Urine 8.5 (*)     Protein Urine 300 (*)     Urobilinogen Urine 2 (*)     Leukocyte Esterase Urine 500 (*)     All other components within normal limits   CBC WITH DIFFERENTIAL WITH PLATELET - Abnormal; Notable for the following components:    WBC 17.5 (*)     RBC 3.60 (*)     HGB 8.7 (*)     HCT 28.5 (*)     MCV 79.2 (*)     MCH 24.2 (*)     MCHC 30.5 (*)     Neutrophil Absolute Prelim 14.72 (*)     Neutrophil Absolute 14.72 (*)     Monocyte Absolute 1.14 (*)     All other components within normal limits   BASIC METABOLIC PANEL (8) - Abnormal; Notable for the following components:    BUN 8 (*)      Creatinine 0.19 (*)     Calcium, Total 8.5 (*)     All other components within normal limits   PROCALCITONIN - Abnormal; Notable for the following components:    Procalcitonin 0.26 (*)     All other components within normal limits   CBC WITH DIFFERENTIAL WITH PLATELET - Abnormal; Notable for the following components:    WBC 13.1 (*)     RBC 3.50 (*)     HGB 8.4 (*)     HCT 27.9 (*)     .0 (*)     MCV 79.7 (*)     MCH 24.0 (*)     MCHC 30.1 (*)     Neutrophil Absolute Prelim 10.77 (*)     Neutrophil Absolute 10.77 (*)     All other components within normal limits   BASIC METABOLIC PANEL (8) - Abnormal; Notable for the following components:    Potassium 3.4 (*)     BUN 8 (*)     Creatinine 0.27 (*)     Calcium, Total 8.5 (*)     All other components within normal limits   POTASSIUM - Abnormal; Notable for the following components:    Potassium 3.4 (*)     All other components within normal limits   CBC WITH DIFFERENTIAL WITH PLATELET - Abnormal; Notable for the following components:    RBC 3.38 (*)     HGB 8.2 (*)     HCT 27.5 (*)     MCH 24.3 (*)     MCHC 29.8 (*)     Neutrophil Absolute Prelim 8.36 (*)     Neutrophil Absolute 8.36 (*)     All other components within normal limits   COMP METABOLIC PANEL (14) - Abnormal; Notable for the following components:    Potassium 3.4 (*)     BUN <5 (*)     Creatinine 0.16 (*)     Calcium, Total 8.2 (*)     Total Protein 4.1 (*)     Albumin 2.3 (*)     Globulin  1.8 (*)     All other components within normal limits   PROTHROMBIN TIME (PT) - Abnormal; Notable for the following components:    PT 17.1 (*)     INR 1.38 (*)     All other components within normal limits   POTASSIUM - Abnormal; Notable for the following components:    Potassium 3.4 (*)     All other components within normal limits   CBC WITH DIFFERENTIAL WITH PLATELET - Abnormal; Notable for the following components:    WBC 13.4 (*)     HGB 10.5 (*)     HCT 35.1 (*)     MCH 24.2 (*)     MCHC 29.9 (*)      Neutrophil Absolute Prelim 10.82 (*)     Neutrophil Absolute 10.82 (*)     All other components within normal limits   BASIC METABOLIC PANEL (8) - Abnormal; Notable for the following components:    Potassium 3.4 (*)     BUN 6 (*)     Creatinine 0.15 (*)     Calcium, Total 7.9 (*)     All other components within normal limits   CBC WITH DIFFERENTIAL WITH PLATELET - Abnormal; Notable for the following components:    RBC 3.55 (*)     HGB 8.5 (*)     HCT 28.6 (*)     .0 (*)     MCH 23.9 (*)     MCHC 29.7 (*)     All other components within normal limits   BASIC METABOLIC PANEL (8) - Abnormal; Notable for the following components:    Glucose 100 (*)     BUN 6 (*)     Creatinine 0.16 (*)     Calcium, Total 7.8 (*)     All other components within normal limits   BLOOD CULTURE - Abnormal; Notable for the following components:    Blood Culture Result Pseudomonas aeruginosa (*)     Blood Culture Result Bacteroides fragilis (*)     Blood Smear Positive Blood Culture (*)     Blood Smear Gram Negative Rods (*)     All other components within normal limits    Narrative:     Anaerobic Bottle Volume - 7 mL  Aerobic Bottle Volume - 7 mL  Anaerobic Bottle Volume - 7 mL    Anaerobic Bottle Time to Detection - 19 hr  6 min    BLOOD CULTURE - Abnormal; Notable for the following components:    Blood Culture Result Bacteroides fragilis (*)     Blood Smear Positive Blood Culture (*)     Blood Smear Gram Negative Rods (*)     All other components within normal limits    Narrative:     Aerobic Bottle Volume - 10 mL  Anaerobic Bottle Volume - 10 mL    Anaerobic Bottle Time to Detection - 19 hr  36 min    URINE CULTURE, ROUTINE - Abnormal; Notable for the following components:    Urine Culture 50,000-99,000 CFU/ML Providencia rettgeri (*)     Urine Culture 50,000-99,000 CFU/ML Alcaligenes faecalis (*)     All other components within normal limits   MRSA SCREEN BY PCR - Abnormal; Notable for the following components:    MRSA Screen By  PCR Positive (*)     All other components within normal limits    Narrative:     A positive result does not necessarily indicate the presence of viable organisms. For confirmation, epidemiological typing and susceptibility testing, appropriate culture is needed.    PLEASE REFER TO MRSA SCREENING PROTOCOL FOR TREATMENT.     BLD CULT ID BY PCR - Abnormal; Notable for the following components:    Bacteroides fragilis by PCR   Detected (*)     Pseudomonas aeruginosa by PCR Detected (*)     All other components within normal limits   LACTIC ACID REFLEX POST POSTIVE - Normal   MAGNESIUM - Normal   MAGNESIUM - Normal   MAGNESIUM - Normal   MAGNESIUM - Normal   POTASSIUM - Normal   RESPIRATORY FLU EXPAND PANEL + COVID-19 - Normal    Narrative:     This test is intended for the simultaneous qualitative detection and differentiation of nucleic acids from multiple viral and bacterial respiratory organisms, including nucleic acid from Severe Acute Respiratory Syndrome Coronavirus 2 (SARS-CoV-2) in nasopharyngeal swab from individuals suspected of respiratory viral infection consistent with COVID-19 by their healthcare provider.    Test performed using the [employer] Respiratory Panel 2.1 (RP2.1) assay on the [employer] 2.0 System, [address], [address], [place] [address].    This test is being used under the Food and Drug Administration's Emergency Use Authorization.    The authorized Fact Sheet for Healthcare Providers for this assay is available upon request from the laboratory.    SARS and MERS coronaviruses are not tested on this assay.   MAGNESIUM   CBC WITH DIFFERENTIAL WITH PLATELET   BASIC METABOLIC PANEL (8)   MAGNESIUM   RAINBOW DRAW BLUE   BLOOD CULTURE   AEROBIC BACTERIAL CULTURE   ANAEROBIC CULTURE       ED Course as of 10/30/24 5030  ------------------------------------------------------------  Time: 10/25 1947  Value: WBC(!): 16.9  Comment:  (Reviewed)  ------------------------------------------------------------  Time: 10/25 1947  Value: Protein Urine(!): 300  Comment: (Reviewed)  ------------------------------------------------------------  Time: 10/25 1947  Value: Leukocyte Esterase (!): 500  Comment: (Reviewed)  ------------------------------------------------------------  Time: 10/25 1947  Value: CREATININE(!): 0.30  Comment: (Reviewed)  ------------------------------------------------------------  Time: 10/25 2018  Value: LACTIC ACID(!): 2.3  Comment: (Reviewed)  ------------------------------------------------------------  Time: 10/25 2018  Value: WBC(!): 16.9  Comment: (Reviewed)  ------------------------------------------------------------  Time: 10/25 2018  Value: Protein Urine(!): 300  Comment: (Reviewed)  ------------------------------------------------------------  Time: 10/25 2019  Value: Clarity Urine(!): Ex.Turbid  Comment: (Reviewed)  ------------------------------------------------------------  Time: 10/25 2019  Value: Leukocyte Esterase (!): 500  Comment: (Reviewed)       X-rays independently interpreted by myself does show significant kyphosis and rotation on this x-ray given the limitations of this patient's mobility.  He may have infiltrates in the right lower lobe he has a globular shaped heart.  This may represent an early infiltrate or pneumonia.       MDM      79-year-old bedbound multiple sclerosis patient presents to the emergency department for being directed in for abnormal blood test and his primary care doctor noting how incredibly ill he appeared yesterday.  He certainly does appear incredibly ill his white blood cell count is 16.9 here.  Urine cultures were started yesterday but patient is noted to be tachycardic on arrival very dry oral mucosa and noted to be ill-appearing he is not hypotensive he does not report a fever    Differential diagnosis includes urinary tract infection, bacteremia, infected decubitus ulcers,  pneumonia, there are certainly options here.  Patient does have a De La Fuente catheter that has recently been changed.  Urine is turbid with 500 of leukocyte esterase given that he does not have a fever he was started on ceftriaxone blood cultures urine cultures all drawn and pending.  White blood cell count actually higher as an outpatient a few days ago.  Sepsis bolus given given lactic acid of 2.3.  Patient will need admission to the hospital.  Discussed with he and his wife.  They are in agreement and with understanding.  Patient has not been hypotensive though he is tachycardic.  His wife states that he does frequently have an elevated heart rate and has for many years since his MS diagnosis.  Unclear if this is SIRS criteria but patient certainly has a significant leukocytosis, tachycardia and he is generally ill-appearing.  It is unclear if he has a urinary tract infection because he does have an indwelling De La Fuente catheter and he does have a history of colonization with it.    Total critical care time exclusive of procedure time on this patient with multiple reevaluations review of the outpatient labs which includes a leukocytosis seen at his primary care doctors note is 35 minutes.    Admission disposition: 10/25/2024  8:24 PM           Medical Decision Making      Disposition and Plan     Clinical Impression:  1. Bacteremia    2. Elevated lactic acid level    3. Moderate protein-calorie malnutrition (HCC)    4. Pressure injury of skin of back, unspecified injury stage         Disposition:  Admit  10/25/2024  8:24 pm    Follow-up:  No follow-up provider specified.        Medications Prescribed:  Current Discharge Medication List              Supplementary Documentation:              Hospital Problems       Present on Admission  Date Reviewed: 4/27/2024            ICD-10-CM Noted POA    * (Principal) Bacteremia R78.81 10/25/2024 Unknown    Anemia D64.9 10/25/2024 Yes    Elevated lactic acid level R79.89 10/25/2024  Unknown    Moderate protein-calorie malnutrition (HCC) E44.0 10/25/2024 Unknown    Pressure injury of skin of back, unspecified injury stage L89.109 10/25/2024 Unknown

## 2024-10-26 NOTE — PROGRESS NOTES
Pt admitted from emergency room with wife at bedside. Pt has over 13 wounds and all wounds where taken pictures of and included in assessment and all dressings where changed at this time. Pt has nroiega and has dark mavis urine. Pt 's left arm swollen and has swelling to bilateral lower extremities. Pt was cleaned.

## 2024-10-26 NOTE — ED QUICK NOTES
Patient irritable about being in holding area of ER. \"Why have we been here for 2 hours with all this noise? This is intolerable.\" Pleasant wife at bedside. Reassuring patient. Awaiting transport.

## 2024-10-27 ENCOUNTER — APPOINTMENT (OUTPATIENT)
Dept: ULTRASOUND IMAGING | Facility: HOSPITAL | Age: 79
End: 2024-10-27
Attending: INTERNAL MEDICINE
Payer: MEDICARE

## 2024-10-27 ENCOUNTER — APPOINTMENT (OUTPATIENT)
Dept: CT IMAGING | Facility: HOSPITAL | Age: 79
End: 2024-10-27
Attending: NURSE PRACTITIONER
Payer: MEDICARE

## 2024-10-27 LAB
ANION GAP SERPL CALC-SCNC: 6 MMOL/L (ref 0–18)
BASOPHILS # BLD AUTO: 0 X10(3) UL (ref 0–0.2)
BASOPHILS NFR BLD AUTO: 0 %
BUN BLD-MCNC: 8 MG/DL (ref 9–23)
CALCIUM BLD-MCNC: 8.5 MG/DL (ref 8.7–10.4)
CHLORIDE SERPL-SCNC: 109 MMOL/L (ref 98–112)
CO2 SERPL-SCNC: 24 MMOL/L (ref 21–32)
CREAT BLD-MCNC: 0.27 MG/DL
EGFRCR SERPLBLD CKD-EPI 2021: 127 ML/MIN/1.73M2 (ref 60–?)
EOSINOPHIL # BLD AUTO: 0.01 X10(3) UL (ref 0–0.7)
EOSINOPHIL NFR BLD AUTO: 0.1 %
ERYTHROCYTE [DISTWIDTH] IN BLOOD BY AUTOMATED COUNT: 17.2 %
GLUCOSE BLD-MCNC: 88 MG/DL (ref 70–99)
HCT VFR BLD AUTO: 27.9 %
HGB BLD-MCNC: 8.4 G/DL
IMM GRANULOCYTES # BLD AUTO: 0.12 X10(3) UL (ref 0–1)
IMM GRANULOCYTES NFR BLD: 0.9 %
LYMPHOCYTES # BLD AUTO: 1.39 X10(3) UL (ref 1–4)
LYMPHOCYTES NFR BLD AUTO: 10.6 %
MAGNESIUM SERPL-MCNC: 1.8 MG/DL (ref 1.6–2.6)
MCH RBC QN AUTO: 24 PG (ref 26–34)
MCHC RBC AUTO-ENTMCNC: 30.1 G/DL (ref 31–37)
MCV RBC AUTO: 79.7 FL
MONOCYTES # BLD AUTO: 0.83 X10(3) UL (ref 0.1–1)
MONOCYTES NFR BLD AUTO: 6.3 %
NEUTROPHILS # BLD AUTO: 10.77 X10 (3) UL (ref 1.5–7.7)
NEUTROPHILS # BLD AUTO: 10.77 X10(3) UL (ref 1.5–7.7)
NEUTROPHILS NFR BLD AUTO: 82.1 %
OSMOLALITY SERPL CALC.SUM OF ELEC: 286 MOSM/KG (ref 275–295)
PLATELET # BLD AUTO: 453 10(3)UL (ref 150–450)
POTASSIUM SERPL-SCNC: 3.4 MMOL/L (ref 3.5–5.1)
RBC # BLD AUTO: 3.5 X10(6)UL
SODIUM SERPL-SCNC: 139 MMOL/L (ref 136–145)
WBC # BLD AUTO: 13.1 X10(3) UL (ref 4–11)

## 2024-10-27 PROCEDURE — 87040 BLOOD CULTURE FOR BACTERIA: CPT | Performed by: NURSE PRACTITIONER

## 2024-10-27 PROCEDURE — 74177 CT ABD & PELVIS W/CONTRAST: CPT | Performed by: NURSE PRACTITIONER

## 2024-10-27 PROCEDURE — 83735 ASSAY OF MAGNESIUM: CPT | Performed by: HOSPITALIST

## 2024-10-27 PROCEDURE — 93971 EXTREMITY STUDY: CPT | Performed by: INTERNAL MEDICINE

## 2024-10-27 PROCEDURE — 80048 BASIC METABOLIC PNL TOTAL CA: CPT | Performed by: HOSPITALIST

## 2024-10-27 PROCEDURE — 85025 COMPLETE CBC W/AUTO DIFF WBC: CPT | Performed by: HOSPITALIST

## 2024-10-27 RX ORDER — MAGNESIUM OXIDE 400 MG/1
400 TABLET ORAL ONCE
Status: COMPLETED | OUTPATIENT
Start: 2024-10-27 | End: 2024-10-27

## 2024-10-27 NOTE — PROGRESS NOTES
[facility]   part of [facility] Infectious Disease  Progress Note    Brenden Cardenas Patient Status:  Inpatient    1945 MRN [id number]   Location [facility] 4NW-A Attending Johnathan Trujillo MD   Hosp Day # 2 PCP Willow Mcmahon,      Subjective:  Patient seen and examined sitting up in bed. No acute overnight events. Still with some low grade temps. S/p LUE and BLE venous dopplers this AM. Tolerating IV abx. Denies n/v/d. Otherwise no new complaints.     Objective:  Blood pressure 150/90, pulse 110, temperature 99.9 °F (37.7 °C), temperature source Oral, resp. rate 18, SpO2 96%.    Intake/Output:    Intake/Output Summary (Last 24 hours) at 10/27/2024 0749  Last data filed at 10/27/2024 0607  Gross per 24 hour   Intake 1199 ml   Output 600 ml   Net 599 ml       Physical Exam:  General: Awake, alert, non-tox, NAD.  HEENT:  Oropharynx clear, trachea ML.  Heart: RRR S1S2 no murmurs.  Lungs: Essentially CTA b/l, no rhonchi, rales, wheezes.  Abdomen: Soft, NT/ND.  BS present.  No guarding or rebound.  Extremity: +LUE edema and BLE edema..  Neurological: No focal deficits.  Derm:  Warm and dry.    Lab Data Review:        Cultures:  Hospital Encounter on 10/25/24   1. Blood Culture     Status: Abnormal (Preliminary result)    Collection Time: 10/25/24  6:54 PM    Specimen: Blood,peripheral   Result Value Ref Range    Blood Culture Result Positive N/A    Blood Smear Positive Blood Culture (A) N/A    Blood Smear Gram Negative Rods (A) N/A       Radiology:  XR CHEST AP PORTABLE  (CPT=71045)    Result Date: 10/25/2024  CONCLUSION:  Tortuous ectatic aorta.  Cardiac shadow is enlarged.  No acute disease seen.   LOCATION:  [place]      Dictated by (CST): Alexi Hernandez MD on 10/25/2024 at 9:06 PM     Finalized by (CST): Alexi Hernandez MD on 10/25/2024 at 9:06 PM          Assessment and Plan:  1.  Sepsis presenting with fevers and leukocytosis without focal complaints  - Source  unclear.  - Blood cultures isolating Pseudomonas aeruginosa and Bacteroides fragilis on PCR.  - Urine with mild sediment -- culture pending.  - MRSA PCR positive.  - CXR negative.  - RVP negative.  - PCT 0.26  - No diarrhea.  - IV Zosyn, ongoing.    2.  H/o advanced MS  - Does have pressure wound, but does not appear to be grossly infected.    3.  H/o gout  - No apparent flare.    4.  Edema of LUE and BLEs  - This is not baseline for patient.  - Had US ordered for all by PCP, but ended up in the hospital so he was unable to complete.  - LUE and BLE venous Dopplers pending.    5.  Recs  - Continue IV Zosyn.  - Will get CT ab/pelvis.   - F/u WBC and fever curve.  - F/u cultures.  - Supportive care as per the primary team.  - Discussed plan of care with nursing.  - Discussed plan of care with patient.  All questions addressed nursing verbalized.  - Further recommendations pending clinical course.    ADDENDUM:  Urine cultures updated with Providencia rettgeri and Alcaligenes faecalis. Will discontinue IV Zosyn and start IV meropenem 1 g every 8 hours.    Discussed case with ID attending/collaborating physician, Dr. Ginna Ragland, who is in agreement with the above plan of care.     Please note that this report has been produced using speech recognition software and may contain errors related to that system including, but not limited to, errors in grammar, punctuation, and spelling, as well as words and phrases that possibly may have been recognized inappropriately.  If there are any questions or concerns, contact the dictating provider for clarification.     The 21st Century Cures Act makes medical notes like these available to patients in the interest of transparency. Please be advised this is a medical document. Medical documents are intended to carry relevant information, facts as evident, and the clinical opinion of the practitioner. The medical note is intended as peer to peer communication and may appear blunt or  direct. It is written in medical language and may contain abbreviations or verbiage that are unfamiliar.     If you have any questions or concerns please call [facility] and [facility] Infectious Disease at [phone].     Rohit Alvarado, APRN    10/27/2024  7:49 AM

## 2024-10-27 NOTE — PROGRESS NOTES
Pt is alert and oriented x4. Pt has multiple wounds and has chronic noriega. Pt is incontinent and has ivf and iv antibiotics ordered. Call light in reach and safety measures in place.

## 2024-10-27 NOTE — PLAN OF CARE
Patient is alert and oriented x 4. No s/s of distress noted. Patient denies pain during comfort rounds. Dressings to wounds changed this shift as patient is incontinent of stool, had moderate soft BM this shift. Chronic noriega for voiding is in place and draining clear yellow urine. ID on consult noted urine culture came back positive and changed IV zosyn to IV Merrem TID. Doppler done of LUE and RLE, no DVT's noted in extremities. Due to limitations in patient's mobility, imaging unable to be obtained of LLE.. Extremities still edematous. No acute events noted this shift. VSS. Medications given per mar. Patient changed to clinitron bed this shift for help with pressure relief. Safety precautions in place for patient. Call light within reach.    Problem: METABOLIC/FLUID AND ELECTROLYTES - ADULT  Goal: Electrolytes maintained within normal limits  Description: INTERVENTIONS:  - Monitor labs and rhythm and assess patient for signs and symptoms of electrolyte imbalances  - Administer electrolyte replacement as ordered  - Monitor response to electrolyte replacements, including rhythm and repeat lab results as appropriate  - Fluid restriction as ordered  - Instruct patient on fluid and nutrition restrictions as appropriate  Outcome: Progressing     Problem: SAFETY ADULT - FALL  Goal: Free from fall injury  Description: INTERVENTIONS:  - Assess pt frequently for physical needs  - Identify cognitive and physical deficits and behaviors that affect risk of falls.  - [place] fall precautions as indicated by assessment.  - Educate pt/family on patient safety including physical limitations  - Instruct pt to call for assistance with activity based on assessment  - Modify environment to reduce risk of injury  - Provide assistive devices as appropriate  - Consider OT/PT consult to assist with strengthening/mobility  - Encourage toileting schedule  Outcome: Progressing     Problem: PAIN - ADULT  Goal: Verbalizes/displays adequate  comfort level or patient's stated pain goal  Description: INTERVENTIONS:  - Encourage pt to monitor pain and request assistance  - Assess pain using appropriate pain scale  - Administer analgesics based on type and severity of pain and evaluate response  - Implement non-pharmacological measures as appropriate and evaluate response  - Consider cultural and social influences on pain and pain management  - Manage/alleviate anxiety  - Utilize distraction and/or relaxation techniques  - Monitor for opioid side effects  - Notify MD/LIP if interventions unsuccessful or patient reports new pain  - Anticipate increased pain with activity and pre-medicate as appropriate  Outcome: Progressing

## 2024-10-27 NOTE — PROGRESS NOTES
D[facility] Hospitalist Progress Note                                                                     [facility]   part of [facility]      Brenden GARCIA Cardenas  5/6/1945    SUBJECTIVE: no chest pain, palpitations, shortness of breath, cough, nausea, vomiting, abdominal pain.     OBJECTIVE:  Temp:  [99.2 °F (37.3 °C)-99.9 °F (37.7 °C)] 99.9 °F (37.7 °C)  Pulse:  [] 110  Resp:  [18] 18  BP: (146-159)/(80-90) 150/90  SpO2:  [96 %-99 %] 96 %  Exam  Gen: No acute distress, alert and oriented   Pulm: Lungs clear bilaterally, normal respiratory effort, no crackles, no wheezing  CV: Heart with regular rate and rhythm, no murmur.   Abd: Abdomen soft, nontender, nondistended, bowel sounds present  MSK: edema bilateral LE, no tenderness of the LE  Skin: no new rashes or lesions, multiple ulcerations/wounds as per pictures in the chart    Labs:   Recent Labs   Lab 10/25/24  1853 10/26/24  0647 10/27/24  0734   WBC 16.9* 17.5* 13.1*   HGB 10.9* 8.7* 8.4*   MCV 78.9* 79.2* 79.7*   .0* 263.0 453.0*       Recent Labs   Lab 10/25/24  1852 10/26/24  0647 10/27/24  0734    139 139   K 4.5 3.6 3.4*    108 109   CO2 30.0 23.0 24.0   BUN 13 8* 8*   CREATSERUM 0.30* 0.19* 0.27*   CA 9.6 8.5* 8.5*   MG  --   --  1.8   GLU 98 73 88       Recent Labs   Lab 10/25/24  1852   ALT 25   AST 31   ALB 3.3       No results for input(s): \"PGLU\" in the last 168 hours.    Meds:   Scheduled:    potassium chloride  40 mEq Intravenous Once    magnesium oxide  400 mg Oral Once    piperacillin-tazobactam  4.5 g Intravenous Q8H    allopurinol  300 mg Oral Daily    metoprolol succinate ER  100 mg Oral Daily Beta Blocker    heparin  5,000 Units Subcutaneous Q8H TONYA     Continuous Infusions:    sodium chloride 100 mL/hr at 10/26/24 0033     PRN:   acetaminophen    ASSESSMENT / PLAN:    79 year old male with history of anxiety, kidney stones, gout, cervical stenosis, htn, MS, multiple  pressure ulcers presenting with leukocytosis.     Leukocytosis  -etiology uncertain  -blood cx pending--> pos as per chart  -urine cx pending--> ngtd  -iv abx  -ID following  -trend     Bacteremia  -iv abx  -ID consulted  -source unknown, possible multiple ulcerations    Hx Gout  -allopurinol     HTN  -sbp stable  -metoprolol      Anemia  -no active bleeding  -trend  -no transfusion at this time     Quality:  DVT Prophylaxis:scd, heparin sq  CODE status: Full per chart  [place]: yes chronic     Plan of care discussed with patient and staff     Dispo: no discharge     Johnathan Trujillo MD  [facility] Hospitalist  [phone]

## 2024-10-28 ENCOUNTER — APPOINTMENT (OUTPATIENT)
Dept: CT IMAGING | Facility: HOSPITAL | Age: 79
End: 2024-10-28
Attending: PHYSICIAN ASSISTANT
Payer: MEDICARE

## 2024-10-28 LAB
ALBUMIN SERPL-MCNC: 2.3 G/DL (ref 3.2–4.8)
ALBUMIN/GLOB SERPL: 1.3 {RATIO} (ref 1–2)
ALP LIVER SERPL-CCNC: 48 U/L
ALT SERPL-CCNC: 10 U/L
ANION GAP SERPL CALC-SCNC: 3 MMOL/L (ref 0–18)
AST SERPL-CCNC: 15 U/L (ref ?–34)
B FRAGILIS DNA BLD POS QL NAA+NON-PROBE: DETECTED
BASOPHILS # BLD AUTO: 0.01 X10(3) UL (ref 0–0.2)
BASOPHILS NFR BLD AUTO: 0.1 %
BILIRUB SERPL-MCNC: 0.3 MG/DL (ref 0.2–1.1)
BUN BLD-MCNC: <5 MG/DL (ref 9–23)
CALCIUM BLD-MCNC: 8.2 MG/DL (ref 8.7–10.4)
CHLORIDE SERPL-SCNC: 112 MMOL/L (ref 98–112)
CO2 SERPL-SCNC: 23 MMOL/L (ref 21–32)
CREAT BLD-MCNC: 0.16 MG/DL
EGFRCR SERPLBLD CKD-EPI 2021: 148 ML/MIN/1.73M2 (ref 60–?)
EOSINOPHIL # BLD AUTO: 0.01 X10(3) UL (ref 0–0.7)
EOSINOPHIL NFR BLD AUTO: 0.1 %
ERYTHROCYTE [DISTWIDTH] IN BLOOD BY AUTOMATED COUNT: 17.1 %
GLOBULIN PLAS-MCNC: 1.8 G/DL (ref 2–3.5)
GLUCOSE BLD-MCNC: 84 MG/DL (ref 70–99)
HCT VFR BLD AUTO: 27.5 %
HGB BLD-MCNC: 8.2 G/DL
IMM GRANULOCYTES # BLD AUTO: 0.12 X10(3) UL (ref 0–1)
IMM GRANULOCYTES NFR BLD: 1.2 %
INR BLD: 1.38 (ref 0.8–1.2)
LYMPHOCYTES # BLD AUTO: 1.1 X10(3) UL (ref 1–4)
LYMPHOCYTES NFR BLD AUTO: 10.7 %
MAGNESIUM SERPL-MCNC: 1.6 MG/DL (ref 1.6–2.6)
MCH RBC QN AUTO: 24.3 PG (ref 26–34)
MCHC RBC AUTO-ENTMCNC: 29.8 G/DL (ref 31–37)
MCV RBC AUTO: 81.4 FL
MONOCYTES # BLD AUTO: 0.69 X10(3) UL (ref 0.1–1)
MONOCYTES NFR BLD AUTO: 6.7 %
NEUTROPHILS # BLD AUTO: 8.36 X10 (3) UL (ref 1.5–7.7)
NEUTROPHILS # BLD AUTO: 8.36 X10(3) UL (ref 1.5–7.7)
NEUTROPHILS NFR BLD AUTO: 81.2 %
P AERUGINOSA DNA BLD POS NAA+NON-PROBE: DETECTED
PLATELET # BLD AUTO: 423 10(3)UL (ref 150–450)
POTASSIUM SERPL-SCNC: 3.4 MMOL/L (ref 3.5–5.1)
POTASSIUM SERPL-SCNC: 3.4 MMOL/L (ref 3.5–5.1)
PROT SERPL-MCNC: 4.1 G/DL (ref 5.7–8.2)
PROTHROMBIN TIME: 17.1 SECONDS (ref 11.6–14.8)
RBC # BLD AUTO: 3.38 X10(6)UL
SODIUM SERPL-SCNC: 138 MMOL/L (ref 136–145)
WBC # BLD AUTO: 10.3 X10(3) UL (ref 4–11)

## 2024-10-28 PROCEDURE — 85610 PROTHROMBIN TIME: CPT | Performed by: PHYSICIAN ASSISTANT

## 2024-10-28 PROCEDURE — 49406 IMAGE CATH FLUID PERI/RETRO: CPT | Performed by: PHYSICIAN ASSISTANT

## 2024-10-28 PROCEDURE — 99152 MOD SED SAME PHYS/QHP 5/>YRS: CPT | Performed by: PHYSICIAN ASSISTANT

## 2024-10-28 PROCEDURE — 85025 COMPLETE CBC W/AUTO DIFF WBC: CPT | Performed by: HOSPITALIST

## 2024-10-28 PROCEDURE — 87070 CULTURE OTHR SPECIMN AEROBIC: CPT | Performed by: PHYSICIAN ASSISTANT

## 2024-10-28 PROCEDURE — 0W9H30Z DRAINAGE OF RETROPERITONEUM WITH DRAINAGE DEVICE, PERCUTANEOUS APPROACH: ICD-10-PCS | Performed by: RADIOLOGY

## 2024-10-28 PROCEDURE — 87075 CULTR BACTERIA EXCEPT BLOOD: CPT | Performed by: PHYSICIAN ASSISTANT

## 2024-10-28 PROCEDURE — 80053 COMPREHEN METABOLIC PANEL: CPT | Performed by: HOSPITALIST

## 2024-10-28 PROCEDURE — 87186 SC STD MICRODIL/AGAR DIL: CPT | Performed by: PHYSICIAN ASSISTANT

## 2024-10-28 PROCEDURE — 84132 ASSAY OF SERUM POTASSIUM: CPT | Performed by: HOSPITALIST

## 2024-10-28 PROCEDURE — 87077 CULTURE AEROBIC IDENTIFY: CPT | Performed by: PHYSICIAN ASSISTANT

## 2024-10-28 PROCEDURE — 87076 CULTURE ANAEROBE IDENT EACH: CPT | Performed by: PHYSICIAN ASSISTANT

## 2024-10-28 PROCEDURE — 83735 ASSAY OF MAGNESIUM: CPT | Performed by: HOSPITALIST

## 2024-10-28 PROCEDURE — 87205 SMEAR GRAM STAIN: CPT | Performed by: PHYSICIAN ASSISTANT

## 2024-10-28 RX ORDER — NALOXONE HYDROCHLORIDE 0.4 MG/ML
INJECTION, SOLUTION INTRAMUSCULAR; INTRAVENOUS; SUBCUTANEOUS
Status: DISCONTINUED
Start: 2024-10-28 | End: 2024-10-28 | Stop reason: WASHOUT

## 2024-10-28 RX ORDER — FLUMAZENIL 0.1 MG/ML
INJECTION INTRAVENOUS
Status: DISCONTINUED
Start: 2024-10-28 | End: 2024-10-28 | Stop reason: WASHOUT

## 2024-10-28 RX ORDER — MIDAZOLAM HYDROCHLORIDE 1 MG/ML
INJECTION INTRAMUSCULAR; INTRAVENOUS
Status: COMPLETED
Start: 2024-10-28 | End: 2024-10-28

## 2024-10-28 RX ORDER — SODIUM CHLORIDE 9 MG/ML
INJECTION, SOLUTION INTRAVENOUS CONTINUOUS
Status: DISCONTINUED | OUTPATIENT
Start: 2024-10-28 | End: 2024-10-31

## 2024-10-28 RX ORDER — MIDAZOLAM HYDROCHLORIDE 1 MG/ML
1 INJECTION INTRAMUSCULAR; INTRAVENOUS EVERY 5 MIN PRN
Status: ACTIVE | OUTPATIENT
Start: 2024-10-28 | End: 2024-10-28

## 2024-10-28 RX ORDER — NALOXONE HYDROCHLORIDE 0.4 MG/ML
0.08 INJECTION, SOLUTION INTRAMUSCULAR; INTRAVENOUS; SUBCUTANEOUS AS NEEDED
Status: DISCONTINUED | OUTPATIENT
Start: 2024-10-28 | End: 2024-10-31

## 2024-10-28 RX ORDER — MAGNESIUM SULFATE HEPTAHYDRATE 40 MG/ML
2 INJECTION, SOLUTION INTRAVENOUS ONCE
Status: DISCONTINUED | OUTPATIENT
Start: 2024-10-28 | End: 2024-10-28

## 2024-10-28 RX ORDER — FLUMAZENIL 0.1 MG/ML
0.2 INJECTION INTRAVENOUS AS NEEDED
Status: DISCONTINUED | OUTPATIENT
Start: 2024-10-28 | End: 2024-10-31

## 2024-10-28 RX ORDER — MAGNESIUM OXIDE 400 MG/1
400 TABLET ORAL ONCE
Status: DISCONTINUED | OUTPATIENT
Start: 2024-10-28 | End: 2024-10-28

## 2024-10-28 RX ORDER — POTASSIUM CHLORIDE 1500 MG/1
40 TABLET, EXTENDED RELEASE ORAL ONCE
Status: DISCONTINUED | OUTPATIENT
Start: 2024-10-28 | End: 2024-10-28

## 2024-10-28 NOTE — SPIRITUAL CARE NOTE
Spiritual Care Visit Note    Patient Name: Brenden Cardenas Date of Spiritual Care Visit: 10/28/24   : 1945 Primary Dx: Bacteremia       Referred By: Referral From: Nurse    Spiritual Care Taxonomy:    Intended Effects: Demonstrate caring and concern    Methods: Collaborate with care team member;Encouraging spiritual/Catholic practices    Interventions: Active listening;Ask guided questions;Explain  role    Visit Type/Summary:     - Spiritual Care: Consulted with RN prior to visit. Offered empathic listening and emotional support. Provided information regarding how to contact Spiritual Care and left a Spiritual Care information card.  remains available as needed for follow up. Patient is Amish but does not attend any Catholic services anymore. Patient is supported by his wife and children.   Spiritual Care support can be requested via an Epic consult. For urgent/immediate needs, please contact the On Call  at: Edward: ext [phone]         Chaplain Resident Thelma Parker MA

## 2024-10-28 NOTE — PLAN OF CARE
Pt Aox4. VSS. RA. Afebrile.   Reported pain of LUE when moved.   NPO for IR abscess drain placement.   R lower abdomen drain. Clean and intact. Draining pink-tinged purulent fluid.     Diet resumed.   Family at the bedside.     Problem: SKIN/TISSUE INTEGRITY - ADULT  Goal: Incision(s), wounds(s) or drain site(s) healing without S/S of infection  Description: INTERVENTIONS:  - Assess and document risk factors for pressure ulcer development  - Assess and document skin integrity  - Assess and document dressing/incision, wound bed, drain sites and surrounding tissue  - Implement wound care per orders  - Initiate isolation precautions as appropriate  - Initiate Pressure Ulcer prevention bundle as indicated  10/28/2024 1858 by Meghana Segovia RN  Outcome: Progressing  10/28/2024 1858 by Meghana Segovia RN  Outcome: Progressing  Goal: Oral mucous membranes remain intact  Description: INTERVENTIONS  - Assess oral mucosa and hygiene practices  - Implement preventative oral hygiene regimen  - Implement oral medicated treatments as ordered  10/28/2024 1858 by Meghana Segovia RN  Outcome: Progressing  10/28/2024 1858 by Meghana Segovia RN  Outcome: Progressing     Problem: PAIN - ADULT  Goal: Verbalizes/displays adequate comfort level or patient's stated pain goal  Description: INTERVENTIONS:  - Encourage pt to monitor pain and request assistance  - Assess pain using appropriate pain scale  - Administer analgesics based on type and severity of pain and evaluate response  - Implement non-pharmacological measures as appropriate and evaluate response  - Consider cultural and social influences on pain and pain management  - Manage/alleviate anxiety  - Utilize distraction and/or relaxation techniques  - Monitor for opioid side effects  - Notify MD/LIP if interventions unsuccessful or patient reports new pain  - Anticipate increased pain with activity and pre-medicate as appropriate  10/28/2024 1858 by  Meghana Segovia RN  Outcome: Progressing  10/28/2024 1858 by Meghana Segovia RN  Outcome: Progressing     Problem: METABOLIC/FLUID AND ELECTROLYTES - ADULT  Goal: Electrolytes maintained within normal limits  Description: INTERVENTIONS:  - Monitor labs and rhythm and assess patient for signs and symptoms of electrolyte imbalances  - Administer electrolyte replacement as ordered  - Monitor response to electrolyte replacements, including rhythm and repeat lab results as appropriate  - Fluid restriction as ordered  - Instruct patient on fluid and nutrition restrictions as appropriate  10/28/2024 1858 by Meghana Segovia RN  Outcome: Progressing  10/28/2024 1858 by Meghana Segovia RN  Outcome: Progressing  Goal: Hemodynamic stability and optimal renal function maintained  Description: INTERVENTIONS:  - Monitor labs and assess for signs and symptoms of volume excess or deficit  - Monitor intake, output and patient weight  - Monitor urine specific gravity, serum osmolarity and serum sodium as indicated or ordered  - Monitor response to interventions for patient's volume status, including labs, urine output, blood pressure (other measures as available)  - Encourage oral intake as appropriate  - Instruct patient on fluid and nutrition restrictions as appropriate  10/28/2024 1858 by Meghana Segovia RN  Outcome: Progressing  10/28/2024 1858 by Meghana Segovia RN  Outcome: Progressing

## 2024-10-28 NOTE — PROGRESS NOTES
Pt is alert and oriented x4. Pt has special bed for the mult wounds. All dressings d/I. Pt has no complaints of pain. Pt has ivf and iv antibiotics. Pt has norieag draining yellow urine. Pt npo per surg order. Call  light in reach and safety measures in place.

## 2024-10-28 NOTE — CM/SW NOTE
10/28/24 1200   CM/SW Referral Data   Referral Source Social Work (self-referral)   Reason for Referral Discharge planning   Informant Spouse/Significant Other   Patient Info   Patient's Home Environment House   Patient lives with Spouse/Significant other   Patient Status Prior to Admission   Independent with ADLs and Mobility No   Pt. requires assistance with Housework;Driving;Meals;Bathing;Ambulating;Dressing;Medications;Feeding;Toileting   Services in place prior to admission DME/Supplies at home;Home Health Care   Home Health Provider Info [facility]   Type of DME/Supplies Standard Walker;Wheelchair;Hospital Bed;[facility]in Lift     SW called and spoke with patient's wife Alicia to confirm intake information. She confirmed that she is patient's primary caregiver and he has all DME that he needs. Patient received low air loss mattress on prior admission due to worsening wounds.     Patient's wife confirmed that patient is current with [facility] for home RN and would like PT, OT and HHA added to the orders if possible. DANETTE sent return referral to [facility] with addition of therapy services and HHA via [facility].     DANETTE will continue to follow for plan of care changes and remain available for any additional DC needs or concerns.     Lorelei Duncan MSW, LSW  Discharge Planner   [id number]

## 2024-10-28 NOTE — PROGRESS NOTES
[facility] Hospitalist Progress Note                                                                     [facility]   part of [facility]      Brenden GARCIA Theresa  5/6/1945    SUBJECTIVE: no chest pain, palpitations, shortness of breath, cough, nausea, vomiting, abdominal pain.     OBJECTIVE:  Temp:  [98.1 °F (36.7 °C)-99.9 °F (37.7 °C)] 98.1 °F (36.7 °C)  Pulse:  [83-85] 85  Resp:  [16-18] 16  BP: (117-135)/(60-70) 135/70  SpO2:  [95 %] 95 %  Exam  Gen: No acute distress, alert and oriented   Pulm: Lungs clear bilaterally, normal respiratory effort, no crackles, no wheezing  CV: Heart with regular rate and rhythm, no murmur.   Abd: Abdomen soft, nontender, nondistended, bowel sounds present  MSK: edema bilateral LE, no tenderness of the LE  Skin: no new rashes or lesions, multiple ulcerations/wounds as per pictures in the chart    Labs:   Recent Labs   Lab 10/25/24  1853 10/26/24  0647 10/27/24  0734 10/28/24  0730 10/28/24  1349   WBC 16.9* 17.5* 13.1* 10.3  --    HGB 10.9* 8.7* 8.4* 8.2*  --    MCV 78.9* 79.2* 79.7* 81.4  --    .0* 263.0 453.0* 423.0  --    INR  --   --   --   --  1.38*       Recent Labs   Lab 10/25/24  1852 10/26/24  0647 10/27/24  0734 10/28/24  0730    139 139 138   K 4.5 3.6 3.4* 3.4*  3.4*    108 109 112   CO2 30.0 23.0 24.0 23.0   BUN 13 8* 8* <5*   CREATSERUM 0.30* 0.19* 0.27* 0.16*   CA 9.6 8.5* 8.5* 8.2*   MG  --   --  1.8 1.6   GLU 98 73 88 84       Recent Labs   Lab 10/25/24  1852   ALT 25   AST 31   ALB 3.3       No results for input(s): \"PGLU\" in the last 168 hours.    Meds:   Scheduled:    meropenem  1 g Intravenous Q8H TONYA    allopurinol  300 mg Oral Daily    metoprolol succinate ER  100 mg Oral Daily Beta Blocker    heparin  5,000 Units Subcutaneous Q8H TONYA     Continuous Infusions:    sodium chloride 100 mL/hr at 10/26/24 0033     PRN:   acetaminophen    ASSESSMENT / PLAN:    79 year old male with history of anxiety,  kidney stones, gout, cervical stenosis, htn, MS, multiple pressure ulcers presenting with leukocytosis.     Leukocytosis--> resolved  -etiology uncertain  -blood cx pending--> pos as per chart  -urine cx pending--> ngtd  -iv abx  -ID following  -CT abd/pelvis--> shows fluid collection concerning for abscess --> surgery consulted, likely need IR drain if possible  -trend     Bacteremia  -iv abx  -ID following  -source unknown, possible multiple ulcerations    Hx Gout  -allopurinol     HTN  -sbp stable  -metoprolol      Anemia  -no active bleeding  -trend  -no transfusion at this time     Quality:  DVT Prophylaxis:scd, heparin sq  CODE status: Full per chart  [place]: yes chronic     Plan of care discussed with patient and staff     Dispo: no discharge     Johnathan Trujillo MD  [facility] Hospitalist  [phone]

## 2024-10-28 NOTE — IMAGING NOTE
Order reviewed with Dr Opsina who reviewed imaging. Plan to bring patient down for 5 pm procedure. I spoke with Meghana REAGAN. He has been NPO except for meds since yesterday and wife at bedside. Pt alert and oriented x3. Will consent in department. LD of Heparin subcutaneous was yesterday at 2200.

## 2024-10-28 NOTE — CONSULTS
[facility]  Report of Consultation    Brenden Cardenas Patient Status:  Inpatient    1945 MRN [id number]   Location [facility] 4NW-A Attending Johnathan Trujillo MD   Hosp Day # 3 PCP Willow Mcmahon, DO     10/28/24    Reason for Consultation:    Surgical Consultation     CC:   Chief Complaint   Patient presents with    Abnormal Labs        History of Present Illness:    Brenden Cardenas is a a(n) 79 year old male. Patient with MS was admitted Friday from home, he was noted to have increased wbc.   He does have an Indwelling catheter, changed every 2 weeks by HH.   Urine cx from 10/25 with UTI  CT scan obtained revealing complex fluid collection in right retroperitoneum contiguous with right iliacus muscle 4.6 x 3.5 x 5.0cm   Small bowel wall thickening. Sacral ulcer extending to osseous cortex  General surgery consulted for retroperitoneal fluid collection     Past Medical History:    Past Medical History:    Anxiety state, unspecified    BACK PAIN    Bedbound    Calculus of kidney    Cervical stenosis of spine    COVID-19    Gout    Gout    High blood pressure    HYPERTENSION    Multiple sclerosis (HCC)    Osteoarthrosis, unspecified whether generalized or localized, unspecified site    OTHER DISEASES    GOUT    Paralysis (HCC)    Left arm and both legs    Pressure ulcer of sacrum    Seborrheic dermatitis    Sepsis with hypotension (HCC)    Skin cancer    Stage 4 decubitus ulcer (HCC)    Unspecified essential hypertension    Visual impairment    glasses       Past Surgical History:    Past Surgical History:   Procedure Laterality Date    Debride wound      x7    Other accessory      \"urolift\"    Other accessory      multiple skin Bx's/removals    Other surgical history  2011    LUMBAR SX    Other surgical history      cervical surgery    Spine surgery procedure unlisted         Family History:    Family History   Problem Relation Age of Onset    Other (Other) Father         industrial lung abn    Other  (gout) Father     Other (Other) Brother         colitis    Hypertension Mother        Social History:     reports that he has never smoked. He has never used smokeless tobacco. He reports current alcohol use. He reports that he does not use drugs.    Allergies:    Allergies[1]    Current Medications:      Current Facility-Administered Medications:     potassium chloride (Klor-Con M20) tab 40 mEq, 40 mEq, Oral, Once    magnesium oxide (Mag-Ox) tab 400 mg, 400 mg, Oral, Once    meropenem (Merrem) 1 g in sodium chloride 0.9% 100 mL IVPB-MBP, 1 g, Intravenous, Q8H TONYA    acetaminophen (Tylenol Extra Strength) tab 1,000 mg, 1,000 mg, Oral, Q6H PRN    allopurinol (Zyloprim) tab 300 mg, 300 mg, Oral, Daily    metoprolol succinate ER (Toprol XL) 24 hr tab 100 mg, 100 mg, Oral, Daily Beta Blocker    sodium chloride 0.9% infusion, , Intravenous, Continuous    heparin (Porcine) 5000 UNIT/ML injection 5,000 Units, 5,000 Units, Subcutaneous, Q8H [facility]    Home Medications:    Medications Ordered Prior to Encounter[2]    Review of Systems:    10 point review performed pertinent positives and negatives per history of present illness.    Physical Exam:    Blood pressure 135/70, pulse 85, temperature 98.1 °F (36.7 °C), temperature source Oral, resp. rate 16, SpO2 95%.    GENERAL: Alert and oriented x 3, well developed, well nourished male, in no apparent distress    SKIN: no wounds on abdominal wall    RESPIRATORY: non labored breathing    CARDIOVASCULAR: RRR    ABDOMEN: soft, non tender, non distended    LYMPHATIC: no palpable lymphadenopathy    .    Laboratory Data:  Recent Labs   Lab 10/25/24  1853 10/26/24  0647 10/27/24  0734 10/28/24  0730   WBC 16.9* 17.5* 13.1* 10.3   HGB 10.9* 8.7* 8.4* 8.2*   MCV 78.9* 79.2* 79.7* 81.4   .0* 263.0 453.0* 423.0       Recent Labs   Lab 10/25/24  1852 10/26/24  0647 10/27/24  0734 10/28/24  0730    139 139 138   K 4.5 3.6 3.4* 3.4*  3.4*    108 109 112   CO2 30.0 23.0 24.0  23.0   BUN 13 8* 8* <5*   CREATSERUM 0.30* 0.19* 0.27* 0.16*   GLU 98 73 88 84   CA 9.6 8.5* 8.5* 8.2*   MG  --   --  1.8 1.6       Recent Labs   Lab 10/25/24  1852 10/28/24  0730   ALT 25 10   AST 31 15   ALB 3.3 2.3*       No results for input(s): \"TROP\" in the last 168 hours.          Radiology:    CT ABDOMEN+PELVIS(CONTRAST ONLY)(CPT=74177)    Result Date: 10/27/2024  PROCEDURE:  CT ABDOMEN+PELVIS (CONTRAST ONLY) (CPT=74177)  COMPARISON:  [facility] , CT, CT ABDOMEN+PELVIS(CONTRAST ONLY)(CPT=74177), 6/28/2024, 5:40 PM.  INDICATIONS:  sepsis with unclear source  TECHNIQUE:  CT scanning was performed from the dome of the diaphragm to the pubic symphysis with non-ionic intravenous contrast material. Post contrast coronal MPR imaging was performed.  Dose reduction techniques were used. Dose information is transmitted to the ACR (American College of Radiology) NRDR (National Radiology Data Registry) which includes the Dose Index Registry.  PATIENT STATED HISTORY:(As transcribed by Technologist)  Evaluation for sepsis.   CONTRAST USED:  80cc of Isovue 370  FINDINGS:  LUNG BASE:  Atelectasis. LIVER:  Mild periportal edema.  BILIARY:  Gallbladder is partially contracted.  There is suggestion of mild gallbladder wall thickening and pericholecystic fluid.  No obvious biliary ductal dilatation.  Consider follow-up with ultrasound to exclude cholecystitis if clinical concern.  PANCREAS:  Homogeneous enhancement. SPLEEN:  Normal caliber. KIDNEYS:  No hydronephrosis .  Cortical scarring both kidneys.  2.5 cm left renal cyst.  Smaller low-attenuation cortical foci bilaterally too small to characterize. ADRENALS:  Stable 12 mm left adrenal nodule likely an adenoma.. AORTA/VASCULAR:  Aortoiliac calcification.  An RETROPERITONEUM:  There is a complex collection arising from the right retroperitoneum contiguous with the right iliacus muscle.  This measures 4.6 x 3.5 by 5.0 cm and appears to have some internal septation.  In the  setting of infection, this is concerning for retroperitoneal abscess.  No adjacent aggressive cortical destructive process. BOWEL/MESENTERY:  Tiny appendicoliths in the distal appendix.  The appendix is normal in caliber.  There is wall thickening involving small bowel loops in the left upper quadrant.  Wall thickening of rectosigmoid colon.  Trace soft tissue stranding in the pericolic gutters bilaterally tracking into the right anterior pelvis.  Large amount of stool in the transverse colon and proximal ascending colon.  Findings nonspecific although may be seen with enterocolitis. ABDOMINAL WALL:  Sacral decubitus ulcer extending to the osseous cortex.  At S4 and S5 sacral segments are not visualized and may have been surgically removed.   BONES:  Postsurgical changes of L3 and L4 posterior shay and pedicle screw fixation and laminectomy. PELVIC ORGANS:  Circumferential wall thickening of the urinary bladder surrounding a De La Fuente catheter balloon.  Cystitis should be excluded.  Trace soft tissue stranding and free fluid in the presacral space.              CONCLUSION:  There is a complex fluid collection in the right retroperitoneum contiguous with the iliacus muscle.  This is new when compared to 6/28/2024.  Abscess should be excluded.   There is circumferential wall thickening of the urinary bladder surrounding indwelling De La Fuente catheter.  Cystitis should be excluded.  There is wall thickening of small bowel loops in the left upper quadrant and suggestion of wall thickening involving rectosigmoid colon.  Small amount of soft tissue stranding in the presacral space and right pericolic gutter.  Findings are suggestive of  enterocolitis in the appropriate clinical setting.  There are post surgical changes involving the distal sacrum with sacral decubitus open skin defect extending to the bony cortex..  LOCATION:  [facility]   Dictated by (CST): Latoya Mcdaniel MD on 10/27/2024 at 1:50 PM     Finalized by (CST): Jonas  MD Latoya on 10/27/2024 at 2:01 PM       US VENOUS DOPPLER LEG RIGHT - DIAG IMG (CPT=93971)    Result Date: 10/27/2024  PROCEDURE:  US VENOUS DOPPLER LEG RIGHT - DIAG IMG (CPT=93971)  COMPARISON:  None.  INDICATIONS:  Edema, fevers, evalaute for DVT.  Bilateral lower extremity swelling.  TECHNIQUE:  Real time, grey scale, and duplex ultrasound was used to evaluate the lower extremity venous system. B-mode two-dimensional images of the vascular structures, Doppler spectral analysis, and color flow.  Doppler imaging were performed.  The following veins were imaged:  Common, deep, and superficial femoral, popliteal, sapheno-femoral junction, posterior tibial veins, and the contralateral common femoral vein.  PATIENT STATED HISTORY: (As transcribed by Technologist)  Patient offered no additional history at this time.       FINDINGS:  EXTREMITY EXAMINED:  Right leg SAPHENOFEMORAL JUNCTION:  No reflux. THROMBI:  None visible. COMPRESSION:  Normal compressibility, phasicity, and augmentation. OTHER:  Could not complete the left leg.  Patient very contracted and unable to ulcer position..            CONCLUSION:  There is no DVT in the right leg.  Left lower extremity could not be evaluated.  Nurse aware per technologist.  LOCATION:  [facility]    Dictated by (CST): Latoya Mcdaniel MD on 10/27/2024 at 10:59 AM     Finalized by (CST): Latoya Mcdaniel MD on 10/27/2024 at 11:00 AM       US VENOUS DOPPLER ARM LEFT - DIAG IMG (CPT=93971)    Result Date: 10/27/2024  PROCEDURE:  US VENOUS DOPPLER ARM LEFT - DIAG IMG (CPT=93971)  COMPARISON:  None.  INDICATIONS:  Edema, fever, evaluate for clot.  Left arm swelling and pain  TECHNIQUE:  Real time, grey scale, and duplex ultrasound was used to evaluate the upper extremity venous system. B-mode two-dimensional images of the vascular structures, Doppler spectral analysis, and color flow.  Doppler imaging were performed.  The following veins were imaged:  Subclavian, Jugular, Axillary,  Brachial, Basilic, Cephalic, and the contralateral Subclavian and Jugular.  PATIENT STATED HISTORY: (As transcribed by Technologist)     FINDINGS:  Left upper extremity: THROMBI:  None visible. COMPRESSION:  Normal compressibility, phasicity, and augmentation of the subclavian, jugular, axillary, brachial, basilic, and cephalic veins.            CONCLUSION:  No evidence of thrombus in the left upper extremity.   LOCATION:  [facility]   Dictated by (CST): Latoya Mcdaniel MD on 10/27/2024 at 10:53 AM     Finalized by (CST): Latoya Mcdaniel MD on 10/27/2024 at 10:54 AM       XR CHEST AP PORTABLE  (CPT=71045)    Result Date: 10/25/2024  PROCEDURE:  XR CHEST AP PORTABLE  (CPT=71045)  TECHNIQUE:  AP chest radiograph was obtained.  COMPARISON:  [facility] , XR, XR CHEST AP/PA (1 VIEW) (CPT=71045), 6/27/2024, 6:25 PM.  INDICATIONS:  elevated wbc looks very ill  PATIENT STATED HISTORY: (As transcribed by Technologist)  Patient offered no additional history at this time.    FINDINGS:  Limited, AP portable rotated to the right poor inspiration.  Tortuous ectatic aorta present.  Cardiac shadow is enlarged..  The lungs are clear of active--appearing disease process.  The costophrenic angles are sharp.  There is no active disease seen.  Nonacute right-sided rib deformities.            CONCLUSION:  Tortuous ectatic aorta.  Cardiac shadow is enlarged.  No acute disease seen.   LOCATION:  [facility]      Dictated by (CST): Alexi Hernandez MD on 10/25/2024 at 9:06 PM     Finalized by (CST): Alexi Hernandez MD on 10/25/2024 at 9:06 PM          Problem List:    Patient Active Problem List   Diagnosis    Hypertrophy of prostate with urinary obstruction and other lower urinary tract symptoms (LUTS)    Spinal stenosis, lumbar region, without neurogenic claudication    Anxiety state, unspecified    MS (multiple sclerosis) (HCC)    Lumbar spondylosis with myelopathy    Primary hypertension    Multiple sclerosis (HCC)    Lumbar stenosis    Azotemia     Pressure injury of buttock, stage 4 (HCC)    Chest pain, atypical    COVID-19    Acute cystitis without hematuria    Urinary tract infection without hematuria, site unspecified    Pressure injury of skin of sacral region, unspecified injury stage    Sepsis with hypotension (HCC)    Leukocytosis    Infected decubitus ulcer, unspecified ulcer stage    Failure to thrive (child)    Anemia    Bacteremia    Elevated lactic acid level    Moderate protein-calorie malnutrition (HCC)    Pressure injury of skin of back, unspecified injury stage       Impression:    80 y/o with MS  Ct scan as noted above, - retroperitoneal fluid collection   Afebrile, leukocytosis improved  UTI    Plan:    Reviewed with patient recommendation for IR drainage of fluid collection  ID following - antibiotics  NPO for procedure, if procedure tomorrow ok for clears NPO midnight  All questions answered  Patient seen and examined with COLLIN Rivera  [facility]  10/28/2024         [1] No Known Allergies  [2]   Current Facility-Administered Medications on File Prior to Encounter   Medication Dose Route Frequency Provider Last Rate Last Admin    [COMPLETED] acetaminophen (Tylenol) tab 650 mg  650 mg Oral Once Samuel Aleman MD   650 mg at 10/15/24 1139    [COMPLETED] methylPREDNISolone sodium succinate (Solu-MEDROL) injection 125 mg  125 mg Intravenous Once Samuel Aleman MD   125 mg at 10/15/24 1142    [COMPLETED] diphenhydrAMINE (Benadryl) cap/tab 50 mg  50 mg Oral Once Samuel Aleman MD   50 mg at 10/15/24 1139    [COMPLETED] Ocrelizumab (Ocrevus) 600 mg in sodium chloride 0.9% 500 mL infusion  600 mg Intravenous Once Samuel Aleman MD   Stopped at 10/15/24 1555     Current Outpatient Medications on File Prior to Encounter   Medication Sig Dispense Refill    metoprolol succinate ER 25 MG Oral Tablet 24 Hr Take 1 tablet (25 mg total) by mouth in the morning and 1 tablet (25  mg total) before bedtime. (Patient taking differently: Take 4 tablets (100 mg total) by mouth daily.)      allopurinol 300 MG Oral Tab Take 1 tablet (300 mg total) by mouth daily.

## 2024-10-28 NOTE — IMAGING NOTE
1720- Pt arrives by bed with transport.  NPO since midnight on 10/28/24.  LD of heparin was 10/27/24 at 2200.  Labs drawn today and results reviewed.  Consent form signed by pt, time out completed prior to procedure.    1815- CT guided R retroperitoneal abscess drain placement complete.  Pt awake and alert, vital signs stable.  Pt denies pain at drain site.  Drain site dressing CDI.  Report given to MERARY Kowalski at bedside, pt transferred to room 406.  Per Dr. Ospina, pt can resume subcutaneous heparin tomorrow 10/29.

## 2024-10-28 NOTE — PROGRESS NOTES
Infectious Disease Progress Note      Date of admission: 10/25/2024  6:26 PM     Reason for consult: Polymicrobial bacteremia    Referring physician: Johnathan Trujillo MD    Subjective: Doing about the same.  No pain.  No nausea or vomiting.  No diarrhea.  No shortness of breath.  No cough or sputum production.    The rest of the systems were reviewed and found to be negative except was mentioned above    Interval events: This is a 79-year-old male patient with end-stage MS, chronic decubitus ulcer, presents here with polymicrobial bacteremia/sepsis.  CT showing complex fluid collection in the right retroperitoneum contiguous with the iliac us muscle that is new.  Blood cultures currently growing Bacteroides along with Pseudomonas aeruginosa.  The patient is currently on IV meropenem.    Medications:    potassium chloride    meropenem    acetaminophen    allopurinol    metoprolol succinate ER    sodium chloride    heparin     Allergies:  Allergies[1]    Physical Exam:  Vitals:    10/28/24 0458   BP: 135/70   Pulse: 85   Resp: 16   Temp: 98.1 °F (36.7 °C)     Vitals signs and nursing note reviewed.   Constitutional:       Appearance: Chronically ill appearance.   HENT:      Head: Normocephalic and atraumatic.      Mouth: Mucous membranes are moist.   Neck:      Musculoskeletal: Neck supple.   Cardiovascular:      Rate and Rhythm: Normal rate.      Heart sounds: Normal heart sounds. No murmur. No friction rub. No gallop.    Pulmonary:      Effort: Pulmonary effort is normal. No respiratory distress.      Breath sounds: Normal breath sounds. No stridor. No wheezing, rhonchi or rales.   Chest:      Chest wall: No tenderness.   Abdominal:      General: Abdomen is flat. There is no distension.      Palpations: Abdomen is soft. There is no mass.      Tenderness: There is no tenderness. There is no guarding or rebound.      Hernia: No hernia is present.   Musculoskeletal:      Right lower leg: No edema.      Left lower leg:  No edema.   Skin:     General: Skin is warm and dry.   Neurological:      Mental Status: Alert and oriented to person, place, and time.     Laboratory data:  I have reviewed all the lab results independently.  Lab Results   Component Value Date    WBC 10.3 10/28/2024    HGB 8.2 10/28/2024    HCT 27.5 10/28/2024    .0 10/28/2024    CREATSERUM 0.16 10/28/2024    BUN <5 10/28/2024     10/28/2024    K 3.4 10/28/2024    K 3.4 10/28/2024     10/28/2024    CO2 23.0 10/28/2024    GLU 84 10/28/2024    CA 8.2 10/28/2024    ALB 2.3 10/28/2024    ALKPHO 48 10/28/2024    BILT 0.3 10/28/2024    TP 4.1 10/28/2024    AST 15 10/28/2024    ALT 10 10/28/2024    MG 1.6 10/28/2024      Recent Labs   Lab 10/28/24  0730   RBC 3.38*   HGB 8.2*   HCT 27.5*   MCV 81.4   MCH 24.3*   MCHC 29.8*   RDW 17.1   NEPRELIM 8.36*   WBC 10.3   .0      Microbiology data:  Hospital Encounter on 10/25/24   1. Blood Culture     Status: Abnormal (Preliminary result)    Collection Time: 10/25/24  6:54 PM    Specimen: Blood,peripheral   Result Value Ref Range    Blood Culture Result Positive N/A    Blood Culture Result Bacteroides fragilis (A) N/A    Blood Smear Positive Blood Culture (A) N/A    Blood Smear Gram Negative Rods (A) N/A   2. Urine Culture, Routine     Status: Abnormal    Collection Time: 10/25/24  6:53 PM    Specimen: Urine, noriega catheter   Result Value Ref Range    Urine Culture 50,000-99,000 CFU/ML Providencia rettgeri (A) N/A    Urine Culture 50,000-99,000 CFU/ML Alcaligenes faecalis (A) N/A       Susceptibility    Providencia rettgeri -  (no method available)     Ampicillin  Resistant      Ampicillin + Sulbactam 8 Sensitive      Cefazolin >=64 Resistant      Cefepime <=1 Sensitive      Ceftriaxone <=1 Sensitive      Ciprofloxacin <=0.25 Sensitive      Gentamicin <=1 Sensitive      Meropenem <=0.25 Sensitive      Levofloxacin <=0.12 Sensitive      Nitrofurantoin 256 Resistant      Piperacillin + Tazobactam <=4  Sensitive      Trimethoprim/Sulfa <=20 Sensitive     Alcaligenes faecalis -  (no method available)     Ciprofloxacin 2 Intermediate      Gentamicin 4 Sensitive      Meropenem <=1 Sensitive      Piperacillin + Tazobactam <=4 Sensitive      Trimethoprim/Sulfa <=2 Sensitive      Impression:  Brenden Cardenas is a 79 year old male with    Polymicrobial bacteremia/sepsis with threat to life  Blood cultures with Pseudomonas aeruginosa and Bacteroides  Most likely source is intra-abdominal/retroperitoneal collection  Discussed case with general surgery, they will discuss it with IR for possible drainage  His urine analysis showing pyuria; however, urine culture is growing providencia and alcaligenes faecalis  Unlikely playing a role in his clinical presentation, most likely representing chronic colonizer  End-stage MS  Multiple pressure wounds  Pyuria with bacteria  As per the above    Recommendations:    Continue IV meropenem for now  I agree with IR consultation for possible drainage.  Surgical management as per general surgery  Continue to monitor daily labs for antibiotic toxicities  Further recommendations will depend on the above work-up and clinical progress     The plan of care was discussed with the primary hospital team, Johnathan Trujillo MD     Recommendations were also discussed with the patient; all questions were answered.     Thank you for this consultation. Please don't hesitate to call the ID team for questions or any acute changes in patient's clinical condition.    Please note that this report has been produced using speech recognition software and may contain errors related to that system including, but not limited to, errors in grammar, punctuation, and spelling, as well as words and phrases that possibly may have been recognized inappropriately.  If there are any questions or concerns, contact the dictating provider for clarification.    The 21st Century Cures Act makes medical notes like these available  to patients in the interest of transparency. Please be advised this is a medical document. Medical documents are intended to carry relevant information, facts as evident, and the clinical opinion of the practitioner. The medical note is intended as peer to peer communication and may appear blunt or direct. It is written in medical language and may contain abbreviations or verbiage that are unfamiliar.     Brendan Max MD  [facility]. Tel: [phone]. Fax: [phone].     Brenden Cardenas : 1945 MRN: [id number] CSN: [id number]          [1] No Known Allergies

## 2024-10-28 NOTE — DISCHARGE INSTRUCTIONS
Sometimes managing your health at home requires assistance.  The [facility] team has recognized your preference to use [facility]. They can be reached by phone at [phone]. The fax number for your reference is [phone].  A representative from the home health agency will contact you or your family to schedule your first visit.    Discharge/After Visit Instructions  [facility] - Department of Radiology  Placement of a Drain    Post Sedation  Follow these guidelines:  You should be watched overnight by a responsible adult. This person should make sure your condition remains stable.  Don’t drink any alcohol for 24 hours after the procedure.  Don't drive, operate dangerous machinery, make important business or personal decisions, or sign legal documents for 24 hours after the procedure.  Note: Your healthcare provider may tell you not to take any medicine by mouth for pain or sleep for a period of time -these medicines may react with the medicines you were given today.     Activity  Take it easy for the rest of the day after your drain placement. You may be sore at the site.  Do not do any strenuous exercises or lift over 5 lbs. for 24 hours after your procedure.    Drain Site  Keep a bandage on the site while the drain is in place. Change the dressing weekly or as needed. You may shower after 24 hours after your procedure, Keep the drain site dry with a waterproof covering (taped plastic wrap works). No soaking in a bathtub until the drain is removed.    Drain Care Instructions/Log  Protect the drain tubing from getting pulled. Empty your drain(s) and record the output daily.    Instructions:  1. Wash your hands with soap and water.  2. Hold the drain bulb with the plug upright.  Open the plug.    3. Empty the drain bulb fluid into a measuring cup.  4. The bulb might have more fluid than one measuring cup full. Empty the filled cup into   the toilet and refill the measuring  cup with the bulb measuring cup with the bulb fluid until the bulb is empty.  5. Turn the empty bulb with opening upright, squeeze the bulb, and replug.  6. Record the amount of fluid you emptied from the bulb.  7. Repeat this process if you have more than one drain.  8. Record each drain in a separate column (see chart below).  9. Wash your hands with soap and water when done.  Call the Radiology Nurse at [phone], Monday - Friday from 8am till 5 pm, when the drain output is less than 10 ml or 1/3 ounce per day for two consecutive days.        Diet  Drink plenty of fluids and resume your usual diet. A slow return to normal foods is an ideal way to minimize nausea.    Pain Management  You may use over-the-counter or prescribed pain relief medication as directed by the  and if not contraindicated due to a medical condition.    Routine Medications  You may resume your home medications. If you take blood thinners, you may resume them 24 hours after your procedure.    When To Seek Medical Advice  Call the provider that ordered your test with any questions and to discuss test results. These may also be available in your [facility] MyChart .  If you have any questions or concerns about the procedure, please call the Radiology Nurse at [phone] from 8am - 5pm, Monday-Friday. After those hours, Dial [phone] and ask the Radiology Department  to page the on-call Interventional Radiologist:    If there is a change in color or temperature of the area where the drain was placed.  If you develop increasing pain or shortness of breath.  For unusual drowsiness, weakness, or dizziness   For unusual vomiting.  IF YOU FEEL YOU ARE EXPERIENCING AN EMERGENCY, CALL 911 OR GO TO THE NEAREST EMERGENCY ROOM  Amount Emptied from Drain(s)  Date Time    Drain #1 Drain #2 Comment/Observation                                                                                             4.2.24  [facility]  Radiology    Wound Cleaning and Dressings:   Bilateral feet and upper back   Wound cleansing:  normal saline   Wound cleaning frequency: daily   Wound product: medi honey and bordered foam   Dressing change frequency:  Change dressing daily and/or PRN     Miscellaneous/Additional Orders:   Off loading: turn Q2H, avoid using pillows to offload when using Cl[place]ron bed, prompt incontinent care.     Sacral and bilateral buttocks  Wound cleansing:  normal saline  Wound cleaning frequency: nightly  Wound product: Dakins solution, moist to dry dressing. Use one piece of kerlix for each wound, wet it with Dakins, wring it out so it is moist, apply dry gauze, abd and tape  Dressing change frequency:  Change dressing nightly and/or PRN    Miscellaneous/Additional Orders:  Off loading: turn Q2H, avoid using pillows to offload when using Clinitron bed, prompt incontinent care.

## 2024-10-29 ENCOUNTER — APPOINTMENT (OUTPATIENT)
Facility: HOSPITAL | Age: 79
End: 2024-10-29
Attending: HOSPITALIST
Payer: MEDICARE

## 2024-10-29 LAB
ANION GAP SERPL CALC-SCNC: 7 MMOL/L (ref 0–18)
BASOPHILS # BLD AUTO: 0.02 X10(3) UL (ref 0–0.2)
BASOPHILS NFR BLD AUTO: 0.1 %
BUN BLD-MCNC: 6 MG/DL (ref 9–23)
CALCIUM BLD-MCNC: 7.9 MG/DL (ref 8.7–10.4)
CHLORIDE SERPL-SCNC: 111 MMOL/L (ref 98–112)
CO2 SERPL-SCNC: 23 MMOL/L (ref 21–32)
CREAT BLD-MCNC: 0.15 MG/DL
EGFRCR SERPLBLD CKD-EPI 2021: 151 ML/MIN/1.73M2 (ref 60–?)
EOSINOPHIL # BLD AUTO: 0.02 X10(3) UL (ref 0–0.7)
EOSINOPHIL NFR BLD AUTO: 0.1 %
ERYTHROCYTE [DISTWIDTH] IN BLOOD BY AUTOMATED COUNT: 17.4 %
GLUCOSE BLD-MCNC: 99 MG/DL (ref 70–99)
HCT VFR BLD AUTO: 35.1 %
HGB BLD-MCNC: 10.5 G/DL
IMM GRANULOCYTES # BLD AUTO: 0.15 X10(3) UL (ref 0–1)
IMM GRANULOCYTES NFR BLD: 1.1 %
LYMPHOCYTES # BLD AUTO: 1.49 X10(3) UL (ref 1–4)
LYMPHOCYTES NFR BLD AUTO: 11.1 %
MAGNESIUM SERPL-MCNC: 1.6 MG/DL (ref 1.6–2.6)
MCH RBC QN AUTO: 24.2 PG (ref 26–34)
MCHC RBC AUTO-ENTMCNC: 29.9 G/DL (ref 31–37)
MCV RBC AUTO: 81.1 FL
MONOCYTES # BLD AUTO: 0.93 X10(3) UL (ref 0.1–1)
MONOCYTES NFR BLD AUTO: 6.9 %
NEUTROPHILS # BLD AUTO: 10.82 X10 (3) UL (ref 1.5–7.7)
NEUTROPHILS # BLD AUTO: 10.82 X10(3) UL (ref 1.5–7.7)
NEUTROPHILS NFR BLD AUTO: 80.7 %
OSMOLALITY SERPL CALC.SUM OF ELEC: 290 MOSM/KG (ref 275–295)
PLATELET # BLD AUTO: 426 10(3)UL (ref 150–450)
POTASSIUM SERPL-SCNC: 3.4 MMOL/L (ref 3.5–5.1)
POTASSIUM SERPL-SCNC: 3.4 MMOL/L (ref 3.5–5.1)
RBC # BLD AUTO: 4.33 X10(6)UL
SODIUM SERPL-SCNC: 141 MMOL/L (ref 136–145)
WBC # BLD AUTO: 13.4 X10(3) UL (ref 4–11)

## 2024-10-29 PROCEDURE — 36410 VNPNXR 3YR/> PHY/QHP DX/THER: CPT

## 2024-10-29 PROCEDURE — 80048 BASIC METABOLIC PNL TOTAL CA: CPT | Performed by: HOSPITALIST

## 2024-10-29 PROCEDURE — 99215 OFFICE O/P EST HI 40 MIN: CPT

## 2024-10-29 PROCEDURE — 83735 ASSAY OF MAGNESIUM: CPT | Performed by: HOSPITALIST

## 2024-10-29 PROCEDURE — 84132 ASSAY OF SERUM POTASSIUM: CPT | Performed by: HOSPITALIST

## 2024-10-29 PROCEDURE — 85025 COMPLETE CBC W/AUTO DIFF WBC: CPT | Performed by: HOSPITALIST

## 2024-10-29 RX ORDER — MAGNESIUM OXIDE 400 MG/1
400 TABLET ORAL ONCE
Status: COMPLETED | OUTPATIENT
Start: 2024-10-29 | End: 2024-10-29

## 2024-10-29 RX ORDER — SODIUM HYPOCHLORITE 1.25 MG/ML
SOLUTION TOPICAL NIGHTLY
Status: DISCONTINUED | OUTPATIENT
Start: 2024-10-29 | End: 2024-10-31

## 2024-10-29 RX ORDER — POTASSIUM CHLORIDE 1500 MG/1
40 TABLET, EXTENDED RELEASE ORAL ONCE
Status: COMPLETED | OUTPATIENT
Start: 2024-10-29 | End: 2024-10-29

## 2024-10-29 NOTE — PLAN OF CARE
Patient on Hiltron bed wound care to see today. Bilateral heel protectors are in place. Dressing are currently clean dry and intact. Antibiotics and fluids as ordered. Abdominal drain flushed and aspirated as per MD order. Pt tolerated well. Bed locked and in lowest position call light within reach.   Problem: METABOLIC/FLUID AND ELECTROLYTES - ADULT  Goal: Electrolytes maintained within normal limits  Description: INTERVENTIONS:  - Monitor labs and rhythm and assess patient for signs and symptoms of electrolyte imbalances  - Administer electrolyte replacement as ordered  - Monitor response to electrolyte replacements, including rhythm and repeat lab results as appropriate  - Fluid restriction as ordered  - Instruct patient on fluid and nutrition restrictions as appropriate  Outcome: Progressing  Goal: Hemodynamic stability and optimal renal function maintained  Description: INTERVENTIONS:  - Monitor labs and assess for signs and symptoms of volume excess or deficit  - Monitor intake, output and patient weight  - Monitor urine specific gravity, serum osmolarity and serum sodium as indicated or ordered  - Monitor response to interventions for patient's volume status, including labs, urine output, blood pressure (other measures as available)  - Encourage oral intake as appropriate  - Instruct patient on fluid and nutrition restrictions as appropriate  Outcome: Progressing     Problem: SKIN/TISSUE INTEGRITY - ADULT  Goal: Incision(s), wounds(s) or drain site(s) healing without S/S of infection  Description: INTERVENTIONS:  - Assess and document risk factors for pressure ulcer development  - Assess and document skin integrity  - Assess and document dressing/incision, wound bed, drain sites and surrounding tissue  - Implement wound care per orders  - Initiate isolation precautions as appropriate  - Initiate Pressure Ulcer prevention bundle as indicated  Outcome: Progressing  Goal: Oral mucous membranes remain  intact  Description: INTERVENTIONS  - Assess oral mucosa and hygiene practices  - Implement preventative oral hygiene regimen  - Implement oral medicated treatments as ordered  Outcome: Progressing     Problem: SAFETY ADULT - FALL  Goal: Free from fall injury  Description: INTERVENTIONS:  - Assess pt frequently for physical needs  - Identify cognitive and physical deficits and behaviors that affect risk of falls.  - [place] fall precautions as indicated by assessment.  - Educate pt/family on patient safety including physical limitations  - Instruct pt to call for assistance with activity based on assessment  - Modify environment to reduce risk of injury  - Provide assistive devices as appropriate  - Consider OT/PT consult to assist with strengthening/mobility  - Encourage toileting schedule  Outcome: Progressing     Problem: PAIN - ADULT  Goal: Verbalizes/displays adequate comfort level or patient's stated pain goal  Description: INTERVENTIONS:  - Encourage pt to monitor pain and request assistance  - Assess pain using appropriate pain scale  - Administer analgesics based on type and severity of pain and evaluate response  - Implement non-pharmacological measures as appropriate and evaluate response  - Consider cultural and social influences on pain and pain management  - Manage/alleviate anxiety  - Utilize distraction and/or relaxation techniques  - Monitor for opioid side effects  - Notify MD/LIP if interventions unsuccessful or patient reports new pain  - Anticipate increased pain with activity and pre-medicate as appropriate  Outcome: Progressing

## 2024-10-29 NOTE — VASCULAR ACCESS
VAT consulted for USGPIV access.  LUE with limited to no movement.  USG assessment of RUE with no veins in RFA suitable for PIV. USG assessment above RAC:  able to identify brachial vein.  Due to limited veins and Dr Max indicating this patient not a candidate for long term antibiotics,  midline inserted as documented.  Tolerated well.

## 2024-10-29 NOTE — PROGRESS NOTES
Infectious Disease Progress Note      Date of admission: 10/25/2024  6:26 PM     Reason for consult: Polymicrobial bacteremia    Referring physician: Johnathan Trujillo MD    Subjective: Doing about the same.  No pain.  No nausea or vomiting.  No diarrhea.  No shortness of breath.  No cough or sputum production.    The rest of the systems were reviewed and found to be negative except was mentioned above    Interval events: This is a 79-year-old male patient with end-stage MS, chronic decubitus ulcer, presents here with polymicrobial bacteremia/sepsis.  CT showing complex fluid collection in the right retroperitoneum contiguous with the iliacus muscle that is new.  Blood cultures currently growing Bacteroides along with Pseudomonas aeruginosa.  The patient is currently on IV meropenem.  Last now status post IR drainage of his abscess on 10/28, cultures are pending.    Medications:    sodium chloride    naloxone    flumazenil    meropenem    acetaminophen    allopurinol    metoprolol succinate ER    sodium chloride    heparin     Allergies:  Allergies[1]    Physical Exam:  Vitals:    10/29/24 0500   BP: 124/60   Pulse: 101   Resp: 20   Temp: 97.9 °F (36.6 °C)     Vitals signs and nursing note reviewed.   Constitutional:       Appearance: Chronically ill appearance.   HENT:      Head: Normocephalic and atraumatic.      Mouth: Mucous membranes are moist.   Neck:      Musculoskeletal: Neck supple.   Cardiovascular:      Rate and Rhythm: Normal rate.   Pulmonary:      Effort: Pulmonary effort is normal. No respiratory distress.   Skin:     General: Skin is warm and dry.   Neurological:      Mental Status: Alert and oriented to person, place, and time.                                                                       Laboratory data:  I have reviewed all the lab results independently.  Lab Results   Component Value Date    CREATSERUM 0.15 10/29/2024    BUN 6 10/29/2024     10/29/2024    K 3.4 10/29/2024    K 3.4  10/29/2024     10/29/2024    CO2 23.0 10/29/2024    GLU 99 10/29/2024    CA 7.9 10/29/2024    INR 1.38 10/28/2024    MG 1.6 10/29/2024      Recent Labs   Lab 10/28/24  0730   RBC 3.38*   HGB 8.2*   HCT 27.5*   MCV 81.4   MCH 24.3*   MCHC 29.8*   RDW 17.1   NEPRELIM 8.36*   WBC 10.3   .0      Microbiology data:  Hospital Encounter on 10/25/24   1. Blood Culture     Status: None (Preliminary result)    Collection Time: 10/27/24 11:34 AM    Specimen: Blood,peripheral   Result Value Ref Range    Blood Culture Result No Growth 1 Day N/A   2. Urine Culture, Routine     Status: Abnormal    Collection Time: 10/25/24  6:53 PM    Specimen: Urine, noriega catheter   Result Value Ref Range    Urine Culture 50,000-99,000 CFU/ML Providencia rettgeri (A) N/A    Urine Culture 50,000-99,000 CFU/ML Alcaligenes faecalis (A) N/A       Susceptibility    Providencia rettgeri -  (no method available)     Ampicillin  Resistant      Ampicillin + Sulbactam 8 Sensitive      Cefazolin >=64 Resistant      Cefepime <=1 Sensitive      Ceftriaxone <=1 Sensitive      Ciprofloxacin <=0.25 Sensitive      Gentamicin <=1 Sensitive      Meropenem <=0.25 Sensitive      Levofloxacin <=0.12 Sensitive      Nitrofurantoin 256 Resistant      Piperacillin + Tazobactam <=4 Sensitive      Trimethoprim/Sulfa <=20 Sensitive     Alcaligenes faecalis -  (no method available)     Ciprofloxacin 2 Intermediate      Gentamicin 4 Sensitive      Meropenem <=1 Sensitive      Piperacillin + Tazobactam <=4 Sensitive      Trimethoprim/Sulfa <=2 Sensitive      Impression:  Brenden Cardenas is a 79 year old male with    Polymicrobial bacteremia/sepsis with threat to life  Blood cultures with Pseudomonas aeruginosa and Bacteroides  Most likely source is intra-abdominal/retroperitoneal collection  Now status post IR drainage on 10/28, cultures are pending  His urine analysis showing pyuria; however, urine culture is growing providencia and alcaligenes faecalis  Unlikely  playing a role in his clinical presentation, most likely representing chronic colonizer  Multiple sacral decubitus ulcers with exposed bone  Given his end-stage MS, trying to treat with IV antibiotics will be futile  Aggressive wound care will be needed  End-stage MS  Multiple pressure wounds  Pyuria with bacteria  As per the above    Recommendations:    Continue IV meropenem for now  Continue to follow-up on drainage cultures  Continue to monitor daily labs for antibiotic toxicities  Further recommendations will depend on the above work-up and clinical progress     The plan of care was discussed with the primary hospital team, Johnathan Trujillo MD     Recommendations were also discussed with the patient; all questions were answered.     Thank you for this consultation. Please don't hesitate to call the ID team for questions or any acute changes in patient's clinical condition.    Please note that this report has been produced using speech recognition software and may contain errors related to that system including, but not limited to, errors in grammar, punctuation, and spelling, as well as words and phrases that possibly may have been recognized inappropriately.  If there are any questions or concerns, contact the dictating provider for clarification.    The  [employer] Cures Act makes medical notes like these available to patients in the interest of transparency. Please be advised this is a medical document. Medical documents are intended to carry relevant information, facts as evident, and the clinical opinion of the practitioner. The medical note is intended as peer to peer communication and may appear blunt or direct. It is written in medical language and may contain abbreviations or verbiage that are unfamiliar.     Brendan Max MD  [facility]. Tel: [phone]. Fax: [phone].     Brenden Cardenas : 1945 MRN: [id number] [facility]: [id number]          [1] No Known Allergies

## 2024-10-29 NOTE — PROGRESS NOTES
[facility]  Progress Note    Brenden Cardenas Patient Status:  Inpatient    1945 MRN [id number]   Location [facility] 4NW-A Attending Johnathan Trujillo MD   Hosp Day # 4 PCP Willow Mcmahon,      Subjective:    IR drain placed yesterday, green purulent drainage noted on procedure note  Patient denies any abdominal pain     Objective/Physical Exam:    Vital Signs:  Blood pressure 124/60, pulse 101, temperature 97.9 °F (36.6 °C), temperature source Oral, resp. rate 20, weight 140 lb 3.4 oz (63.6 kg), SpO2 93%.    General:  Alert, orientated x3.  Cooperative.  No apparent distress.  Abdomen:  IR drain with purulent drainage  Abdomen is soft and non tender    Labs:  Reviewed    Lab Results   Component Value Date    WBC 13.4 10/29/2024    HGB 10.5 10/29/2024    HCT 35.1 10/29/2024    .0 10/29/2024    CREATSERUM 0.15 10/29/2024    BUN 6 10/29/2024     10/29/2024    K 3.4 10/29/2024    K 3.4 10/29/2024     10/29/2024    CO2 23.0 10/29/2024    GLU 99 10/29/2024    CA 7.9 10/29/2024    MG 1.6 10/29/2024       Problem List:  Patient Active Problem List   Diagnosis    Hypertrophy of prostate with urinary obstruction and other lower urinary tract symptoms (LUTS)    Spinal stenosis, lumbar region, without neurogenic claudication    Anxiety state, unspecified    MS (multiple sclerosis) (HCC)    Lumbar spondylosis with myelopathy    Primary hypertension    Multiple sclerosis (HCC)    Lumbar stenosis    Azotemia    Pressure injury of buttock, 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[facility])    Chest pain, atypical    COVID-19    Acute cystitis without hematuria    Urinary tract infection without hematuria, site unspecified    Pressure injury of skin of sacral region, unspecified injury stage    Sepsis with hypotension (HCC)    Leukocytosis    Infected decubitus ulcer, unspecified ulcer stage    Failure to thrive (child)    Anemia    Bacteremia    Elevated lactic acid level    Moderate protein-calorie malnutrition (HCC)    Pressure  injury of skin of back, unspecified injury stage           Impression:     78 y/o with retroperitoneal fluid collection s/p IR drain placement   Cx: GPC prelim    Plan:    No dietary restrictions from surgical perspective  Continue Iv antibiotics per ID  Will need abscessogram once drainage is decreased to < 10cc x 2 consecutive days  All questions answered  DW COLLIN Puente  [facility]  General Surgery  10/29/2024

## 2024-10-29 NOTE — PROGRESS NOTES
[facility] Hospitalist Progress Note                                                                     [facility]   part of [facility]      Brenden GARCIA Theresa  5/6/1945    SUBJECTIVE: no chest pain, palpitations, shortness of breath, cough, nausea, vomiting, abdominal pain.     OBJECTIVE:  Temp:  [97.9 °F (36.6 °C)-99.7 °F (37.6 °C)] 97.9 °F (36.6 °C)  Pulse:  [] 101  Resp:  [17-20] 20  BP: (118-150)/(52-83) 124/60  SpO2:  [92 %-100 %] 93 %  Exam  Gen: No acute distress, alert and oriented   Pulm: Lungs clear bilaterally, normal respiratory effort, no crackles, no wheezing  CV: Heart with regular rate and rhythm, no murmur.   Abd: Abdomen soft, nontender, nondistended, bowel sounds present  MSK: edema bilateral LE, no tenderness of the LE  Skin: no new rashes or lesions, multiple ulcerations/wounds as per pictures in the chart    Labs:   Recent Labs   Lab 10/25/24  1853 10/26/24  0647 10/27/24  0734 10/28/24  0730 10/28/24  1349 10/29/24  1059   WBC 16.9* 17.5* 13.1* 10.3  --  13.4*   HGB 10.9* 8.7* 8.4* 8.2*  --  10.5*   MCV 78.9* 79.2* 79.7* 81.4  --  81.1   .0* 263.0 453.0* 423.0  --  426.0   INR  --   --   --   --  1.38*  --        Recent Labs   Lab 10/25/24  1852 10/26/24  0647 10/27/24  0734 10/28/24  0730 10/29/24  0643    139 139 138 141   K 4.5 3.6 3.4* 3.4*  3.4* 3.4*  3.4*    108 109 112 111   CO2 30.0 23.0 24.0 23.0 23.0   BUN 13 8* 8* <5* 6*   CREATSERUM 0.30* 0.19* 0.27* 0.16* 0.15*   CA 9.6 8.5* 8.5* 8.2* 7.9*   MG  --   --  1.8 1.6 1.6   GLU 98 73 88 84 99       Recent Labs   Lab 10/25/24  1852 10/28/24  0730   ALT 25 10   AST 31 15   ALB 3.3 2.3*       No results for input(s): \"PGLU\" in the last 168 hours.    Meds:   Scheduled:    meropenem  1 g Intravenous Q8H TONYA    allopurinol  300 mg Oral Daily    metoprolol succinate ER  100 mg Oral Daily Beta Blocker    heparin  5,000 Units Subcutaneous Q8H TONYA     Continuous Infusions:     sodium chloride Stopped (10/28/24 1459)    sodium chloride 100 mL/hr at 10/29/24 1618     PRN:   naloxone    flumazenil    acetaminophen    ASSESSMENT / PLAN:    79 year old male with history of anxiety, kidney stones, gout, cervical stenosis, htn, MS, multiple pressure ulcers presenting with leukocytosis.     Leukocytosis  -etiology uncertain  -blood cx pending--> pos as per chart  -urine cx pending--> ngtd  -iv abx  -ID following  -CT abd/pelvis--> shows fluid collection concerning for abscess --> surgery consulted, likely need IR drain if possible--> drain placed 10/28, cx pending  -trend     Bacteremia  -iv abx  -ID following  -source unknown, possible multiple ulcerations or abscess    Hx Gout  -allopurinol     HTN  -sbp stable  -metoprolol      Anemia  -no active bleeding  -trend  -no transfusion at this time     Quality:  DVT Prophylaxis:scd, heparin sq  CODE status: Full per chart  [place]: yes chronic     Plan of care discussed with patient and staff     Dispo: no discharge     Johnathan Trujillo MD  [facility] Hospitalist  [phone]

## 2024-10-29 NOTE — PLAN OF CARE
Pt a/o x3. VSS, remained afebrile. Meds given per MAR. Potassium and magnesium replaced per electrolyte protocol. [place] intact and draining. Bulb suction flushed and emptied. Wound care assessed all wounds. Orders to change dressings. Midline placed by vascular. Fall precautions in place. Call light within reach.     Problem: METABOLIC/FLUID AND ELECTROLYTES - ADULT  Goal: Electrolytes maintained within normal limits  Description: INTERVENTIONS:  - Monitor labs and rhythm and assess patient for signs and symptoms of electrolyte imbalances  - Administer electrolyte replacement as ordered  - Monitor response to electrolyte replacements, including rhythm and repeat lab results as appropriate  - Fluid restriction as ordered  - Instruct patient on fluid and nutrition restrictions as appropriate  Outcome: Progressing  Goal: Hemodynamic stability and optimal renal function maintained  Description: INTERVENTIONS:  - Monitor labs and assess for signs and symptoms of volume excess or deficit  - Monitor intake, output and patient weight  - Monitor urine specific gravity, serum osmolarity and serum sodium as indicated or ordered  - Monitor response to interventions for patient's volume status, including labs, urine output, blood pressure (other measures as available)  - Encourage oral intake as appropriate  - Instruct patient on fluid and nutrition restrictions as appropriate  Outcome: Progressing     Problem: SKIN/TISSUE INTEGRITY - ADULT  Goal: Incision(s), wounds(s) or drain site(s) healing without S/S of infection  Description: INTERVENTIONS:  - Assess and document risk factors for pressure ulcer development  - Assess and document skin integrity  - Assess and document dressing/incision, wound bed, drain sites and surrounding tissue  - Implement wound care per orders  - Initiate isolation precautions as appropriate  - Initiate Pressure Ulcer prevention bundle as indicated  Outcome: Progressing  Goal: Oral mucous membranes  remain intact  Description: INTERVENTIONS  - Assess oral mucosa and hygiene practices  - Implement preventative oral hygiene regimen  - Implement oral medicated treatments as ordered  Outcome: Progressing     Problem: PAIN - ADULT  Goal: Verbalizes/displays adequate comfort level or patient's stated pain goal  Description: INTERVENTIONS:  - Encourage pt to monitor pain and request assistance  - Assess pain using appropriate pain scale  - Administer analgesics based on type and severity of pain and evaluate response  - Implement non-pharmacological measures as appropriate and evaluate response  - Consider cultural and social influences on pain and pain management  - Manage/alleviate anxiety  - Utilize distraction and/or relaxation techniques  - Monitor for opioid side effects  - Notify MD/LIP if interventions unsuccessful or patient reports new pain  - Anticipate increased pain with activity and pre-medicate as appropriate  Outcome: Progressing

## 2024-10-29 NOTE — CONSULTS
[facility]  Report of Inpatient Wound Care Consultation    Brenden Cardenas Patient Status:  Inpatient    1945 MRN [id number]   Location [facility] 4NW-A Attending Johnathan Trujillo MD   Hosp Day # 4 PCP Willow Mcmahon DO     Reason for Consultation:  \"Chronic sacral wounds\"    History of Present Illness:  Brenden Cardenas is a a(n) 79 year old male. Patient states he lives at home with spouse and sees a nurse and a wound care physician at home.  Patient with multiple comorbidities, with skin breakdown described below.     Subjective: patient is interested to find out how his wounds are progressing: \"How do they look?\"    History:  Past Medical History:    Anxiety state, unspecified    BACK PAIN    Bedbound    Calculus of kidney    Cervical stenosis of spine    COVID-19    Gout    Gout    High blood pressure    HYPERTENSION    Multiple sclerosis (HCC)    Osteoarthrosis, unspecified whether generalized or localized, unspecified site    OTHER DISEASES    GOUT    Paralysis (HCC)    Left arm and both legs    Pressure ulcer of sacrum    Seborrheic dermatitis    Sepsis with hypotension (HCC)    Skin cancer    Stage 4 decubitus ulcer (HCC)    Unspecified essential hypertension    Visual impairment    glasses     Past Surgical History:   Procedure Laterality Date    Debride wound      x7    Other accessory      \"urolift\"    Other accessory      multiple skin Bx's/removals    Other surgical history  2011    LUMBAR SX    Other surgical history      cervical surgery    Spine surgery procedure unlisted        reports that he has never smoked. He has never used smokeless tobacco. He reports current alcohol use. He reports that he does not use drugs.      Allergies:  @ALLERGY    Laboratory Data:    Recent Labs   Lab 10/25/24  1852 10/25/24  1853 10/27/24  0734 10/28/24  0730 10/28/24  1349 10/29/24  0643 10/29/24  1059   WBC  --    < > 13.1* 10.3  --   --  13.4*   HGB  --    < > 8.4* 8.2*  --   --  10.5*   HCT  --     < > 27.9* 27.5*  --   --  35.1*   PLT  --    < > 453.0* 423.0  --   --  426.0   CREATSERUM 0.30*   < > 0.27* 0.16*  --  0.15*  --    BUN 13   < > 8* <5*  --  6*  --    GLU 98   < > 88 84  --  99  --    CA 9.6   < > 8.5* 8.2*  --  7.9*  --    ALB 3.3  --   --  2.3*  --   --   --    TP 6.0  --   --  4.1*  --   --   --    INR  --   --   --   --  1.38*  --   --     < > = values in this interval not displayed.         ASSESSMENT:  Wound 02/02/24 Ankle Right;Lateral (Active)   Date First Assessed/Time First Assessed: 02/02/24 2200   Present on Original Admission: Yes  Primary Wound Type: Old surgical  Location: Ankle  Wound Location Orientation: Right;Lateral  Wound Description (Comments): dry wound from old skin CA site      Assessments 10/29/2024  8:19 AM   Wound Image     Wound Length (cm) 0 cm   Wound Width (cm) 0 cm   Wound Surface Area (cm^2) 0 cm^2   Wound Depth (cm) 0 cm   Wound Volume (cm^3) 0 cm^3   Shape no open wound       Wound 10/26/24 Back Medial;Proximal (Active)   Date First Assessed/Time First Assessed: 10/26/24 0008   Present on Original Admission: Yes  Primary Wound Type: Pressure Injury  Location: Back  Wound Location Orientation: Medial;Proximal      Assessments 10/29/2024  9:17 AM   Wound Image     Drainage Amount Moderate   Drainage Description Serosanguineous   Treatments Cleansed;Honey Gel   Dressing Mepilex   Wound Length (cm) 5.5 cm   Wound Width (cm) 3.5 cm   Wound Surface Area (cm^2) 19.25 cm^2   Wound Depth (cm) 0.1 cm   Wound Volume (cm^3) 1.925 cm^3   Margins Well-defined edges   Wound Bed Slough (%) 90 % (10 % intact skin)   Wound Odor None       Wound 10/26/24 Sacrum (Active)   Date First Assessed/Time First Assessed: 10/26/24 0010   Present on Original Admission: Yes  Primary Wound Type: Pressure Injury  Location: Sacrum      Assessments 10/29/2024  8:47 AM   Wound Image      Drainage Amount Moderate   Drainage Description Serosanguineous   Treatments Cleansed   Dressing Kerlix roll;ABD  Pad   Wound Length (cm) 16.5 cm   Wound Width (cm) 14.5 cm   Wound Surface Area (cm^2) 239.25 cm^2   Wound Depth (cm) 3 cm   Wound Volume (cm^3) 717.75 cm^3   Margins Well-defined edges   Ashlee-wound Assessment Dry;Excoriated   Wound Granulation Tissue Pale Grey;Pink;Firm   Wound Bed Granulation (%) 80 %   Wound Bed Slough (%) 10 %   Exposed Structure Bone (10%)   Wound Odor None   Undermining? Yes   Number of Undermines 2   Undermine 1 Start Position 7 (2.5)   Undermine 1 End Position 12   Undermine 2 Start Position 1   Undermine 2 End Position 4 (4cm at 2 oxlick)   Pressure Injury Stage Stage 4       Wound 10/26/24 Scrotum (Active)   Date First Assessed/Time First Assessed: 10/26/24 0011   Present on Original Admission: Yes  Location: Scrotum      Assessments 10/29/2024  8:37 AM   Wound Image     Wound Length (cm) 0 cm   Wound Width (cm) 0 cm   Wound Surface Area (cm^2) 0 cm^2   Wound Depth (cm) 0 cm   Wound Volume (cm^3) 0 cm^3   Shape skin redness, no openm wounds       Wound 10/26/24 Buttocks Right (Active)   Date First Assessed/Time First Assessed: 10/26/24 0014   Present on Original Admission: Yes  Location: Buttocks  Wound Location Orientation: Right      Assessments 10/29/2024  8:54 AM   Wound Image      Drainage Amount Moderate   Drainage Description Serosanguineous   Treatments Cleansed   Dressing Kerlix roll;ABD Pad   Wound Length (cm) 9 cm   Wound Width (cm) 6 cm   Wound Surface Area (cm^2) 54 cm^2   Wound Depth (cm) 4.6 cm   Wound Volume (cm^3) 248.4 cm^3   Margins Well-defined edges   Wound Bed Granulation (%) 60 %   Wound Bed Slough (%) 20 %   Exposed Structure Bone (20%)   Undermining? Yes   Number of Undermines 1   Undermine 1 Start Position 7   Undermine 1 End Position 5 (2.3 cm)   Pressure Injury Stage Stage 4       Wound 10/26/24 Buttocks Right (Active)   Date First Assessed/Time First Assessed: 10/26/24 0015   Present on Original Admission: Yes  Location: Buttocks  Wound Location Orientation:  Right      Assessments 10/29/2024  9:14 AM   Wound Image     Closure None   Drainage Amount Moderate   Drainage Description Serosanguineous   Treatments Cleansed   Dressing Kerlix roll;ABD Pad   Wound Length (cm) 11 cm   Wound Width (cm) 9 cm   Wound Surface Area (cm^2) 99 cm^2   Wound Depth (cm) 2.1 cm   Wound Volume (cm^3) 207.9 cm^3   Margins Well-defined edges   Wound Bed Granulation (%) 25 %   Wound Bed Slough (%) 75 %   Wound Odor Mild   Undermining? Yes   Number of Undermines 1   Undermine 1 Start Position 5 (1.5cm)   Undermine 1 End Position 11       Wound 10/26/24 Foot Right;Lateral (Active)   Date First Assessed/Time First Assessed: 10/26/24 0016   Present on Original Admission: Yes  Primary Wound Type: Pressure Injury  Location: Foot  Wound Location Orientation: Right;Lateral      Assessments 10/29/2024  8:20 AM   Wound Image     Site Assessment Yellow   Drainage Amount Small   Drainage Description Serosanguineous   Treatments Cleansed;Honey Gel   Dressing Mepilex   Wound Length (cm) 0.7 cm   Wound Width (cm) 1 cm   Wound Surface Area (cm^2) 0.7 cm^2   Wound Depth (cm) 0.1 cm   Wound Volume (cm^3) 0.07 cm^3   Margins Well-defined edges   Ashlee-wound Assessment Blanchable erythema   Wound Bed Slough (%) 100 %   Wound Odor None   Pressure Injury Stage Unstageable       Wound 10/26/24 Ankle Left;Medial (Active)   Date First Assessed/Time First Assessed: 10/26/24 0017   Present on Original Admission: Yes  Location: Ankle  Wound Location Orientation: Left;Medial      Assessments 10/29/2024  8:28 AM   Wound Image     Drainage Amount Moderate   Drainage Description Serosanguineous   Treatments Cleansed;Honey Gel   Dressing Mepilex   Wound Length (cm) 2.5 cm   Wound Width (cm) 2.5 cm   Wound Surface Area (cm^2) 6.25 cm^2   Wound Depth (cm) 0.2 cm   Wound Volume (cm^3) 1.25 cm^3   Margins Well-defined edges   Wound Granulation Tissue Pink;Firm   Wound Bed Granulation (%) 25 %   Wound Bed Slough (%) 75 %   Wound  Odor None   Pressure Injury Stage Unstageable       Wound 10/26/24 Buttocks Left;Lower (Active)   Date First Assessed/Time First Assessed: 10/26/24 0019   Present on Original Admission: Yes  Primary Wound Type: Pressure Injury  Location: Buttocks  Wound Location Orientation: Left;Lower      Assessments 10/29/2024  8:52 AM   Wound Image     Closure None   Drainage Amount Moderate   Drainage Description Serosanguineous   Treatments Cleansed   Dressing Kerlix roll;ABD Pad (all kerlix and abd dressings are wet to dry)   Wound Length (cm) 8.5 cm   Wound Width (cm) 5.5 cm   Wound Surface Area (cm^2) 46.75 cm^2   Wound Depth (cm) 1.5 cm   Wound Volume (cm^3) 70.125 cm^3   Margins Well-defined edges   Wound Granulation Tissue Pink;Red;Firm   Wound Bed Granulation (%) 40 %   Wound Bed Slough (%) 60 %   Pressure Injury Stage Unstageable       Wound 10/26/24 Foot Dorsal;Left (Active)   Date First Assessed/Time First Assessed: 10/26/24 0020   Present on Original Admission: Yes  Location: Foot  Wound Location Orientation: Dorsal;Left      Assessments 10/29/2024  8:28 AM   Wound Image     Wound Length (cm) 0 cm   Wound Width (cm) 0 cm   Wound Surface Area (cm^2) 0 cm^2   Wound Depth (cm) 0 cm   Wound Volume (cm^3) 0 cm^3       Wound 10/26/24 Heel Left (Active)   Date First Assessed/Time First Assessed: 10/26/24 0020   Present on Original Admission: Yes  Location: Heel  Wound Location Orientation: Left      Assessments 10/29/2024  8:32 AM   Wound Image     Closure None   Drainage Amount Small   Drainage Description Serosanguineous   Treatments Cleansed;Honey Gel   Dressing Mepilex   Wound Length (cm) 0.3 cm   Wound Width (cm) 0.7 cm   Wound Surface Area (cm^2) 0.21 cm^2   Wound Depth (cm) 0.3 cm   Wound Volume (cm^3) 0.063 cm^3   Margins Well-defined edges   Wound Bed Granulation (%) 80 %   Wound Bed Slough (%) 20 %   Pressure Injury Stage Unstageable       Wound 10/26/24 Finger (Comment which one) Anterior;Left (Active)   Date  First Assessed/Time First Assessed: 10/26/24 0021   Location: (c) Finger (Comment which one)  Wound Location Orientation: Anterior;Left      Assessments 10/29/2024  8:35 AM   Wound Image     Wound Length (cm) 0 cm   Wound Width (cm) 0 cm   Wound Surface Area (cm^2) 0 cm^2   Wound Depth (cm) 0 cm   Wound Volume (cm^3) 0 cm^3       Wound 10/26/24 Left;Plantar (Active)   Date First Assessed/Time First Assessed: 10/26/24 0023   Present on Original Admission: Yes  Wound Location Orientation: Left;Plantar      Assessments 10/29/2024  8:31 AM   Wound Image     Drainage Amount None   Treatments Cleansed;Honey Gel   Dressing Mepilex   Wound Length (cm) 1 cm   Wound Width (cm) 1 cm   Wound Surface Area (cm^2) 1 cm^2   Wound Depth (cm) 0 cm   Wound Volume (cm^3) 0 cm^3   Wound Bed Slough (%) 100 %   Wound Odor None   Pressure Injury Stage Unstageable       Wound 10/29/24 Foot Right;Plantar (Active)   Date First Assessed/Time First Assessed: 10/29/24 0823   Location: Foot  Wound Location Orientation: Right;Plantar      Assessments 10/29/2024  8:24 AM   Wound Image     Drainage Amount Scant   Drainage Description Serosanguineous   Treatments Cleansed;Honey Gel   Dressing Mepilex   Wound Length (cm) 0.4 cm   Wound Width (cm) 0.5 cm   Wound Surface Area (cm^2) 0.2 cm^2   Wound Depth (cm) 0.1 cm   Wound Volume (cm^3) 0.02 cm^3   Margins Well-defined edges   Wound Bed Slough (%) 100 %   Wound Odor None       Wound 10/29/24 Back Right;Lateral;Distal (Active)   Date First Assessed/Time First Assessed: 10/29/24 0919   Location: Back  Wound Location Orientation: Right;Lateral;Distal      Assessments 10/29/2024  9:19 AM   Wound Image     Drainage Amount Small   Drainage Description Serosanguineous   Treatments Cleansed;Honey Gel   Dressing Mepilex   Wound Length (cm) 2 cm   Wound Width (cm) 2.5 cm   Wound Surface Area (cm^2) 5 cm^2   Wound Depth (cm) 0.1 cm   Wound Volume (cm^3) 0.5 cm^3   Margins Well-defined edges   Wound Bed Slough  (%) 10 %   Wound Bed Eschar (%) 90 %   Wound Odor None        Edema : +4  = Very deep pitting, indentation lasts a long time  Pulse: palpable, faint  Capillary refill:sluggish, >3  Lower Extremity Temp: warm    Wound Cleaning and Dressings:  Bilateral feet and upper back  Wound cleansing:  normal saline  Wound cleaning frequency: daily  Wound product: medi honey and bordered foam  Dressing change frequency:  Change dressing daily and/or PRN    Sacral and bilateral buttocks  Wound cleansing:  normal saline  Wound cleaning frequency: nightly  Wound product: Dakins solution, moist to dry dressing. Use one piece of kerlix for each wound, wet it with Dakins, wring it out so it is moist, apply dry gauze, abd and tape  Dressing change frequency:  Change dressing nightly and/or PRN     ALL WOUND CARE SUPPLIES CAN BE OBTAINED FROM [facility]     Miscellaneous/Additional Orders:  Off loading: turn Q2H, avoid using pillows to offload when using [place] bed, prompt incontinent care.     If patient is Diabetic: want to make sure blood sugars are within a controled range for wound healing     Protein intake: depending on providers recommendations and patients kidney functions - if kidneys are good then recommend patient to increase protein intake (Boost, Jah, Ensure, Premiere Protein)       Recommendations: Consult general surgery for possible surgical debridement of sacral and wounds to bilateral buttocks. Imaging for sacral and right buttock wound with bone exposure.       Thank you for this consultation and for allowing me to participate in the care of your patient.  Please call [phone] if you have any questions about this consultation and plan of care.     Time Spent 1 Hour 45 Minutes.    Thank you,  Srikanth Hyatt RN  Wound/Ostomy/Continence nurse    10/29/2024  11:48 AM

## 2024-10-30 LAB
ANION GAP SERPL CALC-SCNC: 3 MMOL/L (ref 0–18)
BASOPHILS # BLD AUTO: 0 X10(3) UL (ref 0–0.2)
BASOPHILS NFR BLD AUTO: 0 %
BUN BLD-MCNC: 6 MG/DL (ref 9–23)
CALCIUM BLD-MCNC: 7.8 MG/DL (ref 8.7–10.4)
CHLORIDE SERPL-SCNC: 110 MMOL/L (ref 98–112)
CO2 SERPL-SCNC: 25 MMOL/L (ref 21–32)
CREAT BLD-MCNC: 0.16 MG/DL
EGFRCR SERPLBLD CKD-EPI 2021: 148 ML/MIN/1.73M2 (ref 60–?)
EOSINOPHIL # BLD AUTO: 0.01 X10(3) UL (ref 0–0.7)
EOSINOPHIL NFR BLD AUTO: 0.1 %
ERYTHROCYTE [DISTWIDTH] IN BLOOD BY AUTOMATED COUNT: 17.3 %
GLUCOSE BLD-MCNC: 100 MG/DL (ref 70–99)
HCT VFR BLD AUTO: 28.6 %
HGB BLD-MCNC: 8.5 G/DL
IMM GRANULOCYTES # BLD AUTO: 0.12 X10(3) UL (ref 0–1)
IMM GRANULOCYTES NFR BLD: 1.3 %
LYMPHOCYTES # BLD AUTO: 1.6 X10(3) UL (ref 1–4)
LYMPHOCYTES NFR BLD AUTO: 16.9 %
MAGNESIUM SERPL-MCNC: 1.6 MG/DL (ref 1.6–2.6)
MCH RBC QN AUTO: 23.9 PG (ref 26–34)
MCHC RBC AUTO-ENTMCNC: 29.7 G/DL (ref 31–37)
MCV RBC AUTO: 80.6 FL
MONOCYTES # BLD AUTO: 0.81 X10(3) UL (ref 0.1–1)
MONOCYTES NFR BLD AUTO: 8.6 %
NEUTROPHILS # BLD AUTO: 6.91 X10 (3) UL (ref 1.5–7.7)
NEUTROPHILS # BLD AUTO: 6.91 X10(3) UL (ref 1.5–7.7)
NEUTROPHILS NFR BLD AUTO: 73.1 %
OSMOLALITY SERPL CALC.SUM OF ELEC: 284 MOSM/KG (ref 275–295)
PLATELET # BLD AUTO: 457 10(3)UL (ref 150–450)
POTASSIUM SERPL-SCNC: 3.8 MMOL/L (ref 3.5–5.1)
POTASSIUM SERPL-SCNC: 3.8 MMOL/L (ref 3.5–5.1)
RBC # BLD AUTO: 3.55 X10(6)UL
SODIUM SERPL-SCNC: 138 MMOL/L (ref 136–145)
WBC # BLD AUTO: 9.5 X10(3) UL (ref 4–11)

## 2024-10-30 PROCEDURE — 83735 ASSAY OF MAGNESIUM: CPT | Performed by: HOSPITALIST

## 2024-10-30 PROCEDURE — 84132 ASSAY OF SERUM POTASSIUM: CPT | Performed by: HOSPITALIST

## 2024-10-30 PROCEDURE — 80048 BASIC METABOLIC PNL TOTAL CA: CPT | Performed by: HOSPITALIST

## 2024-10-30 PROCEDURE — 85025 COMPLETE CBC W/AUTO DIFF WBC: CPT | Performed by: HOSPITALIST

## 2024-10-30 RX ORDER — MAGNESIUM SULFATE HEPTAHYDRATE 40 MG/ML
2 INJECTION, SOLUTION INTRAVENOUS ONCE
Status: COMPLETED | OUTPATIENT
Start: 2024-10-30 | End: 2024-10-30

## 2024-10-30 NOTE — PROGRESS NOTES
[facility]   part of [facility] Infectious Disease Progress Note    Brenden Cardenas Patient Status:  Inpatient    1945 MRN [id number]   Location [facility] 4NW-A Attending Johnathan Trujillo MD   Hosp Day # 5 PCP Willow Mcmahon,      Subjective:  Pt sleeping comfortably on exam.     Objective:    Allergies:  Allergies[1]    Medications:    Current Facility-Administered Medications:     magnesium sulfate in sterile water for injection 2 g/50mL IVPB premix 2 g, 2 g, Intravenous, Once    sodium hypochlorite (Dakin's) 0.125 % external solution, , Topical, Nightly    sodium chloride 0.9% infusion, , Intravenous, Continuous    naloxone (Narcan) 0.4 MG/ML injection 0.08 mg, 0.08 mg, Intravenous, PRN    flumazenil (Romazicon) 0.5 mg/5mL injection 0.2 mg, 0.2 mg, Intravenous, PRN    meropenem (Merrem) 1 g in sodium chloride 0.9% 100 mL IVPB-MBP, 1 g, Intravenous, Q8H TONYA    acetaminophen (Tylenol Extra Strength) tab 1,000 mg, 1,000 mg, Oral, Q6H PRN    allopurinol (Zyloprim) tab 300 mg, 300 mg, Oral, Daily    metoprolol succinate ER (Toprol XL) 24 hr tab 100 mg, 100 mg, Oral, Daily Beta Blocker    sodium chloride 0.9% infusion, , Intravenous, Continuous    heparin (Porcine) 5000 UNIT/ML injection 5,000 Units, 5,000 Units, Subcutaneous, Q8H TONYA    Physical Exam:  General: Sleeping.  No apparent distress.  Vital Signs:  Blood pressure 134/70, pulse 92, temperature 98.1 °F (36.7 °C), temperature source Oral, resp. rate 17, weight 140 lb 3.4 oz (63.6 kg), SpO2 93%.   Temp (24hrs), Av.2 °F (36.8 °C), Min:98.1 °F (36.7 °C), Max:98.2 °F (36.8 °C)      HEENT: Exam is unremarkable.  .   Lungs: Clear to auscultation bilaterally.  Cardiac: Regular rate and rhythm.   Abdomen:  Soft, non-distended, non-tender, with no rebound or guarding.   Extremities:  No lower extremity edema noted.  Without clubbing or cyanosis.    Skin: Wounds covered, images from wound care reviewed  Neurologic: Cranial nerves are  grossly intact.     Labs:  Lab Results   Component Value Date    WBC 9.5 10/30/2024    HGB 8.5 10/30/2024    HCT 28.6 10/30/2024    .0 10/30/2024    CREATSERUM 0.16 10/30/2024    BUN 6 10/30/2024     10/30/2024    K 3.8 10/30/2024    K 3.8 10/30/2024     10/30/2024    CO2 25.0 10/30/2024     10/30/2024    CA 7.8 10/30/2024    MG 1.6 10/30/2024         Assessment/Plan:    1.  Polymicrobial sepsis  -blood culture with pseudomonas and bacteroides  -secondary to intra-abd abscess  -s/p IR drainage on 10/28, cultures pending  -on IV meropenem  2.  Bacteriuria with abnormal UA  -cultures with e faecalis and providencia  -likely colonization  3.  Multiple sacral wounds with bone exposure  -wound on consult  -unlikely/difficult to cure/resolve  4.  Advanced/end-stage MS    If you have any questions or concerns please call [facility] Infectious Disease at [phone].     Daniel Guido, APRN           [1] No Known Allergies

## 2024-10-30 NOTE — CM/SW NOTE
SW sent updated clinical information to [facility] for eventual patient admission. SW will continue to follow for medical clearance.     SW will continue to follow for plan of care changes and remain available for any additional DC needs or concerns.     Lorelei Duncan MSW, LSW  Discharge Planner   [phone]

## 2024-10-30 NOTE — PHYSICAL THERAPY NOTE
PT eval orders received via mobility screen. Per chart review pt is total lift at baseline. RN confirms. Pt has all necessary equipment in home including yeison lift, hospital bed, wheelchair. No skilled IP PT needs indicated at this time. Will sign off. RN, DANETTE aware and in agreement.

## 2024-10-30 NOTE — PROGRESS NOTES
[facility] Hospitalist Progress Note                                                                     [facility]   part of [facility]      Brenden GARCIA Cardenas  5/6/1945    SUBJECTIVE: no chest pain, palpitations, shortness of breath, cough, nausea, vomiting, abdominal pain.     OBJECTIVE:  Temp:  [98.1 °F (36.7 °C)-98.2 °F (36.8 °C)] 98.1 °F (36.7 °C)  Pulse:  [92-97] 92  Resp:  [17-18] 17  BP: (134-137)/(70-79) 134/70  Exam  Gen: No acute distress, alert and oriented   Pulm: Lungs clear bilaterally, normal respiratory effort, no crackles, no wheezing  CV: Heart with regular rate and rhythm, no murmur.   Abd: Abdomen soft, nontender, nondistended, bowel sounds present  MSK: edema bilateral LE, no tenderness of the LE  Skin: no new rashes or lesions, multiple ulcerations/wounds as per pictures in the chart    Labs:   Recent Labs   Lab 10/26/24  0647 10/27/24  0734 10/28/24  0730 10/28/24  1349 10/29/24  1059 10/30/24  0549   WBC 17.5* 13.1* 10.3  --  13.4* 9.5   HGB 8.7* 8.4* 8.2*  --  10.5* 8.5*   MCV 79.2* 79.7* 81.4  --  81.1 80.6   .0 453.0* 423.0  --  426.0 457.0*   INR  --   --   --  1.38*  --   --        Recent Labs   Lab 10/26/24  0647 10/27/24  0734 10/28/24  0730 10/29/24  0643 10/30/24  0549    139 138 141 138   K 3.6 3.4* 3.4*  3.4* 3.4*  3.4* 3.8  3.8    109 112 111 110   CO2 23.0 24.0 23.0 23.0 25.0   BUN 8* 8* <5* 6* 6*   CREATSERUM 0.19* 0.27* 0.16* 0.15* 0.16*   CA 8.5* 8.5* 8.2* 7.9* 7.8*   MG  --  1.8 1.6 1.6 1.6   GLU 73 88 84 99 100*       Recent Labs   Lab 10/25/24  1852 10/28/24  0730   ALT 25 10   AST 31 15   ALB 3.3 2.3*       No results for input(s): \"PGLU\" in the last 168 hours.    Meds:   Scheduled:    sodium hypochlorite   Topical Nightly    meropenem  1 g Intravenous Q8H TONYA    allopurinol  300 mg Oral Daily    metoprolol succinate ER  100 mg Oral Daily Beta Blocker    heparin  5,000 Units Subcutaneous Q8H TONYA      Continuous Infusions:    sodium chloride Stopped (10/28/24 1809)    sodium chloride 100 mL/hr at 10/29/24 1618     PRN:   naloxone    flumazenil    acetaminophen    ASSESSMENT / PLAN:    79 year old male with history of anxiety, kidney stones, gout, cervical stenosis, htn, MS, multiple pressure ulcers presenting with leukocytosis.     Leukocytosis--> resolved  -etiology uncertain  -blood cx pending--> pos as per chart  -urine cx pending--> ngtd  -iv abx  -ID following  -CT abd/pelvis--> shows fluid collection concerning for abscess --> surgery consulted, likely need IR drain if possible--> drain placed 10/28, cx pending  -trend     Bacteremia  -iv abx  -ID following  -source unknown, possible multiple ulcerations or abscess    Hx Gout  -allopurinol     HTN  -sbp stable  -metoprolol      Anemia  -no active bleeding  -trend  -no transfusion at this time     Quality:  DVT Prophylaxis:scd, heparin sq  CODE status: Full per chart  [place]: yes chronic     Plan of care discussed with patient and staff     Dispo: no discharge     Johnathan Trujillo MD  Critical access hospital Hospitalist  [phone]

## 2024-10-30 NOTE — CDS QUERY
DOCUMENTATION CLARIFICATION QUERY   Dear Dr. Schulz,    Please clarify the diagnoses of Sepsis and Bacteremia:      [   ]  Bacteremia only, with Sepsis ruled out  [   ]  Bacteremia with Sepsis due to ___________confirmed (please include supporting clinical indicators for sepsis) __________  [   ] Other: (please specify)_________________________   ________________________________________________________________________________     CLINICAL INDICATORS:   10/25-10/26/24:   Temp 98.6, 101.6*   Pulse 115, 103  RR 19, 21, 23   B/P 155/89, 133/63     WBC 16.9*, 17.5*  Lactic Acid 2.3*, 1.2  Platelets 484.0*, 263.0  Total Bilirubin 0.3  Creatinine 0.30*, 0.19*   10/26/24 H&P: Leukocytosis -etiology uncertain    10/26 ID: Sepsis presenting with fevers and leukocytosis without focal complaints and  source unclear    10/29 Hospitalist: Bacteremia  -source unknown, possible multiple ulcerations or abscess    10/30 ID: Polymicrobial sepsis  -blood culture with pseudomonas and bacteroides  -secondary to intra-abd abscess    RISK FACTORS: MS, Chronic pressure ulcers   TREATMENT : ID consult - IR consult - Serial labs - IV fluids -  IR drainage of abscess on 10/28, cultures pending -on IV meropenem      Use of terms such as suspected, possible, or probable (associated with a specific diagnosis that is being evaluated, monitored, or treated as if it exists) are acceptable and can be coded in the inpatient setting, when documented at the time of discharge.   Roxanne Aguilar RN, BSN, CWOCN, [facility] Clinical   [phone]                                                               THIS FORM IS A PERMANENT PART OF THE MEDICAL RECORD

## 2024-10-30 NOTE — PLAN OF CARE
Patient denies shortness of breath. Patient medicated for throat pain with PRN Tylenol.  Patient with multiple wounds on air mattress. The majority of the dressings were completed by wound care on day shift. The buttocks and sacral wounds are to be completed patient refused at the start of the shift. Will attempt again to change the dressings. Patient afebrile on IV fluids and antibiotics as ordered. Bed locked and in lowest positions call light within reach.   Problem: METABOLIC/FLUID AND ELECTROLYTES - ADULT  Goal: Electrolytes maintained within normal limits  Description: INTERVENTIONS:  - Monitor labs and rhythm and assess patient for signs and symptoms of electrolyte imbalances  - Administer electrolyte replacement as ordered  - Monitor response to electrolyte replacements, including rhythm and repeat lab results as appropriate  - Fluid restriction as ordered  - Instruct patient on fluid and nutrition restrictions as appropriate  Outcome: Progressing  Goal: Hemodynamic stability and optimal renal function maintained  Description: INTERVENTIONS:  - Monitor labs and assess for signs and symptoms of volume excess or deficit  - Monitor intake, output and patient weight  - Monitor urine specific gravity, serum osmolarity and serum sodium as indicated or ordered  - Monitor response to interventions for patient's volume status, including labs, urine output, blood pressure (other measures as available)  - Encourage oral intake as appropriate  - Instruct patient on fluid and nutrition restrictions as appropriate  Outcome: Progressing     Problem: SKIN/TISSUE INTEGRITY - ADULT  Goal: Incision(s), wounds(s) or drain site(s) healing without S/S of infection  Description: INTERVENTIONS:  - Assess and document risk factors for pressure ulcer development  - Assess and document skin integrity  - Assess and document dressing/incision, wound bed, drain sites and surrounding tissue  - Implement wound care per orders  - Initiate  isolation precautions as appropriate  - Initiate Pressure Ulcer prevention bundle as indicated  Outcome: Progressing  Goal: Oral mucous membranes remain intact  Description: INTERVENTIONS  - Assess oral mucosa and hygiene practices  - Implement preventative oral hygiene regimen  - Implement oral medicated treatments as ordered  Outcome: Progressing     Problem: SAFETY ADULT - FALL  Goal: Free from fall injury  Description: INTERVENTIONS:  - Assess pt frequently for physical needs  - Identify cognitive and physical deficits and behaviors that affect risk of falls.  - Kermit fall precautions as indicated by assessment.  - Educate pt/family on patient safety including physical limitations  - Instruct pt to call for assistance with activity based on assessment  - Modify environment to reduce risk of injury  - Provide assistive devices as appropriate  - Consider OT/PT consult to assist with strengthening/mobility  - Encourage toileting schedule  Outcome: Progressing     Problem: PAIN - ADULT  Goal: Verbalizes/displays adequate comfort level or patient's stated pain goal  Description: INTERVENTIONS:  - Encourage pt to monitor pain and request assistance  - Assess pain using appropriate pain scale  - Administer analgesics based on type and severity of pain and evaluate response  - Implement non-pharmacological measures as appropriate and evaluate response  - Consider cultural and social influences on pain and pain management  - Manage/alleviate anxiety  - Utilize distraction and/or relaxation techniques  - Monitor for opioid side effects  - Notify MD/LIP if interventions unsuccessful or patient reports new pain  - Anticipate increased pain with activity and pre-medicate as appropriate  Outcome: Progressing

## 2024-10-30 NOTE — DIETARY NOTE
[facility]   part of [facility]  NUTRITION ASSESSMENT    Unable to diagnose malnutrition criteria at this time.    NUTRITION INTERVENTION:    Meal and Snacks - Continue Regular Diet as tolerated; monitor patient po intake. Encourage adequate po of appropriate diet.  Medical Food Supplements - Glucerna and Jah BID    PATIENT STATUS:   10/30 - 78 yo male. Presented with outpatient labs showed leukocytosis and fever.  MRSA+. CT abd/pelvis shows fluid collection concerning for abscess - IR drained on 10/28. Blood and urine cultures +.  Wounds present triggered to be seen. Pt reports good appetite, concerned for wounds and is aware of increased nutrition needs. Documented meals PO intake is variable.   Reports taking Ensure PTA and has tried Jah but doesn't like orange or fruit punch flavors. Discussed ordering Glucerna BID to maximize nutrition. Pt agreeable to trial unflavored Jah with apple juice.     PMH: Anxiety, Calculus of kidney, Cervical stenosis of spine, COVID-19, Gout, Multiple sclerosis, Osteoarthrosis, Seborrheic dermatitis, Skin cancer, essential hypertension    ANTHROPOMETRICS:  Ht:  5'9\"  Wt: 63.6 kg (140 lb 3.4 oz).   BMI: Body mass index is 20.71 kg/m².  IBW: 72.7 kg    WEIGHT HISTORY:  Weight gain of 9 lbs over the past 3.5 months. 35 lbs / 19.5%  weight loss since February 2014.    Wt Readings from Last 15 Encounters:   10/28/24 63.6 kg (140 lb 3.4 oz)   07/02/24 59.5 kg (131 lb 2.8 oz)   04/28/24 70.9 kg (156 lb 6.4 oz)   04/24/24 73.3 kg (161 lb 8 oz)   04/23/24 73 kg (161 lb)   04/19/24 69.9 kg (154 lb)   04/09/24 74.8 kg (165 lb)   04/05/24 69.6 kg (153 lb 8 oz)   03/29/24 74.1 kg (163 lb 6.4 oz)   03/19/24 70.8 kg (156 lb 1.6 oz)   03/11/24 71.2 kg (157 lb)   03/05/24 71.3 kg (157 lb 1.6 oz)   02/16/24 79.4 kg (175 lb)   02/08/24 79.4 kg (175 lb)   02/02/24 79.4 kg (175 lb)      NUTRITION:  Diet:       Procedures    Regular/General diet Is Patient on Accuchecks? No        Percent  Meals Eaten (last 3 days)       Date/Time Percent Meals Eaten (%)    10/28/24 1938 80 %    10/29/24 2019 40 %          Food Allergies: No  Cultural/Ethnic/Baptist Preferences Addressed: Yes    GI SYSTEM REVIEW: WNL; last BM per RN 10/30  Skin/Wounds: multiple wounds documented by IPWC RN: pressure injury coccyx wound, stage 4 buttocks, left ankle, back, right ankle, scrotum, left foot.    NUTRITION RELATED PHYSICAL FINDINGS:     1. Body Fat/Muscle Mass: unable to assess     2. Fluid Accumulation:  RN documented LUE, RLE, LLE, left hand, right foot, left foot +2 edema.    NUTRITION PRESCRIPTION: 63.6 kg  Calories: 1232-6543 calories/day ( 28-32 kcals/kg )  Protein: 76-95 grams protein/day (1.2-1.5 gm/kg)  Fluid: ~1 ml/kcal or per MD discretion    NUTRITION DIAGNOSIS/PROBLEM:  Increased nutrient needs related to increased nutritional demands for healing as evidenced by  wounds present    MONITOR AND EVALUATE/NUTRITION GOALS:  PO intake of 75% of meals TID - New  PO intake of 75% of oral nutrition supplement/s - New    MEDICATIONS:  2g Mg, Abx  Gtt: NS at 100 ml/hr    LABS:  Reviewed     Pt is at Moderate nutrition risk    Elayne Denton RDN, LDN, [facility]  Clinical Dietitian   [id number]

## 2024-10-30 NOTE — PROGRESS NOTES
[facility]   part of [facility]  Progress Note      Brenden Cardenas Patient Status:  Inpatient    1945 MRN [id number]   Location [facility] 4NW-A Attending Johnathan Trujillo MD   Hosp Day # 4 PCP Willow Mcmahon,        79 year-old male with right retroperitoneal abscess s/p drain placement    - Drain functioning properly with elevated outputs  - Continue current drain management  - CT Abscessogram when output < 10 cc/day  - Consider drain removal if output is < 10 cc/day x 2 days and no fistulous communication.      Heather Rodriguez MD  10/29/2024  7:26 PM

## 2024-10-30 NOTE — IMAGING NOTE
78 y/o with retroperitoneal fluid collection s/p IR drain placement.  24-hour drain output: 60 mL    - Continue current drain management  - CT Abscessogram when output < 10 cc/day  - Consider drain removal if output is < 10 cc/day x 2 days and no fistulous communication.

## 2024-10-31 VITALS
HEART RATE: 87 BPM | OXYGEN SATURATION: 98 % | RESPIRATION RATE: 20 BRPM | DIASTOLIC BLOOD PRESSURE: 75 MMHG | TEMPERATURE: 99 F | BODY MASS INDEX: 21 KG/M2 | SYSTOLIC BLOOD PRESSURE: 131 MMHG | WEIGHT: 140.19 LBS

## 2024-10-31 LAB
ANION GAP SERPL CALC-SCNC: 5 MMOL/L (ref 0–18)
BACTERIA BLD CULT: POSITIVE
BACTERIA BLD CULT: POSITIVE
BASOPHILS # BLD AUTO: 0.02 X10(3) UL (ref 0–0.2)
BASOPHILS NFR BLD AUTO: 0.1 %
BUN BLD-MCNC: 7 MG/DL (ref 9–23)
CALCIUM BLD-MCNC: 8 MG/DL (ref 8.7–10.4)
CHLORIDE SERPL-SCNC: 107 MMOL/L (ref 98–112)
CO2 SERPL-SCNC: 23 MMOL/L (ref 21–32)
CREAT BLD-MCNC: <0.15 MG/DL
EOSINOPHIL # BLD AUTO: 0.02 X10(3) UL (ref 0–0.7)
EOSINOPHIL NFR BLD AUTO: 0.1 %
ERYTHROCYTE [DISTWIDTH] IN BLOOD BY AUTOMATED COUNT: 18.1 %
GLUCOSE BLD-MCNC: 86 MG/DL (ref 70–99)
HCT VFR BLD AUTO: 36 %
HGB BLD-MCNC: 10.3 G/DL
IMM GRANULOCYTES # BLD AUTO: 0.22 X10(3) UL (ref 0–1)
IMM GRANULOCYTES NFR BLD: 1.6 %
LYMPHOCYTES # BLD AUTO: 1.78 X10(3) UL (ref 1–4)
LYMPHOCYTES NFR BLD AUTO: 13.1 %
MAGNESIUM SERPL-MCNC: 1.8 MG/DL (ref 1.6–2.6)
MCH RBC QN AUTO: 23.7 PG (ref 26–34)
MCHC RBC AUTO-ENTMCNC: 28.6 G/DL (ref 31–37)
MCV RBC AUTO: 82.9 FL
MONOCYTES # BLD AUTO: 0.86 X10(3) UL (ref 0.1–1)
MONOCYTES NFR BLD AUTO: 6.3 %
NEUTROPHILS # BLD AUTO: 10.69 X10 (3) UL (ref 1.5–7.7)
NEUTROPHILS # BLD AUTO: 10.69 X10(3) UL (ref 1.5–7.7)
NEUTROPHILS NFR BLD AUTO: 78.8 %
OSMOLALITY SERPL CALC.SUM OF ELEC: 277 MOSM/KG (ref 275–295)
PLATELET # BLD AUTO: 401 10(3)UL (ref 150–450)
POTASSIUM SERPL-SCNC: 3.7 MMOL/L (ref 3.5–5.1)
RBC # BLD AUTO: 4.34 X10(6)UL
SODIUM SERPL-SCNC: 135 MMOL/L (ref 136–145)
WBC # BLD AUTO: 13.6 X10(3) UL (ref 4–11)

## 2024-10-31 PROCEDURE — 80048 BASIC METABOLIC PNL TOTAL CA: CPT | Performed by: HOSPITALIST

## 2024-10-31 PROCEDURE — 85025 COMPLETE CBC W/AUTO DIFF WBC: CPT | Performed by: HOSPITALIST

## 2024-10-31 PROCEDURE — 83735 ASSAY OF MAGNESIUM: CPT | Performed by: HOSPITALIST

## 2024-10-31 RX ORDER — METOPROLOL SUCCINATE 25 MG/1
100 TABLET, EXTENDED RELEASE ORAL DAILY
Qty: 120 TABLET | Refills: 0 | Status: SHIPPED | OUTPATIENT
Start: 2024-10-31

## 2024-10-31 RX ORDER — MAGNESIUM OXIDE 400 MG/1
400 TABLET ORAL ONCE
Status: COMPLETED | OUTPATIENT
Start: 2024-10-31 | End: 2024-10-31

## 2024-10-31 NOTE — OCCUPATIONAL THERAPY NOTE
OT orders to eval and treat received via functional mobility screening protocol.  Chart reviewed and case discussed with nursing team and PT.  Patient has h/o MS, HTN, general anxiety, stage IV sacral decub, gout, chronic noriega, protein calorie malnutrition, physical deconditioning.   Pt lives at home with his Wife and son. Family uses yeison lift for OOB mobility at this time; pt requires total assist for bed mobility, bathing and dressing. Pt is able to feed himself at times. Pt was receiving HHPT/OT services.  No skilled OT intervention warranted at this time.  Will sign off.

## 2024-10-31 NOTE — PLAN OF CARE
NURSING DISCHARGE NOTE    Discharged Nursing home ([facility]) via Ambulance.  Accompanied by Support staff  Belongings Taken by patient/family.    Pt received A&Ox3-4, forgetful at times. Afebrile. VSS. Denies pain. Seen by ID - plan to dc on IV merrem x2 wks. Midline to RUE c/d/i. Repeat CT abscessogram once output <10ml/24h x2 days. Flushed and aspirated this afternoon per orders. Report given to Minoo REAGAN @ [facility]. Pt's wife updated on POC over phone. PIV removed.

## 2024-10-31 NOTE — IMAGING NOTE
24 hour RLQ drain ouput 10 ml, previously 60 ml. Pt going home. Continue drainage as outpatient with daily drain flush with 10 ml sterile saline. Return for CT abscess injection when output is <10 ml/day for at least 2 days.

## 2024-10-31 NOTE — DISCHARGE SUMMARY
General Medicine Discharge Summary     Patient ID:  Brenden Cardenas  79 year old  5/6/1945    Admit date: 10/25/2024    Discharge date and time:10/31/2024    Attending Physician: Sagar Schulz MD     Primary Care Physician: Willow Mcmahon DO     Reason for admission: see HPI    Discharge Diagnoses: Bacteremia [R78.81]  Moderate protein-calorie malnutrition (HCC) [E44.0]  Elevated lactic acid level [R79.89]  Pressure injury of skin of back, unspecified injury stage [L89.109]    Discharged Condition: good    Exam: (see progress notes for full details)  No acute distress, alert and oriented    Hospital Course: Brenden Cardenas is a 79 year old male with history of anxiety, kidney stones, gout, cervical stenosis, htn, MS, multiple pressure ulcers presenting with leukocytosis. Patient says that he was feeling like his normal self but recent outpatient labs showed a leukocytosis that lead his PMD to direct him to the ER where he ws found to have continued leukocytosis and a fever. Patient denies any focal symptoms. Patient denies any positive review of systems.   79 year old male with history of anxiety, kidney stones, gout, cervical stenosis, htn, MS, multiple pressure ulcers presenting with leukocytosis.       Retroperitoneal abscess  Leukocytosis--> resolved, slightly elevated today at 13.6  -blood cx pending--> pos as per chart  -urine cx pending--> ngtd  -iv abx  -ID following  -CT abd/pelvis--> shows fluid collection concerning for abscess --> surgery consulted, likely need IR drain if possible--> drain placed 10/28, cx reviewed, shows E. coli as well as strep  -DC on IV meropenem for at least 2 weeks per ID, already has midline placed, antibiotics to be given at the rehab facility     Bacteremia  -iv abx  -ID following  -Likely secondary to retroperitoneal abscess, IR placed a drain, to get a CT abscessogram prior to removal of drain, will need less than 10 cc of drain x 2 days and a CT abscessogram prior to  removal of drain  Discussed this with infectious disease-     Hx Gout  -allopurinol     HTN  -sbp stable  -metoprolol      Anemia  -no active bleeding  -trend  -no transfusion at this time  Hemoglobin has been stable  Consults: IP CONSULT TO HOSPITALIST  CONSULT TO WOUND OSTOMY  IP CONSULT TO INFECTIOUS DISEASE  IP CONSULT TO GENERAL SURGERY  IP CONSULT TO SOCIAL WORK  IP CONSULT TO VASCULAR ACCESS TEAM  NURSING CONSULT TO DIETITIAN  IP CONSULT TO SOCIAL WORK    Operative Procedures:      Disposition: SNF    Patient Instructions:   Current Discharge Medication List        START taking these medications    Details   sodium chloride 0.9% SOLN 100 mL with meropenem 1 g SOLR 1 g Inject 1 g into the vein every 8 (eight) hours for 14 days. Weekly CBC with diff with and CMP. Midline care as per protocol.           CONTINUE these medications which have CHANGED    Details   metoprolol succinate ER 25 MG Oral Tablet 24 Hr Take 4 tablets (100 mg total) by mouth daily.           CONTINUE these medications which have NOT CHANGED    Details   allopurinol 300 MG Oral Tab Take 1 tablet (300 mg total) by mouth daily.           STOP taking these medications       acetaminophen 500 MG Oral Tab                I reconciled current and discharge medications on day of discharge    Follow-up with PCP, infectious disease, CT abscessogram    No orders of the defined types were placed in this encounter.      Total Time Coordinating Care: Greater than 30 minutes    Patient had opportunity to ask questions and state understand and agree with therapeutic plan as outlined above.     Thank You,    Sagar hugo MD

## 2024-10-31 NOTE — CM/SW NOTE
CM notified by ID that pt will dc on IV Merrem. Pt has midline in place and has HHC with [facility]. Referral sent for infusion provider. CM will update pt with details once coverage and any copay is confirmed.     Addendum:  1100 CM met w/ pt and sps to discuss options for DC with IV antibiotics. Pt will have a co-pay for home infusion of $252.25/ wk with home infusion and pt/sps would need to be able to self administer IV Merrem every 8 hrs. Pt could also admit to a SNF for 2 wks for the IV antibiotics.  Following consideration, pt/sps chose to admit to SNF at [facility] to receive IV Merrem. Pt/sps request referral to [facility]. Referral sent to facility. PASRR completed and attached to referral. CM will update pt/sps, and RN with facility's ability to accept pt.     Addendum:  1220:[facility] can accept pt for admission. Pt/sps updated.    1600:Pt cleared for DC today. [facility] can accept pt this PM for admission. [facility] scheduled for 6:00 pm . PCS form completed and available for RN. Pt's sps Alicia called, DANAE left.     RN to call report to receiving RN at [facility] Phone [phone]        CM/SW will remain available for DC planning and/or support.     MARLY GarnicaN, CMSRN    [id number]

## 2024-10-31 NOTE — PROGRESS NOTES
Pt has clinitron bed and all dressings where changed as ordered. Pt has ivf running and getting iv antibiotics. Pt has a drain to abdomen and has a chronic noriega. Pt has call light in reach and safety measures in place. Pt is alert and oriented x4 and no complaints of pain.

## 2024-10-31 NOTE — PROGRESS NOTES
Infectious Disease Progress Note      Date of admission: 10/25/2024  6:26 PM     Reason for consult: Polymicrobial bacteremia    Referring physician: Johnathan Trujillo MD    Subjective: Doing about the same.  No pain.  No nausea or vomiting.  No diarrhea.  No shortness of breath.  No cough or sputum production.    The rest of the systems were reviewed and found to be negative except was mentioned above    Interval events: This is a 79-year-old male patient with end-stage MS, chronic decubitus ulcer, presents here with polymicrobial bacteremia/sepsis.  CT showing complex fluid collection in the right retroperitoneum contiguous with the iliacus muscle that is new.  Blood cultures currently growing Bacteroides along with Pseudomonas aeruginosa.  The patient is currently on IV meropenem.  Now status post IR drainage of his abscess on 10/28, cultures are pending.    Medications:    sodium hypochlorite    sodium chloride    naloxone    flumazenil    meropenem    acetaminophen    allopurinol    metoprolol succinate ER    sodium chloride    heparin     Allergies:  Allergies[1]    Physical Exam:  Vitals:    10/31/24 0340   BP: (!) 146/94   Pulse: 93   Resp: 20   Temp: 98.6 °F (37 °C)     Vitals signs and nursing note reviewed.   Constitutional:       Appearance: Chronically ill appearance.   HENT:      Head: Normocephalic and atraumatic.      Mouth: Mucous membranes are moist.   Neck:      Musculoskeletal: Neck supple.   Cardiovascular:      Rate and Rhythm: Normal rate.   Pulmonary:      Effort: Pulmonary effort is normal. No respiratory distress.   Skin:     General: Skin is warm and dry.   Neurological:      Mental Status: Alert and oriented to person, place, and time.     Laboratory data:  I have reviewed all the lab results independently.  Lab Results   Component Value Date    WBC 13.6 10/31/2024    HGB 10.3 10/31/2024    HCT 36.0 10/31/2024    .0 10/31/2024    CREATSERUM <0.15 10/31/2024    BUN 7 10/31/2024      10/31/2024    K 3.7 10/31/2024     10/31/2024    CO2 23.0 10/31/2024    GLU 86 10/31/2024    CA 8.0 10/31/2024    MG 1.8 10/31/2024      Recent Labs   Lab 10/31/24  0642   RBC 4.34   HGB 10.3*   HCT 36.0*   MCV 82.9   MCH 23.7*   MCHC 28.6*   RDW 18.1   NEPRELIM 10.69*   WBC 13.6*   .0      Microbiology data:  Hospital Encounter on 10/25/24   1. Aerobic Bacterial Culture     Status: Abnormal    Collection Time: 10/28/24  6:15 PM    Specimen: Abdominal retroperitoneum; Abscess   Result Value Ref Range    Aerobic Culture Result 1+ growth Escherichia coli (A) N/A    Aerobic Culture Result 2+ growth Streptococcus anginosus (A) N/A    Aerobic Smear 2+ Gram Positive Cocci N/A    Aerobic Smear 1+ WBCs seen N/A       Susceptibility    Escherichia coli -  (no method available)     Amikacin <=2 Sensitive      Ampicillin >=32 Resistant      Ampicillin + Sulbactam 16 Intermediate      Cefazolin <=4 Sensitive      Ciprofloxacin 1 Sensitive      Gentamicin >=16 Resistant      Meropenem <=0.25 Sensitive      Levofloxacin 1 Sensitive      Piperacillin + Tazobactam <=4 Sensitive      Tobramycin 8 Intermediate      Trimethoprim/Sulfa <=20 Sensitive    2. Blood Culture     Status: None (Preliminary result)    Collection Time: 10/27/24 11:34 AM    Specimen: Blood,peripheral   Result Value Ref Range    Blood Culture Result No Growth 3 Days N/A   3. Urine Culture, Routine     Status: Abnormal    Collection Time: 10/25/24  6:53 PM    Specimen: Urine, noriega catheter   Result Value Ref Range    Urine Culture 50,000-99,000 CFU/ML Providencia rettgeri (A) N/A    Urine Culture 50,000-99,000 CFU/ML Alcaligenes faecalis (A) N/A       Susceptibility    Providencia rettgeri -  (no method available)     Ampicillin  Resistant      Ampicillin + Sulbactam 8 Sensitive      Cefazolin >=64 Resistant      Cefepime <=1 Sensitive      Ceftriaxone <=1 Sensitive      Ciprofloxacin <=0.25 Sensitive      Gentamicin <=1 Sensitive       Meropenem <=0.25 Sensitive      Levofloxacin <=0.12 Sensitive      Nitrofurantoin 256 Resistant      Piperacillin + Tazobactam <=4 Sensitive      Trimethoprim/Sulfa <=20 Sensitive     Alcaligenes faecalis -  (no method available)     Ciprofloxacin 2 Intermediate      Gentamicin 4 Sensitive      Meropenem <=1 Sensitive      Piperacillin + Tazobactam <=4 Sensitive      Trimethoprim/Sulfa <=2 Sensitive      Impression:  Brenden Cardenas is a 79 year old male with    Polymicrobial bacteremia/sepsis with threat to life  Blood cultures with Pseudomonas aeruginosa and Bacteroides  Most likely source is intra-abdominal/retroperitoneal collection  Now status post IR drainage on 10/28, cultures growing E. coli and Streptococcus anginosus  His urine analysis showing pyuria; however, urine culture is growing providencia and alcaligenes faecalis  Unlikely playing a role in his clinical presentation, most likely representing chronic colonizer  Multiple sacral decubitus ulcers with exposed bone  Given his end-stage MS, trying to treat with IV antibiotics will be futile  Aggressive wound care will be needed  End-stage MS  Multiple pressure wounds  Pyuria with bacteria  As per the above    Recommendations:    Continue IV meropenem for now  Plan to discharge the patient on meropenem 1 g three times daily for 14 days.  The patient already has a midline  Weekly CBC with differential and CMP, sed rate and CRP.  Fax results to [facility]. Fax: [phone]. Tel: [phone].  PICC line care as per protocol.  Follow-up with infectious disease in 2 weeks after discharge  I suspect his abscess is related to his chronic decubitus ulcers.  The likelihood of it resolving is low.  As such, as long as his bacteremia resolves, we will likely stop his antibiotics at the 2-week gerardo and continue with drain management as per IR  Okay to discharge from ID perspective once okay with the other services  Continue to monitor daily labs for  antibiotic toxicities  Further recommendations will depend on the above work-up and clinical progress     The plan of care was discussed with the primary hospital team, Johnathan Trujillo MD     Recommendations were also discussed with the patient; all questions were answered.     Thank you for this consultation. Please don't hesitate to call the ID team for questions or any acute changes in patient's clinical condition.    Please note that this report has been produced using speech recognition software and may contain errors related to that system including, but not limited to, errors in grammar, punctuation, and spelling, as well as words and phrases that possibly may have been recognized inappropriately.  If there are any questions or concerns, contact the dictating provider for clarification.    The  Cent[employer] Cures Act makes medical notes like these available to patients in the interest of transparency. Please be advised this is a medical document. Medical documents are intended to carry relevant information, facts as evident, and the clinical opinion of the practitioner. The medical note is intended as peer to peer communication and may appear blunt or direct. It is written in medical language and may contain abbreviations or verbiage that are unfamiliar.     Brendan Max MD  [facility]. Tel: [phone]. Fax: [phone].     Brenden Cardenas : 1945 MRN: [id number] [facility]: [id number]          [1] No Known Allergies

## 2024-11-05 ENCOUNTER — INITIAL APN SNF VISIT (OUTPATIENT)
Dept: INTERNAL MEDICINE CLINIC | Age: 79
End: 2024-11-05

## 2024-11-05 DIAGNOSIS — R63.0 POOR APPETITE: ICD-10-CM

## 2024-11-05 DIAGNOSIS — T07.XXXA MULTIPLE WOUNDS: ICD-10-CM

## 2024-11-05 DIAGNOSIS — G35 MULTIPLE SCLEROSIS (HCC): ICD-10-CM

## 2024-11-05 DIAGNOSIS — L98.429 SKIN ULCER OF SACRUM, UNSPECIFIED ULCER STAGE (HCC): ICD-10-CM

## 2024-11-05 DIAGNOSIS — R60.0 LOCALIZED EDEMA: ICD-10-CM

## 2024-11-05 DIAGNOSIS — Z97.8 FOLEY CATHETER STATUS: ICD-10-CM

## 2024-11-05 DIAGNOSIS — R78.81 BACTEREMIA: Primary | ICD-10-CM

## 2024-11-05 DIAGNOSIS — R53.1 WEAKNESS: ICD-10-CM

## 2024-11-05 DIAGNOSIS — Z87.39 H/O: GOUT: ICD-10-CM

## 2024-11-05 PROCEDURE — 99310 SBSQ NF CARE HIGH MDM 45: CPT | Performed by: NURSE PRACTITIONER

## 2024-11-06 ENCOUNTER — TELEPHONE (OUTPATIENT)
Dept: CT IMAGING | Facility: HOSPITAL | Age: 79
End: 2024-11-06

## 2024-11-06 VITALS
RESPIRATION RATE: 20 BRPM | HEART RATE: 84 BPM | BODY MASS INDEX: 20 KG/M2 | DIASTOLIC BLOOD PRESSURE: 57 MMHG | OXYGEN SATURATION: 92 % | TEMPERATURE: 98 F | WEIGHT: 136 LBS | SYSTOLIC BLOOD PRESSURE: 117 MMHG

## 2024-11-06 RX ORDER — POLYETHYLENE GLYCOL 3350 17 G/17G
17 POWDER, FOR SOLUTION ORAL DAILY PRN
COMMUNITY

## 2024-11-06 RX ORDER — IPRATROPIUM BROMIDE AND ALBUTEROL SULFATE 2.5; .5 MG/3ML; MG/3ML
3 SOLUTION RESPIRATORY (INHALATION) EVERY 4 HOURS PRN
COMMUNITY

## 2024-11-06 RX ORDER — ERGOCALCIFEROL 1.25 MG/1
50000 CAPSULE ORAL WEEKLY
COMMUNITY

## 2024-11-06 RX ORDER — ACETAMINOPHEN 325 MG/1
650 TABLET ORAL EVERY 6 HOURS PRN
COMMUNITY

## 2024-11-06 RX ORDER — HYDROCODONE BITARTRATE AND ACETAMINOPHEN 5; 325 MG/1; MG/1
1 TABLET ORAL EVERY 6 HOURS PRN
COMMUNITY

## 2024-11-06 NOTE — TELEPHONE ENCOUNTER
Received call yesterday from [facility] regarding abscessogram for existing IR drain. Pt nurse Jeannette relates pt has been draining 15 ml for the past three days and they are flushing drain q 8H. [facility]. I provided my direct number to coordinate drain check when he reaches less than 10 ml for 3 consecutive days.

## 2024-11-06 NOTE — PROGRESS NOTES
Skilled Nursing Facility, Subacute Rehab  [facility]    Brenden Cardenas Author: NEVAEH Yeung     1945 MRN [id number]   Last Hospital  Admission 10/25/24      Last Hospital Discharge 10/31/24 PCP Willow Mcmahon DO     Hospital Discharge Diagnoses:  -Bacteremia  -Multiple Wounds  -H/o Gout  -HTN  -MS, bedbound  -De La Fuente status  -NORMA status    HPI:  Brenden Cardenas  : 1945, Age 79 year old  male patient with PMH of anxiety, kidney stones, gout, cervical stenosis, htn, MS, and multiple pressure ulcers presented with leukocytosis.  Per hospital record, the patient stated that he was feeling like his normal self but recent outpatient labs showed a leukocytosis and a fever.  Workup was done and he was found to have retroperitoneal abscess.  The leukocytosis resolved, however, became slightly elevated.  Blood cultures were positive, urine cx ngtd.  A CT of the abd/pelvis showed fluild collection concerning for abscess.  Surgery consulted.  Per surgery it was felt that an IR drain needed to be placed.  It was placed on 10/28, cx reviewed, showed E. Coli as well as strep.  The patient was discharged on IV Meropeneum for at least 2 weeks per ID.  Patient to get a CT abscessogram prior to removal of drain.  Will need less than 10 ml of drain X 2 days and a CT abscessogram prior to removal of drain.   Discharged in stable condition to [facility] for rehabilitation and medical management.  Admitting to [facility] for nursing care, medication management, and PT/OT/ST eval and tx.    Today:  Seen in room.  RN staff reported to this writer that the patient is declining all treatment, including dressing changes, medications, and he is not eating.  This writer assessed the patient.  He is alert and oriented.  Calm.  He repeated to this writer that he did not want dressing changes, medications and did not want to eat.  He did request a hospice evaluation.  Wounds were seen last week by this writer as listed below.  In-house PCP had  d/w patient and wife last week about Palliative Care or Hospice.  The patient had opted for Palliative Care.  That was ordered.  However, today, he requested a Hospice Evaluation.  Ordered.      Chief Complaint   Patient presents with    Follow - Up     Bacteremia, Multiple Wounds, MS      ALLERGIES    He has No Known Allergies.      CURRENT MEDS:    Current Outpatient Medications on File Prior to Visit   Medication Sig    HYDROcodone-acetaminophen 5-325 MG Oral Tab Take 1 tablet by mouth every 6 (six) hours as needed for Pain. and 30 minutes before dressing changes    acetaminophen 325 MG Oral Tab Take 2 tablets (650 mg total) by mouth every 6 (six) hours as needed for Pain.    polyethylene glycol, PEG 3350, 17 g Oral Powd Pack Take 17 g by mouth daily as needed.    ipratropium-albuterol 0.5-2.5 (3) MG/3ML Inhalation Solution Take 3 mL by nebulization every 4 (four) hours as needed.    ergocalciferol 1.25 MG (68586 UT) Oral Cap Take 1 capsule (50,000 Units total) by mouth once a week.    sodium chloride 0.9% SOLN 100 mL with meropenem 1 g SOLR 1 g Inject 1 g into the vein every 8 (eight) hours for 14 days. Weekly CBC with diff with and CMP. Midline care as per protocol.    metoprolol succinate ER 25 MG Oral Tablet 24 Hr Take 4 tablets (100 mg total) by mouth daily.    allopurinol 300 MG Oral Tab Take 1 tablet (300 mg total) by mouth daily.     No current facility-administered medications on file prior to visit.         HISTORY:    He  has a past medical history of Anxiety state, unspecified, BACK PAIN, Bedbound, Calculus of kidney, Cervical stenosis of spine, COVID-19, Gout, Gout, High blood pressure, HYPERTENSION, Multiple sclerosis (HCC), Osteoarthrosis, unspecified whether generalized or localized, unspecified site, OTHER DISEASES, Paralysis (HCC), Pressure ulcer of sacrum, Seborrheic dermatitis, Sepsis with hypotension (HCC), Skin cancer, Stage 4 decubitus ulcer (HCC), Unspecified essential hypertension, and  Visual impairment.    He  has a past surgical history that includes other surgical history (02/2011); other surgical history; spine surgery procedure unlisted; other accessory; other accessory; and Debride wound.      CODE STATUS VERIFIED: Full Code    SUBJECTIVE:    REVIEW OF SYSTEMS:  GENERAL HEALTH:---Has pain with dressing changes  SKIN: denies any unusual skin lesions or rashes  WOUNDS: Multiple wounds on back, buttock and both heels   EYES:no visual complaints or deficits  HENT: denies nasal congestion, sinus pain or sore throat; and hearing loss negative  RESPIRATORY: denies shortness of breath, wheezing or cough   CARDIOVASCULAR:denies chest pain, no palpitations , denies syncope, denies orthopnea, denies cough  GI: denies nausea, vomiting, constipation, diarrhea; no rectal bleeding; no heartburn-->has NORMA Drain  :+De La Fuente Catheter  MUSCULOSKELETAL:+pain with dressing changes  NEURO:denies seizures, denies vertigo, denies tinnitus, denies tremors, and ---+Has MS bedbound and weak  PSYCHE: no symptoms of depression or anxiety  HEMATOLOGY:denies hx anemia, denies bruising, denies excessive bleeding  ENDOCRINE: denies excessive thirst or urination; denies unexpected wt gain or wt loss  ALLERGY/IMM.: denies food or seasonal allergies      OBJECTIVE:  VITALS:  /57   Pulse 84   Temp 98 °F (36.7 °C)   Resp 20   Wt 136 lb (61.7 kg)   SpO2 92%   BMI 20.08 kg/m²     PHYSICAL EXAM:  GENERAL HEALTH: well developed, well nourished, in no apparent distress  LINES, TUBES, DRAINS:  De La Fuente catheter, +NORMA Drain  SKIN: pale, warm, dry  WOUND: 16 wounds; 5 wounds on back, large wound on buttock and wounds on both feet  EYES: PERRLA, conjunctiva normal; no drainage from eyes  HENT: normocephalic; normal nose, no nasal drainage, mucous membranes pink, moist  NECK: full range of motion observed  RESPIRATORY:clear to percussion and auscultation, No wheezing/cough/accessory muscle use; on room air  CARDIOVASCULAR: S1, S2  normal, RRR; no S3, no S4; , no click, no murmur  ABDOMEN: normal active BS+, soft, non-distended; no apparent masses; observed, non-tender, no guarding during physical exam-->NORMA Drain  :  +De La Fuente-->chronic  LYMPHATIC: no lymphedema  MUSCULOSKELETAL: no acute synovitis upper or lower extremity.  Weakness R/T recent hospitalization/diagnoses/sequelae; will undergo therapies to rehab and improve strength, endurance and independence w/ ADLs as able  EXTREMITIES/VASCULAR: no cyanosis, clubbing or edema, radial pulses 2+, and dorsalis pedal pulses 2+  NEUROLOGIC: cranial nerves intact II-XII, follows commands, ---limited with his hands and cannot walk d/t the MS  PSYCHIATRIC: alert and oriented x 3; affect appropriate    DIAGNOSTICS REVIEWED AT THIS VISIT:    [facility] medical records, notes, lab and imaging results reviewed. And diagnostics available in rehab records/[facility].  Medication reconciliation completed.      Dated:  11/5/24    CBC W/DIFF  WBC 11.6 10'3/uL 3.5-10.5 H Final  RBC 3.94 10'6/uL (Based on  documented  legal sex) 4.30-  5.80  L Final  HGB 9.4 g/dL (Based on  documented  legal sex) 13.0-  17.5  L Final  HCT 32.9 % (Based on  documented  legal sex) 38.0-  50.0  L Final  MCV 83.5 fL 80.0-99.0 Final  MCH 23.9 pg 27.0-34.0 L Final  MCHC 28.6 g/dL 32.0-35.5 L Final  RDW 18.4 % 11.0-15.0 H Final   10'3/uL 150-400 H Final  MPV 9.4 fL 8.8-12.1 Final  NRBC's 0.0 % 0.0 Final  Absolute NRBCs 0.0 10'3/uL No reference  range  established  Final  Neutrophils 69.6 % 34.0-73.0 Final  Lymphocytes 19.6 % 15.0-50.0 Final  Monocytes 9.9 % 1.0-15.0 Final  Eosinophils 0.1 % 0.0-8.0 Final  Basophils 0.1 % 0.0-2.0 Final  Immature Granulocytes 0.7 % No defined  reference range  Final  Absolute Neutrophils 8.1 10'3/uL 1.5-8.0 H Final  Absolute Lymphocytes 2.3 10'3/uL 1.0-4.0 Final  Absolute Monocytes 1.2 10'3/uL 0.2-1.0 H Final  Absolute Eosinophils 0.0 10'3/uL 0.0-0.6 Final  Absolute Basophils 0.0 10'3/uL  0.0-0.3 Final  Absolute Immature Granulocytes 0.1 10'3/uL 0.00-0.10 Final    Sedimentation Rate 120 mm/Hour (Based on  documented  legal sex) 0-20  H Final    C-Reactive Protein 85.4 mg/L 0.0-10.0 H Final    CMP(COMPREHENSIVE METABOLIC PANEL)  Sodium 138 mmol/L 133-146 Final  Potassium 4.3 mmol/L 3.5-5.1 Final  Chloride 102 mmol/L  Final  Carbon Dioxide 28 mmol/L 21-31 Final  Anion Gap 8 mmol/L 4-13 Final  Blood Urea Nitrogen 13 mg/dL 7-25 Final  Creatinine <0.20 mg/dL 0.60-1.30 L Final  eGFRcr (CKD-EPI 2021) . . Final  Unable to calculate due to low analyte concentration.  Calcium 7.8 mg/dL 8.3-10.5 L Final  Glucose 78 mg/dL  Final  Protein, Total 3.9 g/dL 6.4-8.3 L Final  Albumin 1.9 g/dL 3.5-5.0 L Final  ALT 10 units/L 11-51 L Final  Alkaline Phosphatase 52 units/L  Final  AST 15 units/L 13-39 Final  Bilirubin, Total 0.3 mg/dL 0.2-1.2 Final    SEE PLAN BELOW  MS, bed bound/Physical Deconditioning/Impaired mobility and ADLs/At risk for falling  -Fall Precautions  -PT/OT/ST to evaluate and treat  -Tylenol 650 mg q6h prn for fever/pain, if given for fever, notify MD/NP  -Norco 5/325 mg q6h prn and 30 minutes prior to dressing change  -Aspiration Precautions  -HOB up on a 45 degree angle for all meals  -Palliative Care  -Patient declining medications and therapy  -Hospice Evaluation, per patient request  -[facility] team to establish care plan meeting with patient and POA/family as appropriate  -Anticipate DC on or before TBD; SW to assist patient/family w/ DC planning  -DC Plan:  TBD     Bacteremia/Multiple Wounds  -Wound care consult  -Wound treatment per wound care orders  -Meropenum 1g q8h-->stop on 11/14/24  -Add Probiotic bid-->stop on 11/21/24  -Add Wound Healing protein supplement bid  -Lab results faxed to Dr. Brendan Max [phone]  -F/u with Dr. Max in two weeks    H/o Gout  -Allopurinol 300 mg every day    HTN  -Vitals q shift  -Metoprolol Succinate  mg every day, hold for  sbp <100 or hr<60    De La Fuente status  -De La Fuente care q shift and prn  -Monitor urine for s/s of infection    NORMA  -Drain care  -Notify Radiology [phone] if NORMA draining <10 ml X 2 days for possible removal     DVT Prophylaxis   -Encourage early mobilization and participation in PT/OT as able    Bowel Management Regimen/Constipation   -Miralax 17g every day prn     LABS  -CBC/CMP weekly while in [facility]  -Lab results faxed to Dr. Brendan Max [phone]    Follow Up Appointment  -Dr. Willow Mcmahon, DO PCP within 1-2 weeks following [facility] discharge.   -F/u with Dr. Max in two weeks    *Greater than 65 minutes spent w/ patient and family, reviewing medical records, labs, completing medication reconciliation and entering orders to establish plan of care in [facility].    Talib Hubbard, APRN  11/05/24   11:46 AM

## 2024-11-11 DIAGNOSIS — K68.19 RETROPERITONEAL ABSCESS (HCC): Primary | ICD-10-CM

## 2024-11-12 ENCOUNTER — SNF VISIT (OUTPATIENT)
Dept: INTERNAL MEDICINE CLINIC | Age: 79
End: 2024-11-12

## 2024-11-12 VITALS
WEIGHT: 136 LBS | HEART RATE: 88 BPM | SYSTOLIC BLOOD PRESSURE: 143 MMHG | RESPIRATION RATE: 20 BRPM | TEMPERATURE: 98 F | DIASTOLIC BLOOD PRESSURE: 89 MMHG | OXYGEN SATURATION: 97 % | BODY MASS INDEX: 20 KG/M2

## 2024-11-12 DIAGNOSIS — R63.0 POOR APPETITE: ICD-10-CM

## 2024-11-12 DIAGNOSIS — G35 MULTIPLE SCLEROSIS (HCC): ICD-10-CM

## 2024-11-12 DIAGNOSIS — R53.1 WEAKNESS: ICD-10-CM

## 2024-11-12 DIAGNOSIS — T07.XXXA MULTIPLE WOUNDS: ICD-10-CM

## 2024-11-12 DIAGNOSIS — R78.81 BACTEREMIA: Primary | ICD-10-CM

## 2024-11-12 DIAGNOSIS — N40.1 BENIGN PROSTATIC HYPERPLASIA WITH INCOMPLETE BLADDER EMPTYING: ICD-10-CM

## 2024-11-12 DIAGNOSIS — Z97.8 FOLEY CATHETER STATUS: ICD-10-CM

## 2024-11-12 DIAGNOSIS — R39.14 BENIGN PROSTATIC HYPERPLASIA WITH INCOMPLETE BLADDER EMPTYING: ICD-10-CM

## 2024-11-12 PROCEDURE — 99309 SBSQ NF CARE MODERATE MDM 30: CPT | Performed by: NURSE PRACTITIONER

## 2024-11-13 NOTE — PROGRESS NOTES
Brenden Cardenas, 1945, 79 year old, male    Chief Complaint:    Chief Complaint   Patient presents with    Follow - Up     Mulitple wounds  Abscess/sepsis with NORMA and IV antibiotics  Weakness  De La Fuente catheter      HPI   Brenden Cardenas  : 1945, Age 79 year old  male patient with PMH of anxiety, kidney stones, gout, cervical stenosis, htn, MS, and multiple pressure ulcers presented with leukocytosis.  Per hospital record, the patient stated that he was feeling like his normal self but recent outpatient labs showed a leukocytosis and a fever.  Workup was done and he was found to have retroperitoneal abscess.  The leukocytosis resolved, however, became slightly elevated.  Blood cultures were positive, urine cx ngtd.  A CT of the abd/pelvis showed fluild collection concerning for abscess.  Surgery consulted.  Per surgery it was felt that an IR drain needed to be placed.  It was placed on 10/28, cx reviewed, showed E. Coli as well as strep.  The patient was discharged on IV Meropeneum for at least 2 weeks per ID.  Patient to get a CT abscessogram prior to removal of drain.  Will need less than 10 ml of drain X 2 days and a CT abscessogram prior to removal of drain.   Discharged in stable condition to [facility] for rehabilitation and medical management.  Admitting to [facility] for nursing care, medication management, and PT/OT/ST [place] and tx.     Subjective:   TODAY:  Brenden is seen in bed with his spouse present. Nursing wound care is present and attending to his multiple wounds.   He denies pain at this time. States he is not eating well. Has no appetite and has struggled with tasting food since previous COVID.   Per spouse he has NORMA drain in place, it is no longer drainage and they plan to remove it with IR Thursday.  He is completing IV antibiotics as well on Thursday.      SW arranging for home health care including extensive wound care. They have elected palliatiive care for home services as well.  He will need wound  care follow up and MD follow up on DC home.       Objective:  /89   Pulse 88   Temp 98.3 °F (36.8 °C)   Resp 20   Wt 136 lb (61.7 kg)   SpO2 97%   BMI 20.08 kg/m²     PHYSICAL EXAM:  GENERAL HEALTH: well developed, thin and frail 79 year old male, in no apparent distress  LINES, TUBES, DRAINS:  De La Fuente catheter, +NORMA Drain  SKIN: pale, warm, dry  WOUND: 16 wounds; 5 wounds on back, large wound on buttock and wounds on both feet  Left elbow resolved  Sacrum stage 4, dakins and foam daily  Left Ischium unstageable dakins and foam daily  Left buttock stage 2 medihoney and foam daily  Left upper back stage 4 medihoney and foam daily  Left medial upper back unstagable medihoney and foam daily  Left medial lower back unstageable medihoney, foam  Right hip stage 4 dails and foam daily  Right ischium stage 4 dakins and foam daily   Right lateral lower back =unstageable medihoney and foam daily, eschar black   Right upper back unstageable medihoney and foam daily, eschar black   Left plantar foot DTI skin prep daily  Left lateral ankle skin prep daily  Left medial ankle stage 4 medihoney foam daily.  Left heel DTI mediohoney and foam daily  Right foot DTI skin prep daily   Right lateral foot unstageable medihoney and foam daily eschar black  EYES: PERRLA, conjunctiva normal; no drainage from eyes  HENT: normocephalic; normal nose, no nasal drainage, mucous membranes pink, moist  NECK: full range of motion observed  RESPIRATORY:clear, No wheezing/cough/accessory muscle use; on room air  CARDIOVASCULAR: S1, S2 normal, RRR;no edema, no click, no murmur  ABDOMEN: normal active BS+, soft, non-distended; no apparent masses; observed, non-tender, no guarding during physical exam-->NORMA Drain no output  :  +De La Fuente-->chronic  LYMPHATIC: no lymphedema  MUSCULOSKELETAL: no acute synovitis upper or lower extremity. Muscle wasting and thin limbs  EXTREMITIES/VASCULAR: no cyanosis, clubbing or edema, radial pulses 2+, and dorsalis  pedal pulses 2+  NEUROLOGIC: alert, oriented to self, place and time,  follows commands, ---limited with his hands and cannot walk d/t the MS  PSYCHIATRIC: calm, distracted, affect appropriate     Medications reviewed: Yes      Current Outpatient Medications:     HYDROcodone-acetaminophen 5-325 MG Oral Tab, Take 1 tablet by mouth every 6 (six) hours as needed for Pain. and 30 minutes before dressing changes, Disp: , Rfl:     acetaminophen 325 MG Oral Tab, Take 2 tablets (650 mg total) by mouth every 6 (six) hours as needed for Pain., Disp: , Rfl:     polyethylene glycol, PEG 3350, 17 g Oral Powd Pack, Take 17 g by mouth daily as needed., Disp: , Rfl:     ipratropium-albuterol 0.5-2.5 (3) MG/3ML Inhalation Solution, Take 3 mL by nebulization every 4 (four) hours as needed., Disp: , Rfl:     ergocalciferol 1.25 MG (46381 UT) Oral Cap, Take 1 capsule (50,000 Units total) by mouth once a week., Disp: , Rfl:     sodium chloride 0.9% SOLN 100 mL with meropenem 1 g SOLR 1 g, Inject 1 g into the vein every 8 (eight) hours for 14 days. Weekly CBC with diff with and CMP. Midline care as per protocol., Disp: 42 each, Rfl: 0    metoprolol succinate ER 25 MG Oral Tablet 24 Hr, Take 4 tablets (100 mg total) by mouth daily., Disp: 120 tablet, Rfl: 0    allopurinol 300 MG Oral Tab, Take 1 tablet (300 mg total) by mouth daily., Disp: , Rfl:       Diagnostics reviewed:      CBC   WBC 11.3  was 11.6  RBC 4.53   HGB 10.7  was 9.4   HCT 38.1    Sedimentation Rate 93 mm/Hour  0-20 H Final  was 120  C-Reactive Protein 64.9 mg/L 0.0-10.0 H Final  was 85      CMP(COMPREHENSIVE METABOLIC PANEL)  Sodium 143   Potassium 4.4   Blood Urea Nitrogen 10   Creatinine 0.21   Calcium 8.6  Glucose 82   Protein, Total 4.3  Albumin 2.2   ALT 8   Alkaline Phosphatase 73   AST 13       Assessment and plan:  MS, bed bound/Physical Deconditioning/Impaired mobility and ADLs/At risk for falling  -Fall Precautions  -PT/OT/ST to evaluate and treat  -Tylenol 650  mg q6h prn for fever/pain, if given for fever, notify MD/NP  -Norco 5/325 mg q6h prn and 30 minutes prior to dressing change  -Aspiration Precautions  -HOB up on a 45 degree angle for all meals  -Palliative Care  -Hospice Evaluation, per patient request - declined   -[facility] team to establish care plan meeting with patient and POA/family as appropriate  -Anticipate DC on or before TBD; SW to assist patient/family w/ DC planning  -DC Plan: Home with family, and Home care with extensive daily wound care and dressing changes      Bacteremia/Multiple Wounds  -Wound care consult  -Wound treatment per wound care orders  -Meropenum 1g q8h-->stop on 11/14/24  -Probiotic bid-->stop on 11/21/24  -Wound Healing protein supplement bid  -Lab results faxed to Dr. Brendan Max [phone]  -F/u with Dr. Max in two weeks     H/o Gout  -Allopurinol 300 mg every day     HTN  -Vitals q shift  -Metoprolol Succinate  mg every day, hold for sbp <100 or hr<60     De La Fuente status  -De La Fuente care q shift and prn  -Monitor urine for s/s of infection     NORMA  -Drain care  -Notify Radiology [phone] if NORMA draining <10 ml X 2 days for possible removal   Removing drain as outpatient on Thursday per notes      DVT Prophylaxis   -Encourage early mobilization and participation in PT/OT as able     Bowel Management Regimen/Constipation   -Miralax 17g every day prn     LABS  -CBC/CMP weekly while in [facility]  -Lab results faxed to Dr. Brendan Max [phone]     Follow Up Appointment  -Dr. Willow Mcmahon, DO PCP within 1-2 weeks following [facility] discharge.   -F/u with Dr. Max in two weeks  _ IR for Drain removal and abscess ogram 11/14    *Established patient; follow-up moderately complex visit/ greater than 30     35 minutes spent w/ patient and staff, including but not limited to/ reviewing present status, needs, abilities with disciplines, reviewing medical records, vital signs, labs, completing medication reconciliation and  entering orders for continued care in [facility].    Note to patient: The 21st Century Cures Act makes medical notes like these available to patients in the interest of transparency. However, this is a medical document intended as peer to peer communication. It is written in medical language and may contain abbreviations or verbiage that are unfamiliar. It may appear blunt or direct. Medical documents are intended to carry relevant information, facts as evident, and the clinical opinion of the practitioner who signs the document.    Cheryl Stephen, APRN  11/12/2024

## 2024-11-14 ENCOUNTER — HOSPITAL ENCOUNTER (OUTPATIENT)
Dept: CT IMAGING | Facility: HOSPITAL | Age: 79
Discharge: HOME | End: 2024-11-14
Attending: PHYSICIAN ASSISTANT
Payer: MEDICARE

## 2024-11-14 ENCOUNTER — SNF DISCHARGE (OUTPATIENT)
Dept: INTERNAL MEDICINE CLINIC | Age: 79
End: 2024-11-14

## 2024-11-14 DIAGNOSIS — R63.0 POOR APPETITE: ICD-10-CM

## 2024-11-14 DIAGNOSIS — K68.19 RETROPERITONEAL ABSCESS (HCC): ICD-10-CM

## 2024-11-14 DIAGNOSIS — R39.14 BENIGN PROSTATIC HYPERPLASIA WITH INCOMPLETE BLADDER EMPTYING: ICD-10-CM

## 2024-11-14 DIAGNOSIS — N40.1 BENIGN PROSTATIC HYPERPLASIA WITH INCOMPLETE BLADDER EMPTYING: ICD-10-CM

## 2024-11-14 DIAGNOSIS — R53.1 WEAKNESS: ICD-10-CM

## 2024-11-14 DIAGNOSIS — R78.81 BACTEREMIA: Primary | ICD-10-CM

## 2024-11-14 DIAGNOSIS — R63.4 WEIGHT LOSS: ICD-10-CM

## 2024-11-14 DIAGNOSIS — I10 HYPERTENSION, UNSPECIFIED TYPE: ICD-10-CM

## 2024-11-14 DIAGNOSIS — G35 MULTIPLE SCLEROSIS (HCC): ICD-10-CM

## 2024-11-14 DIAGNOSIS — T07.XXXA MULTIPLE WOUNDS: ICD-10-CM

## 2024-11-14 DIAGNOSIS — Z97.8 FOLEY CATHETER STATUS: ICD-10-CM

## 2024-11-14 PROCEDURE — 76080 X-RAY EXAM OF FISTULA: CPT | Performed by: PHYSICIAN ASSISTANT

## 2024-11-14 PROCEDURE — 49424 ASSESS CYST CONTRAST INJECT: CPT | Performed by: PHYSICIAN ASSISTANT

## 2024-11-14 PROCEDURE — 99316 NF DSCHRG MGMT 30 MIN+: CPT | Performed by: NURSE PRACTITIONER

## 2024-11-15 VITALS
DIASTOLIC BLOOD PRESSURE: 66 MMHG | HEART RATE: 72 BPM | SYSTOLIC BLOOD PRESSURE: 134 MMHG | RESPIRATION RATE: 18 BRPM | BODY MASS INDEX: 20 KG/M2 | WEIGHT: 136 LBS | OXYGEN SATURATION: 98 % | TEMPERATURE: 98 F

## 2024-11-16 NOTE — PROGRESS NOTES
Brenden Cardenas, 5/6/1945, 79 year old, male is being discharged from Facility: [facility]      DISCHARGE SUMMARY    Date of Admission:10/31/24    Date of Discharge: 11/15/24                                 Admitting Diagnoses:  -Bacteremia  -Multiple Wounds  -H/o Gout  -HTN  -MS, bedbound  -De La Fuente status  -NORMA status    Reason for Admission:  Subacute Rehab and Medical Management    Subjective:  Brenden is seen in bed this evening.   He denies pain. States they took out his NORMA drain today.   He has multiple wounds that have been redressed.   He is going home tomorrow on Palliative care and with home care and wound care at home.     He is completing IV antibiotics today.    He will need wound care follow up and PCP and ID MD follow up on DC home.     Vital signs:  /66   Pulse 72   Temp 97.7 °F (36.5 °C)   Resp 18   Wt 136 lb (61.7 kg)   SpO2 98%   BMI 20.08 kg/m²     ROS and Physical Exam:      REVIEW OF SYSTEMS:  GENERAL HEALTH: fatigue weight loss  SKIN: denies any unusual skin lesions or rashes  WOUNDS: Multiple wounds on back, buttock and both heels   EYES:no visual complaints or deficits  HENT: denies nasal congestion, sinus pain or sore throat; and hearing loss negative  RESPIRATORY: denies shortness of breath, wheezing or cough   CARDIOVASCULAR:denies chest pain, no palpitations , denies syncope, denies orthopnea, denies cough  GI: denies nausea, vomiting, constipation, diarrhea; no rectal bleeding; no heartburn  :+De La Fuente Catheter  MUSCULOSKELETAL:weakness and thin limbs, contractures  NEURO:denies seizures, denies vertigo, denies tinnitus, denies tremors, and ---+Has MS bedbound and weak  PSYCHE: no symptoms of depression or anxiety  HEMATOLOGY:denies hx anemia, denies bruising, denies excessive bleeding  ENDOCRINE: denies excessive thirst or urination; denies unexpected wt gain or wt loss  ALLERGY/IMM.: denies food or seasonal allergies    GENERAL HEALTH: well developed, thin and frail 79 year old  male, in no apparent distress  LINES, TUBES, DRAINS:  De La Fuente catheter  SKIN: pale, warm, dry  WOUND: 16 wounds; 5 wounds on back, large wound on buttock and wounds on both feet  Left elbow resolved  Sacrum stage 4, dakins and foam daily  Left Ischium unstageable dakins and foam daily  Left buttock stage 2 medihoney and foam daily  Left upper back stage 4 medihoney and foam daily  Left medial upper back unstagable medihoney and foam daily  Left medial lower back unstageable medihoney, foam  Right hip stage 4 dails and foam daily  Right ischium stage 4 dakins and foam daily   Right lateral lower back =unstageable medihoney and foam daily, eschar black   Right upper back unstageable medihoney and foam daily, eschar black   Left plantar foot DTI skin prep daily  Left lateral ankle skin prep daily  Left medial ankle stage 4 medihoney foam daily.  Left heel DTI mediohoney and foam daily  Right foot DTI skin prep daily   Right lateral foot unstageable medihoney and foam daily eschar black  EYES: PERRLA, conjunctiva normal; no drainage from eyes  HENT: normocephalic; normal nose, no nasal drainage, mucous membranes pink, moist  NECK: full range of motion observed  RESPIRATORY:clear, No wheezing/cough/accessory muscle use; on room air  CARDIOVASCULAR: S1, S2 normal, RRR;no edema, no click, no murmur  ABDOMEN: normal active BS+, soft, non-distended; no apparent masses; observed, non-tender, no guarding during physical exam  :  +De La Fuente-->chronic some pale sediment  LYMPHATIC: no lymphedema  MUSCULOSKELETAL: no acute synovitis upper or lower extremity. Muscle wasting and thin limbs  EXTREMITIES/VASCULAR: no cyanosis, clubbing or edema, radial pulses 2+, and dorsalis pedal pulses 2+  NEUROLOGIC: alert, oriented to self, place and time,  follows commands, ---limited with his hands and cannot walk d/t the MS  PSYCHIATRIC: calm, distracted, affect appropriate         Skilled Nursing Facility Course:    Brenden Cradenas has completed  his IV abx. He continues with extensive wound care and dressing changes  Medically cleared for anticipated discharge  IL  checked and OK for narcotics prescribed.  Home with home care services RN/PT/OT/ST/Bath Aide, wound care and nursing services  Equipment per PT recommendations: WC/Walker/Hospital Bed/Commode Chair    Most recent lab results:  CBC   WBC 11.3  was 11.6  RBC 4.53   HGB 10.7  was 9.4   HCT 38.1     Sedimentation Rate 93 mm/Hour  0-20 H Final  was 120  C-Reactive Protein 64.9 mg/L 0.0-10.0 H Final  was 85        CMP(COMPREHENSIVE METABOLIC PANEL)  Sodium 143   Potassium 4.4   Blood Urea Nitrogen 10   Creatinine 0.21   Calcium 8.6  Glucose 82   Protein, Total 4.3  Albumin 2.2   ALT 8   Alkaline Phosphatase 73   AST 13        Discharge Diagnoses w/ current management:  MS, bed bound/Physical Deconditioning/Impaired mobility and ADLs/At risk for falling  -Fall Precautions  -PT/OT/ST to evaluate and treat  -Tylenol 650 mg q6h prn for fever/pain, if given for fever, notify MD/NP  -Norco 5/325 mg q6h prn and 30 minutes prior to dressing change  -Aspiration Precautions  -HOB up on a 45 degree angle for all meals  -Palliative Care on DC home, hospice care was declined by patient and family   - SW to assist patient/family w/ DC planning  -DC Plan: Home with family, and Home care with extensive daily wound care and dressing changes 11/15/24     Bacteremia/Multiple Wounds  -Wound care consult  -Wound treatment per wound care orders  -Meropenum 1g q8h-->stop on 11/15/24  -Probiotic bid-->stop on 11/21/24  -Wound Healing protein supplement bid  -Lab results faxed to Dr. Brendan Max [phone]  -F/u with Dr. Max in two weeks     H/o Gout  -Allopurinol 300 mg every day     HTN  -Vitals q shift  -Metoprolol Succinate  mg every day, hold for sbp <100 or hr<60  - BP controlled     De La Fuente status  -De La Fuente care q shift and prn  -Monitor urine for s/s of infection     NORMA  -Drain care  -Notify  Radiology [phone] if NORMA draining <10 ml X 2 days for possible removal   Removed drain as outpatient on Thursday by ID     DVT Prophylaxis   -Encourage early mobilization and participation in PT/OT as able     Bowel Management Regimen/Constipation   -Miralax 17g every day prn     LABS  -CBC/CMP weekly while in [facility]  -Lab results faxed to Dr. Brendan Max [phone]     Follow Up Appointment  -Dr. Willow Mcmahon, DO PCP within 1-2 weeks following [facility] discharge.   -F/u with Dr. Max in two weeks  - IR for Drain removal and abscessogram 11/14    Medication Reconciliation Completed:  Yes    35 min spent in coordination of care in preparation for discharge.    Note to patient: The 21st Century Cures Act makes medical notes like these available to patients in the interest of transparency. However, this is a medical document intended as peer to peer communication. It is written in medical language and may contain abbreviations or verbiage that are unfamiliar. It may appear blunt or direct. Medical documents are intended to carry relevant information, facts as evident, and the clinical opinion of the practitioner who signs the document.      Cheryl Stephen, APRN  11/14/2024  9:00 PM

## 2024-12-01 PROBLEM — T68.XXXA HYPOTHERMIA, INITIAL ENCOUNTER: Status: ACTIVE | Noted: 2024-01-01

## 2024-12-01 PROBLEM — K52.89 STERCORAL COLITIS: Status: ACTIVE | Noted: 2024-01-01

## 2024-12-01 PROBLEM — A41.9 SEPTIC SHOCK (HCC): Status: ACTIVE | Noted: 2024-01-01

## 2024-12-01 PROBLEM — R65.21 SEPTIC SHOCK (HCC): Status: ACTIVE | Noted: 2024-01-01

## 2024-12-01 PROBLEM — L89.304: Status: ACTIVE | Noted: 2024-01-01

## 2024-12-01 NOTE — H&P
[facility] Hospitalist History and Physical     PCP: Willow Mcmahon DO    Chief Complaint:   Chief Complaint   Patient presents with    Hypotension       History of Present Illness: Patient is a 79 year old male w/ a pmh of stage IV MS - bedbound, gout, eHTN, neurogenic bladder w/ chronic noriega multiple pressure ulcers, and recent bacteroides + pseudomonas bacteremia from rp abscess who presents with a cc diarrhea and weakness.  Patient recently admitted 10/25-10/31 for bacteremia 2/2 rp abscess s/p drainage, and then was discharged to SNF from which he was discharged on 11/14.  After returning home, patient developed diarrhea, decreased appetite, and worsening weakness.  Found to have white significantly elevated white count, hypothermia, and hypotension.  Given fluids for sepsis without significant improvement in BP, so admitted to ICU for pressor support.    Review of Systems  A 10-point ROS was completed and is otherwise negative    Current Meds  Scheduled Meds:    piperacillin-tazobactam  4.5 g Intravenous Q8H    [START ON 12/2/2024] vancomycin  15 mg/kg Intravenous Q24H     Continuous Infusions:    norepinephrine 12 mcg/min (12/01/24 1530)    sodium chloride 150 mL/hr (12/01/24 1642)    norepinephrine      vasopressin (Vasostrict) 20 Units in sodium chloride 0.9% 100 mL infusion for septic shock       PRN Meds:   norepinephrine    vasopressin (Vasostrict) 20 Units in sodium chloride 0.9% 100 mL infusion for septic shock    Allergies[1]     Past Medical History:  Past Medical History:    Anxiety state, unspecified    BACK PAIN    Bedbound    Calculus of kidney    Cervical stenosis of spine    COVID-19    Gout    Gout    High blood pressure    HYPERTENSION    Multiple sclerosis (HCC)    Osteoarthrosis, unspecified whether generalized or localized, unspecified site    OTHER DISEASES    GOUT    Paralysis (HCC)    Left arm and both legs    Pressure ulcer of sacrum    Seborrheic dermatitis    Sepsis with hypotension (HCC)     Skin cancer    Stage 4 decubitus ulcer (HCC)    Unspecified essential hypertension    Visual impairment    glasses        Social History  Past Surgical History:   Procedure Laterality Date    Debride wound      x7    Other accessory      \"urolift\"    Other accessory      multiple skin Bx's/removals    Other surgical history  02/2011    LUMBAR SX    Other surgical history      cervical surgery    Spine surgery procedure unlisted        Social History     Tobacco Use    Smoking status: Never    Smokeless tobacco: Never   Substance Use Topics    Alcohol use: Yes     Comment: once every few days        Family History  Family History   Problem Relation Age of Onset    Other (Other) Father         industrial lung abn    Other (gout) Father     Other (Other) Brother         colitis    Hypertension Mother        Intake/Output:  No intake/output data recorded.  Wt Readings from Last 3 Encounters:   12/01/24 136 lb 0.4 oz (61.7 kg)   11/14/24 136 lb (61.7 kg)   11/12/24 136 lb (61.7 kg)         Exam:  [unfilled]  Temp:  [95.1 °F (35.1 °C)-98.4 °F (36.9 °C)] 97.5 °F (36.4 °C)  Pulse:  [] 113  Resp:  [14-31] 24  BP: ()/(41-75) 106/59  SpO2:  [97 %-100 %] 100 %     Gen: no acute distress, appears stated age  Eyes: Pink conjunctiva, no ptosis.  PERRL  HENT: No masses, trachea midline.  No thyromegaly.  Pulm: Lungs CTAB, normal respiratory effort  Cv: Tachy but regular, 2+ b/l LE edema, no m/r/g, normal PMI     GI: soft, non-tender, bowel sounds present, no HSM  Msk: No digital cyanosis, 2+ b/l UE/LE peripheral pulses  Skin: Multiple pressure ulcers to LE's, sacrum, heels  Psych: AOx1, calm     Labs:     Recent Labs   Lab 12/01/24  1033   BUN 33*      K 5.0      ALB 2.2*   AST 12   ALT 9*   CO2 22.0   ANIONGAP 9        Recent Labs   Lab 12/01/24  1033   WBC 59.2*   HGB 9.1*   HCT 29.3*   .0*   MCV 78.6*       Additional Diagnostics:     ECG: personally reviewed, sinus tach, non-specific TWI, no  significant ST changes, no signs of active ischemia    CXR: image personally reviewed, significantly rotated, elevated R neptali-diaphragm    Radiology: CT ABDOMEN+PELVIS(CONTRAST ONLY)(CPT=74177)    Result Date: 12/1/2024  PROCEDURE:  CT ABDOMEN+PELVIS (CONTRAST ONLY) (CPT=74177)  COMPARISON:  [facility] , CT, CT ABDOMEN+PELVIS(CONTRAST ONLY)(CPT=74177), 10/27/2024, 12:49 PM.  INDICATIONS:  sepsis, recent retroperitoneal abscess, eval  TECHNIQUE:  CT scanning was performed from the dome of the diaphragm to the pubic symphysis with non-ionic intravenous contrast material. Post contrast coronal MPR imaging was performed.  Dose reduction techniques were used. Dose information is transmitted to the ACR (American College of Radiology) NRDR (National Radiology Data Registry) which includes the Dose Index Registry.  PATIENT STATED HISTORY:(As transcribed by Technologist)  Patient is here for sepsis with recent abscess.   CONTRAST USED:  80cc of Isovue 370  FINDINGS:  LIVER:  No enlargement, atrophy, abnormal density, or significant focal lesion.  BILIARY:  Moderate distension of the gallbladder without gallstones or wall thickening. PANCREAS:  No lesion, fluid collection, ductal dilatation, or atrophy.  SPLEEN:  No enlargement or focal lesion.  KIDNEYS:  Stable 2 cm left renal simple cyst.  No further follow-up recommended.  No hydronephrosis.  Normal enhancement. ADRENALS:  No mass or enlargement.  AORTA/VASCULAR:  No aneurysm or dissection.  Moderate vascular calcification through the bifurcation. RETROPERITONEUM:  No mass or adenopathy.  BOWEL/MESENTERY:  There is a large amount of stool in the rectum.  There is wall thickening with edema consistent with stercoral colitis.  This is a new finding.  There is wall edema extending into the redundant sigmoid colon.  There is no bowel obstruction.  Moderate amount of upstream stool noted in the right colon.  A previously noted abscess in the right ileus psoas/iliacus muscle is no  longer identified. ABDOMINAL WALL:  No mass or hernia.  URINARY BLADDER:  De La Fuente catheter there partly decompresses the bladder. PELVIC NODES:  No adenopathy.  PELVIC ORGANS:  No visible mass.  Pelvic organs appropriate for patient age.  BONES:  See below.  Status post fusion L3-4.  Marked degenerative disc disease L5-S1 and to a lesser at L4-5 with large bridging osteophytes throughout the spine that is visualized.  Degenerative changes of the hip joints noted with some sclerotic changes of the femoral heads unchanged. LUNG BASES:  Stable low lung volumes right lung.  Stable right lower lobe dependent atelectasis no definite pneumonia. OTHER:  Large decubitus ulcer adjacent to the greater trochanter of the right femur with some marginal sclerosis.  There are partially visualized large decubitus ulcers adjacent to the ischial tuberosities with sclerotic reactive change.  There are degenerative changes of the hips noted.            CONCLUSION:  1. Development of a large amount of dense stool within the rectum with sterile colitis with some wall edema and pericolonic/Ashlee rectal edema noted.  Moderate amount of upstream stool noted.  No definite obstruction. 2. The previously noted abscess in the right pelvis/iliacus ileus psoas region has resolved. 3. Additional nonemergent findings as described above which appears stable. 4. Mild distention of the gallbladder without wall thickening or gallstones.  No biliary tree dilation.    LOCATION:  [facility]   Dictated by (CST): Gary Toro MD on 12/01/2024 at 12:37 PM     Finalized by (CST): Gary Toro MD on 12/01/2024 at 12:44 PM       XR CHEST AP PORTABLE  (CPT=71045)    Result Date: 12/1/2024  PROCEDURE:  XR CHEST AP PORTABLE  (CPT=71045)  TECHNIQUE:  AP chest radiograph was obtained.  COMPARISON:  [facility] , XR, XR CHEST AP PORTABLE  (CPT=71045), 10/25/2024, 8:48 PM.  [facility] , XR, XR CHEST AP/PA (1 VIEW) (CPT=71045), 6/27/2024, 6:25 PM.  INDICATIONS:  From home -  hypotensive 70/53  PATIENT STATED HISTORY: (As transcribed by Technologist)  Patient offered no additional history at this time.               CONCLUSION:  The patient is again noted to be markedly rotated to the right.  Marked degenerative change of the spine are noted.  Postsurgical changes lower cervical spine again noted. The heart size appears to be within normal limits with normal vascularity without effusion. There is some minimal reticular changes in the right infrahilar region and ground-glass opacities in the right retrocardiac region which are improved compared to the study of 10/25/2024 which may represent interstitial pneumonitis, fibrosis, or atelectasis.  Stable slight elevation the right hemidiaphragm.   LOCATION:  [facility]      Dictated by (CST): Gary Toro MD on 12/01/2024 at 11:12 AM     Finalized by (CST): Gary Toro MD on 12/01/2024 at 11:13 AM       CT ABSCESS INJECTION VIA EXISTING CATH (CPT=76080/58931)    Result Date: 11/14/2024  PROCEDURE:  CT ABSCESS INJECTION VIA EXISTING CATH (CPT=76080/59921)  COMPARISON:  [facility] , CT, CT ABDOMEN+PELVIS(CONTRAST ONLY)(CPT=74177), 10/27/2024, 12:49 PM.  [facility] , CT, CT DRAIN ABSCESS RETROPERITONEAL (CPT=49406), 10/28/2024, 5:53 PM.  INDICATIONS:  K68.19 Retroperitoneal abscess (HCC)  TECHNIQUE:  Initial noncontrast scans were performed.  Subsequently the existing right lower quadrant percutaneous drainage catheter was injected with contrast.  Follow-up scans were then obtained.  Dose reduction techniques were used. Dose information is transmitted to the ACR (American College of Radiology) NRDR (National Radiology Data Registry) which includes the Dose Index Registry.   FINDINGS:  Initial scans demonstrated a collapsed targeted fluid collection/abscess cavity with the catheter appearing in satisfactory position.  Approximately 5 mL of diluted contrast was injected by hand.  Contrast tracks back along the catheter onto the skin.  Follow-up  scans demonstrated a collapsed cavity with no fistula.  The patient reports only drops of output for about a week.  The catheter was subsequently removed uneventfully and sterile dressings placed.     CONCLUSION:            1. Collapsed abscess cavity.  No fistula. 2. Uneventful removal of the drainage catheter.    LOCATION:  [facility]   Dictated by (CST): Jose Alfredo Ospina MD on 11/14/2024 at 1:37 PM     Finalized by (CST): Jose Alfredo Ospina MD on 11/14/2024 at 1:39 PM          ASSESSMENT:  Patient is a 79 year old male w/ a pmh of stage IV MS - bedbound, gout, eHTN, neurogenic bladder w/ chronic noriega multiple pressure ulcers, and recent bacteroides + pseudomonas bacteremia from rp abscess who presents with a cc diarrhea and weakness.     Problem list  Septic shock  Diarrhea  Stage IV MS  Stage IV sacral pressure ulcers - present on admission  Neurogenic bladder with chronic indwelling noriega  eHTN  gout    PLAN:  Previous [facility] medical records and outpatient [facility] medical records reviewed    Septic shock  -unclear etiology - UA dirty but likely colonized, possible C.diff, possibly infected pressure ulcers  -RP abscess resolved on imaging  -agree with empiric broad spectrum abx  - s/p 3L IVF  -pressors per crit care  -noriega exchanged in ED  -further abx per ID  -wound care  -f/u pending cultures, stool studies    Chronic pressure wounds  -wound care consult  -abx as above    Other chronic medical conditions (Stage IV MS, eHTN, gout)  -stable  -cpm      Will continue to follow while hospitalized.  Please page me or the on-call hospitalist with questions or concerns.    Thank you,  Milton Theodore MD  Formerly [facility], [facility] and [facility]  [phone] (Answering Service)         [1] No Known Allergies

## 2024-12-01 NOTE — ED PROVIDER NOTES
History     Chief Complaint   Patient presents with    Hypotension       HPI    79 year old male with history of MS, paraplegia, neurogenic bladder, hypertension, anemia, recent admission for bacteremia with retroperitoneal abscess status post drainage and antibiotics presents by EMS.  Patient states for the past few days he has been having diarrhea, generalized weakness, poor appetite.  He is not currently on antibiotics.  He denies any pain or vomiting.  No cough    Per chart review patient had a CT showing resolution of abscess, had 14 days of meropenem.      Past Medical History:    Anxiety state, unspecified    BACK PAIN    Bedbound    Calculus of kidney    Cervical stenosis of spine    COVID-19    Gout    Gout    High blood pressure    HYPERTENSION    Multiple sclerosis (HCC)    Osteoarthrosis, unspecified whether generalized or localized, unspecified site    OTHER DISEASES    GOUT    Paralysis (HCC)    Left arm and both legs    Pressure ulcer of sacrum    Seborrheic dermatitis    Sepsis with hypotension (HCC)    Skin cancer    Stage 4 decubitus ulcer (HCC)    Unspecified essential hypertension    Visual impairment    glasses       Past Surgical History:   Procedure Laterality Date    Debride wound      x7    Other accessory      \"urolift\"    Other accessory      multiple skin Bx's/removals    Other surgical history  02/2011    LUMBAR SX    Other surgical history      cervical surgery    Spine surgery procedure unlisted         Social History     Socioeconomic History    Marital status:    Tobacco Use    Smoking status: Never    Smokeless tobacco: Never   Vaping Use    Vaping status: Never Used   Substance and Sexual Activity    Alcohol use: Yes     Comment: once every few days    Drug use: Never   Other Topics Concern    Caffeine Concern No    Exercise Yes     Comment: Full therapy 2 x week     Social Drivers of Health     Financial Resource Strain: Low Risk  (5/2/2024)    Received from [facility]    Overall Financial Resource Strain (CARDIA)     Difficulty of Paying Living Expenses: Not hard at all   Food Insecurity: No Food Insecurity (10/26/2024)    Food Insecurity     Food Insecurity: Never true   Transportation Needs: No Transportation Needs (10/26/2024)    Transportation Needs     Lack of Transportation: No   Physical Activity: Inactive (5/2/2024)    Received from [facility]    Exercise Vital Sign     Days of Exercise per Week: 0 days     Minutes of Exercise per Session: 0 min   Stress: No Stress Concern Present (5/2/2024)    Received from [facility]    [employer] [place] [employer] - Occupational Stress Questionnaire     Feeling of Stress : Not at all   Social Connections: Unknown (5/2/2024)    Received from [facility]    Social Connection and Isolation Panel [NHANES]     Frequency of Social Gatherings with Friends and Family: Never     Attends Jew Services: Never     Active Member of Clubs or Organizations: No     Attends Club or Organization Meetings: Never     Marital Status:    Housing Stability: Low Risk  (10/26/2024)    Housing Stability     Housing Instability: No                   Physical Exam     ED Triage Vitals   BP 12/01/24 1011 (!) 78/56   Pulse 12/01/24 1011 105   Resp 12/01/24 1011 22   Temp 12/01/24 1047 (!) 95.1 °F (35.1 °C)   Temp src 12/01/24 1047 Rectal   SpO2 12/01/24 1011 98 %   O2 Device 12/01/24 1011 None (Room air)       Physical Exam  Constitutional:       General: He is not in acute distress.     Appearance: He is ill-appearing.   HENT:      Mouth/Throat:      Mouth: Mucous membranes are dry.   Eyes:      Extraocular Movements: Extraocular movements intact.   Cardiovascular:      Rate and Rhythm: Tachycardia present.      Pulses: Normal pulses.   Pulmonary:      Effort: Pulmonary effort is normal. No respiratory distress.   Abdominal:      General: Abdomen is flat. There is no distension.      Tenderness: There  is abdominal tenderness.   Genitourinary:     Comments: Numerous deep sacral ulcers with overlying granulation tissue, no significant purulent discharge or induration  Musculoskeletal:      Cervical back: Normal range of motion.   Neurological:      Mental Status: He is alert.      Comments: No strength in lower extremities, minimal strength in upper extremities, patient has sensation and is able to talk and answer questions without difficulty              ED Course     Labs Reviewed   COMP METABOLIC PANEL (14) - Abnormal; Notable for the following components:       Result Value    BUN 33 (*)     Creatinine 0.67 (*)     ALT 9 (*)     Total Protein 4.5 (*)     Albumin 2.2 (*)     All other components within normal limits   CBC WITH DIFFERENTIAL WITH PLATELET - Abnormal; Notable for the following components:    WBC 59.2 (*)     RBC 3.73 (*)     HGB 9.1 (*)     HCT 29.3 (*)     .0 (*)     MCV 78.6 (*)     MCH 24.4 (*)     Neutrophil Absolute Prelim 53.03 (*)     All other components within normal limits   LACTIC ACID, PLASMA - Abnormal; Notable for the following components:    Lactic Acid 3.3 (*)     All other components within normal limits   URINALYSIS WITH CULTURE REFLEX - Abnormal; Notable for the following components:    Clarity Urine Ex.Turbid (*)     Blood Urine 1+ (*)     Protein Urine 600 (*)     Urobilinogen Urine 4 (*)     Leukocyte Esterase Urine 500 (*)     WBC Urine >50 (*)     RBC Urine >10 (*)     Bacteria Urine 3+ (*)     WBC Clump Present (*)     All other components within normal limits   MANUAL DIFFERENTIAL - Abnormal; Notable for the following components:    Neutrophil Absolute Manual 57.42 (*)     All other components within normal limits   SARS-COV-2/FLU A AND B/RSV BY PCR (GENEXPERT) - Normal    Narrative:     This test is intended for the qualitative detection and differentiation of SARS-CoV-2, influenza A, influenza B, and respiratory syncytial virus (RSV) viral RNA in nasopharyngeal or  nares swabs from individuals suspected of respiratory viral infection consistent with COVID-19 by their healthcare provider. Signs and symptoms of respiratory viral infection due to SARS-CoV-2, influenza, and RSV can be similar.    Test performed using the Xpert Xpress SARS-CoV-2/FLU/RSV (real time RT-PCR)  assay on the GeneXpert instrument, [employer], "[employer] [place] [address].   This test is being used under the Food and Drug Administration's Emergency Use Authorization.    The authorized Fact Sheet for Healthcare Providers for this assay is available upon request from the laboratory.   LACTIC ACID REFLEX POST POSTIVE   RAINBOW DRAW LAVENDER   RAINBOW DRAW LIGHT GREEN   RAINBOW DRAW BLUE   BLOOD CULTURE   BLOOD CULTURE   GI STOOL PANEL BY PCR   C. DIFFICILE(TOXIGENIC)PCR   URINE CULTURE, ROUTINE     CT ABDOMEN+PELVIS(CONTRAST ONLY)(CPT=74177)    Result Date: 12/1/2024  CONCLUSION:  1. Development of a large amount of dense stool within the rectum with sterile colitis with some wall edema and pericolonic/Ashlee rectal edema noted.  Moderate amount of upstream stool noted.  No definite obstruction. 2. The previously noted abscess in the right pelvis/iliacus ileus psoas region has resolved. 3. Additional nonemergent findings as described above which appears stable. 4. Mild distention of the gallbladder without wall thickening or gallstones.  No biliary tree dilation.    LOCATION:  [facility]   Dictated by (CST): Gary Toro MD on 12/01/2024 at 12:37 PM     Finalized by (CST): Gary Toro MD on 12/01/2024 at 12:44 PM       XR CHEST AP PORTABLE  (CPT=71045)    Result Date: 12/1/2024  CONCLUSION:  The patient is again noted to be markedly rotated to the right.  Marked degenerative change of the spine are noted.  Postsurgical changes lower cervical spine again noted. The heart size appears to be within normal limits with normal vascularity without effusion. There is some minimal reticular changes in the right infrahilar  region and ground-glass opacities in the right retrocardiac region which are improved compared to the study of 10/25/2024 which may represent interstitial pneumonitis, fibrosis, or atelectasis.  Stable slight elevation the right hemidiaphragm.   LOCATION:  [facility]      Dictated by (CST): Gary Toro MD on 12/01/2024 at 11:12 AM     Finalized by (CST): Gary Toro MD on 12/01/2024 at 11:13 AM            MDM     Vitals:    12/01/24 1145 12/01/24 1200 12/01/24 1215 12/01/24 1230   BP: 120/55 104/53 111/47 106/52   Pulse: 101 98 100 99   Resp: (!) 28 15 26 14   Temp:       TempSrc:       SpO2: 100% 100% 100% 100%   Weight:           Hypotension in the setting of poor oral intake and diarrhea, may be hypovolemic, given patient's numerous comorbidities, differential also includes sepsis from UTI, possible pulmonary, will reevaluate intra-abdominal and retroperitoneal regions with CT for assessment of recurrence of abscess.  Will start on prophylactic sepsis fluids and broad-spectrum antibiotics.        ED Course as of 12/01/24 1314  ------------------------------------------------------------  Time: 12/01 1048  Comment: Patient is hypothermic, placed on Maddison hugger.    ------------------------------------------------------------  Time: 12/01 1049  Comment: After 1 L of fluids, my reassessment with slightly elevated blood pressure but patient remains hypotensive.  His mentation and perfusion are unchanged.  Will give 1 more liter and if remains hypotensive we will plan to start vasopressors.  ------------------------------------------------------------  Time: 12/01 1050  Comment: EKG interpretation by me: EKG sinus rhythm at a rate of 103, diffuse T wave inversions  appears similar to prior EKG  ------------------------------------------------------------  Time: 12/01 1106  Comment: My interpretation of x-ray with rotation, no significant consolidations  noted  ------------------------------------------------------------  Time: 12/01 1119  Comment: No definite pneumonia noted on x-ray by radiology  ------------------------------------------------------------  Time: 12/01 1128  Comment: Blood pressure is improving with Levophed.  ------------------------------------------------------------  Time: 12/01 1128  Comment: Labs with reassuring renal function, no acidosis however lactate is elevated, significant leukocytosis.  ------------------------------------------------------------  Time: 12/01 1217  Comment: Case discussed with hospitalist and intensivist  ------------------------------------------------------------  Time: 12/01 1313  Comment: CT showing stercoral colitis and stool burden.  Patient has been having persistent diarrhea in the ER and is self evacuating.       Critical Care Note  40 minutes of my time was spent engaged in work directly related to patient care, exclusive of other procedures, to prevent further deterioration of patient's condition.  Addressed impending deterioration including: airway, respiratory, cardiovascular, metabolic  Interpretation of CXR, cardiac output, EKG, BP  Response to treatments and reassessment of patient  Speaking with consultants  Performed by self      Disposition and Plan     Clinical Impression:  1. Septic shock (HCC)    2. Urinary tract infection without hematuria, site unspecified    3. Pressure injury of buttock, stage 4, unspecified laterality (HCC)    4. MS (multiple sclerosis) (HCC)    5. Hypothermia, initial encounter    6. Stercoral colitis        Disposition:  Admit    Follow-up:  No follow-up provider specified.    Medications Prescribed:  Current Discharge Medication List          Hospital Problems       Present on Admission  Date Reviewed: 4/27/2024            ICD-10-CM Noted POA    * (Principal) Septic shock (HCC) A41.9, R65.21 12/1/2024 Unknown

## 2024-12-01 NOTE — CONSULTS
ICU  Critical Care APRN Consult Note    NAME: Brenden Cardenas - ROOM: 468/[facility] - MRN: [id number] - Age: 79 year old - :1945    History Of Present Illness:  Brenden Cardenas is a 79 year old male with PMHx significant for ancxiety, gout, cervical stenosis, HTN, MS stage IV, multiple pressure ulcers, recent bacteremia--bacteroides fragilis and pseudomonas aeroginosa bacteremia, retroperitoneal abscess s/p drainage and completion of meropnem x14 days, neurogenic bladder with chronic noriega who presented to the ED with complaints of diarrhea. Patient and wife at bedside assisting in history reports that patient has had poor appetite and po intake for the past 7 days and diarrhea with multiple episodes for the past 5 days. Denies fever or chills. Noted to be hypothermic-95.1 degrees in the ED. Labs notable for WBC 5932, LA 3.3-->2.3, CT A/P with resolution of previous retroperitoneal abscess, sterile colitis and moderate stool. UA positive (sent from chronic noriega). Sepsis bolus-2.8 L IVF bolus given, cultures sent and started on Zosyn and Vanco with ID consulted.     Of note, patient  was last admitted 10/25-10/31 with bacteremia likely 2/2 retroperitoneal abscess s/p IR drain placement. He was dc'd on IV meropenem for 2 weeks, completed on  at [facility] prior to returning home.     He is admitted to ICU on vasopressors. Currently on levophed 12mcg. Oriented to self only. Able to carry a conversation and answer questions, although inappropriate responses at times. Thinks he is at [facility] and its .     Past Medical History:  Past Medical History:    Anxiety state, unspecified    BACK PAIN    Bedbound    Calculus of kidney    Cervical stenosis of spine    COVID-19    Gout    Gout    High blood pressure    HYPERTENSION    Multiple sclerosis (HCC)    Osteoarthrosis, unspecified whether generalized or localized, unspecified site    OTHER DISEASES    GOUT    Paralysis (HCC)    Left arm and both legs     Pressure ulcer of sacrum    Seborrheic dermatitis    Sepsis with hypotension (HCC)    Skin cancer    Stage 4 decubitus ulcer (HCC)    Unspecified essential hypertension    Visual impairment    glasses     Past Surgical History:   Past Surgical History:   Procedure Laterality Date    Debride wound      x7    Other accessory      \"urolift\"    Other accessory      multiple skin Bx's/removals    Other surgical history  02/2011    LUMBAR SX    Other surgical history      cervical surgery    Spine surgery procedure unlisted        Family History:   Family History   Problem Relation Age of Onset    Other (Other) Father         industrial lung abn    Other (gout) Father     Other (Other) Brother         colitis    Hypertension Mother      Social History:    reports that he has never smoked. He has never used smokeless tobacco. He reports current alcohol use. He reports that he does not use drugs.     Review of Systems:   A comprehensive 10 point review of systems was completed.  Pertinent positives and negatives noted in the HPI.    Current Facility-Administered Medications   Medication Dose Route Frequency    norepinephrine (Levophed) 4 mg/250mL infusion premix  0.5-50 mcg/min Intravenous Continuous    sodium chloride 0.9% infusion  150 mL/hr Intravenous Continuous    norepinephrine (Levophed) 4 mg/250mL infusion premix  0.5-50 mcg/min Intravenous Continuous PRN    vasopressin (Vasostrict) 20 Units in sodium chloride 0.9% 100 mL infusion for septic shock  0.03 Units/min Intravenous Continuous PRN    piperacillin-tazobactam (Zosyn) 4.5 g in dextrose 5% 100 mL IVPB-ADDV  4.5 g Intravenous Q8H    [START ON 12/2/2024] vancomycin (Vancocin) 1,000 mg in sodium chloride 0.9% 250 mL IVPB-ADDV  15 mg/kg Intravenous Q24H    vancomycin (Vancocin) cap 500 mg  500 mg Oral QID     OBJECTIVE  Vitals:  BP 96/57   Pulse 117   Temp 97.5 °F (36.4 °C) (Temporal)   Resp 21   Wt 136 lb 0.4 oz (61.7 kg)   SpO2 100%   BMI 20.09 kg/m²              RA    Physical Exam:    General Appearance: Alert, cooperative, no acute distress, oriented to self only  Neck: No JVD  Lungs: Clear to auscultation bilaterally, respirations unlabored  Heart: Regular rate and rhythm, S1 and S2 normal, no murmur, rub or gallop  Abdomen: Soft, non-tender, bowel sounds active all four quadrants, no masses, no organomegaly  Extremities: Atraumatic, 2+ pitting edema to bilateral fee, capillary refill <3 sec., multiple pressure ulcer wounds to bilateral LEs and sacrum, back, scrotal , heels (See pictures in Media)  Pulses: 2+ and symmetric all extremities  Skin: Skin color, texture, turgor normal for ethnicity, no rashes or lesions, warm and dry  Neurologic: CNII-XII intact, normal strength    Data this admission:  CT ABDOMEN+PELVIS(CONTRAST ONLY)(CPT=74177)    Result Date: 12/1/2024  CONCLUSION:  1. Development of a large amount of dense stool within the rectum with sterile colitis with some wall edema and pericolonic/Ashlee rectal edema noted.  Moderate amount of upstream stool noted.  No definite obstruction. 2. The previously noted abscess in the right pelvis/iliacus ileus psoas region has resolved. 3. Additional nonemergent findings as described above which appears stable. 4. Mild distention of the gallbladder without wall thickening or gallstones.  No biliary tree dilation.    LOCATION:  [facility]   Dictated by (CST): Gary Toro MD on 12/01/2024 at 12:37 PM     Finalized by (CST): Gary Toro MD on 12/01/2024 at 12:44 PM       XR CHEST AP PORTABLE  (CPT=71045)    Result Date: 12/1/2024  CONCLUSION:  The patient is again noted to be markedly rotated to the right.  Marked degenerative change of the spine are noted.  Postsurgical changes lower cervical spine again noted. The heart size appears to be within normal limits with normal vascularity without effusion. There is some minimal reticular changes in the right infrahilar region and ground-glass opacities in the right  retrocardiac region which are improved compared to the study of 10/25/2024 which may represent interstitial pneumonitis, fibrosis, or atelectasis.  Stable slight elevation the right hemidiaphragm.   LOCATION:  [facility]      Dictated by (CST): Gary Toro MD on 12/01/2024 at 11:12 AM     Finalized by (CST): Gary Toro MD on 12/01/2024 at 11:13 AM       CT ABSCESS INJECTION VIA EXISTING CATH (CPT=76080/51717)    Result Date: 11/14/2024  1. Collapsed abscess cavity.  No fistula. 2. Uneventful removal of the drainage catheter.    LOCATION:  [facility]   Dictated by (CST): Jose Alfredo Ospina MD on 11/14/2024 at 1:37 PM     Finalized by (CST): Jose Alfredo Ospina MD on 11/14/2024 at 1:39 PM       Labs:  Lab Results   Component Value Date    WBC 59.2 12/01/2024    HGB 9.1 12/01/2024    HCT 29.3 12/01/2024    .0 12/01/2024    CREATSERUM 0.67 12/01/2024    BUN 33 12/01/2024     12/01/2024    K 5.0 12/01/2024     12/01/2024    CO2 22.0 12/01/2024    GLU 83 12/01/2024    CA 9.2 12/01/2024    ALB 2.2 12/01/2024    ALKPHO 80 12/01/2024    BILT 0.3 12/01/2024    TP 4.5 12/01/2024    AST 12 12/01/2024    ALT 9 12/01/2024    MG 1.9 12/01/2024    PHOS 4.9 12/01/2024       Assessment/Plan:      Shock: Likely septic due to possible UTI vs sterile colitis. +/- multiple chronic wounds. Recent admit with bacteroides fragilis and pseudomonas aeroginosa bacteremia, retroperitoneal abscess s/p drainage and completion of meropnem x14 days. Concerns for Cdiff given diarrhea with recent antbx and colitis on CT. May have component of hypovolemia.  -Leukocytosis- 59, hypothermic- 95.1 in ED  -s/p 3 L IVF bolus in ED for sepsis  - LA 3.3-->2.3  -continue Zosyn (12/1) and Vanco (12/1-)  -Vasopressors for MAP > 65, SBP >90-currently on levophed gtt  -CT A/P with resolution of abscess, sterile colitis  -Cdiff and stool panel pending  -Wound cx pending  -UA positive, cx pending--sent from chronic noriega. Will resend   -B. Cxs  pending  -EKG with TWIs in noemy-lateral and lateral leads. Troponin pending  -Consider stress dose steroids if pressor needs increase  -ID consulted    Diarrhea: Complains of 5 days PTA  -Cdiff and stool panel pending  -Contact plus iso until cdiff results  -IVFs  -Rectal tube placed given multiple wounds    Neurogenic bladder  -Chronic noriega-exchanged in ICU    Multiple chronic pressure ulcer wounds  -Wound care consult  -Home health RN 2x week, ID MD 1x/week (Wednesdays)  -ID consult    Malnutrition, Poor PO intake/appetite  -Regular diet  -If continues to have no appetite this admit, will need to consider other means of nutrition  -Supplements  -Dietary consult  -Monitor BMP, Mg and Phos    Multiple Sclerosis Stage IV, paraplegia  -Bedbound    Gout  -Allopurinol    Anemia  -Hgb stable  -No signs of active bleeding  -Daily CBC    HTN  -Hold home meds given shock    FEN  -IVFs  -Replete lytes PRN  -Dietary consult    Proph  -Lovenox subcutaneous      Dispo:     Code Status: DNAR/Selective Treatment-confirmed with patient and wife  -ICU for close monitoring    Plan of care discussed with intensivist, Dr. Alexandrea Magana, [facility]  ICU  Phone  [phone]   Pager [phone]      ICU attending Nora     I agree with the APC history and plan and have independently evaluated and examined this patient.     Patient admitted with septic shock leukmoid reaction and possible c diff colitis.    Plan to start broad spectrum abx, send for c diff start on PO vanco QID (500 for severe infection with jossy, shock and leukocytosis).    Continue zosyn until bcx return and await ID consultation for now.    Volume reuscitate with balanced salt solution as fluids allows us. Patient has MULTIPLE wounds, consult wound care.      Date of Service  12/21/24     35 minutes of critical care time were spent in diagnostic evaluation, therapeutic treatment plan and management of this critically ill patient.

## 2024-12-01 NOTE — ED INITIAL ASSESSMENT (HPI)
Patient to the ED from home via [facility] with c/o hypotension. Patient has been exp diarrhea for the last 5 days. Patient has also had significant hospitalizations r/t his MS and frequent UTI. Patient has had minimal urinary output. Patient arrives with noriega in place with dark mavis colored urine. Patient BP at home with EMS was 70/53.

## 2024-12-02 PROBLEM — Z71.89 GOALS OF CARE, COUNSELING/DISCUSSION: Status: ACTIVE | Noted: 2024-01-01

## 2024-12-02 PROBLEM — Z51.5 PALLIATIVE CARE ENCOUNTER: Status: ACTIVE | Noted: 2024-01-01

## 2024-12-02 NOTE — CONSULTS
Infectious Disease Initial Consultation      Date of admission: 12/1/2024 10:06 AM     Date of service: 12/02/24 8:56 AM    Consult requested by: Jamila Leahy DO    Reason for consult: Sepsis    Chief complaint: Substance    History of present illness: Brenden Cardenas is a 79 year old male with end-stage multiple sclerosis, currently bedbound, with multiple pressure ulcers, recently developed Bacteroides and Pseudomonas bacteremia in the setting of a retroperitoneal abscess who presents here with diarrhea and generalized weakness.  The patient was recently admitted to the hospital from 10/25 through 10/31 with the above bacteremia.  At that point, underwent drainage of his retroperitoneal abscess and was discharged to rehab and from there was discharged home on 11/14.  After returning home, he started having diarrhea with decreased appetite and worsening weakness.  Was found to have significantly elevated white count with hypothermia and hypotension hence was admitted to the ICU for further care.    In the emergency room, patient was hypothermic as mentioned above, and hypotensive.  Labs revealed leukocytosis with a white count of 46.4 with a left shift.  UA showed more than 50 white blood cells.  C. difficile is currently positive.  MRSA screen is also positive.  CT of the abdomen pelvis showed colitis.  The previously noted abscess in the right pelvis and iliac us psoas muscle was resolved.  Chest x-ray did not show any evidence of pneumonia.  At bedtime presentation, the patient was started on IV Zosyn, IV vancomycin along with oral vancomycin.  The patient is currently in the ICU.    Review of systems:  All other components of the review of systems are negative, except those described in the history of present illness.     Past Medical History:    Anxiety state, unspecified    BACK PAIN    Bedbound    Calculus of kidney    Cervical stenosis of spine    COVID-19    Gout    Gout    High blood pressure     HYPERTENSION    Multiple sclerosis (HCC)    Osteoarthrosis, unspecified whether generalized or localized, unspecified site    OTHER DISEASES    GOUT    Paralysis (HCC)    Left arm and both legs    Pressure ulcer of sacrum    Seborrheic dermatitis    Sepsis with hypotension (HCC)    Skin cancer    Stage 4 decubitus ulcer (HCC)    Unspecified essential hypertension    Visual impairment    glasses     Past Surgical History:   Procedure Laterality Date    Debride wound      x7    Other accessory      \"urolift\"    Other accessory      multiple skin Bx's/removals    Other surgical history  02/2011    LUMBAR SX    Other surgical history      cervical surgery    Spine surgery procedure unlisted       Social History     Socioeconomic History    Marital status:    Tobacco Use    Smoking status: Never    Smokeless tobacco: Never   Vaping Use    Vaping status: Never Used   Substance and Sexual Activity    Alcohol use: Yes     Comment: once every few days    Drug use: Never   Other Topics Concern    Caffeine Concern No    Exercise Yes     Comment: Full therapy 2 x week     Social Drivers of Health     Financial Resource Strain: Low Risk  (5/2/2024)    Received from [facility]    Overall Financial Resource Strain (CARDIA)     Difficulty of Paying Living Expenses: Not hard at all   Food Insecurity: No Food Insecurity (10/26/2024)    Food Insecurity     Food Insecurity: Never true   Transportation Needs: No Transportation Needs (10/26/2024)    Transportation Needs     Lack of Transportation: No   Physical Activity: Inactive (5/2/2024)    Received from [facility]    Exercise Vital Sign     Days of Exercise per Week: 0 days     Minutes of Exercise per Session: 0 min   Stress: No Stress Concern Present (5/2/2024)    Received from [facility]    [place] [employer] - Occupational Stress Questionnaire     Feeling of Stress : Not at all   Social Connections: Unknown (5/2/2024)     Received from [facility]    Social Connection and Isolation Panel [NHANES]     Frequency of Social Gatherings with Friends and Family: Never     Attends [employer]ology Services: Never     Active Member of Clubs or Organizations: No     Attends Club or Organization Meetings: Never     Marital Status:    Housing Stability: Low Risk  (10/26/2024)    Housing Stability     Housing Instability: No     Family History   Problem Relation Age of Onset    Other (Other) Father         industrial lung abn    Other (gout) Father     Other (Other) Brother         colitis    Hypertension Mother      Reviewed, see above    Medications:    albumin human    norepinephrine    sodium chloride    norepinephrine    vasopressin (Vasostrict) 20 Units in sodium chloride 0.9% 100 mL infusion for septic shock    piperacillin-tazobactam    vancomycin    vancomycin    midodrine    lidocaine PF     Allergies:  Allergies[1]    Physical Exam:  Vitals:    12/02/24 0700   BP:    Pulse: 105   Resp: 21   Temp:      Vitals signs and nursing note reviewed.   Constitutional:       Appearance: Normal appearance.   HENT:      Head: Normocephalic and atraumatic.      Mouth: Mucous membranes are moist.   Neck:      Musculoskeletal: Neck supple.   Cardiovascular:      Rate and Rhythm: Normal rate.      Heart sounds: Normal heart sounds. No murmur. No friction rub. No gallop.    Pulmonary:      Effort: Pulmonary effort is normal. No respiratory distress.      Breath sounds: Normal breath sounds. No stridor. No wheezing, rhonchi or rales.   Chest:      Chest wall: No tenderness.   Abdominal:      General: Abdomen is flat. There is no distension.      Palpations: Abdomen is soft. There is no mass.      Tenderness: There is mild diffuse tenderness. There is no guarding or rebound.      Hernia: No hernia is present.   Musculoskeletal:      Right lower leg: No edema.      Left lower leg: No edema.   Skin:     General: Skin is warm and dry.     Laboratory  data:  I have independently reviewed all lab results; including old microbiological results.  Lab Results   Component Value Date    WBC 46.4 12/02/2024    HGB 9.2 12/02/2024    HCT 29.9 12/02/2024    .0 12/02/2024    CREATSERUM 0.49 12/02/2024    BUN 29 12/02/2024     12/02/2024    K 4.1 12/02/2024     12/02/2024    CO2 20.0 12/02/2024     12/02/2024    CA 8.1 12/02/2024    ALB 2.1 12/02/2024    ALKPHO 72 12/02/2024    BILT 0.3 12/02/2024    TP 3.8 12/02/2024    AST 11 12/02/2024    ALT 7 12/02/2024    MG 1.8 12/02/2024    PHOS 4.6 12/02/2024    TROPHS 7 12/01/2024        Recent Labs   Lab 12/02/24  0512   RBC 3.86   HGB 9.2*   HCT 29.9*   MCV 77.5*   MCH 23.8*   MCHC 30.8*   RDW 17.3   NEPRELIM 40.42*   WBC 46.4*   .0*   NEUT 82   LYMPH 7   MON 3   EOS 0       Microbiology data:  No results found for this visit on 12/01/24.      Radiology:  I have reviewed all imaging data available independently.   CT abdomen pelvis on 12/1:  Acute colitis    Impression:  Brenden Cardenas is a 79 year old male with     Severe C. difficile colitis, presenting here with sepsis and septic shock with threat to life  CT showing colitis without perforation or megacolon  Has not had a bowel movement since last night, concerning for developing ileus  Currently on p.o. vancomycin  Also on IV vancomycin and IV Zosyn given his septic picture  Chest x-ray without pneumonia  UA showing pyuria; however, patient has a chronic [place].  Recent polymicrobial bacteremia with Pseudomonas aeruginosa and Bacteroides in the setting of a retroperitoneal abscess  CT showing resolution of his abscess  Completed 14-day course of meropenem as an outpatient in mid November  History of multiple sclerosis, currently bedbound with multiple decubitus ulcers  Could also serve another source of infection  However, currently do not appear acutely infected    Recommendations:     Continue with p.o. vancomycin, will lower dose to 125 mg 4  times a day  Add IV Flagyl 500 mg IV every 8  Continue IV Zosyn and IV vancomycin for the time being pending his blood cultures  If his blood cultures remain negative by tomorrow, both Zosyn and IV vancomycin can be discontinued  If he continues without bowel movements throughout the day, then would add vancomycin 500 mg rectal enemas every 6 hours  Supportive care as per the primary team  Continue to monitor daily labs for antibiotic toxicity  Further recommendations will depend on the above work-up and clinical progress     The plan of care was discussed with the primary hospital team, Jamila Leahy DO     Thank you for this consultation. Please don't hesitate to call the ID team for questions or any acute changes in patient's clinical condition.    Please note that this report has been produced using speech recognition software and may contain errors related to that system including, but not limited to, errors in grammar, punctuation, and spelling, as well as words and phrases that possibly may have been recognized inappropriately.  If there are any questions or concerns, contact the dictating provider for clarification.    The 21st Century Cures Act makes medical notes like these available to patients in the interest of transparency. Please be advised this is a medical document. Medical documents are intended to carry relevant information, facts as evident, and the clinical opinion of the practitioner. The medical note is intended as peer to peer communication and may appear blunt or direct. It is written in medical language and may contain abbreviations or verbiage that are unfamiliar.     Brendan Max MD  [facility]. Tel: [phone]. Fax: [phone].     Brenden Cardenas Zosyn and IV vancomycin can be discontinued at that point  Supportive care as per the primary team  As long as his blood cultures remain negative by tomorrow,Time beingContinue IV vancomycin and IV Zosyn for the    Will  add Flagyl gel 500 mg IV 3 times daily given the severity of his presentationThat higher dosages are [id number] mg 4 times a day.  NoDOB: 5/6/1945 MRN: [id number] CSN: [id number]          [1] No Known Allergies

## 2024-12-02 NOTE — PROGRESS NOTES
[facility] Hospitalist Progress Note     PCP: Willow Mcmahon DO    SUBJECTIVE:  Patient resting in bed.  Appears lethargic.    OBJECTIVE:  Temp:  [97.5 °F (36.4 °C)-98 °F (36.7 °C)] 98 °F (36.7 °C)  Pulse:  [] 100  Resp:  [16-34] 16  BP: ()/(54-59) 100/57  SpO2:  [96 %-100 %] 96 %  AO: ()/(34-51) 106/46    Intake/Output:    Intake/Output Summary (Last 24 hours) at 12/2/2024 1533  Last data filed at 12/2/2024 1000  Gross per 24 hour   Intake 4788.13 ml   Output 210 ml   Net 4578.13 ml       Last 3 Weights   12/02/24 0400 129 lb 13.6 oz (58.9 kg)   12/01/24 1011 136 lb 0.4 oz (61.7 kg)   11/14/24 2058 136 lb (61.7 kg)   11/12/24 1535 136 lb (61.7 kg)       Exam  Physical Exam:  General: Lethargic, cooperative.  HEENT:  Normocephalic, atraumatic.  Neck:  Trachea midline.  No JVD.   Chest:  Clear to auscultation bilaterally. No wheezes, rales, or rhonchi.  CV:  Regular rate and rhythm.  Positive S1/S2. No murmur, no gallops, no rubs  GI: Bowel sounds present in all four quadrants, abdomen is firm.  Extremities:  No lower extremity edema or cyanosis.  Neurological: Moving extremities.  Skin:  Warm and dry.      Data Review:       Labs:     Recent Labs   Lab 12/01/24  1033 12/02/24  0512   WBC 59.2* 46.4*   HGB 9.1* 9.2*   MCV 78.6* 77.5*   .0* 522.0*   BAND 8 8       Recent Labs   Lab 12/01/24  1033 12/01/24  1705 12/02/24  0512     --  136   K 5.0  --  4.1     --  109   CO2 22.0  --  20.0*   BUN 33*  --  29*   CREATSERUM 0.67*  --  0.49*   CA 9.2  --  8.1*   MG  --  1.9 1.8   PHOS  --  4.9 4.6   GLU 83  --  114*       Recent Labs   Lab 12/01/24  1033 12/02/24  0512   ALT 9* 7*   AST 12 11   ALB 2.2* 2.1*       Recent Labs   Lab 12/02/24  1119   PGLU 125*       No results for input(s): \"TROP\" in the last 168 hours.    Meds:      vancomycin  125 mg Oral QID    metroNIDAZOLE  500 mg Intravenous Q8H    heparin  5,000 Units Subcutaneous Q8H TONYA    piperacillin-tazobactam  4.5 g Intravenous Q8H     vancomycin  15 mg/kg Intravenous Q24H    midodrine  5 mg Oral TID      lactated ringers 100 mL/hr at 12/02/24 1047    norepinephrine 3 mcg/min (12/02/24 1408)    norepinephrine 12 mcg/min (12/01/24 1915)    vasopressin (Vasostrict) 20 Units in sodium chloride 0.9% 100 mL infusion for septic shock         norepinephrine    vasopressin (Vasostrict) 20 Units in sodium chloride 0.9% 100 mL infusion for septic shock       Assessment/Plan:   79 year old male w/ a pmh of stage IV MS - bedbound, gout, eHTN, neurogenic bladder w/ chronic noriega multiple pressure ulcers, and recent bacteroides + pseudomonas bacteremia from rp abscess who presents with a cc diarrhea and weakness.     Septic shock  Diarrhea  Stage IV MS  Stage IV sacral pressure ulcers - present on admission  Neurogenic bladder with chronic indwelling noriega  eHTN  gout     PLAN:     Septic shock  Severe Cdiff colitis  Multiple decubitus ulcers  Leukocytosis  -Likely multiple etiologies - C. difficile PCR positive, UA dirty but likely colonized,  possibly infected pressure ulcers  -RP abscess resolved on imaging  -Cont with empiric broad spectrum abx (Vanc/zosyn)  -Continue p.o. Vanco/Flagyl  -ID following  -Continue to wean Levophed  -wound care  -f/u pending cultures, stool studies        Other chronic medical conditions (Stage IV MS, eHTN, gout)  -stable  -cpm    Discussed w/ ID     Outpatient records reviewed    [facility] Center[facility]

## 2024-12-02 NOTE — CONSULTS
[facility]   part of [facility]  Palliative Care Initial Consult Note    Brenden Cardenas Patient Status:  Inpatient    1945 MRN [id number]   Location [facility] 4SW-A Attending Jamila Leahy,    Hosp Day # 1 PCP Willow Mcmahon DO     Date of Consult: 2024  Patient seen at: [facility]    Reason for Consultation: Kiesha HERRING requested a consult for goals of care.      Subjective     History of Present Illness: Brenden Cardenas is a 79 year old male with a history of  Multiple sclerosis,  cervical stenosis, hypertension, multiple pressure ulcers,  chronic noriega for neurogenic bladder, recent bacteremia with retroperitoneal abscess s/p drainage and antibiotic treatment through 24,  who was admitted on 2024 for diarrhea, poor appetite and oral intake. Work up in our hospital revealed hypothermia, hypotension on pressors, leukocytosis, altered mental status, treated for sepsis with fluids and antibiotics, stool + for C- diff.     History was obtained from Epic and his wife.   Today is day #1 of hospitalization.   This is the 3rd hospitalization in the past 6 months.    When I entered the room, the patient was sleeping, easily wakes to name. His wife is present at bedside. Sim has complaints of pain in his throat, abdomen and generalized touch/ movement. His mouth is dry, he was offered water although difficult for him to take with a straw. When water offered with oral care, moaned and refused further care.      Review of Systems:   Dyspnea: denies   Cough: not observed  Appetite: poor       Bowel Movement         2024  0600             Stool Count Calculated for I/O: 1          Wt Readings from Last 6 Encounters:   24 129 lb 13.6 oz (58.9 kg)   24 136 lb (61.7 kg)   24 136 lb (61.7 kg)   24 136 lb (61.7 kg)   10/28/24 140 lb 3.4 oz (63.6 kg)   24 131 lb 2.8 oz (59.5 kg)        Palliative Care Social History:   Marital Status:    Children: Yes  Living Situation Prior to Admit: Home with family   Is Patient Confused: No  Occupational History: Retired  historically    Substance History:   reports that he has never smoked. He has never used smokeless tobacco.  reports current alcohol use.  reports no history of drug use.      Spiritual Assessment:   M[employer]on - Parish Not Listed    Past Medical History/Past Surgical History:       Medical History: obtained from [facility]  Past Medical History:    Anxiety state, unspecified    BACK PAIN    Bedbound    Calculus of kidney    Cervical stenosis of spine    COVID-19    Gout    Gout    High blood pressure    HYPERTENSION    Multiple sclerosis (HCC)    Osteoarthrosis, unspecified whether generalized or localized, unspecified site    OTHER DISEASES    GOUT    Paralysis (HCC)    Left arm and both legs    Pressure ulcer of sacrum    Seborrheic dermatitis    Sepsis with hypotension (HCC)    Skin cancer    Stage 4 decubitus ulcer (HCC)    Unspecified essential hypertension    Visual impairment    glasses     Past Surgical History:   Procedure Laterality Date    Debride wound      x7    Other accessory      \"urolift\"    Other accessory      multiple skin Bx's/removals    Other surgical history  02/2011    LUMBAR SX    Other surgical history      cervical surgery    Spine surgery procedure unlisted         Family History: obtained from [facility]  Family History   Problem Relation Age of Onset    Other (Other) Father         industrial lung abn    Other (gout) Father     Other (Other) Brother         colitis    Hypertension Mother        Allergies:  Allergies[1]    Medications:     Current Facility-Administered Medications:     lactated ringers infusion, , Intravenous, Continuous    vancomycin (Vancocin) cap 125 mg, 125 mg, Oral, QID    metroNIDAZOLE in sodium chloride 0.79% (Flagyl) 5 mg/mL IVPB premix 500 mg, 500 mg, Intravenous, Q8H    norepinephrine (Levophed) 4 mg/250mL infusion premix, 0.5-50  mcg/min, Intravenous, Continuous    norepinephrine (Levophed) 4 mg/250mL infusion premix, 0.5-50 mcg/min, Intravenous, Continuous PRN    vasopressin (Vasostrict) 20 Units in sodium chloride 0.9% 100 mL infusion for septic shock, 0.03 Units/min, Intravenous, Continuous PRN    piperacillin-tazobactam (Zosyn) 4.5 g in dextrose 5% 100 mL IVPB-ADDV, 4.5 g, Intravenous, Q8H    vancomycin (Vancocin) 1,000 mg in sodium chloride 0.9% 250 mL IVPB-ADDV, 15 mg/kg, Intravenous, Q24H    midodrine (ProAmatine) tab 5 mg, 5 mg, Oral, TID    Functional Status History:  ADLs: bathing or showering, dressing, getting in and out of bed or a chair, walking, using the toilet, and eating  - HIGH  5+ performance deficits   IADLs: use the phone, shop for groceries, meal preparation, manage medicines, clean living area, use transportation by self, manage money  - HIGH  5+ performance deficits   DME: bed bound. Use of yeison to assist in sitting in WC. He was undergoing wound care at home and the past week he had not been out of bed due to the worsening pressure on his wounds while up in the chair.       Palliative Performance Scale:     (pt/family reported) 30%    Current: 25%  % Ambulation Activity Level Self-Care Intake Consciousness   100 Full  Normal  No Disease Full Normal Full   90 Full  Normal  Some Disease Full Normal Full   80 Full  Normal w/effort  Some Disease Full Normal or reduced Full   70 Reduced  Can't Perform Job  Some Disease Full Normal or reduced Full   60 Reduced  Can't Perform Hobby   Significant Disease Occ Assist Normal or reduced Full or confused   50 Mainly sit/lie Can't do any work  Extensive Disease Partial Assist Normal or reduced Full or confused   40 Mainly in bed Can't do any work  Extensive Disease Mainly Assist Normal or reduced Full or confused   30 Bed Bound Can't do any work  Extensive Disease Max Assist  Total Care Reduced  Drowsy/confused   20 Bed Bound Can't do any work  Extensive Disease Max  Assist  Total Care Minimal  Drowsy/confused   10 Bed Bound Can't do any work  Extensive Disease Max Assist  Total Care Mouth Care  Drowsy/confused   0 Death        Objective      Vital Signs:  Blood pressure 100/57, pulse 100, temperature 98 °F (36.7 °C), temperature source Temporal, resp. rate 16, weight 129 lb 13.6 oz (58.9 kg), SpO2 96%.  Body mass index is 19.18 kg/m².    Non-verbal signs of pain present: Yes    Physical Exam:  General: drowsy although wakes spontaneously and to stimulation. In no apparent respiratory distress. Body habitus Thin   HEENT: Dry MM   Cardiac: regular rate  Lungs: Normal effort  Abdomen:  + tender,  + distended  Extremities: +2-3 LE edema present  Neurologic: Alert and oriented to   person, situation.   Skin: Warm and dry. Multiple sacral wounds and back wounds    Hematology:  Lab Results   Component Value Date    WBC 46.4 (H) 12/02/2024    HGB 9.2 (L) 12/02/2024    HCT 29.9 (L) 12/02/2024    .0 (H) 12/02/2024       Coags:  Lab Results   Component Value Date    INR 1.38 (H) 10/28/2024    PTT 33.5 06/27/2024       Chemistry:  Lab Results   Component Value Date    CREATSERUM 0.49 (L) 12/02/2024    BUN 29 (H) 12/02/2024     12/02/2024    K 4.1 12/02/2024     12/02/2024    CO2 20.0 (L) 12/02/2024     (H) 12/02/2024    CA 8.1 (L) 12/02/2024    ALB 2.1 (L) 12/02/2024    ALKPHO 72 12/02/2024    BILT 0.3 12/02/2024    TP 3.8 (L) 12/02/2024    AST 11 12/02/2024    ALT 7 (L) 12/02/2024    PSA 1.28 05/13/2021    DDIMER 2.28 (H) 02/02/2024    MG 1.8 12/02/2024    PHOS 4.6 12/02/2024       Imaging:  CT ABDOMEN+PELVIS(CONTRAST ONLY)(CPT=74177)    Result Date: 12/1/2024  CONCLUSION:  1. Development of a large amount of dense stool within the rectum with sterile colitis with some wall edema and pericolonic/Ashlee rectal edema noted.  Moderate amount of upstream stool noted.  No definite obstruction. 2. The previously noted abscess in the right pelvis/iliacus ileus psoas  region has resolved. 3. Additional nonemergent findings as described above which appears stable. 4. Mild distention of the gallbladder without wall thickening or gallstones.  No biliary tree dilation.    LOCATION:  [facility]   Dictated by (CST): Gary Toro MD on 12/01/2024 at 12:37 PM     Finalized by (CST): Gary Toro MD on 12/01/2024 at 12:44 PM       XR CHEST AP PORTABLE  (CPT=71045)    Result Date: 12/1/2024  CONCLUSION:  The patient is again noted to be markedly rotated to the right.  Marked degenerative change of the spine are noted.  Postsurgical changes lower cervical spine again noted. The heart size appears to be within normal limits with normal vascularity without effusion. There is some minimal reticular changes in the right infrahilar region and ground-glass opacities in the right retrocardiac region which are improved compared to the study of 10/25/2024 which may represent interstitial pneumonitis, fibrosis, or atelectasis.  Stable slight elevation the right hemidiaphragm.   LOCATION:  [facility]      Dictated by (CST): Gary Toro MD on 12/01/2024 at 11:12 AM     Finalized by (CST): Gary Toro MD on 12/01/2024 at 11:13 AM        Summary of Discussion      I discussed reason for palliative care consultation with his wife.    I differentiated the palliative treatment-focus model versus the hospice comfort-focused philosophy of care. I informed the patient/family that having palliative care support does not limit medical treatment options or decisions to those who wish to continue curative or restorative medical therapies. I discussed the benefits of palliative care to include assistance with arising symptom management needs, an extra layer of support, to ensure GOC are respected throughout healthcare continuum, and assist with transition to hospice care when appropriate.           Outpatient Hospice services:  24/7 phone triage services   RN visit one or more times per week  depending on need  Home health aid to assist in ADLs/hygiene   Hospice criteria:  Less than six-month prognosis   Must forego most life-prolonging  measures/treatments   Focus solely on comfort   Must sign onto hospice benefit with agency         Prognostic awareness/understanding: Excellent Vargas understands Sim's medical conditions, his decline over the past year. Alicia verbalized prior to this hospitalization Sim realized he was not going to improve from physical therapy and he was going to be bed bound.  His understanding of his mobility loss impacts his long term desire to sustain life. This hospitalization was the first time Sim had verbalized not wanting CPR or intubation. Alicia accepts his decisions and agrees as he is not the same person he was a year ago. His immobility, weight loss and recurrent hospitalizations have been difficult for Sim. At home he only took Aleve as needed for his left arm pain. He historically only had pain with moving his left arm. Currently he has generalized pain, difficult for Alicia to see Sim in pain.   Alicia understands the seriousness of his infection, she understands due to his underlying medical conditions it is difficult for him to fight infections and recover over and over.   Alicia only wishes for treatment that will give him a meaningful recovery. She will continue to gain information on testing, KUB, prior to escalating treatment. She would like to see if he can recover from the infection although pending possible invasive treatments she may set limitations.   If Sim declines a procedure for example a feeding tube or NG she is accepting for the procedure to not be completed. She understands if some treatments are declined it may lead to end of life.   Alicia herself asked about hospice. She would like to see how Sim does over the next day and then further discuss GOC. Currently Sim remains treatment focused. Alicia is aware she can accept  or decline escalation of care if his condition were to change.     Provided emotional support to Sim's wife.    Advance Care Planning counseling and discussion: DNAR with selective treatment.      POA  Alicia Cardenas, wife [phone].    POLST FORM - declined at this time.   DNAR/Selective Treatment    Principal Problem:    Septic shock (HCC)  Active Problems:    MS (multiple sclerosis) (HCC)    Urinary tract infection without hematuria, site unspecified    Pressure injury of buttock, stage 4, unspecified laterality (HCC)    Hypothermia, initial encounter    Stercoral colitis  Palliative Care encounter    Assessment and Recommendations      Goals of care:    Currently Sim remains treatment focused. Alicia/ wife is aware she can accept or decline escalation of care if his condition were to change.   Code Status: DNAR/Selective Treatment        Discussed today's visit with Adrian REAGAN    Palliative Care Follow Up: Palliative care team will Continue to follow while inpatient.    Thank you for allowing Palliative Care services to participate in the care of Brenden Cardenas.    A total of 75 minutes were spent on this consult, which included all of the following: chart review, direct face to face contact, history taking, physical examination, advanced care planning,  counseling, coordinating care and documentation.     Donna Griffith, APRN  12/2/2024  11:16 AM  Palliative Care Services    The 21st Century Cures Act makes medical notes like these available to patients in the interest of transparency. Please be advised this is a medical document. Medical documents are intended to carry relevant information, facts as evident, and the clinical opinion of the practitioner. The medical note is intended as peer to peer communication and may appear blunt or direct. It is written in medical language and may contain abbreviations or verbiage that are unfamiliar.          [1] No Known Allergies

## 2024-12-02 NOTE — DIETARY MALNUTRITION NOTE
[facility]   part of [facility]  NUTRITION ASSESSMENT    Pt meets severe malnutrition criteria at this time.    CRITERIA FOR MALNUTRITION DIAGNOSIS:  Criteria for severe malnutrition diagnosis: chronic illness related to wt loss greater than 7.5% in 3 months, energy intake less than 75% for greater than 1 month, body fat severe depletion, and muscle mass severe depletion    NUTRITION INTERVENTION:    RD nutrition Care Plan- Initiated ONS (oral nutritional supplements), Recommend EN (enteral nutrition) support if unable to take adequate PO safely within 1-2 days, and Ordered multivitamin  Meal and Snacks - Continue Low Fiber diet as tolerated; monitor patient po intake. Encourage adequate po of appropriate diet.  Medical Food Supplements - RD added Ensure Plus HP vanilla BID. Rationale/use for oral supplements discussed.  Enteral Nutrition - If DHT placed and confirmed in correct position, recommend initiating Jevity 1.5 at 20 ml/hr and advancing 20 ml/hr q4hrs to GOAL: Jevity 1.5 at 50 ml/hr.   This will provide 1800 kcal, 77 grams protein, 912 ml total free water, and 100% of RDI's.   Recommend 150 ml water flush q 4 hours, TF+FWF provides 1812 ml total fluids.   Vitamin and Mineral Supplements - Recommend adding Multivitamin with minerals  Coordination of Nutrition Care - Recommend Palliative care consult for goal of care    PATIENT STATUS: 79 year old male admitted on 12/1 presents with septic shock. Per H&P, \"Patient recently admitted 10/25-10/31 for bacteremia 2/2 rp abscess s/p drainage, and then was discharged to SNF from which he was discharged on 11/14. After returning home, patient developed diarrhea, decreased appetite, and worsening weakness.\" Visited pt at bedside with wife and son present. Most hx provided by family. They report pt with overall decreased appetite at baseline but has worsened over the past week. Pt denies N/V but has been having diarrhea; found to be +cdiff. Pt with more  firm/distended abdomen today so KUB ordered which showed fecal impaction. No chewing or swallowing dysfunction but chews slowly and thoroughly d/t MS. NKFA. Per family, pt got COVID in January and pt didn't eat much for 3 months and lost wt. However, in June appetite/PO intake was back to normal but then got sick again in September and appetite/PO intake has varied since then. Believes wt has been ~140-145 lbs. Pt does drink Ensure at home 2-3x/day as well as protein modular. Offered ONS and pt agreeable to vanilla Ensure. Wife also brought up possibility of feeding tube temporarily. Palliative care following for ongoing GOC discussions - continue treatment focused for now. Will continue to monitor and follow up as appropriate.    PMH: stage IV MS - bedbound, gout, HTN, neurogenic bladder w/ chronic noriega, multiple pressure ulcers    ANTHROPOMETRICS:  Ht:  5'9\"  Wt: 58.9 kg (129 lb 13.6 oz).   BMI: Body mass index is 19.18 kg/m².  IBW: 72.7 kg    WEIGHT HISTORY: Per chart, pt with ~27 lb wt loss x 8 months (17%, significant per standards); 11 lb wt loss x 2 months (8%).  Patient Weight(s) for the past 336 hrs:   Weight   12/02/24 0400 58.9 kg (129 lb 13.6 oz)   12/01/24 1011 61.7 kg (136 lb 0.4 oz)       Wt Readings from Last 10 Encounters:   12/02/24 58.9 kg (129 lb 13.6 oz)   11/14/24 61.7 kg (136 lb)   11/12/24 61.7 kg (136 lb)   11/05/24 61.7 kg (136 lb)   10/28/24 63.6 kg (140 lb 3.4 oz)   07/02/24 59.5 kg (131 lb 2.8 oz)   04/28/24 70.9 kg (156 lb 6.4 oz)   04/24/24 73.3 kg (161 lb 8 oz)   04/23/24 73 kg (161 lb)   04/19/24 69.9 kg (154 lb)        NUTRITION:  Diet:       Procedures    Low Fiber/Soft diet Low Fiber/Soft; Is Patient on Accuchecks? No      Food Allergies: No  Cultural/Ethnic/Zoroastrianism Preferences Addressed: Yes    Percent Meals Eaten (last 3 days)       None            GI SYSTEM REVIEW: diarrhea; last BM 12/2  Skin/Wounds: back wound, sacrum wound, groin wound, scrotum wound, b/l buttocks wound,  b/l ankle wounds    NUTRITION RELATED PHYSICAL FINDINGS:     1. Body Fat/Muscle Mass: moderate depletion body fat Triceps, severe depletion body fat Buccal fat pad, moderate muscle depletion Clavicle region, and severe muscle depletion Temple region, Thigh region, and Calf region     2. Fluid Accumulation: upper extremity edema 1+, hand edema 1+, lower extremity edema 1+, and foot edema 3+    NUTRITION PRESCRIPTION:  58.9 kg Actual Body Weight  Calories: 6716-3946 calories/day (25-30 kcal/kg)  Protein: 71-88 grams protein/day (1.2-1.5 gm/kg)  Fluid: ~1 ml/kcal or per MD discretion    NUTRITION DIAGNOSIS/PROBLEM:  Malnutrition related to insufficient appetite resulting in inadequate nutrition intake as evidenced by documented/reported insufficient oral intake, documented/reported unintentional weight loss, loss of fat mass, and loss of muscle mass    MONITOR AND EVALUATE/NUTRITION GOALS:  PO intake of 75% of meals TID - New  PO intake of 75% of oral nutrition supplement/s - New  Weight stable within 1 to 2 lbs during admission - New  Start alternative nutrition in 24-48 hrs if diet is not able to advance- New      MEDICATIONS:  Abx  Gtt: LR at 100 ml/hr, levophed at 2 mcg/min    LABS:  Reviewed     Pt is at High nutrition risk    Josselyn Bravo RD, LDN, CNSC  Clinical Dietitian  Spectra: [phone]

## 2024-12-02 NOTE — PROCEDURES
Arterial Line  Performed by: Chantal HERRING     General Information and Staff     Procedure Start: 2230  Patient Location:  ICU  Indication: continuous blood pressure monitoring and blood sampling needed    Site Identification: real time ultrasound guided, sterile technique used     Procedure Details     Catheter Size:  20 G  Catheter Length:  1 and 3/4 inchCatheter Type:  Arrow  Seldinger Technique?: Yes    Laterality:  right  Site: radial    Site Prep: chlorhexidine  Line Secured:  Tape and Tegaderm     Assessment: Ventura's test negative, Good flash, guidewire and catheter advanced without difficulty, pulsatile blood flow noted.    Events: patient tolerated procedure well with no complications

## 2024-12-02 NOTE — PLAN OF CARE
Received pt to unit from ED around 1500.  Pt is alert and answers orientation questions appropriately if given enough time to think.  Pt has moments where he will confuse some things and get his words mixed up, but is aware when he makes a mistake.     Lungs c/dim on RA.   HR ST in 110s, inverted T wave noted on EKG and monitor. CCAPRN aware.   B/P maintained on levo.  BS active in AQ.   Chronic noriega changed in ICU r/t sepsis dx and it was due to be changed according to the wife.  Multiple liquid BMs noted when patient was admitted, given extensive wounds, a rectal tube was placed.    All wounds were taken pictures of and placed in pt's media. Wounds include most of back, sacrum (WTD dressing placed), chanel buttocks, scrotal, chanel heels and ankles, and chest.   Mepilex dressings placed on most of the wounds, WTD sterile water Dx applied on sacral, hip, and buttocks wounds r/t depth and drainage. Wound care consulted.     Stool (+) for c.diff, MD aware, see orders, ID to f/u tomorrow with specifics on ABT management.     Pt verbalized at bedside that he did not want to have CPR or be intubated, Wife agreed with pt and CCAPRN discussed code status in depth at the bedside. Code status updated post conversation.    NOC RN endorsed. Call light in place, pt needs assistance with feeding r/t MS Dx. Bed alarm engaged, Sand bed ordered, pt and wife do not want the bed at this time. Will keep bed and attempt to re-educate spouse and patient in the morning.

## 2024-12-02 NOTE — PLAN OF CARE
Assumed care of pt following shift report. Pt is A&Ox2. Pt able to answer questions but is confused at times. Limited movement to RUE only. Pt very contracted and does not tolerate significant repositioning. Afebrile. Levo infusing and being titrated per orders. 500 mL bolus of LRx3; pt very dehydrated and responding well to fluids. A-line placed. Vigileo set up. BP is very labile. Tele monitor shows NSR/ST. Stable on room air; denies SOB. Pt able to take pills hole and denies nausea. Received pt with flexiseal in place; however, flexiseal is unable to be secured d/t lack of sphincter muscle tone and large wounds that surround the perianal area. Pt has large, watery stool that leaks into his wounds. He requires frequent changes and wound care d/t to this. Changed dressings. [place] w/ dark, cloudy urine. See MAR and flowsheets for additional assessments.

## 2024-12-03 NOTE — PROGRESS NOTES
[facility]   part of [facility] Infectious Disease Progress Note    Brenden Cardenas Patient Status:  Inpatient    1945 MRN [id number]   Location [facility] 4SW-A Attending Jamila Leahy DO   Hosp Day # 2 PCP Willow Mcmahon DO     Subjective:  Pt complains of on going sputum. Pt with some abd pain.     Objective:    Allergies:  Allergies[1]    Medications:    Current Facility-Administered Medications:     ceFEPIme (Maxipime) 1 g in sodium chloride 0.9% 100 mL IVPB-MBP, 1 g, Intravenous, Q8H    potassium chloride 40 mEq in 250mL sodium chloride 0.9% IVPB premix, 40 mEq, Intravenous, Once    docusate sodium (Colace) cap 100 mg, 100 mg, Oral, BID    polyethylene glycol (PEG 3350) (Miralax) 17 g oral packet 17 g, 17 g, Oral, Daily PRN    magnesium hydroxide (Milk of Magnesia) 400 MG/5ML oral suspension 30 mL, 30 mL, Oral, Daily PRN    bisacodyl (Dulcolax) 10 MG rectal suppository 10 mg, 10 mg, Rectal, Daily PRN    fleet enema (Fleet) rectal enema 133 mL, 1 enema, Rectal, Once PRN    lactated ringers infusion, , Intravenous, Continuous    metroNIDAZOLE in sodium chloride 0.79% (Flagyl) 5 mg/mL IVPB premix 500 mg, 500 mg, Intravenous, Q8H    heparin (Porcine) 5000 UNIT/ML injection 5,000 Units, 5,000 Units, Subcutaneous, Q8H TONYA    multivitamin (Tab-A-Nico/Beta Carotene) tab 1 tablet, 1 tablet, Oral, Daily    acetaminophen (Ofirmev) 10 mg/mL infusion premix 1,000 mg, 1,000 mg, Intravenous, Q6H PRN    vancomycin (Firvanq) 50 mg/mL oral solution 125 mg, 125 mg, Per G Tube, QID    vancomycin (Vancocin) 1,000 mg in sodium chloride 0.9% 250 mL IVPB-ADDV, 15 mg/kg, Intravenous, Q24H    Physical Exam:  General: Alert, orientated x3.  Cooperative.  No apparent distress.  Vital Signs:  Blood pressure 120/56, pulse 97, temperature 97.1 °F (36.2 °C), temperature source Temporal, resp. rate 16, weight 134 lb 0.6 oz (60.8 kg), SpO2 97%.   Temp (24hrs), Av.4 °F (36.3 °C), Min:97.1 °F (36.2 °C), Max:97.6  °F (36.4 °C)      HEENT: Exam is unremarkable.  Without scleral icterus.  Mucous membranes are moist. PERRLA.  Oropharynx is clear. NG in place  Neck: No tenderness to palpitation.  Full range of motion to flexion and extension, lateral rotation and lateral flexion of cervical spine.  No JVD. Supple.   Lungs: Clear to auscultation bilaterally.  Cardiac: Regular rate and rhythm. No murmur.  Abdomen:  Soft, non-distended, mild tenderness, with no rebound or guarding.   Extremities:  No lower extremity edema noted.  Without clubbing or cyanosis.    Skin: Multiple wounds, not examined, images in epic reviewed  Neurologic: Cranial nerves are grossly intact.  Motor strength and sensory examination is grossly normal.  No focal neurologic deficit.    Labs:  Lab Results   Component Value Date    WBC 33.3 12/03/2024    HGB 8.3 12/03/2024    HCT 27.3 12/03/2024    .0 12/03/2024    CREATSERUM 0.43 12/03/2024    BUN 27 12/03/2024     12/03/2024    K 3.4 12/03/2024     12/03/2024    CO2 20.0 12/03/2024    GLU 86 12/03/2024    CA 8.2 12/03/2024    ALB 2.0 12/03/2024    ALKPHO 56 12/03/2024    BILT 0.3 12/03/2024    TP 3.4 12/03/2024    AST 9 12/03/2024    ALT <7 12/03/2024    MG 2.2 12/03/2024    PHOS 4.6 12/03/2024         Assessment/Plan:    1.  Septic shock secondary to c diff  -weaned off pressor  -Ct with colitis  -abnormal UA, cultures with morganella; hx of chronic noriega, likely colonization  -on IV vancomycin, cefepime, metronidazole and PO vancomycin  2.  Leukocytosis  -trending down at 33k  3.  Recent polymicrobial sepsis  -blood culture with pseudomonas and bacteroides  -secondary to intra-abd abscess  -s/p IR drainage on 10/28/24  -s/p IV meropenem  4.  Multiple sacral wound  -unlikely/difficult to cure/resolve  -no obvious infection  5.  Advanced/end-stage MS  6.  Dispo  -continue with IV metronidazole and PO vancomycin  -discussed with Dr. Max and RN    If you have any questions or concerns  please call [facility] Infectious Disease at [phone].     NEVAEH Hinson         [1] No Known Allergies

## 2024-12-03 NOTE — PLAN OF CARE
Received bedside shift report around 0700  Pt is alert and answers orientation questions appropriately if given enough time to think.  Pt has moments where he will confuse some things and get his words mixed up, but is aware when he makes a mistake.He became more lethargic throughout the shift and is over-all soft-spoken.      Lungs c/dim on RA.   HR SR/ST 90-low 100s  B/P maintained on levo, had to increase dosage from yesterday. Albumin also given to assist w/ B/P. (Se MAR) CCAPRN aware.  BS active in AQ, pt had new onset ABD tenderness and increased tenderness throughout body w/ movements. This was increased from yesterday's observations.   Feeding tube dropped r/t FTT and inability to take PO. Palliative consulted per CCAPRN. More of an in depth GOC conversation to be had tomorrow in AM.   KUB ordered for ABD pain, resulted (see notes and results). Attempted manual disimpaction with no results. Pt had x3 liquid Bms on shift. Most recent was more mucous in color than brown.   Minimal UO in noriega. Color still mavis and hazy. Ucx resulted in eMAR. ID following.   Wound care did not visit pt today. Dressings changed as needed. No rectal tone noted, RT does not work for this pt.   NOC endorsed.

## 2024-12-03 NOTE — PROGRESS NOTES
[facility]   part of [facility]  Palliative Care Progress Note    Brenden Cardenas Patient Status:  Inpatient    1945 MRN [id number]   Location [facility] 4SW-A Attending Jamila Leahy DO   Hosp Day # 2 PCP Willow Mcmahon DO     History/Other:  history of  Multiple sclerosis,  cervical stenosis, hypertension, multiple pressure ulcers,  chronic noriega for neurogenic bladder, recent bacteremia with retroperitoneal abscess s/p drainage and antibiotic treatment through 24,  who was admitted on 2024 for diarrhea, poor appetite and oral intake. Work up in our hospital revealed hypothermia, hypotension on pressors, leukocytosis, altered mental status, treated for sepsis with fluids and antibiotics, stool + for C- diff.      Allergies:  Allergies[1]  Medications:     Current Facility-Administered Medications:     polyethylene glycol (PEG 3350) (Miralax) 17 g oral packet 17 g, 17 g, Oral, Daily PRN    bisacodyl (Dulcolax) 10 MG rectal suppository 10 mg, 10 mg, Rectal, Daily PRN    fleet enema (Fleet) rectal enema 133 mL, 1 enema, Rectal, Once PRN    docusate (Colace) 50 MG/5ML oral solution 100 mg, 100 mg, Oral, BID    dextrose 10% infusion (TPN no rate), , Intravenous, Continuous PRN    pancrelipase (Lip-Prot-Amyl) (Zenpep) DR particles cap 10,000 Units, 10,000 Units, Per G Tube, PRN **AND** sodium bicarbonate tab 325 mg, 325 mg, Per G Tube, PRN    lactated ringers infusion, , Intravenous, Continuous    metroNIDAZOLE in sodium chloride 0.79% (Flagyl) 5 mg/mL IVPB premix 500 mg, 500 mg, Intravenous, Q8H    heparin (Porcine) 5000 UNIT/ML injection 5,000 Units, 5,000 Units, Subcutaneous, Q8H TONYA    multivitamin (Tab-A-Nico/Beta Carotene) tab 1 tablet, 1 tablet, Oral, Daily    acetaminophen (Ofirmev) 10 mg/mL infusion premix 1,000 mg, 1,000 mg, Intravenous, Q6H PRN    vancomycin (Firvanq) 50 mg/mL oral solution 125 mg, 125 mg, Per G Tube, QID    Subjective      Interval events: Dobhoff placed  12/2/2024    When I entered the room, the patient was undergoing wound care evaluation. His son and wife are present at the hospital.   I met with the wife and son outside the room with his daughter on the phone to discuss acute illness and long term GOC.        Bowel Movement         12/3/2024  0200             Stool Count Calculated for I/O: 1            Objective:     Vital Signs:  Blood pressure 115/53, pulse 95, temperature 97.4 °F (36.3 °C), temperature source Temporal, resp. rate 25, weight 134 lb 0.6 oz (60.8 kg), SpO2 98%.  Body mass index is 19.79 kg/m².      Performance scale:30%  % Ambulation Activity Level Self-Care Intake Consciousness   100 Full  Normal  No Disease Full Normal Full   90 Full  Normal  Some Disease Full Normal Full   80 Full  Normal w/effort  Some Disease Full Normal or reduced Full   70 Reduced  Can't Perform Job  Some Disease Full Normal or reduced Full   60 Reduced  Can't Perform Hobby   Significant Disease Occ Assist Normal or reduced Full or confused   50 Mainly sit/lie Can't do any work  Extensive Disease Partial Assist Normal or reduced Full or confused   40 Mainly in bed Can't do any work  Extensive Disease Mainly Assist Normal or reduced Full or confused   30 Bed Bound Can't do any work  Extensive Disease Max Assist  Total Care Reduced  Drowsy/confused   20 Bed Bound Can't do any work  Extensive Disease Max Assist  Total Care Minimal  Drowsy/confused   10 Bed Bound Can't do any work  Extensive Disease Max Assist  Total Care Mouth Care  Drowsy/confused   0 Death          Physical Exam:  General: Drowsy although wakes easily. In no respiratory distress. Body habitus Cachectic   HEENT:  Dry MM   Cardiac:  regular rate  Extremities:  BLE edema present  Neurologic: Awake and oriented to  person, place  Skin: Warm and dry, multiple wounds, visualized during wound care.         Results:     Hematology:  Lab Results   Component Value Date    WBC 33.3 (H) 12/03/2024    HGB 8.3 (L)  12/03/2024    HCT 27.3 (L) 12/03/2024    .0 12/03/2024     Coags:  Lab Results   Component Value Date    INR 1.38 (H) 10/28/2024    PTT 33.5 06/27/2024     Chemistry:  Lab Results   Component Value Date    CREATSERUM 0.43 (L) 12/03/2024    BUN 27 (H) 12/03/2024     12/03/2024    K 3.4 (L) 12/03/2024     12/03/2024    CO2 20.0 (L) 12/03/2024    GLU 86 12/03/2024    CA 8.2 (L) 12/03/2024    ALB 2.0 (L) 12/03/2024    ALKPHO 56 12/03/2024    BILT 0.3 12/03/2024    TP 3.4 (L) 12/03/2024    AST 9 12/03/2024    ALT <7 (L) 12/03/2024    PSA 1.28 05/13/2021    DDIMER 2.28 (H) 02/02/2024    MG 2.2 12/03/2024    PHOS 4.6 12/03/2024     Imaging:  XR CHEST AP PORTABLE  (CPT=71045)    Result Date: 12/3/2024  CONCLUSION:  1. Tip of Dobbhoff tube in the expected location of the gastric body.   Clinical service notified of these findings with preliminary radiology report from [facility] services.    LOCATION:  [facility]      Dictated by (CST): Mary King MD on 12/03/2024 at 6:34 AM     Finalized by (CST): Mary King MD on 12/03/2024 at 6:36 AM       XR CHEST AP PORTABLE  (CPT=71045)    Result Date: 12/2/2024  CONCLUSION:  See above.   LOCATION:  [id number]      Dictated by (CST): Ray Gonzalez MD on 12/02/2024 at 4:33 PM     Finalized by (CST): Ray Gonzalez MD on 12/02/2024 at 4:34 PM       XR ABDOMEN (1 VIEW) (CPT=74018)    Result Date: 12/2/2024  CONCLUSION:  Large amount of stool seen within the rectum and sigmoid colon may reflect fecal impaction.  Patient has CT diagnosis of stercoral colitis.   LOCATION:  [facility]   Dictated by (CST): Jaylon Hebert MD on 12/02/2024 at 12:48 PM     Finalized by (CST): Jaylon Hebert MD on 12/02/2024 at 12:51 PM           Summary of Discussion :   A discussion was had related to his acute infection and treatment given his underlying chronic medical condition. Sim himself is not clear enough today to have detailed goals of care discussion. He does express concerns about his pain and  what is happening to his body. His son Dustin states he has always wanted to continue with care.   We discussed over the past year his health has been declining and his hospitalizations becoming closer together. His mobility has declined.   His extensive wounds were discussed in relation to his pain, skin as his largest organ that has broken down and potential for recurrent infections.   The family collectively agrees with ongoing medical management for his acute illness.When discussing big picture goals of care / discharge planning the family is considering LTC with or without hospice.   The son Dustin would like Sim's input on the decision of hospice. He is hopeful his mentation will clear as to provide the family his insight. Dustin is aware if Sim can not provide that insight his mother is the decision maker.     We discussed if during this hospitalization he has a clinical complication requiring escalation of care/ returning to the ICU they have the ability if they feel it is time to transition to comfort care/ hospice vs escalating treatment.      I returned to his room this afternoon. Alicia shared she spoke with Sim as he was asking questions about his care going forward. At the time of my visit Sim had the covers over his head. They talked about hospice. Sim will further discuss his wishes tomorrow with his children.    Advance Care Planning counseling and discussion: DNAR with selective treatment.       HC POA  surrogate . Healthcare Agent's Name: Alicia ZAVALETA FORM - will complete prior to discharge  DNAR/Selective Treatment    Principal Problem:    Septic shock (HCC)  Active Problems:    MS (multiple sclerosis) (HCC)    Urinary tract infection without hematuria, site unspecified    Pressure injury of buttock, stage 4, unspecified laterality (HCC)    Hypothermia, initial encounter    Stercoral colitis    Palliative care encounter    Goals of care, counseling/discussion      Assessment and  Recommendations   Goals of care:    Continue treatment focused medical management.   Code Status: DNAR/Selective Treatment  Healthcare Agent's Name: Alicia Cardenas      Discussed today's visit with Vincent REAGAN and Dr. Montero.     Palliative Care Follow Up: Palliative care team will Continue to follow while inpatient.      Thank you for allowing Palliative Care services to participate in the care of Brenden Cardenas.    A total of 50 minutes were spent on this follow up, which included all of the following: chart review, direct face to face contact,  physical examination, counseling, coordinating care and documentation.     NEVAEH Crews  12/3/2024   12:55 PM   Palliative Care Services    The 21st Century Cures Act makes medical notes like these available to patients in the interest of transparency. Please be advised this is a medical document. Medical documents are intended to carry relevant information, facts as evident, and the clinical opinion of the practitioner. The medical note is intended as peer to peer communication and may appear blunt or direct. It is written in medical language and may contain abbreviations or verbiage that are unfamiliar.          [1] No Known Allergies

## 2024-12-03 NOTE — CDS QUERY
Dear Doctor Armond,     Please provide a nutritional status, if known, related to the clinical information below     [   ] Severe Protein-Calorie Malnutrition  [   ]  Other Please specify :______________    DOCUMENTATION FROM THE MEDICAL RECORD      Clinical Indicators:   12/2 Dietary PN- Pt meets severe malnutrition criteria at this time.     CRITERIA FOR MALNUTRITION DIAGNOSIS:  Criteria for severe malnutrition diagnosis: chronic illness related to wt loss greater than 7.5% in 3 months, energy intake less than 75% for greater than 1 month, body fat severe depletion, and muscle mass severe depletion    Ht:  5'9\"  Wt: 58.9 kg (129 lb 13.6 oz).   BMI: Body mass index is 19.18 kg/m².    Skin/Wounds: back wound, sacrum wound, groin wound, scrotum wound, b/l buttocks wound, b/l ankle wounds     NUTRITION RELATED PHYSICAL FINDINGS:     1. Body Fat/Muscle Mass: moderate depletion body fat Triceps, severe depletion body fat Buccal fat pad, moderate muscle depletion Clavicle region, and severe muscle depletion Temple region, Thigh region, and Calf region     2. Fluid Accumulation: upper extremity edema 1+, hand edema 1+, lower extremity edema 1+, and foot edema 3+    NUTRITION DIAGNOSIS/PROBLEM:  Malnutrition related to insufficient appetite resulting in inadequate nutrition intake as evidenced by documented/reported insufficient oral intake, documented/reported unintentional weight loss, loss of fat mass, and loss of muscle mass    Pt is at High nutrition risk     Possible Risk Factors: multiple pressure ulcers, MS    Treatment: Dietary consult, strict I/O, dietary nutritional supplement BID, daily weights, clinitron rite td, tube feedings           Use of terms such as suspected, possible, or probable (associated with a specific diagnosis that is being evaluated, monitored, or treated as if it exists) are acceptable and can be coded in the inpatient setting, when documented at the time of discharge.     Please add any  additional documentation to your progress note and continue to document this through discharge.    For questions, please contact clinical  Vonda Lopez -[phone].  Thank you.    THIS FORM IS A PERMANENT PART OF THE MEDICAL RECORD

## 2024-12-03 NOTE — CM/SW NOTE
12/03/24 1300   CM/SW Referral Data   Referral Source    Reason for Referral Discharge planning   Informant Patient;Spouse/Significant Other     Met with pt and spouse to answer questions about discharging pt to [facility]. Spouse asking about Hospice. Discussed Medicare coverage under hospice. Informed spouse that Medicare does not pay room and board at [facility]. Per spouse pt has been to  under respite and she is aware of costs. Referral sent to  and inquired about hospice companies that they are contracted with. Will inform spouse when list available.     Daphne Vazquez, MSN RN, CM  [phone]

## 2024-12-03 NOTE — CDS QUERY
Dear Dr Leahy,    Please clarify the relationship, if any, between the UTI and chronic indwelling noriega catheter    [    ] There is an association and/or causal relationship between the UTI & Chronic Indwelling Noriega Catheter.  [    ] There is no association and/or causal relationship between the UTI & Chronic Indwelling Noriega Catheter.  [    ] Other (please specify): _________________      Documentation from the Medical Record    CLINICAL INDICATORS: 12/1 ED 79 Y/M- diarrhea, generalized weakness, hypotension    ED initial assessment: Patient arrives with noriega in place with dark mavis colored urine     Urine culture >100,000 CFU/ML Morganella morganii     12/1 NN- Chronic noriega changed in ICU r/t sepsis dx and it was due to be changed according to the wife.     12/1 Hospitalist PN- Neurogenic bladder with chronic indwelling noriega     12/3 Critical Care PN- Patient with morganella in urine, very thickened bladder, sediment in urine and some urinary symptoms. If treating as active infection would use cefepime or kalpana and not zosyn for morganella given its ability to be an AMP-C inducer, defer to ID in am.    12/3 ID PN- abnormal UA, cultures with morganella; hx of chronic noriega, likely colonization  -on IV vancomycin, cefepime, metronidazole and PO vancomycin        RISK FACTORS: Chronic Indwelling noriega catheter, paraplegia, neurogenic bladder, c diff, pressure ulcers    TREATMENT: urine culture, noriega exchange, IV vancomycin, cefepime, metronidazole and PO vancomycin    Use of terms such as suspected, possible, or probable (associated with a specific diagnosis that is being evaluated, monitored, or treated as if it exists) are acceptable and can be coded in the inpatient setting, when documented at the time of discharge.     Please add any additional documentation to your progress note and continue to document this through discharge.    Clinical : Vonda Lopez [phone]. Thank  You.    THIS FORM IS A PERMANENT PART OF THE MEDICAL RECORD

## 2024-12-03 NOTE — PROGRESS NOTES
Brendenodell Cardenas Patient Status:  Inpatient    1945 MRN [id number]   Formerly [facility] 4SW-A Attending Jamila Leahy DO   Hosp Day # 2 PCP Willow Mcmahon DO     Critical Care Progress Note          Subjective:  No acute events overnight. No longer requiring vasopressors.    Objective:    Medications:   cefepime  1 g Intravenous Q8H    potassium chloride  40 mEq Intravenous Once    metroNIDAZOLE  500 mg Intravenous Q8H    heparin  5,000 Units Subcutaneous Q8H TONYA    multivitamin  1 tablet Oral Daily    vancomycin  125 mg Per G Tube QID    vancomycin  15 mg/kg Intravenous Q24H    midodrine  5 mg Oral TID              Intake/Output Summary (Last 24 hours) at 12/3/2024 0843  Last data filed at 12/3/2024 0800  Gross per 24 hour   Intake 3502.27 ml   Output 420 ml   Net 3082.27 ml       /56   Pulse 97   Temp 97.1 °F (36.2 °C) (Temporal)   Resp 16   Wt 134 lb 0.6 oz (60.8 kg)   SpO2 97%   BMI 19.79 kg/m²     Physical Exam:     General Appearance: Alert, cooperative, oriented to self only  Neck: No JVD  Lungs: Clear to auscultation bilaterally, respirations unlabored  Heart: Regular rate and rhythm, S1 and S2 normal, no murmur, rub or gallop  Abdomen: distended, firm, tenderness to light palpation, bowel sounds active  Extremities: Atraumatic, 2+ pitting edema to bilateral fee, capillary refill <3 sec., multiple pressure ulcer wounds to bilateral LEs and sacrum, back, scrotal , heels (See pictures in Media)  Pulses: 2+ and symmetric all extremities  Skin: Skin color, texture, turgor normal for ethnicity, no rashes or lesions, warm and dry    Recent Labs   Lab 24  0418   RBC 3.46*   HGB 8.3*   HCT 27.3*   MCV 78.9*   MCH 24.0*   MCHC 30.4*   RDW 17.2   NEPRELIM 27.40*   WBC 33.3*   .0     Recent Labs   Lab 24  1033 24  0512 24  1454 24  0418   GLU 83 114* 100* 86   BUN 33* 29* 28* 27*   CREATSERUM 0.67* 0.49* 0.43* 0.43*   CA 9.2 8.1* 8.3* 8.2*   ALB 2.2* 2.1*   --  2.0*    136 140 140   K 5.0 4.1 3.8 3.4*    109 110 111   CO2 22.0 20.0* 20.0* 20.0*   ALKPHO 80 72  --  56   AST 12 11  --  9   ALT 9* 7*  --  <7*   BILT 0.3 0.3  --  0.3   TP 4.5* 3.8*  --  3.4*     Recent Labs   Lab 12/02/24 2122   ABGPHT 7.40   EGJXFL0X 30*   NPVDR7I 99   ABGHCO3 20.7*   ABGBE -5.5*   TEMP 98.6   SONA Not Applicable   SITE Arterial Line   DEV Room Air   THGB 8.5*     No results for input(s): \"BNP\" in the last 168 hours.  No results for input(s): \"TROP\", \"CK\" in the last 168 hours.    XR CHEST AP PORTABLE  (CPT=71045)    Result Date: 12/3/2024  CONCLUSION:  1. Tip of Dobbhoff tube in the expected location of the gastric body.   Clinical service notified of these findings with preliminary radiology report from [facility] services.    LOCATION:  [facility]      Dictated by (CST): Mary King MD on 12/03/2024 at 6:34 AM     Finalized by (CST): Mary King MD on 12/03/2024 at 6:36 AM       XR CHEST AP PORTABLE  (CPT=71045)    Result Date: 12/2/2024  CONCLUSION:  See above.   LOCATION:  [id number]      Dictated by (CST): Ray Gonzalez MD on 12/02/2024 at 4:33 PM     Finalized by (CST): Ray Gonzalez MD on 12/02/2024 at 4:34 PM       XR ABDOMEN (1 VIEW) (CPT=74018)    Result Date: 12/2/2024  CONCLUSION:  Large amount of stool seen within the rectum and sigmoid colon may reflect fecal impaction.  Patient has CT diagnosis of stercoral colitis.   LOCATION:  [facility]   Dictated by (CST): Jaylon Hebert MD on 12/02/2024 at 12:48 PM     Finalized by (CST): Jaylon Hebert MD on 12/02/2024 at 12:51 PM           Assessment/Plan:    Brenden Cardenas is a 79 year old male admitted to ICU for septic shock due to Cdiff with sterile colitis, possible UTI and multiple chronic wounds.        Shock: Likely multifactorial; Septic due to Cdiff with sterile colitis, possible UTI. +/- multiple chronic wounds. Recent admit with bacteroides fragilis and pseudomonas aeroginosa bacteremia, retroperitoneal abscess s/p  drainage and completion of meropenem x14 days. Hypovolemic given poor po intake x1 week and diarrhea x5 days  - Leukocytosis- improving, hypothermic- 95.1 in ED, now normothermic  - s/p 6 L IVF bolus in ED for sepsis  - LA 3.3-->2.3, now cleared  - Vasopressors weaned off 12/2  - CT A/P with resolution of abscess, colitis without perforation and megacolon  - Cdiff positive--PO vanco ordered  - UA positive, cx with morganella  - B. Cxs NGTD  - EKG with TWIs in noemy-lateral and lateral leads. Troponin negative   - Cefepime (12/3- ), flagyl (12/2- ), IV vanc (12/1- ), PO vanc  - ID following     Diarrhea: Complains of 5 days PTA  - CT A/P & KUB with fecal impaction- likely diarrhea with leakage around impaction. Concerned for ileus. Will give bowel regimen  - Cdiff positive, EIA positive  - Contact plus iso   - Stool panel negative  - IVFs     NAGMA: Likely due to GI losses/diarrhea  - Bicarb 20  - IVFs with LR  - Monitor BMP     Neurogenic bladder   - Chronic noriega-exchanged in ICU     Multiple chronic pressure ulcer wounds  - Wound care consult  - Home health RN 2x week, ID MD 1x/week (Wednesdays)  - ID consult  - CM consult--may need more assistance at home     Malnutrition, Poor PO intake/appetite  - Regular diet  - DHT inserted 12/2- consult to dietician for tube feed recs  - Monitor BMP, Mg and Phos     Multiple Sclerosis Stage IV, paraplegia  - Bedbound     Gout  - Allopurinol     Anemia  - Hgb stable  - No signs of active bleeding  - Daily CBC     HTN  - Hold home meds given shock    FEN  - Stop IVFs  - Replete lytes PRN  - NGT/Tube feeds    Proph  - Heparin subcutaneous  - Bedbound at baseline  - SCDs    Dispo:   - DNAR/Selective Treatment-confirmed with patient and wife  - Palliative care following  - Stable to transfer to general medicine floor    Plan of care discussed with intensivist, Dr. Montero.      Brandie Griffith [facility]  Critical Care  [id number]

## 2024-12-03 NOTE — DIETARY NOTE
[facility]   part of [facility]   CLINICAL NUTRITION    Nutrition referral was triggered based on consult for tube feed recs. Full nutrition assessment done yesterday (see RD note) with TF recommendations provided. DHT in place and confirms in gastric body.     Nutrition Recommendations:  Via DHT, initiate Jevity 1.5 at 20 ml/hr and advance 20 ml/hr q4hrs to GOAL: Jevity 1.5 at 50 ml/hr with  ml q4hrs.  Continue PO diet and Ensure Plus BID.    Will continue to monitor and follow up as appropriate.    Josselyn Bravo, RD, LDN, [facility]  Clinical Dietitian  Spectra: [phone]

## 2024-12-03 NOTE — CONSULTS
[facility]  Report of Inpatient Wound Care Consultation    Brenden Cardenas Patient Status:  Inpatient    1945 MRN [id number]   Location [facility] 4SW-A Attending Jamila Leahy DO   Hosp Day # 2 PCP Willow Mcmahon DO     Reason for Consultation:  Wounds    History of Present Illness:  Brenden Cardenas is a a(n) 79 year old male.  Patient with multiple comorbidities.    SUBJECTIVE:  It hurts.       History:  Past Medical History:    Anxiety state, unspecified    BACK PAIN    Bedbound    Calculus of kidney    Cervical stenosis of spine    COVID-19    Gout    Gout    High blood pressure    HYPERTENSION    Multiple sclerosis (HCC)    Osteoarthrosis, unspecified whether generalized or localized, unspecified site    OTHER DISEASES    GOUT    Paralysis (HCC)    Left arm and both legs    Pressure ulcer of sacrum    Seborrheic dermatitis    Sepsis with hypotension (HCC)    Skin cancer    Stage 4 decubitus ulcer (HCC)    Unspecified essential hypertension    Visual impairment    glasses     Past Surgical History:   Procedure Laterality Date    Debride wound      x7    Other accessory      \"urolift\"    Other accessory      multiple skin Bx's/removals    Other surgical history  2011    LUMBAR SX    Other surgical history      cervical surgery    Spine surgery procedure unlisted        reports that he has never smoked. He has never used smokeless tobacco. He reports current alcohol use. He reports that he does not use drugs.      Allergies:  @ALLERGY    Laboratory Data:    Recent Labs   Lab 24  1033 24  0512 24  1119 24  1454 24  0418 24  0553 24  1129   WBC 59.2* 46.4*  --   --  33.3*  --   --    HGB 9.1* 9.2*  --   --  8.3*  --   --    HCT 29.3* 29.9*  --   --  27.3*  --   --    .0* 522.0*  --   --  369.0  --   --    CREATSERUM 0.67* 0.49*  --  0.43* 0.43*  --   --    BUN 33* 29*  --  28* 27*  --   --    GLU 83 114*  --  100* 86  --   --    CA 9.2 8.1*  --  8.3*  8.2*  --   --    ALB 2.2* 2.1*  --   --  2.0*  --   --    TP 4.5* 3.8*  --   --  3.4*  --   --    PGLU  --   --  125*  --   --  98 78         ASSESSMENT:  Wound 02/02/24 Ankle Right;Lateral (Active)   Date First Assessed/Time First Assessed: 02/02/24 2200   Present on Original Admission: Yes  Primary Wound Type: Old surgical  Location: Ankle  Wound Location Orientation: Right;Lateral  Wound Description (Comments): dry wound from old skin CA site      Assessments 12/3/2024 10:19 AM   Wound Image     Wound Length (cm) 3 cm   Wound Width (cm) 3 cm   Wound Surface Area (cm^2) 9 cm^2   Wound Depth (cm) 0.2 cm   Wound Volume (cm^3) 1.8 cm^3       Wound 10/26/24 Back Medial;Proximal (Active)   Date First Assessed/Time First Assessed: 10/26/24 0008   Present on Original Admission: Yes  Primary Wound Type: Pressure Injury  Location: Back  Wound Location Orientation: Medial;Proximal      Assessments 12/3/2024 10:47 AM   Wound Image     Dressing Changed Changed   Wound Length (cm) 5.5 cm   Wound Width (cm) 4.5 cm   Wound Surface Area (cm^2) 24.75 cm^2   Wound Depth (cm) 0.2 cm   Wound Volume (cm^3) 4.95 cm^3   Wound Healing % -157   Margins Epibole (Rolled edges)   Wound Bed Slough (%) 100 %   Wound Odor None   Tunneling? No   Undermining? No   Sinus Tracts? No   Pressure Injury Stage Unstageable       Wound 10/26/24 Sacrum (Active)   Date First Assessed/Time First Assessed: 10/26/24 0010   Present on Original Admission: Yes  Primary Wound Type: Pressure Injury  Location: Sacrum      Assessments 12/3/2024 10:33 AM   Wound Image     Wound Length (cm) 15.5 cm   Wound Width (cm) 20.5 cm   Wound Surface Area (cm^2) 317.75 cm^2   Wound Depth (cm) 1.7 cm   Wound Volume (cm^3) 540.175 cm^3   Wound Healing % 25   Exposed Structure Muscle;Bone;Tendon;Adipose   Wound Odor None   Tunneling? No   Undermining? No   Pressure Injury Stage Stage 4       Wound 10/26/24 Buttocks Right (Active)   Date First Assessed/Time First Assessed: 10/26/24  0014   Present on Original Admission: Yes  Location: Buttocks  Wound Location Orientation: Right      Assessments 12/3/2024 10:48 AM   Wound Image     Dressing Changed Changed   Wound Length (cm) 12 cm   Wound Width (cm) 12.5 cm   Wound Surface Area (cm^2) 150 cm^2   Wound Depth (cm) 3.1 cm   Wound Volume (cm^3) 465 cm^3   Wound Healing % -87   Margins Epibole (Rolled edges)   Wound Bed Slough (%) 25 %   Wound Bed Eschar (%) 10 %   Exposed Structure Tendon;Muscle;Bone;Joint   Wound Odor None   Tunneling? No   Undermining? Yes   Number of Undermines 1   Undermine 1 Start Position 6   Undermine 1 End Position 9 (1.5)   Sinus Tracts? No   Pressure Injury Stage Stage 4       Wound 10/26/24 Foot Right;Lateral (Active)   Date First Assessed/Time First Assessed: 10/26/24 0016   Present on Original Admission: Yes  Primary Wound Type: Pressure Injury  Location: Foot  Wound Location Orientation: Right;Lateral      Assessments 12/3/2024 10:17 AM   Wound Image     Wound Length (cm) 2 cm   Wound Width (cm) 2 cm   Wound Surface Area (cm^2) 4 cm^2   Wound Depth (cm) 0.1 cm   Wound Volume (cm^3) 0.4 cm^3   Wound Healing % -471       Wound 10/26/24 Ankle Left;Medial (Active)   Date First Assessed/Time First Assessed: 10/26/24 0017   Present on Original Admission: Yes  Location: Ankle  Wound Location Orientation: Left;Medial      Assessments 12/3/2024 10:20 AM   Wound Image     Wound Length (cm) 2.3 cm   Wound Width (cm) 2.1 cm   Wound Surface Area (cm^2) 4.83 cm^2   Wound Depth (cm) 0.2 cm   Wound Volume (cm^3) 0.966 cm^3   Wound Healing % 23       Wound 10/26/24 Buttocks Left;Lower (Active)   Date First Assessed/Time First Assessed: 10/26/24 0019   Present on Original Admission: Yes  Primary Wound Type: Pressure Injury  Location: Buttocks  Wound Location Orientation: Left;Lower      Assessments 12/3/2024 10:35 AM   Wound Image     Wound Length (cm) 9.5 cm   Wound Width (cm) 5 cm   Wound Surface Area (cm^2) 47.5 cm^2   Wound Depth  (cm) 1.3 cm   Wound Volume (cm^3) 61.75 cm^3   Wound Healing % 12   Margins Epibole (Rolled edges)       Wound 10/26/24 Heel Left (Active)   Date First Assessed/Time First Assessed: 10/26/24 0020   Present on Original Admission: Yes  Location: Heel  Wound Location Orientation: Left      Assessments 12/3/2024 10:21 AM   Wound Image     Wound Length (cm) 0.5 cm   Wound Width (cm) 1.9 cm   Wound Surface Area (cm^2) 0.95 cm^2   Wound Depth (cm) 0 cm   Wound Volume (cm^3) 0 cm^3   Wound Healing % 100       Wound 10/26/24 Left;Plantar (Active)   Date First Assessed/Time First Assessed: 10/26/24 0023   Present on Original Admission: Yes  Wound Location Orientation: Left;Plantar      Assessments 12/3/2024 10:23 AM   Wound Image     Wound Length (cm) 0.5 cm   Wound Width (cm) 1.2 cm   Wound Surface Area (cm^2) 0.6 cm^2       Wound 10/29/24 Back Right;Lateral;Distal (Active)   Date First Assessed/Time First Assessed: 10/29/24 0919   Location: Back  Wound Location Orientation: Right;Lateral;Distal      Assessments 12/3/2024 10:46 AM   Wound Image     Wound Length (cm) 4.3 cm   Wound Width (cm) 3.5 cm   Wound Surface Area (cm^2) 15.05 cm^2   Wound Depth (cm) 0.2 cm   Wound Volume (cm^3) 3.01 cm^3   Wound Healing % -502       Wound 12/03/24 Back Left;Medial;Upper (Active)   Date First Assessed/Time First Assessed: 12/03/24 1031   Location: Back  Wound Location Orientation: Left;Medial;Upper      Assessments 12/3/2024 10:32 AM   Wound Image        Extensive wounds, all present on admission.  RN here to view.    Palliative Care following with goals of care discussion with patient and family.     Cleansed all, applied wet to moist to the sacral, R gluteal and R hip wounds, foam to the heel and lower leg wounds.    Recommend every 48 hours or prn.  These wounds are all Palliative in nature and will never heal.  I explained this to the wife.     Miscellaneous/Additional Orders:  Offloading: Air mattress, Turn Q 2 hours, and Heel  off-loading boot(s)    Care Summary:  Care Summary: Discussed Plan of Care at beside with patient. Patient verbally acknowledges understanding of all instructions and all questions were answered.      Additional Notes:  Goals of care discussion.       Thank you for this consultation and for allowing me to participate in the care of your patient.      Time Spent 45 Minutes.    Thank you,  Nydia Yoder, PT, MPT  Wound Care Clinician  [facility]  12/3/2024

## 2024-12-03 NOTE — PLAN OF CARE
Assumed patient care this morning.   Alert, awake. Confused. Frequently needing re-orientation. Denies pain at rest but states being \"tortured\" with care/turning. B/p stable. Offered food, declined. Loose stools, with some formed stool earlier. Tube feeds started through dobhoff. Seen by wound care due to multiple wounds, dressings changed. Placed on clinitron bed to help with wounds. Spouse/son at bedside, updated. Cleared to transfer out of ICU, awaiting bed placement.

## 2024-12-03 NOTE — PROGRESS NOTES
Patient with morganella in urine, very thickened bladder, sediment in urine and some urinary symptoms. If treating as active infection would use cefepime or kalpana and not zosyn for morganella given its ability to be an AMP-C inducer, defer to ID in am.    Alex Lechuga.  Critical Care

## 2024-12-03 NOTE — PROGRESS NOTES
[facility] Hospitalist Progress Note     PCP: Willow Mcmahon DO    SUBJECTIVE:  Patient resting in bed.  Denies chest pain or shortness of breath.    OBJECTIVE:  Temp:  [97.1 °F (36.2 °C)-97.6 °F (36.4 °C)] 97.4 °F (36.3 °C)  Pulse:  [92-99] 95  Resp:  [15-37] 25  BP: (115-120)/(53-56) 115/53  SpO2:  [95 %-100 %] 98 %  AO: ()/(31-75) 152/70    Intake/Output:    Intake/Output Summary (Last 24 hours) at 12/3/2024 1503  Last data filed at 12/3/2024 1244  Gross per 24 hour   Intake 4465.6 ml   Output 455 ml   Net 4010.6 ml       Last 3 Weights   12/03/24 0400 134 lb 0.6 oz (60.8 kg)   12/02/24 0400 129 lb 13.6 oz (58.9 kg)   12/01/24 1011 136 lb 0.4 oz (61.7 kg)   11/14/24 2058 136 lb (61.7 kg)   11/12/24 1535 136 lb (61.7 kg)       Exam  Physical Exam:  General: Awake, cooperative.  HEENT:  Normocephalic, atraumatic.  Neck:  Trachea midline.  No JVD.   Chest:  Clear to auscultation bilaterally. No wheezes, rales, or rhonchi.  CV:  Regular rate and rhythm.  Positive S1/S2. No murmur, no gallops, no rubs  GI: Bowel sounds present in all four quadrants, abdomen is firm.  Extremities:  B/L lower extremity edema, no cyanosis.  Neurological: Moving extremities.  Skin:  Warm and dry.      Data Review:       Labs:     Recent Labs   Lab 12/01/24  1033 12/02/24  0512 12/03/24  0418   WBC 59.2* 46.4* 33.3*   HGB 9.1* 9.2* 8.3*   MCV 78.6* 77.5* 78.9*   .0* 522.0* 369.0   BAND 8 8 10       Recent Labs   Lab 12/01/24  1033 12/01/24  1705 12/02/24  0512 12/02/24  1454 12/03/24  0418     --  136 140 140   K 5.0  --  4.1 3.8 3.4*     --  109 110 111   CO2 22.0  --  20.0* 20.0* 20.0*   BUN 33*  --  29* 28* 27*   CREATSERUM 0.67*  --  0.49* 0.43* 0.43*   CA 9.2  --  8.1* 8.3* 8.2*   MG  --  1.9 1.8  --  2.2   PHOS  --  4.9 4.6  --  4.6   GLU 83  --  114* 100* 86       Recent Labs   Lab 12/01/24  1033 12/02/24  0512 12/03/24  0418   ALT 9* 7* <7*   AST 12 11 9   ALB 2.2* 2.1* 2.0*       Recent Labs   Lab 12/02/24  1119  12/03/24  0553 12/03/24  1129   PGLU 125* 98 78       No results for input(s): \"TROP\" in the last 168 hours.    Meds:      docusate  100 mg Oral BID    metroNIDAZOLE  500 mg Intravenous Q8H    heparin  5,000 Units Subcutaneous Q8H TONYA    multivitamin  1 tablet Oral Daily    vancomycin  125 mg Per G Tube QID      dextrose 10%         polyethylene glycol (PEG 3350)    bisacodyl    fleet enema    dextrose 10%    lipase-protease-amylase (Lip-Prot-Amyl) **AND** sodium bicarbonate    acetaminophen       Assessment/Plan:   79 year old male w/ a pmh of stage IV MS - bedbound, gout, eHTN, neurogenic bladder w/ chronic noriega multiple pressure ulcers, and recent bacteroides + pseudomonas bacteremia from rp abscess who presents with a cc diarrhea and weakness.     Septic shock  Diarrhea  Stage IV MS  Stage IV sacral pressure ulcers - present on admission  Neurogenic bladder with chronic indwelling noriega  eHTN  gout     PLAN:     Septic shock  Severe Cdiff colitis  Multiple decubitus ulcers  Leukocytosis  -Likely multiple etiologies - C. difficile PCR positive, UA dirty but likely colonized,  possibly infected pressure ulcers  -RP abscess resolved on imaging  -Weaned off Levophed  -ID following.  Continue with p.o. Vanco and IV Flagyl.  -wound care        Other chronic medical conditions (Stage IV MS, eHTN, gout)  -stable  -cpm    Discussed with ARACELI Leahy DO  [facility] Hospitalist

## 2024-12-04 PROBLEM — A41.9 SEPSIS (HCC): Status: ACTIVE | Noted: 2024-01-01

## 2024-12-04 NOTE — PLAN OF CARE
Assumed patient care this morning.   More alert, awake, more oriented today as well. Stated this morning \"I am suffering\" when asked how is going. Able to verbalize he is ready for hospice, acknowledges this will lead to end of life and he \"is ready.\"  Son/spouse here and met with palliative care, discussion led to accepting hospice. Hospice to meet with patient/family to discuss process/initiation/timing.

## 2024-12-04 NOTE — PLAN OF CARE
Pt alert and oriented x1-2 Frequent reorientation required. Afebrile. NSR-ST on tele. Nrmotensive. On RA. Dobhoff in place, TF infusing per orders. De La Fuente in place/patent. C/o pain with turns, PRN given.     Tx orders in place awaiting bed.      See MAR and flowsheets for additional information.

## 2024-12-04 NOTE — PROGRESS NOTES
Infectious Disease Progress Note      Date of admission: 12/1/2024 10:06 AM     Reason for consult: Severe C. difficile colitis    Referring physician: Jamila Leahy DO    Subjective: Patient continues to be encephalopathic.  He is continuing to have watery stools, mostly mucousy.  He continues to complain of mild abdominal pain.  No nausea or vomiting.    The rest of the systems were reviewed and found to be negative except was mentioned above    Interval events: This is a 79-year-old male patient with history of end-stage multiple sclerosis, currently bedbound, with multiple pressure ulcers, who was recently hospital with Bacteroides and Pseudomonas bacteremia in the setting of a retroperitoneal abscess.  Received a prolonged course of IV antibiotics for the above infection now presenting with severe C. difficile colitis.  Currently on p.o. vancomycin and IV metronidazole.    Medications:    acetaminophen    polyethylene glycol (PEG 3350)    bisacodyl    fleet enema    docusate    dextrose 10%    lipase-protease-amylase (Lip-Prot-Amyl) **AND** sodium bicarbonate    metroNIDAZOLE    heparin    multivitamin    vancomycin     Allergies:  Allergies[1]    Physical Exam:  Vitals:    12/04/24 0800   BP: 123/60   Pulse: 100   Resp: 25   Temp: 97 °F (36.1 °C)     Vitals signs and nursing note reviewed.   Constitutional:       Appearance: Chronically ill appearance.   HENT:      Head: Normocephalic and atraumatic.      Mouth: Mucous membranes are moist.   Neck:      Musculoskeletal: Neck supple.   Cardiovascular:      Rate and Rhythm: Normal rate.      Heart sounds: Normal heart sounds. No murmur. No friction rub. No gallop.    Pulmonary:      Effort: Pulmonary effort is normal. No respiratory distress.      Breath sounds: Normal breath sounds. No stridor. No wheezing, rhonchi or rales.   Chest:      Chest wall: No tenderness.   Abdominal:      General: Abdomen is flat. There is no distension.      Palpations: Abdomen  is soft. There is no mass.      Tenderness: There is mild diffuse tenderness. There is no guarding or rebound.      Hernia: No hernia is present.   Skin:     General: Skin is warm and dry.   Neurological:      General: Encephalopathic      Laboratory data:  I have reviewed all the lab results independently.  Lab Results   Component Value Date    WBC 38.6 12/04/2024    HGB 10.0 12/04/2024    HCT 32.4 12/04/2024    .0 12/04/2024    CREATSERUM 0.47 12/04/2024    BUN 27 12/04/2024     12/04/2024    K 4.0 12/04/2024    K 4.0 12/04/2024     12/04/2024    CO2 17.0 12/04/2024     12/04/2024    CA 8.7 12/04/2024    ALB 2.0 12/04/2024    ALKPHO 67 12/04/2024    BILT 0.2 12/04/2024    TP 3.7 12/04/2024    AST 10 12/04/2024    ALT 9 12/04/2024      Recent Labs   Lab 12/04/24  0454   RBC 4.13   HGB 10.0*   HCT 32.4*   MCV 78.5*   MCH 24.2*   MCHC 30.9*   RDW 17.2   NEPRELIM 28.66*   WBC 38.6*   .0   NEUT 85   LYMPH 3   MON 4   EOS 0      Microbiology data:  Hospital Encounter on 12/01/24   1. Urine Culture, Routine     Status: Abnormal    Collection Time: 12/01/24 10:49 AM    Specimen: Urine, clean catch   Result Value Ref Range    Urine Culture >100,000 CFU/ML Morganella morganii (A) N/A       Susceptibility    Morganella morganii -  (no method available)     Ampicillin >=32 Resistant      Ampicillin + Sulbactam 16 Intermediate      Cefazolin >=64 Resistant      Cefepime <=1 Sensitive      Ceftriaxone <=1 Sensitive      Ciprofloxacin <=0.25 Sensitive      Gentamicin <=1 Sensitive      Meropenem 0.5 Sensitive      Levofloxacin <=0.12 Sensitive      Nitrofurantoin 128 Resistant      Piperacillin + Tazobactam <=4 Sensitive      Trimethoprim/Sulfa <=20 Sensitive    2. Blood Culture     Status: None (Preliminary result)    Collection Time: 12/01/24 10:34 AM    Specimen: Blood,peripheral   Result Value Ref Range    Blood Culture Result No Growth 2 Days N/A      Impression:  Brenden Cardenas is a 79 year  old male with    Severe C. difficile colitis, presenting here with sepsis and septic shock with threat to life  CT showing colitis without perforation or megacolon at time of admission; however, large amounts of stools noted concerning for developing ileus  Currently on p.o. vancomycin and IV metronidazole  Initially on IV vancomycin and IV Zosyn/cefepime given his septic picture, both discontinued on 12/3/2024 in the setting of negative cultures and no other source of infection  Pyuria with bacteriuria  No signs of UTI  This is in the setting of a chronic [place] Fuente catheter  Would not necessarily treatment  Multiple sclerosis with multiple decubitus ulcers  Do not appear to be infected at this point  Recent polymicrobial bacteremia with Pseudomonas aeruginosa and Bacteroides in the setting of a retroperitoneal abscess  Current CT showing resolution of his abscess  Completed 14-day course of meropenem as an outpatient in mid November    Recommendations:    Continue p.o. vancomycin 125 mg 4 times daily today along with IV Flagyl 500 mg every 8  Continue to trend his blood cell count and if it continues to trend upwards, then would repeat a CT of the abdomen pelvis  Supportive care as per the primary team  Continue to monitor daily labs for antibiotic toxicities  Further recommendations will depend on the above work-up and clinical progress     The plan of care was discussed with the primary hospital team, Jamila Leahy DO     Recommendations were also discussed with the patient; all questions were answered.     Thank you for this consultation. Please don't hesitate to call the ID team for questions or any acute changes in patient's clinical condition.    Please note that this report has been produced using speech recognition software and may contain errors related to that system including, but not limited to, errors in grammar, punctuation, and spelling, as well as words and phrases that possibly may have been  recognized inappropriately.  If there are any questions or concerns, contact the dictating provider for clarification.    The  [employer] Cures Act makes medical notes like these available to patients in the interest of transparency. Please be advised this is a medical document. Medical documents are intended to carry relevant information, facts as evident, and the clinical opinion of the practitioner. The medical note is intended as peer to peer communication and may appear blunt or direct. It is written in medical language and may contain abbreviations or verbiage that are unfamiliar.     Brendan Max MD  [facility]. Tel: [phone]. Fax: [phone].     Brenden Cardenas : 1945 MRN: [id number] CSN: [id number]          [1] No Known Allergies

## 2024-12-04 NOTE — PROGRESS NOTES
Brendenodell Cardenas Patient Status:  Inpatient    1945 MRN [id number]   Location [facility] [place] 4SW-A Attending Jamila Leahy DO   Hosp Day # 3 PCP Willow Mcmahon DO     Critical Care Progress Note      Subjective: No issues overnight.  Pt tired and ready to be comfortable/hospice.       Objective:    Medications:   docusate  100 mg Oral BID    metroNIDAZOLE  500 mg Intravenous Q8H    heparin  5,000 Units Subcutaneous Q8H TONYA    multivitamin  1 tablet Oral Daily    vancomycin  125 mg Per G Tube QID              Intake/Output Summary (Last 24 hours) at 2024 0845  Last data filed at 2024 0400  Gross per 24 hour   Intake 1916.33 ml   Output 295 ml   Net 1621.33 ml       /69 (BP Location: Left arm)   Pulse 103   Temp 97.4 °F (36.3 °C) (Temporal)   Resp 24   Wt 134 lb 0.6 oz (60.8 kg)   SpO2 100%   BMI 19.79 kg/m²     Physical Exam:     General Appearance: Alert, cooperative, oriented x4  Neck: No JVD  Lungs: Clear to auscultation bilaterally, respirations unlabored  Heart: Regular rate and rhythm, S1 and S2 normal, no murmur, rub or gallop  Abdomen: distended, firm, tenderness to light palpation, bowel sounds active  Extremities: Atraumatic, 2+ pitting edema to bilateral fee, capillary refill <3 sec., multiple pressure ulcer wounds to bilateral LEs and sacrum, back, scrotal , heels (See pictures in Media)  Pulses: 2+ and symmetric all extremities  Skin: Skin color, texture, turgor normal for ethnicity, no rashes or lesions, warm and dry    Recent Labs   Lab 24  0454   RBC 4.13   HGB 10.0*   HCT 32.4*   MCV 78.5*   MCH 24.2*   MCHC 30.9*   RDW 17.2   NEPRELIM 28.66*   WBC 38.6*   .0     Recent Labs   Lab 24  0512 24  1454 24  0418 24  0454   * 100* 86 150*   BUN 29* 28* 27* 27*   CREATSERUM 0.49* 0.43* 0.43* 0.47*   CA 8.1* 8.3* 8.2* 8.7   ALB 2.1*  --  2.0* 2.0*    140 140 139   K 4.1 3.8 3.4* 4.0  4.0    110 111 109   CO2 20.0* 20.0*  20.0* 17.0*   ALKPHO 72  --  56 67   AST 11  --  9 10   ALT 7*  --  <7* 9*   BILT 0.3  --  0.3 0.2   TP 3.8*  --  3.4* 3.7*     Recent Labs   Lab 12/02/24 2122   ABGPHT 7.40   QAUQJY1X 30*   ORQPD1U 99   ABGHCO3 20.7*   ABGBE -5.5*   TEMP 98.6   SONA Not Applicable   SITE Arterial Line   DEV Room Air   THGB 8.5*     No results for input(s): \"BNP\" in the last 168 hours.  No results for input(s): \"TROP\", \"CK\" in the last 168 hours.    No results found.       Assessment/Plan:    Brenden Cardenas is a 79 year old male admitted to ICU for septic shock due to Cdiff with sterile colitis, possible UTI and multiple chronic wounds.        Shock: Likely multifactorial; Septic due to Cdiff with sterile colitis, possible UTI. +/- multiple chronic wounds. Recent admit with bacteroides fragilis and pseudomonas aeroginosa bacteremia, retroperitoneal abscess s/p drainage and completion of meropenem x14 days. Hypovolemic given poor po intake x1 week and diarrhea x5 days  - Leukocytosis, hypothermic- 95.1 in ED, now normothermic  - s/p 6 L IVF bolus in ED for sepsis  - LA 3.3, now cleared  - Vasopressors weaned off 12/2  - CT A/P with resolution of abscess, colitis without perforation and megacolon  - Cdiff positive--PO vanco ordered  - UA positive, cx with morganella--likely colonized with chronic noriega  - B. Cxs NGTD  - EKG with TWIs in noemy-lateral and lateral leads. Troponin negative   - continue flagyl (12/2- ) and PO vanco, stopped Cefepime and IV vanc given cx negative  - ID following     Diarrhea: Complains of 5 days PTA  - CT A/P & KUB with fecal impaction- likely diarrhea with leakage around impaction. Concerned for ileus  - Bowel regimen  - Cdiff positive, EIA positive  - Contact plus iso   - Stool panel negative    NAGMA: Likely due to GI losses/diarrhea  - Bicarb 17  - IVFs stopped  - Monitor BMP     Neurogenic bladder   - Chronic noriega-exchanged in ICU     Multiple chronic pressure ulcer wounds  - Wound care consult  -  Home health RN 2x week, ID MD 1x/week (Wednesdays)  - ID consult  - CM consult--may need more assistance at home     Malnutrition, Poor PO intake/appetite  - Regular diet  - DHT inserted 12/2- TFs  - Monitor BMP, Mg and Phos     Multiple Sclerosis Stage IV, paraplegia  - Bedbound     Gout  - Allopurinol     Anemia  - Hgb stable  - No signs of active bleeding  - Daily CBC     HTN  - Hold home meds given shock    FEN  - Replete lytes PRN  - NGT/Tube feeds    Proph  - Heparin subcutaneous  - Bedbound at baseline  - SCDs    Dispo:   - DNAR/Selective Treatment-confirmed with patient and wife  - Palliative care following  - Stable to transfer to general medicine floor  -Patient ready to transition to hospice. Decision pending when wife and son arrive to bedside.     Plan of care discussed with intensivist, Dr. Montero.    Kiesha Magana, JARADP-BC  ICU  Phone  [phone]   Pager [phone]

## 2024-12-04 NOTE — OCCUPATIONAL THERAPY NOTE
OT order received, chart reviewed. At baseline, the patient needs total assist for bathing, dressing, and bed mobility. Per EMR, goals of care being discussed. Will hold initiation of OT services until goals of care are established.

## 2024-12-04 NOTE — HOSPICE RN NOTE
[facility] met with pt, wife Alicia and son at bedside. RH discussed hospice benefit, goals of care, levels of hospice care, medications used to treat symptoms related to end of life, revocation, medicare coverage. All questions encouraged and answered. Care companion book reviewed, consents signed by wife/POA.    Pt is a 80Y/O male with acute sepsis. Pt has a history of stage IV MS - bedbound, gout, eHTN, neurogenic bladder w/ chronic noriega, multiple pressure ulcers, and recent bacteroides + pseudomonas bacteremia from retroperitoneal abscess who presents with diarrhea and weakness. Pt brought to hospital having a decrease in appetite and an increase in weakness since discharge from SNF on 11/14/2024. On admission, pt had a WBC of 59.2, original lactic acid level of 3.3, hypothermic at 95.1 degrees and was hypotensive at 78/56. Pt was fluid resuscitated, BP did not respond so pt was transferred to ICU and started on pressors. Pt having a lot of pain but not currently receiving any pain management other than PO/IV tylenol x's 3 in last 2 days.    Pt received in bed, is chronically bedbound due to MS. Alert, awake. C/o generalized pain, moderate to severe. Only receiving IV/NG tylenol in hospital. Requesting more aggressive pain management. Pt also with several wounds, including stage 4. RR=24, appears unlabored, slightly tachypneic. NG with TF, family requesting to keep for now for oral vanco administration to reduce diarrhea. TF to continue tonight at current rate of 50ml/hr, reduce by half to 25ml/hr tomorrow. Education provided on oral care, pleasure feeds.    PPS: 20    Case discussed with Hospice Medical Director Dr. Bowling and Dr. Leahy who are aware and agreeable with POC to admit to [facility] hosp[facility] for pain management. New POLST signed for DNR/Comfort, MD aware needs signature. Chart flipped, comfort care order set ordered. Family requesting to trial oral liquid morphine concentrate first before  IVP morphine. IV morphine drip remains available PRN.     Pt inpatient hospice at this time for management of symptoms of pain with IV push medication. Pt requires frequent assessment by skilled nurse for medication administration/titration and for the observation of, and interventions for, all current and potential symptoms related to EOL. All questions encouraged and answered, will continue to monitor for comfort. [facility] to continue to offer services and provide support to patient and family as needed. POC discussed with Vincent REAGAN.    Heather Jackman RN, BSN  [facility] Nurse Liaison  Office: [phone] or After Hours: [phone]

## 2024-12-04 NOTE — CM/SW NOTE
Acknowledged Hospice order. Spoke with Hannah from [facility] and she will follow up with family.     Daphne Vazquez MSN RN, CM  X [phone]

## 2024-12-04 NOTE — HOSPICE RN NOTE
reached out to wife Alicia to set up mtg. Wife asking to meet between 4-5pm today once son is available to be present as well.  provided wife with #[phone] number and she will call when ready to meet. Please call  with any questions/changes.    Heather Jackman RN, BSN  [facility]  Transitional Nurse Liaison  [phone] or After hours: [phone]

## 2024-12-04 NOTE — SPIRITUAL CARE NOTE
Spiritual Care Visit Note    Patient Name: Brenden Cardenas Date of Spiritual Care Visit: 24   : 1945 Primary Dx: Septic shock (HCC)       Referred By:      Spiritual Care Taxonomy:    Intended Effects: Build relationship of care and support    Methods: Collaborate with care team member;Offer support;Offer spiritual/Muslim support;Offer emotional support    Interventions: Acknowledge response to difficult experience;Silent prayer    Visit Type/Summary:     - Spiritual Care: Consulted with RN and no spiritual care requested at this time. Offered silent prayer for patient and family.   remains available as needed for follow up.    Spiritual Care support can be requested via an [facility] consult. For urgent/immediate needs, please contact the On Call  at: Edward: ext [phone]    Rev. Claudia Birch MA  Chaplain Resident

## 2024-12-04 NOTE — PROGRESS NOTES
[facility] received hospice referral, will follow up today.    [facility]  [phone]

## 2024-12-04 NOTE — PROGRESS NOTES
[facility]   part of [facility]  Palliative Care Progress Note    Brenden Cardenas Patient Status:  Inpatient    1945 MRN [id number]   Location [facility] 4SW-A Attending Jamila Leahy DO   Hosp Day # 3 PCP Willow Mcmahon DO     History/Other:  history of  Multiple sclerosis,  cervical stenosis, hypertension, multiple pressure ulcers,  chronic noriega for neurogenic bladder, recent bacteremia with retroperitoneal abscess s/p drainage and antibiotic treatment through 24,  who was admitted on 2024 for diarrhea, poor appetite and oral intake. Work up in our hospital revealed hypothermia, hypotension on pressors, leukocytosis, altered mental status, treated for sepsis with fluids and antibiotics, stool + for C- diff.      Allergies:  Allergies[1]  Medications:     Current Facility-Administered Medications:     acetaminophen (Tylenol) 160 MG/5ML oral liquid 1,000 mg, 1,000 mg, Oral, Q6H PRN    polyethylene glycol (PEG 3350) (Miralax) 17 g oral packet 17 g, 17 g, Oral, Daily PRN    bisacodyl (Dulcolax) 10 MG rectal suppository 10 mg, 10 mg, Rectal, Daily PRN    fleet enema (Fleet) rectal enema 133 mL, 1 enema, Rectal, Once PRN    docusate (Colace) 50 MG/5ML oral solution 100 mg, 100 mg, Oral, BID    dextrose 10% infusion (TPN no rate), , Intravenous, Continuous PRN    pancrelipase (Lip-Prot-Amyl) (Zenpep) DR particles cap 10,000 Units, 10,000 Units, Per G Tube, PRN **AND** sodium bicarbonate tab 325 mg, 325 mg, Per G Tube, PRN    metroNIDAZOLE in sodium chloride 0.79% (Flagyl) 5 mg/mL IVPB premix 500 mg, 500 mg, Intravenous, Q8H    heparin (Porcine) 5000 UNIT/ML injection 5,000 Units, 5,000 Units, Subcutaneous, Q8H TONYA    multivitamin (Tab-A-Nico/Beta Carotene) tab 1 tablet, 1 tablet, Oral, Daily    vancomycin (Firvanq) 50 mg/mL oral solution 125 mg, 125 mg, Per G Tube, QID    Subjective      Interval events: continues to have pain from pressure ulcers or movement. He states at rest on the  current bed his pain improved. His mentation has improved and he understands his current acute illness, chronic illness and the decision of choosing hospice will lead to end of life.     Review of Systems:  Dyspnea: denies  Cough: cough following a drink of water  Appetite:  dobhoff in place    Bowel Movement         12/4/2024  0200             Stool Count Calculated for I/O: 1          Objective:     Vital Signs:  Blood pressure 123/60, pulse 100, temperature 97 °F (36.1 °C), temperature source Temporal, resp. rate 25, weight 134 lb 0.6 oz (60.8 kg), SpO2 100%.  Body mass index is 19.79 kg/m².      Performance scale:30%  % Ambulation Activity Level Self-Care Intake Consciousness   100 Full  Normal  No Disease Full Normal Full   90 Full  Normal  Some Disease Full Normal Full   80 Full  Normal w/effort  Some Disease Full Normal or reduced Full   70 Reduced  Can't Perform Job  Some Disease Full Normal or reduced Full   60 Reduced  Can't Perform Hobby   Significant Disease Occ Assist Normal or reduced Full or confused   50 Mainly sit/lie Can't do any work  Extensive Disease Partial Assist Normal or reduced Full or confused   40 Mainly in bed Can't do any work  Extensive Disease Mainly Assist Normal or reduced Full or confused   30 Bed Bound Can't do any work  Extensive Disease Max Assist  Total Care Reduced  Drowsy/confused   20 Bed Bound Can't do any work  Extensive Disease Max Assist  Total Care Minimal  Drowsy/confused   10 Bed Bound Can't do any work  Extensive Disease Max Assist  Total Care Mouth Care  Drowsy/confused   0 Death          Physical Exam:  General: awake, softly spoken. In no respiratory distress. Body habitus Cachectic   HEENT:  No gross focal deficits. Dry MM   Cardiac:  regular rate  Lungs: diminished  Abdomen: Soft  Extremities: + BLE edema present, +UE edema  Neurologic: Alert and oriented to  person, place,  situation   Psychiatric: Mood - pleasant   Skin: Warm and dry. Multiple sacral wounds  and back wounds      Results:     Hematology:  Lab Results   Component Value Date    WBC 38.6 (H) 12/04/2024    HGB 10.0 (L) 12/04/2024    HCT 32.4 (L) 12/04/2024    .0 12/04/2024     Coags:  Lab Results   Component Value Date    INR 1.38 (H) 10/28/2024    PTT 33.5 06/27/2024     Chemistry:  Lab Results   Component Value Date    CREATSERUM 0.47 (L) 12/04/2024    BUN 27 (H) 12/04/2024     12/04/2024    K 4.0 12/04/2024    K 4.0 12/04/2024     12/04/2024    CO2 17.0 (L) 12/04/2024     (H) 12/04/2024    CA 8.7 12/04/2024    ALB 2.0 (L) 12/04/2024    ALKPHO 67 12/04/2024    BILT 0.2 12/04/2024    TP 3.7 (L) 12/04/2024    AST 10 12/04/2024    ALT 9 (L) 12/04/2024    PSA 1.28 05/13/2021    DDIMER 2.28 (H) 02/02/2024    MG 2.2 12/03/2024    PHOS 4.6 12/03/2024     Imaging:  XR CHEST AP PORTABLE  (CPT=71045)    Result Date: 12/3/2024  CONCLUSION:  1. Tip of Dobbhoff tube in the expected location of the gastric body.   Clinical service notified of these findings with preliminary radiology report from [facility] services.    LOCATION:  [facility]      Dictated by (CST): Mary King MD on 12/03/2024 at 6:34 AM     Finalized by (CST): Mary King MD on 12/03/2024 at 6:36 AM       XR CHEST AP PORTABLE  (CPT=71045)    Result Date: 12/2/2024  CONCLUSION:  See above.   LOCATION:  [id number]      Dictated by (CST): Ray Gonzalez MD on 12/02/2024 at 4:33 PM     Finalized by (CST): Ray Gonzalez MD on 12/02/2024 at 4:34 PM       XR ABDOMEN (1 VIEW) (CPT=74018)    Result Date: 12/2/2024  CONCLUSION:  Large amount of stool seen within the rectum and sigmoid colon may reflect fecal impaction.  Patient has CT diagnosis of stercoral colitis.   LOCATION:  [facility]   Dictated by (CST): Jaylon Hebert MD on 12/02/2024 at 12:48 PM     Finalized by (CST): Jaylon Hebert MD on 12/02/2024 at 12:51 PM           Summary of Discussion : This morning at the bedside, Sim verbalized he is not doing well. We reviewed his discussion with  his wife yesterday about transitioning to comfort care supported by hospice. Sim stated yes, it has been years of pain. We reviewed his pain can be managed on hospice, the tube in his nose can be removed. He nodded to wanting the tube out. Sim will wait for his wife to arrive today and then will express his wishes to his children.     1157: I called the wife and we discussed Sim is still asking for hospice. He also would like the dobhoff out. We discussed having [facility] evaluate him for GIP, she is hopeful he will qualify for GIP. She asked me to place the hospice consult now. Her son will be by later this afternoon but she would like the hospice process started in the meantime. Sim had verbalized he wanted to tell his children himself he is ready for hospice. She is concerned his pain has been unmanaged and hopeful transitioning to comfort care he can have pain medication for relief. I acknowledged once on comfort care it is very reasonable to give him pain  medication.     1600: Sim's son and wife at the bedside. Sim was able to verbalize he is ready to stop treatment, have the NG removed knowing it will lead to end of life. He asked \"how long do I have? \" We discussed only one person knows that answer, without nutrition and hydration most likely days to weeks. The pain medication will cause drowsiness. Sim is aware hospice care can be done at [facility] will determine if he can stay in the hospital under hospice care if so desired. We discussed the transition to [facility] most likely will cause pain due to the cart, ambulance ride and change in bed. He acknowledged. The family and Sim will meet with hospice this afternoon to gain an understanding on hospice care going forward.     Advance Care Planning counseling and discussion:       HC POA  surrogate . Healthcare Agent's Name: Alicia ZAVALETA FORM - not completed as code status will potentially change to comfort care after Sim talks  to his children.       Principal Problem:    Septic shock (HCC)  Active Problems:    MS (multiple sclerosis) (HCC)    Urinary tract infection without hematuria, site unspecified    Pressure injury of buttock, stage 4, unspecified laterality (HCC)    Hypothermia, initial encounter    Stercoral colitis    Palliative care encounter    Goals of care, counseling/discussion      Assessment and Recommendations   Goals of care:    Continue current medical management until Sim has the opportunity to discuss his GOC of care with his children.   Alicia/ his wife would like to start the hospice process. [facility] hospice consult placed.   Healthcare Agent's Name: Alicia Cardenas      Discussed today's visit with Vincent REAGAN, Kiesha HERRING, Daphne ESTEVES and Dr. Montero.    Thank you for allowing Palliative Care services to participate in the care of Brenden Cardenas.    A total of 35 minutes were spent on this follow up, which included all of the following: chart review, direct face to face contact,  physical examination, counseling, coordinating care and documentation.     NEVAEH Crews  12/4/2024   9:40 AM   Palliative Care Services    The 21st Century Cures Act makes medical notes like these available to patients in the interest of transparency. Please be advised this is a medical document. Medical documents are intended to carry relevant information, facts as evident, and the clinical opinion of the practitioner. The medical note is intended as peer to peer communication and may appear blunt or direct. It is written in medical language and may contain abbreviations or verbiage that are unfamiliar.          [1] No Known Allergies

## 2024-12-05 NOTE — DISCHARGE SUMMARY
[facility] Hospitalist Discharge Summary     Patient ID:  Brenden Cardenas  79 year old  5/6/1945    Admit date: 12/1/2024  Discharge date and time: 12/4/2024  6:20 PM   Attending Physician: No att. providers found   Primary Care Physician: Willow Mcmahon DO   Discharge Diagnoses: MS (multiple sclerosis) ([facility]) [G35]  Hypothermia, initial encounter [T68.XXXA]  Urinary tract infection without hematuria, site unspecified [N39.0]  Septic shock ([facility]) [A41.9, R65.21]  Pressure injury of buttock, stage 4, unspecified laterality ([facility]) [L89.304]  Stercoral colitis [K52.89]      Disposition:  [facility]      Hospital Course:  79 year old male w/ a pmh of stage IV MS - bedbound, gout, eHTN, neurogenic bladder w/ chronic noriega multiple pressure ulcers, and recent bacteroides + pseudomonas bacteremia from rp abscess who presents with a cc diarrhea and weakness.  Patient recently admitted 10/25-10/31 for bacteremia 2/2 rp abscess s/p drainage, and then was discharged to SNF from which he was discharged on 11/14.  After returning home, patient developed diarrhea, decreased appetite, and worsening weakness.  Found to have white significantly elevated white count, hypothermia, and hypotension.  Given fluids for sepsis without significant improvement in BP, so admitted to ICU for pressor support.  The patient eventually decided to pursue hospice.     Septic shock  Diarrhea  Stage IV MS  Stage IV sacral pressure ulcers - present on admission  Neurogenic bladder with chronic indwelling noriega  eHTN  gout     PLAN:     Septic shock  Severe Cdiff colitis  Multiple decubitus ulcers  Leukocytosis  -Likely multiple etiologies - C. difficile PCR positive, UA dirty but likely colonized,  possibly infected pressure ulcers  -RP abscess resolved on imaging  -Weaned off Levophed  -ID following.  Continue with p.o. Vanco and IV Flagyl.  -wound care  -Transitioned to [facility]        Other chronic medical conditions (Stage IV MS, eHTN, gout)  -stable  -cpm       Exam on  Day of DC:  /60 (BP Location: Left arm)   Pulse 97   Temp 97.5 °F (36.4 °C) (Temporal)   Resp 24   Wt 134 lb 0.6 oz (60.8 kg)   SpO2 98%   BMI 19.79 kg/m²   Physical Exam:  General: Awake, cooperative.  HEENT:  Normocephalic, atraumatic.  Neck:  Trachea midline.  No JVD.   Chest:  Clear to auscultation bilaterally. No wheezes, rales, or rhonchi.  CV:  Regular rate and rhythm.  Positive S1/S2. No murmur, no gallops, no rubs  GI: Bowel sounds present in all four quadrants, abdomen is firm.  Extremities:  B/L lower extremity edema, no cyanosis.  Neurological: Moving extremities.  Skin:  Warm and dry.         I as the attending physician reconciled the current and discharge medications on day of discharge.     Discharge Medication List as of 12/4/2024  6:20 PM        CONTINUE these medications which have NOT CHANGED    Details   naproxen (ALEVE) 220 MG Oral Tab Take 1-2 tablets (220-440 mg total) by mouth as needed (for pain)., Historical      HYDROcodone-acetaminophen 5-325 MG Oral Tab Take 1 tablet by mouth every 6 (six) hours as needed for Pain. and 30 minutes before dressing changes, Historical      acetaminophen 325 MG Oral Tab Take 2 tablets (650 mg total) by mouth every 6 (six) hours as needed for Pain., Historical      polyethylene glycol, PEG 3350, 17 g Oral Powd Pack Take 17 g by mouth daily as needed., Historical      ipratropium-albuterol 0.5-2.5 (3) MG/3ML Inhalation Solution Take 3 mL by nebulization every 4 (four) hours as needed., Historical      ergocalciferol 1.25 MG (49888 UT) Oral Cap Take 1 capsule (50,000 Units total) by mouth once a week., Historical      metoprolol succinate ER 25 MG Oral Tablet 24 Hr Take 4 tablets (100 mg total) by mouth daily., Normal, Disp-120 tablet, R-0      allopurinol 300 MG Oral Tab Take 1 tablet (300 mg total) by mouth daily., Historical           STOP taking these medications       sodium chloride 0.9% SOLN 100 mL with meropenem 1 g SOLR 1 g               Jamila Leahy DO  [facility]

## 2024-12-05 NOTE — PLAN OF CARE
Pt alert and oriented x1-3. DNAR/Comfort. PRNs given, See MAR. TF infusing per orders. Comfort maintained, See flowsheets.

## 2024-12-05 NOTE — H&P
[facility] Hospitalist History and Physical      No chief complaint on file.       PCP: Willow Mcmahon DO      History of Present Illness: 79 year old male w/ a pmh of stage IV MS - bedbound, gout, eHTN, neurogenic bladder w/ chronic noriega multiple pressure ulcers, and recent bacteroides + pseudomonas bacteremia from rp abscess who presents with a cc diarrhea and weakness.  Patient recently admitted 10/25-10/31 for bacteremia 2/2 rp abscess s/p drainage, and then was discharged to SNF from which he was discharged on 11/14.  After returning home, patient developed diarrhea, decreased appetite, and worsening weakness.  Found to have white significantly elevated white count, hypothermia, and hypotension.  Given fluids for sepsis without significant improvement in BP, so admitted to ICU for pressor support.  The patient eventually decided to pursue hospice.  He was admitted to [facility].    Past Medical History:    Anxiety state, unspecified    BACK PAIN    Bedbound    Calculus of kidney    Cervical stenosis of spine    COVID-19    Gout    Gout    High blood pressure    HYPERTENSION    Multiple sclerosis (HCC)    Osteoarthrosis, unspecified whether generalized or localized, unspecified site    OTHER DISEASES    GOUT    Paralysis (HCC)    Left arm and both legs    Pressure ulcer of sacrum    Seborrheic dermatitis    Sepsis with hypotension (HCC)    Skin cancer    Stage 4 decubitus ulcer (HCC)    Unspecified essential hypertension    Visual impairment    glasses      Past Surgical History:   Procedure Laterality Date    Debride wound      x7    Other accessory      \"urolift\"    Other accessory      multiple skin Bx's/removals    Other surgical history  02/2011    LUMBAR SX    Other surgical history      cervical surgery    Spine surgery procedure unlisted          ALL:  Allergies[1]     No current outpatient medications on file.       Social History     Tobacco Use    Smoking status: Never    Smokeless tobacco: Never    Substance Use Topics    Alcohol use: Yes     Comment: once every few days        Fam Hx  Family History   Problem Relation Age of Onset    Other (Other) Father         industrial lung abn    Other (gout) Father     Other (Other) Brother         colitis    Hypertension Mother        Review of Systems  Comprehensive ROS reviewed and negative except for what is stated in HPI.      OBJECTIVE:  /66 (BP Location: Left arm)   Pulse 94   Temp 97.3 °F (36.3 °C) (Temporal)   Resp 14   SpO2 97%   Physical Exam:  General: Awake, cooperative.  HEENT:  Normocephalic, atraumatic.  Neck:  Trachea midline.  No JVD.   Chest:  Clear to auscultation bilaterally. No wheezes, rales, or rhonchi.  CV:  Regular rate and rhythm.  Positive S1/S2. No murmur, no gallops, no rubs  GI: Bowel sounds present in all four quadrants, abdomen is firm.  Extremities:  B/L lower extremity edema, no cyanosis.  Neurological: Moving extremities.  Skin:  Warm and dry.        Data Review:    LABS:          Radiology: XR CHEST AP PORTABLE  (CPT=71045)    Result Date: 12/3/2024  PROCEDURE:  XR CHEST AP PORTABLE  (CPT=71045)  TECHNIQUE:  AP chest radiograph was obtained.  COMPARISON:  [facility] , CT, CT ABDOMEN+PELVIS(CONTRAST ONLY)(CPT=74177), 12/01/2024, 11:39 AM.  [facility] , XR, XR CHEST AP PORTABLE  (CPT=71045), 12/02/2024, 4:00 PM.  INDICATIONS:  Dobhoff placement  PATIENT STATED HISTORY: (As transcribed by Technologist)     FINDINGS:  Single image of the lower chest and upper abdomen demonstrate radiopaque tip of Dobbhoff in the expected location of the gastric body.            CONCLUSION:  1. Tip of Dobbhoff tube in the expected location of the gastric body.   Clinical service notified of these findings with preliminary radiology report from [facility] services.    LOCATION:  [facility]      Dictated by (CST): Mary King MD on 12/03/2024 at 6:34 AM     Finalized by (CST): Mary King MD on 12/03/2024 at 6:36 AM       XR CHEST AP PORTABLE   (CPT=71045)    Result Date: 12/2/2024  PROCEDURE:  XR CHEST AP PORTABLE  (CPT=71045)  TECHNIQUE:  AP chest radiograph was obtained.  COMPARISON:  [facility] , XR, XR CHEST AP PORTABLE  (CPT=71045), 12/01/2024, 10:49 AM.  INDICATIONS:  Enteric tube placement  PATIENT STATED HISTORY: (As transcribed by Technologist)  Tube placement.    FINDINGS:  An enteric tube overlies the expected region of the stomach.  Please note that the chest was not entirely included within the field of view.  Low lung volumes somewhat limit assessment.            CONCLUSION:  See above.   LOCATION:  [id number]      Dictated by (CST): Ray Gonzalez MD on 12/02/2024 at 4:33 PM     Finalized by (CST): Ray Gonzalez MD on 12/02/2024 at 4:34 PM       XR ABDOMEN (1 VIEW) (CPT=74018)    Result Date: 12/2/2024  PROCEDURE:  XR ABDOMEN (1 VIEW) (CPT=74018)  INDICATIONS:  abdominal pain  COMPARISON:  [facility] , CT, CT ABDOMEN+PELVIS(CONTRAST ONLY)(CPT=74177), 12/01/2024, 11:39 AM.  TECHNIQUE:  Supine AP view was obtained.  PATIENT STATED HISTORY: (As transcribed by Technologist)  Patient offered no additional history at this time.    FINDINGS:  Orthopedic hardware identified at L3-4.  Prostate seeds are seen.  There appears to be a large amount of stool within the rectum and sigmoid colon.  Moderate stool seen within the right colon.  There are gas-filled loops of nondistended small bowel scattered throughout the abdomen.  There is no free intraperitoneal air seen on the supine views obtained.  Degenerative changes are seen in the spine.  Osseous structures appear demineralized.  Patient has a known right hip decubitus ulcer            CONCLUSION:  Large amount of stool seen within the rectum and sigmoid colon may reflect fecal impaction.  Patient has CT diagnosis of stercoral colitis.   LOCATION:  [facility]   Dictated by (CST): Jaylon Hebert MD on 12/02/2024 at 12:48 PM     Finalized by (CST): Jaylon Hebert MD on 12/02/2024 at 12:51 PM       CT  ABDOMEN+PELVIS(CONTRAST ONLY)(CPT=74177)    Result Date: 12/1/2024  PROCEDURE:  CT ABDOMEN+PELVIS (CONTRAST ONLY) (CPT=74177)  COMPARISON:  [facility] , CT, CT ABDOMEN+PELVIS(CONTRAST ONLY)(CPT=74177), 10/27/2024, 12:49 PM.  INDICATIONS:  sepsis, recent retroperitoneal abscess, eval  TECHNIQUE:  CT scanning was performed from the dome of the diaphragm to the pubic symphysis with non-ionic intravenous contrast material. Post contrast coronal MPR imaging was performed.  Dose reduction techniques were used. Dose information is transmitted to the ACR (American College of Radiology) NRDR (National Radiology Data Registry) which includes the Dose Index Registry.  PATIENT STATED HISTORY:(As transcribed by Technologist)  Patient is here for sepsis with recent abscess.   CONTRAST USED:  80cc of Isovue 370  FINDINGS:  LIVER:  No enlargement, atrophy, abnormal density, or significant focal lesion.  BILIARY:  Moderate distension of the gallbladder without gallstones or wall thickening. PANCREAS:  No lesion, fluid collection, ductal dilatation, or atrophy.  SPLEEN:  No enlargement or focal lesion.  KIDNEYS:  Stable 2 cm left renal simple cyst.  No further follow-up recommended.  No hydronephrosis.  Normal enhancement. ADRENALS:  No mass or enlargement.  AORTA/VASCULAR:  No aneurysm or dissection.  Moderate vascular calcification through the bifurcation. RETROPERITONEUM:  No mass or adenopathy.  BOWEL/MESENTERY:  There is a large amount of stool in the rectum.  There is wall thickening with edema consistent with stercoral colitis.  This is a new finding.  There is wall edema extending into the redundant sigmoid colon.  There is no bowel obstruction.  Moderate amount of upstream stool noted in the right colon.  A previously noted abscess in the right ileus psoas/iliacus muscle is no longer identified. ABDOMINAL WALL:  No mass or hernia.  URINARY BLADDER:  De La Fuente catheter there partly decompresses the bladder. PELVIC NODES:  No  adenopathy.  PELVIC ORGANS:  No visible mass.  Pelvic organs appropriate for patient age.  BONES:  See below.  Status post fusion L3-4.  Marked degenerative disc disease L5-S1 and to a lesser at L4-5 with large bridging osteophytes throughout the spine that is visualized.  Degenerative changes of the hip joints noted with some sclerotic changes of the femoral heads unchanged. LUNG BASES:  Stable low lung volumes right lung.  Stable right lower lobe dependent atelectasis no definite pneumonia. OTHER:  Large decubitus ulcer adjacent to the greater trochanter of the right femur with some marginal sclerosis.  There are partially visualized large decubitus ulcers adjacent to the ischial tuberosities with sclerotic reactive change.  There are degenerative changes of the hips noted.            CONCLUSION:  1. Development of a large amount of dense stool within the rectum with sterile colitis with some wall edema and pericolonic/Ashlee rectal edema noted.  Moderate amount of upstream stool noted.  No definite obstruction. 2. The previously noted abscess in the right pelvis/iliacus ileus psoas region has resolved. 3. Additional nonemergent findings as described above which appears stable. 4. Mild distention of the gallbladder without wall thickening or gallstones.  No biliary tree dilation.    LOCATION:  [facility]   Dictated by (CST): Gary Toro MD on 12/01/2024 at 12:37 PM     Finalized by (CST): Gary Toro MD on 12/01/2024 at 12:44 PM       XR CHEST AP PORTABLE  (CPT=71045)    Result Date: 12/1/2024  PROCEDURE:  XR CHEST AP PORTABLE  (CPT=71045)  TECHNIQUE:  AP chest radiograph was obtained.  COMPARISON:  [facility] , XR, XR CHEST AP PORTABLE  (CPT=71045), 10/25/2024, 8:48 PM.  [facility] , XR, XR CHEST AP/PA (1 VIEW) (CPT=71045), 6/27/2024, 6:25 PM.  INDICATIONS:  From home - hypotensive 70/53  PATIENT STATED HISTORY: (As transcribed by Technologist)  Patient offered no additional history at this time.                CONCLUSION:  The patient is again noted to be markedly rotated to the right.  Marked degenerative change of the spine are noted.  Postsurgical changes lower cervical spine again noted. The heart size appears to be within normal limits with normal vascularity without effusion. There is some minimal reticular changes in the right infrahilar region and ground-glass opacities in the right retrocardiac region which are improved compared to the study of 10/25/2024 which may represent interstitial pneumonitis, fibrosis, or atelectasis.  Stable slight elevation the right hemidiaphragm.   LOCATION:  [facility]      Dictated by (CST): Gary Toro MD on 12/01/2024 at 11:12 AM     Finalized by (CST): Gary Toro MD on 12/01/2024 at 11:13 AM       CT ABSCESS INJECTION VIA EXISTING CATH (CPT=76080/35082)    Result Date: 11/14/2024  PROCEDURE:  CT ABSCESS INJECTION VIA EXISTING CATH (CPT=76080/30957)  COMPARISON:  [facility] , CT, CT ABDOMEN+PELVIS(CONTRAST ONLY)(CPT=74177), 10/27/2024, 12:49 PM.  [facility] , CT, CT DRAIN ABSCESS RETROPERITONEAL (CPT=49406), 10/28/2024, 5:53 PM.  INDICATIONS:  K68.19 Retroperitoneal abscess (HCC)  TECHNIQUE:  Initial noncontrast scans were performed.  Subsequently the existing right lower quadrant percutaneous drainage catheter was injected with contrast.  Follow-up scans were then obtained.  Dose reduction techniques were used. Dose information is transmitted to the ACR (American College of Radiology) NRDR (National Radiology Data Registry) which includes the Dose Index Registry.   FINDINGS:  Initial scans demonstrated a collapsed targeted fluid collection/abscess cavity with the catheter appearing in satisfactory position.  Approximately 5 mL of diluted contrast was injected by hand.  Contrast tracks back along the catheter onto the skin.  Follow-up scans demonstrated a collapsed cavity with no fistula.  The patient reports only drops of output for about a week.  The catheter was  subsequently removed uneventfully and sterile dressings placed.     CONCLUSION:            1. Collapsed abscess cavity.  No fistula. 2. Uneventful removal of the drainage catheter.    LOCATION:  [facility]   Dictated by (CST): Jose Alfredo Ospina MD on 11/14/2024 at 1:37 PM     Finalized by (CST): Jose Alfredo Ospina MD on 11/14/2024 at 1:39 PM          Assessment/Plan:   9 year old male w/ a pmh of stage IV MS - bedbound, gout, eHTN, neurogenic bladder w/ chronic noriega multiple pressure ulcers, and recent bacteroides + pseudomonas bacteremia from rp abscess who presents with a cc diarrhea and weakness.    Septic shock  Severe Cdiff colitis  Multiple decubitus ulcers  Leukocytosis  -PO vanc/ flagyl  -Hospice following  -Symptom control as needed.  Comfort medications ordered.      Outpatient records or previous hospital records reviewed.   [facility] hospitalist to continue to follow patient while in house  A total of 60 minutes taken with patient and coordinating care.  Greater than 50% face to face encounter.      [facility] Hospitalist      **Certification      PHYSICIAN Certification of Need for Inpatient Hospitalization - Initial Certification    Patient will require inpatient services that will reasonably be expected to span two midnight's based on the clinical documentation in H+P.   Based on patients current state of illness, I anticipate that, after discharge, patient will require TBD.         [1] No Known Allergies

## 2024-12-05 NOTE — PLAN OF CARE
Assumed patient care this morning.   Mental status waxing/waning from alert to lethargic. Comfort/hospice care. Spouse here later this morning. Updated. Per spouse/patient request, would like dobhoff removed and done, acknowledge will not be able to take oral vancomycin unless he is able to take by mouth. Room available and transferred out of ICU.

## 2024-12-05 NOTE — HOSPICE RN NOTE
[facility] inpatient nursing visit. Spouse Alicia at bedside. Emotional support provided. Discussed patient history. Patient minimally responsive. Has furrowed brow. Discussed patient status with hospital nurse Vincent REAGAN. Requested dose of morphine for comfort now. PPS 20. Last VS /63, HR 97, RR 15, temp 97.4 temporal. On room air. Spouse expressed that patient wanted NGT removed today. Patient to transfer to room 408. Remains appropriate for [facility] inpatient level of hospice care. Needs continued frequent nursing assessments for administration and titration of IV/PO comfort medications. Also has multiple wounds. Hospice will continue to follow this patient closely for EOL pain and symptom management and to support family. Please call [facility] with any questions or concerns.    Kera Kramer RN, [facility]  [facility] Liaison  [phone] after hours

## 2024-12-05 NOTE — SPIRITUAL CARE NOTE
Spiritual Care Visit Note    Patient Name: Brenden Cardenas Date of Spiritual Care Visit: 24   : 1945 Primary Dx: <principal problem not specified>       Referred By:      Spiritual Care Taxonomy:                   Visit Type/Summary:     - Spiritual Care: Consulted with RN prior to visit.  remains available as needed for follow up.    Spiritual Care support can be requested via an Epic consult. For urgent/immediate needs, please contact the On Call  at: [facility]: ext [phone]

## 2024-12-05 NOTE — CM/SW NOTE
SW called and spoke with the patient's spouse.  She is on her way to the hospital and would like to touch base with hospice when she arrives.  Hospice will meet with wife when she arrives.    Sumaya [facility]  [facility]  [phone]

## 2024-12-05 NOTE — PLAN OF CARE
Patient/family met with hospice. Decision to transition to inpatient hospice, will keep dobhoff to continue tube feeds and oral vancomycin. Updated Dr. Montero/Kiesha MENCHACA, in agreement.

## 2024-12-06 NOTE — HOSPICE RN NOTE
[facility] Inpatient Nursing Rounds:     Patient's eyes opened during assessment, son at bedside, spouse arrived as well. Patient's breathing shallow, irregular, room air.  Patient has severe pain when touched. Rectal tube inserted overnight with no output at this time, noriega draining dark mavis urine. Congestion noted. Changing Robinul IVP q 4, Ativan changed to hourly as needed, and Scopolamine patch scheduled.      Morphine IVP X 5, Scopolamine patch in the past 24 hours.    PPS: 10%    Patient remains eligible for general inpatient hospice care for symptom management of severe pain, wound care for multiple wounds, airway secretions requiring frequent nursing assessments and interventions including titration of IV medications. POC discussed with son and Marianne REAGAN (called but did not get an answer). All are in agreement. [facility] will continue to support the patient and family.     Katheryn Yin RN  [facility] RN Liaison  [phone] (After-hours)

## 2024-12-06 NOTE — PLAN OF CARE
Patient inpatient hospice, alert not oriented. Patient started on  scopolamine patch behind right ear, and started on robinol, medciated with prn dose  of morphine at 1000  Patient given oral care, noriega patent draining yellow urine, rectal tube intact.

## 2024-12-06 NOTE — PLAN OF CARE
Pt received from ICU on hospice. Pt lethargic, minimal communication. Pt on clinitron bed, family at bedside. Pt demonstrated s/s pain once on shift, PRN morphine admin per MAR. Pt resting comfortably in bed. Pt changed as tolerated. Wound care reinforced. Pt's mouth swabbed and moisturizer applied. Comfort measures provided to both pt and family. Fall and safety precautions in place. Call light within reach.

## 2024-12-06 NOTE — CM/SW NOTE
SW met with patient and wife at bedside.  Patient is alert but not oriented.  Patient is in pain if touched.  Patient remains inpatient hospice eligible but if he becomes stable family has stated they would like him to discharge to [facility].    Sumaya Carlisle [facility]  [phone]

## 2024-12-06 NOTE — HOSPICE RN NOTE
[facility] inpatient nursing rounds. Spouse Alicia and son at bedside. All questions and concerns addressed. Discussed patient history with family. Patient minimally responsive. With intermittent pain. Has severe pain when moved and repositioned. He has received 2 doses IVP morphine today. Will continue to assess the need for morphine gtt. Comfort order set in place. No objective signs of distress noted at this time. Remains appropriate for [facility] inpatient level of hospice care. Needs continued frequent nursing assessments for administration and titration of IV comfort medications. Also has multiple wounds. Hospice will continue to follow this patient closely for EOL pain and symptom management and to support family. Please call [facility] with any questions or concerns.    Kera Kramer RN, [facility]  [facility] Liaison  [phone] after hours

## 2024-12-06 NOTE — PLAN OF CARE
Patient lethargic. Open eyes, nod to yes/ no questions at times. Son at the bedside. Liquid stool. Rectal tube inserted for comfort. Wounds cleansed, packed, dressing changed. Oral care done. Prn morphine given as needed. Appears comfortable. Repositioned for comfort.   Problem: PAIN - ADULT  Goal: Verbalizes/displays adequate comfort level or patient's stated pain goal  Description: INTERVENTIONS:  - Encourage pt to monitor pain and request assistance  - Assess pain using appropriate pain scale  - Administer analgesics based on type and severity of pain and evaluate response  - Implement non-pharmacological measures as appropriate and evaluate response  - Consider cultural and social influences on pain and pain management  - Manage/alleviate anxiety  - Utilize distraction and/or relaxation techniques  - Monitor for opioid side effects  - Notify MD/LIP if interventions unsuccessful or patient reports new pain  - Anticipate increased pain with activity and pre-medicate as appropriate  Outcome: Progressing

## 2024-12-06 NOTE — PROGRESS NOTES
[facility] Hospitalist Progress Note     PCP: Willow Mcmahon DO    Chief Complaint: follow-up   Follow up for: There were no encounter diagnoses.    Overnight/Interim Events:      SUBJECTIVE:  Lying in bed, appears comfortable, nods no to pain.     OBJECTIVE:  Temp:  [97 °F (36.1 °C)-97.4 °F (36.3 °C)] 97 °F (36.1 °C)  Pulse:  [] 100  Resp:  [14-29] 14  BP: (113-125)/(63-66) 113/65  SpO2:  [97 %-100 %] 98 %    Intake/Output:    Intake/Output Summary (Last 24 hours) at 12/5/2024 1925  Last data filed at 12/5/2024 1700  Gross per 24 hour   Intake 678 ml   Output 425 ml   Net 253 ml       Last 3 Weights   12/03/24 0400 134 lb 0.6 oz (60.8 kg)   12/02/24 0400 129 lb 13.6 oz (58.9 kg)   12/01/24 1011 136 lb 0.4 oz (61.7 kg)   11/14/24 2058 136 lb (61.7 kg)   11/12/24 1535 136 lb (61.7 kg)       Exam    General: Alert, no distress, appears stated age.     Head:  Normocephalic, without obvious abnormality, atraumatic.   Eyes:  Sclera anicteric, EOMs intact.    Nose: Nares normal,  Mucosa normal    Throat: Lips normal   Neck: Supple, symmetrical, trachea midline   Lungs:   Clear to auscultation bilaterally. Normal effort   Chest wall:  No tenderness or deformity   Heart:  Regular rate and rhythm, S1, S2 normal, no murmur, rub or gallop appreciated   Abdomen:   Soft, NT/ND, Bowel sounds normal. No masses,  No organomegaly.    Extremities: Extremities normal, atraumatic, no cyanosis or LE edema.   Skin: Skin color, texture, turgor normal. No rashes or lesions.    Neurologic: Moving all extremities spontaneously, no focal deficit appreciated      Data Review:       Labs:     Recent Labs   Lab 12/01/24  1033 12/02/24  0512 12/03/24  0418 12/04/24  0454   WBC 59.2* 46.4* 33.3* 38.6*   HGB 9.1* 9.2* 8.3* 10.0*   MCV 78.6* 77.5* 78.9* 78.5*   .0* 522.0* 369.0 374.0   BAND 8 8 10 4       Recent Labs   Lab 12/01/24  1033 12/01/24  1705 12/02/24  0512 12/02/24  1454 12/03/24  0418 12/04/24  0454     --  136 140 140 139    K 5.0  --  4.1 3.8 3.4* 4.0  4.0     --  109 110 111 109   CO2 22.0  --  20.0* 20.0* 20.0* 17.0*   BUN 33*  --  29* 28* 27* 27*   CREATSERUM 0.67*  --  0.49* 0.43* 0.43* 0.47*   CA 9.2  --  8.1* 8.3* 8.2* 8.7   MG  --  1.9 1.8  --  2.2  --    PHOS  --  4.9 4.6  --  4.6  --    GLU 83  --  114* 100* 86 150*       Recent Labs   Lab 12/01/24  1033 12/02/24  0512 12/03/24  0418 12/04/24  0454   ALT 9* 7* <7* 9*   AST 12 11 9 10   ALB 2.2* 2.1* 2.0* 2.0*       Recent Labs   Lab 12/03/24  0553 12/03/24  1129 12/03/24  1806 12/04/24  0639 12/04/24  1208   PGLU 98 78 113* 179* 232*       No results for input(s): \"TROP\" in the last 168 hours.      Meds:      metroNIDAZOLE  500 mg Intravenous Q8H    vancomycin  125 mg Per G Tube QID      morphINE in sodium chloride 0.9%         scopolamine    acetaminophen    morphINE    morphINE    morphINE in sodium chloride 0.9%    haloperidol lactate    LORazepam    atropine    furosemide    glycopyrrolate    bisacodyl    ondansetron    acetaminophen       Assessment/Plan:     79 year old male w/ a pmh of stage IV MS - bedbound, gout, eHTN, neurogenic bladder w/ chronic noriega multiple pressure ulcers, and recent bacteroides + pseudomonas bacteremia from rp abscess who presents with a cc diarrhea and weakness.     Septic shock  Severe Cdiff colitis  Multiple decubitus ulcers  Leukocytosis  -PO vanc/ flagyl  -Hospice following, apprec  -Symptom control as needed.  Comfort medications ordered. appears comfortable         Dispo: follow    Questions/concerns were discussed with patient and wife/son by bedside. Reviewed chart including previous progress notes. Total Time spent with patient and coordinating care:  55 minutes    MD DAMIAN Borrero Hospitalist  [phone]  12/5/2024  7:25 PM

## 2024-12-07 NOTE — PROGRESS NOTES
0630- No respiration noted. Patient passed away at 0630. [facility] notified, case # [id number]. Patient not candidate for any tissue, organ or eye donor. Okay to release the body to morgue/  home. Midline and noriega removed. Wife at the bedside. Family is coming to the hospital. Will endorse to a Day rn. [facility] notified.

## 2024-12-07 NOTE — HOSPICE RN NOTE
[facility] inpatient nursing rounds. Spouse and daughter at bedside. Patient with increased pain today. Hospital nurse Marianne RN at bedside giving dose of IVP glycopyrrolate for excess secretions. Contacted Dr. Napoleon Stevenson for morphine panel. Order received and entered. Remains appropriate for [facility] inpatient hospice. Needs continued frequent nursing assessments for administration and titration of IV comfort medications. Hospice will continue to follow this patient closely for EOL pain and symptom management and to support family. Please call [facility] with any questions or concerns.    Kera Kramer RN, [facility]  [facility] Liaison  [phone] after hours

## 2024-12-07 NOTE — PLAN OF CARE
Family hesitant for patient to receive pain med, for waiting for daughter to arrive from [place]. Daughter is here now, patient medicated with morphine 2mg iv for pain, facial grimmacing noted, family refuses to have patient repositioned

## 2024-12-07 NOTE — PLAN OF CARE
Lethargic. Room air. Oral care done, head of bed elevated. Scheduled Robinul given. Repositioned in bed. Rectal tube and noriega intact. Dressing clean and dry. Restless, increased labored breathing. Prn morphine IVP given, not much effective. Plan of care discussed with patient's wife. Morphine gtt started for comfort.   Problem: PAIN - ADULT  Goal: Verbalizes/displays adequate comfort level or patient's stated pain goal  Description: INTERVENTIONS:  - Encourage pt to monitor pain and request assistance  - Assess pain using appropriate pain scale  - Administer analgesics based on type and severity of pain and evaluate response  - Implement non-pharmacological measures as appropriate and evaluate response  - Consider cultural and social influences on pain and pain management  - Manage/alleviate anxiety  - Utilize distraction and/or relaxation techniques  - Monitor for opioid side effects  - Notify MD/LIP if interventions unsuccessful or patient reports new pain  - Anticipate increased pain with activity and pre-medicate as appropriate  Outcome: Not Progressing

## 2024-12-09 NOTE — DISCHARGE SUMMARY
[facility] Hospitalist Discharge Summary     Patient ID:  Brenden Cardenas  79 year old  5/6/1945    Admit date: 12/1/2024  Discharge date and time: 12/4/2024  6:20 PM   Attending Physician: No att. providers found   Primary Care Physician: Willow Mcmahon DO   Discharge Diagnoses: MS (multiple sclerosis) ([facility]) [G35]  Hypothermia, initial encounter [T68.XXXA]  Urinary tract infection without hematuria, site unspecified [N39.0]  Septic shock ([facility]) [A41.9, R65.21]  Pressure injury of buttock, stage 4, unspecified laterality ([facility]) [L89.304]  Stercoral colitis [K52.89]      Disposition:  [facility]      Hospital Course:  79 year old male w/ a pmh of stage IV MS - bedbound, gout, eHTN, neurogenic bladder w/ chronic noriega multiple pressure ulcers, and recent bacteroides + pseudomonas bacteremia from rp abscess who presents with a cc diarrhea and weakness.  Patient recently admitted 10/25-10/31 for bacteremia 2/2 rp abscess s/p drainage, and then was discharged to SNF from which he was discharged on 11/14.  After returning home, patient developed diarrhea, decreased appetite, and worsening weakness.  Found to have white significantly elevated white count, hypothermia, and hypotension.  Given fluids for sepsis without significant improvement in BP, so admitted to ICU for pressor support.  The patient eventually decided to pursue hospice.     Septic shock  Diarrhea  Stage IV MS  Stage IV sacral pressure ulcers - present on admission  Neurogenic bladder with chronic indwelling noriega  eHTN  gout     PLAN:     Septic shock  Severe Cdiff colitis  Multiple decubitus ulcers  Leukocytosis  -Likely multiple etiologies - C. difficile PCR positive, UA dirty but likely colonized,  possibly infected pressure ulcers  -RP abscess resolved on imaging  -Weaned off Levophed  -ID following.  Continue with p.o. Vanco and IV Flagyl.  -wound care  -Transitioned to [facility]        Other chronic medical conditions (Stage IV MS, eHTN, gout)  -stable  -cpm       Exam on  Day of DC:  /72 (BP Location: Right arm)   Pulse 117   Temp 98.2 °F (36.8 °C) (Axillary)   Resp 24   SpO2 (!) 87%   Physical Exam:  General: Awake, cooperative.  HEENT:  Normocephalic, atraumatic.  Neck:  Trachea midline.  No JVD.   Chest:  Clear to auscultation bilaterally. No wheezes, rales, or rhonchi.  CV:  Regular rate and rhythm.  Positive S1/S2. No murmur, no gallops, no rubs  GI: Bowel sounds present in all four quadrants, abdomen is firm.  Extremities:  B/L lower extremity edema, no cyanosis.  Neurological: Moving extremities.  Skin:  Warm and dry.         I as the attending physician reconciled the current and discharge medications on day of discharge.     Discharge Medication List as of 12/4/2024  6:20 PM        CONTINUE these medications which have NOT CHANGED    Details   naproxen (ALEVE) 220 MG Oral Tab Take 1-2 tablets (220-440 mg total) by mouth as needed (for pain)., Historical      HYDROcodone-acetaminophen 5-325 MG Oral Tab Take 1 tablet by mouth every 6 (six) hours as needed for Pain. and 30 minutes before dressing changes, Historical      acetaminophen 325 MG Oral Tab Take 2 tablets (650 mg total) by mouth every 6 (six) hours as needed for Pain., Historical      polyethylene glycol, PEG 3350, 17 g Oral Powd Pack Take 17 g by mouth daily as needed., Historical      ipratropium-albuterol 0.5-2.5 (3) MG/3ML Inhalation Solution Take 3 mL by nebulization every 4 (four) hours as needed., Historical      ergocalciferol 1.25 MG (51957 UT) Oral Cap Take 1 capsule (50,000 Units total) by mouth once a week., Historical      metoprolol succinate ER 25 MG Oral Tablet 24 Hr Take 4 tablets (100 mg total) by mouth daily., Normal, Disp-120 tablet, R-0      allopurinol 300 MG Oral Tab Take 1 tablet (300 mg total) by mouth daily., Historical           STOP taking these medications       sodium chloride 0.9% SOLN 100 mL with meropenem 1 g SOLR 1 g              [facility]  Hospitalist

## 2025-02-03 NOTE — CM/SW NOTE
MA reached out to patient regarding no show / missed appt.    No answer, left message to call office to reschedule appointment   PASRR completed & uploaded in [employer].  Spoke with pt's wife Karen who is agreeable to have pt go to [facility].  SW to f/u tomorrow with [facility] list/choice.  Wife wants [facility] list emailed to her at [email or web address] when list is available.

## 2025-03-20 ENCOUNTER — MED REC SCAN ONLY (OUTPATIENT)
Dept: NEUROLOGY | Facility: CLINIC | Age: 80
End: 2025-03-20

## 2025-03-20 ENCOUNTER — TELEPHONE (OUTPATIENT)
Dept: NEUROLOGY | Facility: CLINIC | Age: 80
End: 2025-03-20

## 2025-03-20 NOTE — TELEPHONE ENCOUNTER
Su from [employer] department calling she needs information on patient case. Please call her at [phone] ex [phone]  Case # [id number]

## 2025-03-20 NOTE — TELEPHONE ENCOUNTER
Returned call to [employer] safety. Informed  that office received adverse event form this morning and it has been completed and faxed back.    Stated form may not have been uploaded in their system. No further questions for the office.    Case number: [id number]    Copy of completed adverse event form to scanning.

## (undated) DIAGNOSIS — G35 MS (MULTIPLE SCLEROSIS) (HCC): Primary | ICD-10-CM

## (undated) DIAGNOSIS — G35 MULTIPLE SCLEROSIS (HCC): Primary | ICD-10-CM

## (undated) DEVICE — SUTURE VICRYL 2-0

## (undated) DEVICE — VIOLET BRAIDED (POLYGLACTIN 910), SYNTHETIC ABSORBABLE SUTURE: Brand: COATED VICRYL

## (undated) DEVICE — CHLORAPREP 26ML APPLICATOR

## (undated) DEVICE — SOL  .9 1000ML BTL

## (undated) DEVICE — GAUZE SPONGES,12 PLY: Brand: CURITY

## (undated) DEVICE — EXOFIN TISSUE ADHESIVE 1.0ML

## (undated) DEVICE — UNDYED BRAIDED (POLYGLACTIN 910), SYNTHETIC ABSORBABLE SUTURE: Brand: COATED VICRYL

## (undated) DEVICE — SUTURE VICRYL 3-0 SH

## (undated) DEVICE — SCD SLEEVE KNEE HI BLEND

## (undated) DEVICE — STERILE POLYISOPRENE POWDER-FREE SURGICAL GLOVES: Brand: PROTEXIS

## (undated) DEVICE — MINI LAP PACK-LF: Brand: MEDLINE INDUSTRIES, INC.

## (undated) NOTE — IP AVS SNAPSHOT
Patient Demographics     Address  [address] [place] [address] [place] [address] Phone  [phone] (Home) *Preferred*  [phone] (Mobile) E-mail Address  [email or web address]      Patient Contacts     Name Relation Home Work Mobile    Alicia Cardenas Spouse [phone]      Allergies as of 4/28/2024  Review status set to Review Complete on 4/28/2024   No Known Allergies     Code Status Information     Code Status    Not on file      Patient Instructions    None        Your Home Meds List      TAKE these medications       Instructions Authorizing Provider Morning Afternoon Evening As Needed   acetaminophen 500 MG Tabs  Commonly known as: Tylenol Extra Strength      Take 2 tablets (1,000 mg total) by mouth every 6 (six) hours as needed for Pain.          allopurinol 300 MG Tabs  Commonly known as: Zyloprim  Next dose due: Take tomorrow 4/29      Take 1 tablet (300 mg total) by mouth daily.          Bicarsim 80 MG Tabs  Generic drug: Simethicone      Take 80 mg by mouth daily as needed.          Centrum Tabs  Next dose due: Take tomorrow 4/29      Take 1 tablet by mouth daily.          cholecalciferol 50 MCG (2000 UT) Tabs  Next dose due: Take tomorrow 4/29      Take 1 tablet (2,000 Units total) by mouth daily.          clotrimazole-betamethasone 1-0.05 % Crea  Commonly known as: Lotrisone  Next dose due: Take tonight 4/29      Apply 1 Application topically 2 (two) times daily. To groin and buttock          colchicine 0.6 MG Tabs      Take 1 tablet (0.6 mg total) by mouth daily as needed.          cyanocobalamin 1000 MCG Tabs  Commonly known as: Vitamin B12  Next dose due: Take tomorrow 4/29      Take 1 tablet (1,000 mcg total) by mouth daily.          Dalfampridine ER 10 MG Tb12  Next dose due: Take tonight 4/29      Take 1 tablet (10 mg total) by mouth in the morning and 1 tablet (10 mg total) before bedtime.          HYDROcodone-acetaminophen 5-325 MG Tabs  Commonly known as: Norco      Take 1  tablet by mouth every 8 (eight) hours as needed.   Xenia Cokeralil         losartan 100 MG Tabs  Commonly known as: Cozaar  Next dose due: Take tomorrow 4/29      TAKE 1 TABLET BY MOUTH EVERY DAY   Napoleon Friedl         magnesium oxide 400 MG Tabs  Commonly known as: Mag-Ox  Next dose due: Take tomorrow 4/29      Take 1 tablet (400 mg total) by mouth daily.          Melatonin 3 MG Caps  Next dose due: Take tonight 4/28      Take 2 capsules (6 mg total) by mouth every evening.          metoprolol succinate ER 25 MG Tb24  Commonly known as: Toprol XL  Next dose due: Take tonight 4/28      Take 1 tablet (25 mg total) by mouth in the morning and 1 tablet (25 mg total) before bedtime.          mirtazapine 15 MG Tabs  Commonly known as: Remeron  Next dose due: Take tonight 4/28      Take 1 tablet (15 mg total) by mouth nightly.          omeprazole 20 MG Cpdr  Commonly known as: PriLOSEC  Next dose due: Take tonight 4/28      Take 1 capsule (20 mg total) by mouth 2 (two) times daily before meals.          ondansetron 4 mg tablet  Commonly known as: Zofran      Take 1 tablet (4 mg total) by mouth every 8 (eight) hours as needed.          senna-docusate 8.6-50 MG Tabs  Commonly known as: Senokot-S  Next dose due: Take tonight 4/28      Take 1 tablet by mouth in the morning and 1 tablet before bedtime.          sodium hypochlorite 0.25 % Soln  Commonly known as: Dakin's      Apply 1 Bottle topically as needed (wound care).             ASK your doctor about these medications       Instructions Authorizing Provider Morning Afternoon Evening As Needed   Ocrelizumab 300 MG/10ML Soln  Commonly known as: Ocrevus      Inject 20 mL (600 mg total) into the vein.          predniSONE 20 MG Tabs  Commonly known as: Deltasone      Take 1 tablet (20 mg total) by mouth as needed.          sodium chloride 0.9% SOLN 480 mL with Ocrelizumab 300 MG/10ML SOLN 600 mg      Inject 600 mg into the vein one time. Every 6 months. Last infusion January,  2024                   [id number] - MAR ACTION REPORT  (last 48 hrs)    ** SITE UNKNOWN **     Order ID Medication Name Action Time Action Reason Comments    [id number] allopurinol (Zyloprim) tab 300 mg 04/28/24 0841 Given      [id number] cefTRIAXone (Rocephin) 1 g in D5W 100 mL IVPB-ADD 04/28/24 0037 New Bag      [id number] metoprolol succinate ER (Toprol XL) 24 hr tab 25 mg 04/28/24 0513 Given      [id number] pantoprazole (Protonix) DR tab 40 mg 04/28/24 0513 Given      [id number] piperacillin-tazobactam (Zosyn) 3.375 g in dextrose 5% 100 mL IVPB-ADDV 04/28/24 0828 New Bag      [id number] piperacillin-tazobactam (Zosyn) 4.5 g in dextrose 5% 100 mL IVPB-ADDV 04/28/24 0037 New Bag      [id number] sodium chloride 0.9% infusion 04/27/24 2256 New Bag      [id number] vancomycin (Vancocin) 1,000 mg in sodium chloride 0.9% 250 mL IVPB-ADDV 04/28/24 0238 New Bag            LEFT LOWER ABDOMEN     Order ID Medication Name Action Time Action Reason Comments    [id number] enoxaparin (Lovenox) 40 MG/0.4ML SUBQ injection 40 mg 04/28/24 0839 Given              Recent Vital Signs    Flowsheet Row Most Recent Value   /43 Filed at 04/28/2024 1138   Pulse 94 Filed at 04/28/2024 0500   Resp 20 Filed at 04/28/2024 0500   Temp 98.1 °F (36.7 °C) Filed at 04/28/2024 1138   SpO2 96 % Filed at 04/28/2024 0500      Patient's Most Recent Weight    Flowsheet Row Most Recent Value   Patient Weight 70.9 kg (156 lb 6.4 oz)         Lab Results Last 24 Hours      Procalcitonin [[id number]] (Normal)  Resulted: 04/28/24 1039, Result status: Final result   Ordering provider: Eris Obrien MD  04/28/24 0922 Resulting lab: [facility] ([facility])   Narrative:  Resulted by: batch: CRP,     Specimen Information    Type Source Collected On   Blood — 04/28/24 6335          Components    Component Value Reference Range Flag Lab   Procalcitonin 0.10 <0.16 ng/mL — [facility] ([facility])   Comment:        Result of <0.16 ng/mL: Systemic  bacterial infection unlikely.     Result of 0.16-0.50 ng/mL: Does not exclude localized bacterial infection.    Result of >2.00 ng/mL: High likelihood of systemic bacterial infection, severe localized organ infection, but can also be elevated after major surgery, trauma, acute multi-organ failure.    Note: Viral infection in the absence of secondary bacterial infection, auto-immune inflammation, mild localized bacterial infections rarely account for elevation of PCT >0.50 ng/mL. PCT levels rise after a few hours, peak at the 12-24 hours, then fall at a 24h half-life, while on appropriate antibiotics or after the inflammatory insult is eliminated. A sustained elevated or rising PCT in the setting of a non-infectious elevation may indicate a secondary bacterial infection.            Sed Rate, Westergren (Automated) [[id number]] (Abnormal)  Resulted: 04/28/24 0953, Result status: Final result   Ordering provider: Eris Obrien MD  04/28/24 0922 Resulting lab: [facility] ([facility])    Specimen Information    Type Source Collected On   Blood — 04/27/24 2254          Components    Component Value Reference Range Flag Lab   Sed Rate 77 0 - 20 mm/Hr H [facility] ([facility])            C-Reactive Protein [[id number]] (Abnormal)  Resulted: 04/28/24 0950, Result status: Final result   Ordering provider: Eris Obrien MD  04/28/24 0922 Resulting lab: [facility] ([facility])    Specimen Information    Type Source Collected On   Blood — 04/28/24 0735          Components    Component Value Reference Range Flag Lab   C-Reactive Protein 8.38 <0.30 mg/dL H [facility])            Basic Metabolic Panel (8) [[id number]] (Abnormal)  Resulted: 04/28/24 0825, Result status: Final result   Ordering provider: Amy Wise MD  04/28/24 0155 Resulting lab: [facility] ([facility])    Specimen Information    Type Source Collected On   Blood — 04/28/24 0735           Components    Component Value Reference Range Flag Lab   Glucose 104 70 - 99 mg/dL H [facility] ([facility])   Sodium 137 136 - 145 mmol/L — [facility] ([facility])   Potassium 3.8 3.5 - 5.1 mmol/L — [facility] ([facility])   Chloride 106 98 - 112 mmol/L — [facility] ([facility])   CO2 25.0 21.0 - 32.0 mmol/L — [facility] ([facility])   Anion Gap 6 0 - 18 mmol/L — [facility] ([facility])   BUN 9 9 - 23 mg/dL — [facility] ([facility])   Creatinine 0.44 0.70 - 1.30 mg/dL L [facility] ([facility])   Calcium, Total 8.6 8.5 - 10.1 mg/dL — [facility] ([facility])   Calculated Osmolality 283 275 - 295 mOsm/kg — [facility] ([facility])   eGFR-Cr 110 >=60 mL/min/1.73m2 — [facility] ([facility])            RAINBOW DRAW BLUE [[id number]]  Resulted: 04/28/24 0001, Result status: Final result   Ordering provider: Linus Cristobal MD  04/27/24 2255 Resulting lab: [facility] ([facility])    Specimen Information    Type Source Collected On   Blood — 04/27/24 2257          Components    Component Value Reference Range Flag Lab   Hold Blue Auto Resulted — — [facility] ([facility])            RAINBOW DRAW GOLD [[id number]]  Resulted: 04/28/24 0001, Result status: Final result   Ordering provider: Linus Cristobal MD  04/27/24 2255 Resulting lab: [facility] ([facility])    Specimen Information    Type Source Collected On   Blood — 04/27/24 2257          Components    Component Value Reference Range Flag Lab   Hold Gold Auto Resulted — — [facility] ([facility])            Urinalysis with Culture Reflex [[id number]] (Abnormal)  Resulted: 04/27/24 2355, Result status: Final result   Ordering provider: Linus Cristobal MD  04/27/24 2241 Resulting lab: [facility] ([facility])    Specimen Information    Type Source Collected On   Urine Urine, clean catch 04/27/24 2254          Components    Component Value Reference Range Flag Lab   Urine Color Yellow Yellow — [facility] ([facility])   Clarity Urine Turbid Clear A [facility] ([facility])   Spec Gravity 1.027 1.005 - 1.030 —  [facility] ([facility])   Glucose Urine Normal Normal mg/dL — [facility] ([facility])   Bilirubin Urine Negative Negative — [facility] ([facility])   Ketones Urine Negative Negative mg/dL — [facility] ([facility])   Blood Urine 1+ Negative A [facility] ([facility])   pH Urine 5.5 5.0 - 8.0 — [facility] ([facility])   Protein Urine 20 Negative mg/dL A [facility] ([facility])   Urobilinogen Urine Normal Normal mg/dL — [facility] ([facility])   Nitrite Urine 1+ Negative A [facility] ([facility])   Leukocyte Esterase Urine 500 Negative A [facility] ([facility])   Comment:          Jaime/uL Interpretation   25          Trace   75          1+   250         2+   500         3+   WBC Urine >50 0 - 5 /HPF A [facility] ([facility])   RBC Urine 6-10 0 - 2 /HPF A [facility] ([facility])   Bacteria Urine Rare None Seen /HPF A [facility] ([facility])   Squamous Epi. Cells Few None Seen /HPF A [facility] ([facility])   Renal Tubular Epithelial Cells None Seen None Seen /HPF — [facility] ([facility])   Transitional Cells None Seen None Seen /HPF — [facility] ([facility])   Yeast Urine None Seen None Seen /HPF — [facility] ([facility])   WBC Clump Present None Seen /HPF A [facility] ([facility])            Comp Metabolic Panel (14) [[id number]] (Abnormal)  Resulted: 04/27/24 2327, Result status: Final result   Ordering provider: Linus Cristobal MD  04/27/24 2241 Resulting lab: [facility] ([facility])    Specimen Information    Type Source Collected On   Blood — 04/27/24 2252          Components    Component Value Reference Range Flag Lab   Glucose 138 70 - 99 mg/dL [facility] [place] [facility] ([facility])   Sodium 138 136 - 145 mmol/L — [facility])   Potassium 3.7 3.5 - 5.1 mmol/L — [facility])   Chloride 105 98 - 112 mmol/L — [facility])   CO2 27.0 21.0 - 32.0 mmol/L — [facility])   Anion Gap 6 0 - 18 mmol/L — [facility])   BUN 12 9 - 23 mg/dL — [facility])   Creatinine 0.55 0.70 - 1.30 mg/dL [facility] ([facility])   Calcium, Total 8.5 8.5 - 10.1 mg/dL — [facility] ([facility])   Calculated Osmolality 288 275 - 295 mOsm/kg — [facility]  ([facility])   eGFR-Cr 103 >=60 mL/min/1.73m2 — [facility] ([facility])   AST 10 15 - 37 U/L L [facility] ([facility])   ALT 17 16 - 61 U/L — [facility] ([facility])   Alkaline Phosphatase 35 45 - 117 U/L L [facility] ([facility])   Bilirubin, Total 0.3 0.1 - 2.0 mg/dL — [facility] ([facility])   Total Protein 6.1 6.4 - 8.2 g/dL L [facility] ([facility])   Albumin 2.2 3.4 - 5.0 g/dL L [facility] ([facility])   Globulin  3.9 2.8 - 4.4 g/dL — [facility] ([facility])   A/G Ratio 0.6 1.0 - 2.0 L [facility] ([facility])            Lactic Acid, Plasma [[id number]] (Normal)  Resulted: 04/27/24 2326, Result status: Final result   Ordering provider: Linus Cristobal MD  04/27/24 2241 Resulting lab: [facility] ([facility])    Specimen Information    Type Source Collected On   Blood — 04/27/24 2254          Components    Component Value Reference Range [facility]   Lactic Acid 1.2 0.4 - 2.0 mmol/L — [facility] ([facility])            CBC With Differential With Platelet [[id number]] (Abnormal)  Resulted: 04/27/24 2304, Result status: Final result   Ordering provider: Linus Cristobal MD  04/27/24 2241 Resulting lab: [facility] ([facility])   Narrative:  The following orders were created for panel order CBC With Differential With Platelet.  Procedure                               Abnormality         Status                     ---------                               -----------         ------                     CBC W/ DIFFERENTIAL[[id number]]          Abnormal            Final result                 Please view results for these tests on the individual orders.    Specimen Information    Type Source Collected On   Blood — 04/27/24 2254            Testing Performed By     Lab - Abbreviation Name Director Address Valid Date Range    139 - [facility] ([facility]) [facility] ([facility]) Goldberg, Cathryn A. MD [address] [place] [id number] 03/19/20 2175 - Present            Microbiology Results (All)     Procedure Component Value Units  Date/Time    Urine Culture, Routine [[id number]] Collected: 04/27/24 2254    Order Status: Sent Lab Status: In process Updated: 04/27/24 2355    Specimen: Urine, clean catch     SARS-CoV-2/Flu A and B/RSV by PCR (GeneXpert) [[id number]]  (Normal) Collected: 04/27/24 2254    Order Status: Completed Lab Status: Final result Updated: 04/27/24 2342    Specimen: Other from Nares      SARS-CoV-2 (COVID-19) - (GeneXpert) Not Detected     Influenza A by PCR Negative     Influenza B by PCR Negative     RSV by PCR Negative    Narrative:      This test is intended for the qualitative detection and differentiation of SARS-CoV-2, influenza A, influenza B, and respiratory syncytial virus (RSV) viral RNA in nasopharyngeal or nares swabs from individuals suspected of respiratory viral infection consistent with COVID-19 by their healthcare provider. Signs and symptoms of respiratory viral infection due to SARS-CoV-2, influenza, and RSV can be similar.    Test performed using the Xpert Xpress SARS-CoV-2/FLU/RSV (real time RT-PCR)  assay on the GeneXpert instrument, [employer], [place] [address].   This test is being used under the Food and Drug Administration's Emergency Use Authorization.    The authorized Fact Sheet for Healthcare Providers for this assay is available upon request from the laboratory.    Blood Culture [[id number]] Collected: 04/27/24 2254    Order Status: Resulted Lab Status: In process Updated: 04/27/24 2301    Specimen: Blood,peripheral     Blood Culture [[id number]] Collected: 04/27/24 2254    Order Status: Resulted Lab Status: In process Updated: 04/27/24 2301    Specimen: Blood,peripheral       Pending Labs     Order Current Status    Blood Culture In process    Blood Culture In process    Urine Culture, Routine In process         H&P - H&P Note      H&P signed by Eris Obrien MD at 4/28/2024  1:44 PM  Version 1 of 1    Author: Eris Obrien MD Service: Hospitalist Author Type: Physician     Filed: 4/28/2024  1:44 PM Date of Service: 4/28/2024  9:13 AM Status: Signed    : Eris Obrien MD (Physician)       [facility] Hospitalist History and Physical      Chief Complaint   Patient presents with    Fever        PCP: Napoleon Ovalles DO      History of Present Illness: Patient is a 78 year old male with PMH sig for multiple sclerosis with lower extremity paraplegia, chronic sacral decubitus ulcer with polymicrobial cultures, hypertension, depression/anxiety, neurogenic bladder with chronic [place] presented from [facility] with a fever.  Fever was noticed yesterday and he received some Tylenol.  Denies any chest pain, cough or congestion or sore throat. He says he was covered in blankets in a self-made 'coccoon' and he was sweating when they took his temperature. He doesn't feel sick and thinks they overreacted to his elevated temperature.   In the ER vitals are stable.  Labs significant for leukocytosis and urinalysis with WBCs and bacteria.  Chest x-ray unremarkable.  Started on Vanco and Zosyn and admitted for further evaluation and treatment.    Past Medical History:    Anxiety state, unspecified    BACK PAIN    Calculus of kidney    Cervical stenosis of spine    COVID-19    Gout    Gout    High blood pressure    HYPERTENSION    Multiple sclerosis (HCC)    Osteoarthrosis, unspecified whether generalized or localized, unspecified site    OTHER DISEASES    GOUT    Seborrheic dermatitis    Skin cancer    Unspecified essential hypertension    Visual impairment    glasses      Past Surgical History:   Procedure Laterality Date    Debride wound      x7    Other accessory      \"urolift\"    Other accessory      multiple skin Bx's/removals    Other surgical history  02/2011    LUMBAR SX    Other surgical history      cervical surgery    Spine surgery procedure unlisted          ALL:  No Known Allergies     No current outpatient medications on file.       Social History     Tobacco Use    Smoking  status: Never    Smokeless tobacco: Never   Substance Use Topics    Alcohol use: Yes     Comment: once every few days        Fam Hx  Family History   Problem Relation Age of Onset    Other (Other) Father         industrial lung abn    Other (gout) Father     Other (Other) Brother         colitis    Hypertension Mother        Review of Systems  Comprehensive ROS reviewed and negative except for what is stated in HPI.      OBJECTIVE:  /55 (BP Location: Right arm)   Pulse 94   Temp 98.1 °F (36.7 °C) (Oral)   Resp 20   Wt 156 lb 6.4 oz (70.9 kg)   SpO2 96%   BMI 23.10 kg/m²   General:  Alert, no distress, appears stated age. \   Head:  Normocephalic, without obvious abnormality, atraumatic.   Eyes:  Sclera anicteric, No conjunctival pallor, EOMs intact.    Nose: Nares normal. Septum midline. Mucosa normal. No drainage.   Throat: Lips, mucosa, and tongue normal. Teeth and gums normal.   Neck: Supple, symmetrical, trachea midline, no cervical or supraclavicular lymph adenopathy, thyroid: no enlargment/tenderness/nodules appreciated   Lungs:   Clear to auscultation bilaterally. Normal effort   Chest wall:  No tenderness or deformity.   Heart:  Regular rate and rhythm, S1, S2 normal, no murmur, rub or gallop appreciated   Abdomen:   Soft, non-tender. Bowel sounds normal. No masses,  No organomegaly. Non distended   Extremities: Extremities normal, atraumatic, no cyanosis or edema.   Skin: Skin color, texture, turgor normal. No rashes or lesions.    Neurologic: Normal strength, no focal deficit appreciated     Data Review:    LABS:   Lab Results   Component Value Date    WBC 13.7 04/27/2024    HGB 11.4 04/27/2024    HCT 34.8 04/27/2024    .0 04/27/2024    CREATSERUM 0.44 04/28/2024    BUN 9 04/28/2024     04/28/2024    K 3.8 04/28/2024     04/28/2024    CO2 25.0 04/28/2024     04/28/2024    CA 8.6 04/28/2024    ALB 2.2 04/27/2024    ALKPHO 35 04/27/2024    BILT 0.3 04/27/2024    TP 6.1  04/27/2024    AST 10 04/27/2024    ALT 17 04/27/2024       CXR: All imaging personally reviewed.      Radiology: XR CHEST AP PORTABLE  (CPT=71045)    Result Date: 4/27/2024            PROCEDURE:  XR CHEST AP PORTABLE  (CPT=71045)  TECHNIQUE:  AP chest radiograph was obtained.  COMPARISON:  [facility] , XR, XR CHEST AP PORTABLE  (CPT=71045), 2/02/2024, 6:41 PM.  INDICATIONS:  fever  PATIENT STATED HISTORY: (As transcribed by Technologist)  Patient shares that he had a fever earlier over 100 degrees.   FINDINGS: Cardiac silhouette and pulmonary vasculature are unremarkable. Small left pleural effusion.  No consolidation.  No pneumothorax. IMPRESSION: Small left pleural effusion.  No consolidation.   LOCATION:  [facility]      Dictated by (CST): Oscar Aleman MD on 4/27/2024 at 11:56 PM     Finalized by (CST): Oscar Aleman MD on 4/27/2024 at 11:56 PM          Assessment/Plan:     78 year old male with PMH sig for multiple sclerosis with lower extremity paraplegia, chronic sacral decubitus ulcer with polymicrobial cultures, hypertension, depression/anxiety, neurogenic bladder with chronic Noriega presented from [facility] with a fever.    Fever, leukocytosis  - likely from warm blankets, low suspicion for active infection   - vanc/zosyn started in ED  - ID consulted  - given chronic sacral wound, he'll always have elevated inflammatory markers (CRP/ESR)  - chronic noriega will always reveal urine sediment     MS with paraplegia  - stable     Essential HTN  - metoprolol    Depression/Anxiety  - remeron    Neurogenic bladder with chronic noriega  - routine noriega cares    FEN: regular diet, PT/OT  Proph: SCDs, heparin  Code status: Full code     Outpatient records or previous hospital records reviewed.   [facility] hospitalist to continue to follow patient while in house    Dispo - discussed with ID, unlikely to be true sepsis, will dc back [facility]. If blood cx return positive he'll need to return to ER, he understands this      Amber Obrien MD  [facility]  Hospitalist  Message over [employer]  Pager: [phone]        **Certification      PHYSICIAN Certification of Need for Inpatient Hospitalization - Initial Certification    Patient will require inpatient services that will reasonably be expected to span two midnight's based on the clinical documentation in H+P.   Based on patients current state of illness, I anticipate that, after discharge, patient will require TBD.      Electronically signed by Eris Obrien MD on 2024  1:44 PM              Consults - MD Consult Notes      Consults signed by Ginna Ragland DO at 2024  1:33 PM     Author: Ginna Ragland DO Service: Infectious Disease Author Type: Physician    Filed: 2024  1:33 PM Date of Service: 2024 11:52 AM Status: Signed    : Ginna Ragland DO (Physician)     Consult Orders    1. Consult to Infectious Disease [[id number]] ordered by Amy Wise MD at [address] [place] [address]   part of [facility] Infectious Disease  Report of Consultation    Brenden Cardenas Patient Status:  Inpatient    1945 MRN [id number]   Location [facility] 4NW-A Attending Eris Obrien*   Hosp Day # 0 PCP Napoleon Ovalles DO     Date of Admission:  2024  Date of Consult:  2024    Reason for Consultation:  Fever    History of Present Illness:  Brenden Cardenas is a a(n) 78 year old male being seen at your request regarding a fever he experienced at [facility].  Patient has a h/o chronic De La Fuente and chronic sacral wound which has been progressing well with wound care.  He was feeling well and without complaints but spiked a temp at [facility] prompting ED presentation.      Patient with some mild active urine sediment given chronic De La Fuente.  He did have some elevated inflammatory markers but not unexpected given his chronic wound process.   Flu/COVID/RSV negative.  CXR with small L pleural effusion only.    Patient has has no further temps.  He has no focal complaints.  Feels well enough for return to [facility].    History:  Past Medical History:    Anxiety state, unspecified    BACK PAIN    Calculus of kidney    Cervical stenosis of spine    COVID-19    Gout    Gout    High blood pressure    HYPERTENSION    Multiple sclerosis (HCC)    Osteoarthrosis, unspecified whether generalized or localized, unspecified site    OTHER DISEASES    GOUT    Seborrheic dermatitis    Skin cancer    Unspecified essential hypertension    Visual impairment    glasses     Past Surgical History:   Procedure Laterality Date    Debride wound      x7    Other accessory      \"urolift\"    Other accessory      multiple skin Bx's/removals    Other surgical history  02/2011    LUMBAR SX    Other surgical history      cervical surgery    Spine surgery procedure unlisted       Family History   Problem Relation Age of Onset    Other (Other) Father         industrial lung abn    Other (gout) Father     Other (Other) Brother         colitis    Hypertension Mother       reports that he has never smoked. He has never used smokeless tobacco. He reports current alcohol use. He reports that he does not use drugs.    Allergies:  No Known Allergies    Medications:    Current Facility-Administered Medications:     allopurinol (Zyloprim) tab 300 mg, 300 mg, Oral, Daily    [Held by provider] Dalfampridine ER TB12 10 mg, 1 tablet, Oral, BID    metoprolol succinate ER (Toprol XL) 24 hr tab 25 mg, 25 mg, Oral, 2x Daily(Beta Blocker)    mirtazapine (Remeron) tab 15 mg, 15 mg, Oral, Nightly    pantoprazole (Protonix) DR tab 40 mg, 40 mg, Oral, BID AC    vancomycin (Vancocin) 1,000 mg in sodium chloride 0.9% 250 mL IVPB-ADDV, 15 mg/kg, Intravenous, Q12H    enoxaparin (Lovenox) 40 MG/0.4ML SUBQ injection 40 mg, 40 mg, Subcutaneous, Daily    acetaminophen (Tylenol) tab 650 mg, 650 mg, Oral, Q6H PRN     ondansetron (Zofran) 4 MG/2ML injection 4 mg, 4 mg, Intravenous, Q6H PRN    piperacillin-tazobactam (Zosyn) 3.375 g in dextrose 5% 100 mL IVPB-ADDV, 3.375 g, Intravenous, Q8H    sodium chloride 0.9% infusion, 125 mL/hr, Intravenous, Continuous    Review of Systems:    Constitutional:  Fever.   HEENT:  No visual changes, oral ulcers, sore throat, difficulty swallowing.   Respiratory: Negative for cough, sputum, hemoptysis, chest pain, wheezing, dyspnea on exertion, or stridor.   Cardiovascular: Negative for chest pain, palpitations, irregular heart beats.   Gastrointestinal:  No abdominal pain, nausea, vomiting, diarrhea, or constipation.   Genitourinary:  Chronic De La Fuente, neurogenic bladder.   Integument/breast: Negative for rash, skin lesions, and pruritus.   Hematologic/lymphatic: Negative for easy bruising, bleeding, and lymphadenopathy.   Musculoskeletal: Chronic sacral wound.   Neurological: No focal neurologic deficits, seizures, tremors.   Psych:  No h/o anxiety, depression, other psych d/o.   Endocrine: No history of of diabetes, thyroid disorder.    Remainder of 12 point review of systems otherwise negative.    Vital signs in last 24 hours:  Patient Vitals for the past 24 hrs:   BP Temp Temp src Pulse Resp SpO2 Weight   04/28/24 1138 105/43 98.1 °F (36.7 °C) Oral -- -- -- --   04/28/24 0500 113/55 98.1 °F (36.7 °C) Oral 94 20 96 % --   04/28/24 0150 128/54 98.1 °F (36.7 °C) Oral 82 -- 98 % 156 lb 6.4 oz (70.9 kg)   04/28/24 0045 130/63 -- -- 79 24 98 % --   04/27/24 2330 112/59 -- -- 75 24 98 % --   04/27/24 2326 112/59 -- -- 76 18 98 % --   04/27/24 2238 98/59 97.5 °F (36.4 °C) Temporal 83 18 98 % --       Intake/Output:  No intake/output data recorded.    Physical Exam:   General: Awake, alert, non-tox and in NAD.   Head: Normocephalic, without obvious abnormality, atraumatic.   Eyes: Conjunctivae/corneas clear.  No scleral icterus.  No conjunctival     hemorrhage.   Nose: Nares normal.   Throat:   Oropharynx clear, MMs moist.   Neck: Trachea ML, no masses.   Lungs: CTA b/l no rhonchi, rales, wheezes.   Chest wall: No tenderness or deformity.   Heart: Regular rate and rhythm, normal S1S2, no murmurs.   Abdomen: Soft, NT/ND.  Bowel sounds present.  No organomegaly.   Extremity: No edema.   Skin: No rashes or lesions.   Neurological: No focal neurologic deficits.    Lab Data Review:  Lab Results   Component Value Date    WBC 13.7 04/27/2024    HGB 11.4 04/27/2024    HCT 34.8 04/27/2024    .0 04/27/2024    CREATSERUM 0.44 04/28/2024    BUN 9 04/28/2024     04/28/2024    K 3.8 04/28/2024     04/28/2024    CO2 25.0 04/28/2024     04/28/2024    CA 8.6 04/28/2024    ALB 2.2 04/27/2024    ALKPHO 35 04/27/2024    BILT 0.3 04/27/2024    TP 6.1 04/27/2024    AST 10 04/27/2024    ALT 17 04/27/2024    ESRML 77 04/27/2024    CRP 8.38 04/28/2024      Cultures:   Blood cultures pending  Urine cultures pending    Flu/COVID/RSV negative    Radiology:  IMPRESSION:   Small left pleural effusion.  No consolidation.       Assessment and Plan:    Single temperature spike in this gentleman with a h/o advanced MS and neurogenic bladder but without focal complaints at the time of presentation  - No further temperatures  - CXR negative, flu/COVID/RSV negative  - Mild active urine sediment not unexpected in the context of a chronic De La Fuente  - Blood cultures negative thus far  - Empiric IV vancomycin and zosyn started    2.  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  Low grade leukocytosis due to the above  - At 13K today    4.  Disposition - stable from ID standpoint without focal complaints and no further fevers reported.  Cultures are pending and can be followed as an outpatient.  He has a very similar  UA to his 2/4/24 sample which was NG on cultures.  No pulmonary source.  Wound is stable.  From ID standpoint OK for return to [facility] as planned off all antibiotics.  Patient is aware he may have to return to the hospital if temps return or any focal findings are noted.  He agrees to this plan.  D/w patient.  D/w Dr. Alberto.    Ginna Ragland DOH[facility] Hospital [facility]  [phone]    4/28/2024  11:52 AM      Electronically signed by Ginna Ragland DO on 4/28/2024  1:33 PM           D/C Summary    No notes of this type exist for this encounter.     Physical Therapy Notes (last 72 hours)  Notes from 4/25/2024  3:32 PM through 4/28/2024  3:32 PM   No notes of this type exist for this encounter.     Occupational Therapy Notes (last 72 hours)  Notes from 4/25/2024  3:32 PM through 4/28/2024  3:32 PM   No notes of this type exist for this encounter.     Video Swallow Study Notes    No notes of this type exist for this encounter.     SLP Notes    No notes of this type exist for this encounter.     Immunizations     Name Date      Covid-19 Moderna 03/16/21     Covid-19 Moderna 02/16/21     Covid-19 Moderna Booster 11/21/21     INFLUENZA defer-03/02/20     Deferral: +Patient Refuses Z28.21       Future Appointments        Provider Department Center    6/18/2024 5:00 PM  [facility]T [facility]    6/18/2024 6:00 PM  MR RM2 (1.5T WIDE) [facility]      Multidisciplinary Problems     Active Goals     Not on file

## (undated) NOTE — IP AVS SNAPSHOT
Patient Demographics     Address  [address] [place] [address] [place] [address] Phone  [phone] (Home)  [phone] (Mobile) E-mail Address  [email or web address]      Patient Contacts     Name Relation Home Work Mobile    Alicia Cardenas Spouse [phone]      Allergies as of 2/7/2024  Review status set to In Progress on 2/2/2024   No Known Allergies     Code Status Information     Code Status    Not on file      Patient Instructions    None      Follow-up Information     [facility] Follow up.    Specialty: Wound Care  Why: As needed  Contact information:  [address] [place] [address]  [phone]  Additional information:  Your appointment is scheduled at [facility]. The address is  [address] [place] [address]. To reach Registration, park in the [facility]. Go through the entrance doors located on the ground floor. Veer left past the Information Desk and proceed to the Wound Care Center.      Masks are optional for all patients and visitors, unless otherwise indicated.                      Your Home Meds List      TAKE these medications       Instructions Authorizing Provider Morning Afternoon Evening As Needed   acetaminophen 500 MG Tabs  Commonly known as: Tylenol Extra Strength      Take 2 tablets (1,000 mg total) by mouth every 6 (six) hours as needed for Pain.          allopurinol 300 MG Tabs  Commonly known as: Zyloprim      TAKE 1 TABLET BY MOUTH EVERY DAY   Napoleon Friedl         ALPRAZolam 0.5 MG Tabs  Commonly known as: Xanax      Take 1 tablet (0.5 mg total) by mouth 3 (three) times daily as needed.   Xenia Saez         amoxicillin 875 MG Tabs  Commonly known as: Amoxil      Take 1 tablet (875 mg total) by mouth every 12 (twelve) hours for 14 days.  Stop taking on: February 21, 2024   Daniel Lata         Centrum Tabs      Take 1 tablet by mouth daily.          cholecalciferol 50 MCG (2000 UT) Tabs      Take 1 tablet  (2,000 Units total) by mouth daily.          ciprofloxacin 500 MG Tabs  Commonly known as: Cipro      Take 1 tablet (500 mg total) by mouth 2 (two) times daily for 14 days.  Stop taking on: February 21, 2024   Daniel Guido         cyanocobalamin 1000 MCG Tabs  Commonly known as: Vitamin B12      Take 1 tablet (1,000 mcg total) by mouth daily.          Dalfampridine ER 10 MG Tb12      Take 1 tablet (10 mg total) by mouth in the morning and 1 tablet (10 mg total) before bedtime.          gentamicin 0.1 % Oint  Commonly known as: Garamycin      Apply 1 Application topically 3 (three) times daily.   Sumitha V Bryant         HYDROcodone-acetaminophen 5-325 MG Tabs  Commonly known as: Norco  Notes to patient: Do not take with other acetaminophen (Tylenol) products. 4,000mg limit of tylenol daily. Do not take with alcohol. Do not drive while on prescription pain medication. Take with some food.      Take 1 tablet by mouth every 8 (eight) hours as needed.   Xenia Saez         losartan 100 MG Tabs  Commonly known as: Cozaar      TAKE 1 TABLET BY MOUTH EVERY DAY   Napoleon Friedl         MAGNESIUM OR      Take 400 mg by mouth daily.          predniSONE 20 MG Tabs  Commonly known as: Deltasone      Take 1 tablet (20 mg total) by mouth as needed.          silver sulfADIAZINE 1 % Crea  Commonly known as: Silvadene      silver sulfadiazine 1 % topical cream   APPLY TO AFFECTED AREA TWICE A DAY DIRECTLY TO SORE          sodium chloride 0.9% SOLN 480 mL with Ocrelizumab 300 MG/10ML SOLN 600 mg      Inject 600 mg into the vein one time. Every 6 months. Last infusion January, 2024                Where to Get Your Medications      Please  your prescriptions at the location directed by your doctor or nurse    Bring a paper prescription for each of these medications  ALPRAZolam 0.5 MG Tabs  amoxicillin 875 MG Tabs  ciprofloxacin 500 MG Tabs  HYDROcodone-acetaminophen 5-325 MG Tabs           [id number] - MAR ACTION REPORT  (last  48 hrs)    ** SITE UNKNOWN **     Order ID Medication Name Action Time Action Reason Comments    [id number] ceFEPIme (Maxipime) 1 g in sodium chloride 0.9% 100 mL IVPB-MBP 02/06/24 1313 New Bag      [id number] ceFEPIme (Maxipime) 1 g in sodium chloride 0.9% 100 mL IVPB-MBP 02/06/24 2241 New Bag      [id number] ceFEPIme (Maxipime) 1 g in sodium chloride 0.9% 100 mL IVPB-MBP 02/07/24 0540 New Bag      [id number] collagenase (Santyl) 250 UNIT/GM ointment 02/06/24 1326 Given      [id number] guaiFENesin ER (Mucinex) 12 hr tab 600 mg 02/05/24 2232 Given      [id number] guaiFENesin ER (Mucinex) 12 hr tab 600 mg 02/06/24 0951 Given      [id number] guaiFENesin ER (Mucinex) 12 hr tab 600 mg 02/06/24 2241 Given      [id number] guaiFENesin ER (Mucinex) 12 hr tab 600 mg 02/07/24 0912 Given      [id number] losartan (Cozaar) tab 100 mg 02/06/24 0951 Given      [id number] losartan (Cozaar) tab 100 mg 02/07/24 0912 Given      [id number] metRONIDAZOLE (Flagyl) tab 250 mg 02/06/24 1313 Given      [id number] metRONIDAZOLE (Flagyl) tab 250 mg 02/06/24 2251 Given      [id number] metRONIDAZOLE (Flagyl) tab 250 mg 02/07/24 0540 Given      [id number] metRONIDAZOLE (Flagyl) tab 250 mg 02/07/24 1424 Given      [id number] vancomycin (Vancocin) 1.25 g in sodium chloride 0.9% 250mL IVPB premix 02/05/24 1836 New Bag      [id number] vancomycin (Vancocin) 1.25 g in sodium chloride 0.9% 250mL IVPB premix 02/06/24 1838 New Bag            LEFT LOWER ABDOMEN     Order ID Medication Name Action Time Action Reason Comments    [id number] heparin (Porcine) 5000 UNIT/ML injection 5,000 Units 02/06/24 0540 Given      [id number] heparin (Porcine) 5000 UNIT/ML injection 5,000 Units 02/06/24 1314 Given      [id number] heparin (Porcine) 5000 UNIT/ML injection 5,000 Units 02/06/24 2241 Given      [id number] heparin (Porcine) 5000 UNIT/ML injection 5,000 Units 02/07/24 0540 Given            RIGHT LOWER ABDOMEN     Order ID Medication Name Action Time Action Reason Comments     [id number] heparin (Porcine) 5000 UNIT/ML injection 5,000 Units 02/05/24 2232 Given      [id number] heparin (Porcine) 5000 UNIT/ML injection 5,000 Units 02/07/24 1424 Given              Recent Vital Signs    Flowsheet Row Most Recent Value   /60 Filed at 02/07/2024 0930   Pulse 89 Filed at 02/07/2024 0930   Resp 20 Filed at 02/07/2024 0930   Temp 98.4 °F (36.9 °C) Filed at 02/07/2024 0930   SpO2 94 % Filed at 02/07/2024 0930      Patient's Most Recent Weight    Flowsheet Row Most Recent Value   Patient Weight 79.4 kg (175 lb)         Lab Results Last 24 Hours      Creatinine, Serum [[id number]] (Normal)  Resulted: 02/07/24 0611, Result status: Final result   Ordering provider: Brendan Max MD  02/06/24 2300 Resulting lab: [facility] ([facility])    Specimen Information    Type Source Collected On   Blood — 02/07/24 0521          Components    Component Value Reference Range Flag Lab   Creatinine 0.95 0.70 - 1.30 mg/dL — [facility])   eGFR-Cr 83 >=60 mL/min/1.73m2 — [facility])            Testing Performed By     Lab - Abbreviation Name Director Address Valid Date Range    139 - [facility] ([facility]) [facility] ([facility]) Goldberg, Cathryn A. MD [address] [place] [id number] 03/19/20 1441 - Present            Microbiology Results (All)     Procedure Component Value Units Date/Time    Blood Culture [[id number]] Collected: 02/02/24 1805    Order Status: Completed Lab Status: Preliminary result Updated: 02/06/24 2300    Specimen: Blood,peripheral      Blood Culture Result No Growth 4 Days    Blood Culture [[id number]] Collected: 02/02/24 1805    Order Status: Completed Lab Status: Preliminary result Updated: 02/06/24 2300    Specimen: Blood,peripheral      Blood Culture Result No Growth 4 Days    Narrative:      Aerobic Bottle Volume - 9 mL  Anaerobic Bottle Volume - 9 mL    Aerobic Bacterial Culture [[id number]]  (Abnormal)  (Susceptibility)  Collected: 02/02/24 1805    Order Status: Completed Lab Status: Final result Updated: 02/06/24 0706    Specimen: Other from Buttocks      Aerobic Culture Result 3+ growth Staphylococcus aureus      1+ growth Pseudomonas aeruginosa      4+ growth Enterococcus faecalis NOT VRE      2+ growth Gram negative marva     Aerobic Smear No WBCs seen      3+ Gram Positive Cocci      3+ Gram Negative Rods    Susceptibility      Staphylococcus aureus      Not Specified     Cefazolin  Sensitive     Clindamycin  Resistant     Erythromycin >=8  Resistant     Gentamicin >=16  Resistant     Levofloxacin >=8  Resistant     Oxacillin 0.5  Sensitive     Trimethoprim/Sulfa <=10  Sensitive     Vancomycin <=0.5  Sensitive                      Susceptibility      Pseudomonas aeruginosa      Not Specified     Cefepime 4  Sensitive     Ceftazidime <=1  Sensitive     Ciprofloxacin <=1  Sensitive     Levofloxacin 1  Sensitive     Meropenem <=1  Sensitive     Piperacillin + Tazobactam <=4  Sensitive     Tobramycin 2  Sensitive                          Urine Culture, Routine [[id number]] Collected: 02/04/24 1354    Order Status: Completed Lab Status: Final result Updated: 02/05/24 1410    Specimen: Urine, clean catch      Urine Culture No Growth at 18-24 hrs.    SARS-CoV-2/Flu A and B/RSV by PCR (GeneXpert) [[id number]]  (Abnormal) Collected: 02/02/24 1805    Order Status: Completed Lab Status: Final result Updated: 02/02/24 1933    Specimen: Other from Nares      SARS-CoV-2 (COVID-19) - (GeneXpert) Detected     Influenza A by PCR Negative     Influenza B by PCR Negative     RSV by PCR Negative    Narrative:      This test is intended for the qualitative detection and differentiation of SARS-CoV-2, influenza A, influenza B, and respiratory syncytial virus (RSV) viral RNA in nasopharyngeal or nares swabs from individuals suspected of respiratory viral infection consistent with COVID-19 by their healthcare provider. Signs and symptoms of respiratory  viral infection due to SARS-CoV-2, influenza, and RSV can be similar.    Test performed using the Xpert Xpress SARS-CoV-2/FLU/RSV (real time RT-PCR)  assay on the Aircrmpert instrument, [employer], [place] [address].   This test is being used under the Food and Drug Administration's Emergency Use Authorization.    The authorized Fact Sheet for Healthcare Providers for this assay is available upon request from the laboratory.      Pending Labs     Order Current Status    Blood Culture Preliminary result    Blood Culture Preliminary result         H&P - H&P Note      H&P signed by Rohit Adrian DO at 2/3/2024  9:01 AM  Version 2 of 2    Author: Rohit Adrian DO Service: Hospitalist Author Type: Physician    Filed: 2/3/2024  9:01 AM Date of Service: 2/3/2024  7:05 AM Status: Addendum    : Rohit Adrian DO (Physician)    Related Notes: Original Note by Rohit Adrian DO (Physician) filed at 2/3/2024  9:00 AM       [facility] History and Physical      Chief Complaint   Patient presents with    Chest Pain        PCP: Napoleon Ovalles DO    History of Present Illness: Patient is a 78 year old male with PMH sig for MS,  HTN, and anxiety who presented to the ED for evaluation of weakness, cough, and congestion.  He state he started to cough about 2 weeks ago, has had chest pain ever since.  He has also become progressively weak with difficulty getting to the bathroom.  He has a known sacral decub that has become more necrotic and erythematous.  His wife has had more difficulty caring for him at home.  No F/C, N/V.  No sick contacts.    In the ED, WBC 15.7K.  UA with positive LE, RBC, WBC, and bacteria.  CTA chest neg for PE or PNA, showed mild basilar atelectasis.  COVID swab positive.  Wound cultures from sacral decubs were collected.  Empiric unasyn was given and IVFs.      On my evaluation, pt with no new complaints.      Past Medical History:   Diagnosis Date    Anxiety  state, unspecified     BACK PAIN     Calculus of kidney     Cervical stenosis of spine     COVID-19     Gout     Gout     High blood pressure     HYPERTENSION     Multiple sclerosis (HCC)     Osteoarthrosis, unspecified whether generalized or localized, unspecified site     OTHER DISEASES     GOUT    Seborrheic dermatitis     Skin cancer     Unspecified essential hypertension     Visual impairment     glasses      Past Surgical History:   Procedure Laterality Date    OTHER ACCESSORY      \"urolift\"    OTHER ACCESSORY      multiple skin Bx's/removals    OTHER SURGICAL HISTORY  02/2011    LUMBAR SX    OTHER SURGICAL HISTORY      cervical surgery    SPINE SURGERY PROCEDURE UNLISTED          ALL:  No Known Allergies     No current facility-administered medications on file prior to encounter.     Current Outpatient Medications on File Prior to Encounter   Medication Sig Dispense Refill    sodium chloride 0.9% SOLN 480 mL with Ocrelizumab 300 MG/10ML SOLN 600 mg Inject 600 mg into the vein one time. Every 6 months. Last infusion January, 2024      gentamicin 0.1 % External Ointment Apply 1 Application topically 3 (three) times daily. 30 g 3    predniSONE 20 MG Oral Tab Take 1 tablet (20 mg total) by mouth as needed.      MAGNESIUM OR Take 400 mg by mouth daily.      ALLOPURINOL 300 MG Oral Tab TAKE 1 TABLET BY MOUTH EVERY DAY (Patient taking differently: Take 1 tablet (300 mg total) by mouth every 3 (three) days. Every 3 days. Last dose 1/31/24) 90 tablet 3    Dalfampridine ER 10 MG Oral Tablet 12 Hr Take 1 tablet (10 mg total) by mouth in the morning and 1 tablet (10 mg total) before bedtime.      LOSARTAN 100 MG Oral Tab TAKE 1 TABLET BY MOUTH EVERY DAY 90 tablet 0    Multiple Vitamins-Minerals (CENTRUM) Oral Tab Take 1 tablet by mouth daily.      Vitamin D3, Cholecalciferol, 50 MCG (2000 UT) Oral Tab Take 1 tablet (2,000 Units total) by mouth daily.      cyanocobalamine 1000 MCG Oral Tab Take 1 tablet (1,000 mcg total)  by mouth daily.      silver sulfADIAZINE 1 % External Cream silver sulfadiazine 1 % topical cream   APPLY TO AFFECTED AREA TWICE A DAY DIRECTLY TO SORE      ALPRAZolam 0.5 MG Oral Tab Take 1 tablet (0.5 mg total) by mouth 3 (three) times daily as needed.      acetaminophen 500 MG Oral Tab Take 2 tablets (1,000 mg total) by mouth every 6 (six) hours as needed for Pain.           Social History     Tobacco Use    Smoking status: Never    Smokeless tobacco: Never   Substance Use Topics    Alcohol use: Yes     Comment: once every few days        Fam Hx  Family History   Problem Relation Age of Onset    Other (Other) Father         industrial lung abn    Other (gout) Father     Other (Other) Brother         colitis    Hypertension Mother        Review of Systems  Comprehensive ROS reviewed and negative except for what is stated in HPI.      OBJECTIVE:  /65 (BP Location: Right arm)   Pulse 83   Temp 98.2 °F (36.8 °C) (Oral)   Resp 18   Ht 5' 9\" (1.753 m)   Wt 175 lb (79.4 kg)   SpO2 95%   BMI 25.84 kg/m²   Gen: No acute distress, alert and oriented x3, no focal neurologic deficits. Chronically ill appearing male.    HEENT:  EOMI, PERRLA, OP clear, MMM  Pulm: Lungs clear bilaterally, normal respiratory effort  CV: Heart with regular rate and rhythm, no murmur.  Normal PMI.    Abd: Abdomen soft, nontender, nondistended, no organomegaly, bowel sounds present  MSK: Full range of motion in extremities, no clubbing, no cyanosis  Skin: +large sacral decub with overlying eschar and surrounding ertythema   Neuro:  Grossly intact, no focal deficits      Data Review:    LABS:   Lab Results   Component Value Date    WBC 10.1 02/03/2024    HGB 12.5 02/03/2024    HCT 38.5 02/03/2024    .0 02/03/2024    CREATSERUM 0.62 02/03/2024    BUN 17 02/03/2024     02/03/2024    K 3.9 02/03/2024     02/03/2024    CO2 25.0 02/03/2024    GLU 81 02/03/2024    CA 8.9 02/03/2024    ALB 2.8 02/02/2024    ALKPHO 37  02/02/2024    BILT 0.5 02/02/2024    TP 6.9 02/02/2024    AST 49 02/02/2024    ALT 33 02/02/2024    DDIMER 2.28 02/02/2024    MG 2.2 02/03/2024    PHOS 2.6 02/02/2024     EKG - personally reviewed - sinus tachy with non specific ST abnormality.  Possible pericarditis?    CXR: image personally reviewed.      Radiology: CT ANGIOGRAPHY, CHEST (CPT=71275)    Result Date: 2/2/2024  PROCEDURE:  CT ANGIOGRAPHY, CHEST (CPT=71275)  COMPARISON:  None.  INDICATIONS:  chest discomfort that started 2 weeks ago. states he is falling apart. hx of MS and has not been able to ambulate for the past month.  TECHNIQUE:  IV contrast-enhanced multislice CT angiography is performed through the pulmonary arterial anatomy. 3D volume renderings are generated.  Dose reduction techniques were used. Dose information is transmitted to the ACR (American College of Radiology) NRDR (National Radiology Data Registry) which includes the Dose Index Registry.  PATIENT STATED HISTORY:(As transcribed by Technologist)  Patient presents via EMS for evaluation of chest pain that started a little over two weeks ago. He reports flu like symptoms prior to the onset with persistent \"hacking cough\". He states the phlegm was green originally and has now moved to a yellow color. Denies known fever. History of MS and reports over the last 4 weeks he has become unable to ambulate. Reports large pressure sore.    CONTRAST USED:  100cc of Isovue 370  FINDINGS:  VASCULATURE:  There is no pulmonary embolism to the first subsegmental arterial level. THORACIC AORTA:  No aneurysm or visible dissection on this nongated PE protocol exam.  LUNGS:  Motion artifact limits assessment of the lungs.  The central airways appear patent.  Mild basilar atelectasis is present.  There is no focal consolidation. MEDIASTINUM:  Small thyroid nodules are likely present.  These are incompletely assessed. SHIKHA:  No mass or adenopathy.  CARDIAC:  No enlargement, pericardial thickening, or  significant coronary artery calcification. PLEURA:  No mass or effusion.  CHEST WALL:  No mass or axillary adenopathy.  LIMITED ABDOMEN:  Minimal nodularity is noted of the left adrenal gland.  This is incompletely assessed but likely is related to underlying adenoma. BONES:  Mild multilevel degenerative changes of the thoracic spine are noted. OTHER:  Negative.             CONCLUSION:  There is no pulmonary embolism to the first subsegmental arterial level.    LOCATION:  [id number]   Dictated by (CST): Ray Gonzalez MD on 2/02/2024 at 8:04 PM     Finalized by (CST): Ray Gonzalez MD on 2/02/2024 at 8:31 PM       XR CHEST AP PORTABLE  (CPT=71045)    Result Date: 2/2/2024  PROCEDURE:  XR CHEST AP PORTABLE  (CPT=71045)  TECHNIQUE:  AP chest radiograph was obtained.  COMPARISON:  [facility] , CT, CT ANGIOGRAPHY, CHEST (CPT=71275), 2/02/2024, 7:28 PM.  SAM, SHELBIE, CHEST PA   LATERAL, 1/31/2011, 4:16 PM.  INDICATIONS:  chest discomfort that started 2 weeks ago. states he is falling apart. hx of MS and has not been able to ambulate for the past month.  PATIENT STATED HISTORY: (As transcribed by Technologist)  Chest discomfort.    FINDINGS:  There is left basilar airspace disease adjacent to an elevated left hemidiaphragm.  There is minimal blunting the left costophrenic angle which may reflect a tiny left pleural effusion.  There is cardiomegaly.  There is no pulmonary edema.  The right lung is clear.  Old right rib fracture deformities are noted.  Orthopedic hardware seen in the lower cervical spine.  There is no pneumothorax.  There is no mediastinal widening.  Degenerative changes are seen in the spine.            CONCLUSION:  1. Cardiomegaly without edema. 2. Left basilar airspace disease could reflect atelectasis or pneumonia.  The left hemidiaphragm is elevated which is a new finding.  Tiny left pleural effusion is noted.    LOCATION:  [facility]      Dictated by (CST): Jaylon Hebert MD on 2/02/2024 at 7:59 PM     Finalized by  (CST): Jaylon Hebert MD on 2/02/2024 at 8:01 PM          Assessment/Plan:     78 yr old male with PMH sig for MS,  HTN, and anxiety who presented to the ED for evaluation of weakness, cough, and congestion    # Pressure injury of buttock with suspected infection   # Acute cystitis with hematuria   - suspect his sacral decubs are infected, may need debridement   - cont empiric unasyn, f/u wound cultures   - f/u UCx  - gen surg c/s appreciated  - wound care c/s    # Atypical chest pain  - suspect due to cough and recent COVID infection   - check ECHO given abnormal EKG  - monitor     # MS with suspected acute exacerbation due to infection   # LE paraplegia  - hold prednisone for now given acute infection   - neuro c/s  - PT/OT    # COVID-19 infection   - pt with 2 weeks of symptoms   - not hypoxic, no PNA on CTA chest  - cont supportive care    # Essential HTN  - cont losartan     DVT prophy - hep subcutaneous   Dispo: inpt care.  POC d/w pt who agrees.     Outpatient records or previous hospital records reviewed.   [facility] hospitalist to continue to follow patient while in house  A total of 76 minutes taken with patient and coordinating care.  Greater than 50% face to face encounter.      Advanced Care Planning  While discussing goals of care with pt, Sim voluntarily participated in an advanced care planning discussion.  The following was discussed: POA and code status.  He confirms that his wife has his healthcare POA.  He confirms FULL CODE status.     17 minutes were spent discussing advanced care planning.  This time was exclusive of the documented time for this visit.     Rohit Adrian DO  [facility] Hospitalist      Electronically signed by Rohit Adrian DO on 2/3/2024  9:01 AM     H&P signed by Rohit Adrian DO at 2/3/2024  9:00 AM  Version 1 of 2    Author: Rohit Adrian DO Service: Hospitalist Author Type: Physician    Filed: 2/3/2024  9:00 AM Date of Service: 2/3/2024  7:05 AM  Status: Signed    : Rohit Adrian DO (Physician)    Related Notes: Addendum by Rohit Adrian DO (Physician) filed at 2/3/2024  9:01 AM       [place] Hospitalist History and Physical      Chief Complaint   Patient presents with    Chest Pain        PCP: Napoleon Ovalles DO    History of Present Illness: Patient is a 78 year old male with PMH sig for MS,  HTN, and anxiety who presented to the ED for evaluation of weakness, cough, and congestion.  He state he started to cough about 2 weeks ago, has had chest pain ever since.  He has also become progressively weak with difficulty getting to the bathroom.  He has a known sacral decub that has become more necrotic and erythematous.  His wife has had more difficulty caring for him at home.  No F/C, N/V.  No sick contacts.    In the ED, WBC 15.7K.  UA with positive LE, RBC, WBC, and bacteria.  CTA chest neg for PE or PNA, showed mild basilar atelectasis.  COVID swab positive.  Wound cultures from sacral decubs were collected.  Empiric unasyn was given and IVFs.      On my evaluation, pt with no new complaints.      Past Medical History:   Diagnosis Date    Anxiety state, unspecified     BACK PAIN     Calculus of kidney     Cervical stenosis of spine     COVID-19     Gout     Gout     High blood pressure     HYPERTENSION     Multiple sclerosis (HCC)     Osteoarthrosis, unspecified whether generalized or localized, unspecified site     OTHER DISEASES     GOUT    Seborrheic dermatitis     Skin cancer     Unspecified essential hypertension     Visual impairment     glasses      Past Surgical History:   Procedure Laterality Date    OTHER ACCESSORY      \"urolift\"    OTHER ACCESSORY      multiple skin Bx's/removals    OTHER SURGICAL HISTORY  02/2011    LUMBAR SX    OTHER SURGICAL HISTORY      cervical surgery    SPINE SURGERY PROCEDURE UNLISTED          ALL:  No Known Allergies     No current facility-administered medications on file prior to  encounter.     Current Outpatient Medications on File Prior to Encounter   Medication Sig Dispense Refill    sodium chloride 0.9% SOLN 480 mL with Ocrelizumab 300 MG/10ML SOLN 600 mg Inject 600 mg into the vein one time. Every 6 months. Last infusion January, 2024      gentamicin 0.1 % External Ointment Apply 1 Application topically 3 (three) times daily. 30 g 3    predniSONE 20 MG Oral Tab Take 1 tablet (20 mg total) by mouth as needed.      MAGNESIUM OR Take 400 mg by mouth daily.      ALLOPURINOL 300 MG Oral Tab TAKE 1 TABLET BY MOUTH EVERY DAY (Patient taking differently: Take 1 tablet (300 mg total) by mouth every 3 (three) days. Every 3 days. Last dose 1/31/24) 90 tablet 3    Dalfampridine ER 10 MG Oral Tablet 12 Hr Take 1 tablet (10 mg total) by mouth in the morning and 1 tablet (10 mg total) before bedtime.      LOSARTAN 100 MG Oral Tab TAKE 1 TABLET BY MOUTH EVERY DAY 90 tablet 0    Multiple Vitamins-Minerals (CENTRUM) Oral Tab Take 1 tablet by mouth daily.      Vitamin D3, Cholecalciferol, 50 MCG (2000 UT) Oral Tab Take 1 tablet (2,000 Units total) by mouth daily.      cyanocobalamine 1000 MCG Oral Tab Take 1 tablet (1,000 mcg total) by mouth daily.      silver sulfADIAZINE 1 % External Cream silver sulfadiazine 1 % topical cream   APPLY TO AFFECTED AREA TWICE A DAY DIRECTLY TO SORE      ALPRAZolam 0.5 MG Oral Tab Take 1 tablet (0.5 mg total) by mouth 3 (three) times daily as needed.      acetaminophen 500 MG Oral Tab Take 2 tablets (1,000 mg total) by mouth every 6 (six) hours as needed for Pain.           Social History     Tobacco Use    Smoking status: Never    Smokeless tobacco: Never   Substance Use Topics    Alcohol use: Yes     Comment: once every few days        Fam Hx  Family History   Problem Relation Age of Onset    Other (Other) Father         industrial lung abn    Other (gout) Father     Other (Other) Brother         colitis    Hypertension Mother        Review of Systems  Comprehensive ROS  reviewed and negative except for what is stated in HPI.      OBJECTIVE:  /65 (BP Location: Right arm)   Pulse 83   Temp 98.2 °F (36.8 °C) (Oral)   Resp 18   Ht 5' 9\" (1.753 m)   Wt 175 lb (79.4 kg)   SpO2 95%   BMI 25.84 kg/m²   Gen: No acute distress, alert and oriented x3, no focal neurologic deficits. Chronically ill appearing male.    HEENT:  EOMI, PERRLA, OP clear, MMM  Pulm: Lungs clear bilaterally, normal respiratory effort  CV: Heart with regular rate and rhythm, no murmur.  Normal PMI.    Abd: Abdomen soft, nontender, nondistended, no organomegaly, bowel sounds present  MSK: Full range of motion in extremities, no clubbing, no cyanosis  Skin: +large sacral decub with overlying eschar and surrounding ertythema   Neuro:  Grossly intact, no focal deficits      Data Review:    LABS:   Lab Results   Component Value Date    WBC 10.1 02/03/2024    HGB 12.5 02/03/2024    HCT 38.5 02/03/2024    .0 02/03/2024    CREATSERUM 0.62 02/03/2024    BUN 17 02/03/2024     02/03/2024    K 3.9 02/03/2024     02/03/2024    CO2 25.0 02/03/2024    GLU 81 02/03/2024    CA 8.9 02/03/2024    ALB 2.8 02/02/2024    ALKPHO 37 02/02/2024    BILT 0.5 02/02/2024    TP 6.9 02/02/2024    AST 49 02/02/2024    ALT 33 02/02/2024    DDIMER 2.28 02/02/2024    MG 2.2 02/03/2024    PHOS 2.6 02/02/2024     EKG - personally reviewed - sinus tachy with non specific ST abnormality.  Possible pericarditis?    CXR: image personally reviewed.      Radiology: CT ANGIOGRAPHY, CHEST (CPT=71275)    Result Date: 2/2/2024  PROCEDURE:  CT ANGIOGRAPHY, CHEST (CPT=71275)  COMPARISON:  None.  INDICATIONS:  chest discomfort that started 2 weeks ago. states he is falling apart. hx of MS and has not been able to ambulate for the past month.  TECHNIQUE:  IV contrast-enhanced multislice CT angiography is performed through the pulmonary arterial anatomy. 3D volume renderings are generated.  Dose reduction techniques were used. Dose  information is transmitted to the ACR (American College of Radiology) NRDR (National Radiology Data Registry) which includes the Dose Index Registry.  PATIENT STATED HISTORY:(As transcribed by Technologist)  Patient presents via EMS for evaluation of chest pain that started a little over two weeks ago. He reports flu like symptoms prior to the onset with persistent \"hacking cough\". He states the phlegm was green originally and has now moved to a yellow color. Denies known fever. History of MS and reports over the last 4 weeks he has become unable to ambulate. Reports large pressure sore.    CONTRAST USED:  100cc of Isovue 370  FINDINGS:  VASCULATURE:  There is no pulmonary embolism to the first subsegmental arterial level. THORACIC AORTA:  No aneurysm or visible dissection on this nongated PE protocol exam.  LUNGS:  Motion artifact limits assessment of the lungs.  The central airways appear patent.  Mild basilar atelectasis is present.  There is no focal consolidation. MEDIASTINUM:  Small thyroid nodules are likely present.  These are incompletely assessed. SHIKHA:  No mass or adenopathy.  CARDIAC:  No enlargement, pericardial thickening, or significant coronary artery calcification. PLEURA:  No mass or effusion.  CHEST WALL:  No mass or axillary adenopathy.  LIMITED ABDOMEN:  Minimal nodularity is noted of the left adrenal gland.  This is incompletely assessed but likely is related to underlying adenoma. BONES:  Mild multilevel degenerative changes of the thoracic spine are noted. OTHER:  Negative.             CONCLUSION:  There is no pulmonary embolism to the first subsegmental arterial level.    LOCATION:  [id number]   Dictated by (CST): Ray Gonzalez MD on 2/02/2024 at 8:04 PM     Finalized by (CST): Ray Gonzalez MD on 2/02/2024 at 8:31 PM       XR CHEST AP PORTABLE  (CPT=71045)    Result Date: 2/2/2024  PROCEDURE:  XR CHEST AP PORTABLE  (CPT=71045)  TECHNIQUE:  AP chest radiograph was obtained.  COMPARISON:  [facility],  CT ANGIOGRAPHY, CHEST (CPT=71275), 2/02/2024, 7:28 PM.  [facility], XR, CHEST PA   LATERAL, 1/31/2011, 4:16 PM.  INDICATIONS:  chest discomfort that started 2 weeks ago. states he is falling apart. hx of MS and has not been able to ambulate for the past month.  PATIENT STATED HISTORY: (As transcribed by Technologist)  Chest discomfort.    FINDINGS:  There is left basilar airspace disease adjacent to an elevated left hemidiaphragm.  There is minimal blunting the left costophrenic angle which may reflect a tiny left pleural effusion.  There is cardiomegaly.  There is no pulmonary edema.  The right lung is clear.  Old right rib fracture deformities are noted.  Orthopedic hardware seen in the lower cervical spine.  There is no pneumothorax.  There is no mediastinal widening.  Degenerative changes are seen in the spine.            CONCLUSION:  1. Cardiomegaly without edema. 2. Left basilar airspace disease could reflect atelectasis or pneumonia.  The left hemidiaphragm is elevated which is a new finding.  Tiny left pleural effusion is noted.    LOCATION:  [facility]      Dictated by (CST): Jaylon Hebert MD on 2/02/2024 at 7:59 PM     Finalized by (CST): Jaylon Hebert MD on 2/02/2024 at 8:01 PM          Assessment/Plan:     78 yr old male with PMH sig for MS,  HTN, and anxiety who presented to the ED for evaluation of weakness, cough, and congestion    # Pressure injury of buttock with suspected infection   # Acute cystitis with hematuria   - suspect his sacral decubs are infected, may need debridement   - cont empiric unasyn, f/u wound cultures   - f/u UCx  - gen surg c/s appreciated  - wound care c/s    # Atypical chest pain  - suspect due to cough and recent COVID infection   - check ECHO given abnormal EKG  - monitor     # MS with suspected acute exacerbation due to infection   # LE paraplegia  - hold prednisone for now given acute infection   - neuro c/s  - PT/OT    # COVID-19 infection   - pt with 2 weeks of  symptoms   - not hypoxic, no PNA on CTA chest  - cont supportive care    # Essential HTN  - cont losartan     DVT prophy - hep subcutaneous   Dispo: inpt care.  POC d/w pt who agrees.     Outpatient records or previous hospital records reviewed.   [facility] hospitalist to continue to follow patient while in house  A total of 76 minutes taken with patient and coordinating care.  Greater than 50% face to face encounter.      Advanced Care Planning  While discussing goals of care with pt, Sim voluntarily participated in an advanced care planning discussion.  The following was discussed: POA and code status.  He confirms that his wife has his healthcare POA.  He confirms FUL CODE status.     17 minutes were spent discussing advanced care planning.  This time was exclusive of the documented time for this visit.     Rohit Adrian DO  [facility] Hospitalist      Electronically signed by Rohit Adrian DO on 2/3/2024  9:00 AM              Consults - MD Consult Notes      Consults signed by Dong Cruz MD at 2/7/2024  9:22 AM      Author: Dong Cruz MD Service: Infectious Disease Author Type: Physician    Filed: 2/7/2024  9:22 AM Status: Signed    : Dong Cruz MD (Physician)       [facility]    PATIENT'S NAME: PETRA HERNANDEZ   ATTENDING PHYSICIAN: Rohit Adrian DO   CONSULTING PHYSICIAN: Dong Cruz M.D.   PATIENT ACCOUNT#:   [id number]    LOCATION:  [address] [place] [address]  MEDICAL RECORD #:   [id number]       YOB: 1945  ADMISSION DATE:       02/02/2024      CONSULT DATE:  02/06/2024    REPORT OF CONSULTATION    HISTORY OF PRESENT ILLNESS:  This is a 78-year-old man with multiple sclerosis.  He has developed a sacral decubitus and has been admitted.  This was debrided a couple of days ago, and it is the hope according to the patient that enzymatic debridement will be all that is necessary moving forward.  Fever and chills were denied.  Of note was diagnosis of COVID  about 2 weeks ago and the COVID test remains positive at this time.  Bladder emptying remains an issue for this patient with multiple sclerosis.  No other GI, , cardiovascular, CNS, or respiratory symptoms noted.    PAST MEDICAL HISTORY:  Significant for the above.  Previous renal stones, DJD, hypertension, gout since his teenage years.    PAST SURGICAL HISTORY:  Cervical and lumbar surgeries.  UroLift was done in the past.     MEDICATIONS:  Unasyn switched to cefepime and vancomycin.      ALLERGIES:  No known allergies.    SOCIAL HISTORY:  Negative cigarettes and alcohol.    FAMILY HISTORY:  Nobody else ill except family members had COVID previously.    REVIEW OF SYSTEMS:  Weight has been stable.      PHYSICAL EXAMINATION:    GENERAL:  This is a well-developed male, bedbound, but no acute distress.   VITAL SIGNS:  He is afebrile.  Vitals are stable.  HEENT:  Pale conjunctivae.  No oral lesions.  NECK:  Supple.  No JVD or adenopathy.  LUNGS:  Seem clear.   HEART:  I do not hear a murmur.  ABDOMEN:  Nontender.  No masses, rebound, or organomegaly.  EXTREMITIES:  No clubbing, cyanosis, edema, phlebitis, or cellulitis.   SKIN:  He was not turned over, but there are good pictures in the Wound Care note that describe the wound well.  Necrotic tissue is seen.    LABORATORY DATA:  Wound culture:  Staph aureus, pseudomonas, enterococcus; back on January 26 there was also Morganella, group B strep, and E. coli and proteus but no pseudomonas.  Urinalysis, active sediment.  Urine culture, no growth.    CT angio did not show pulmonary embolism this admission.    Chest x-ray, looked like some atelectasis.      Sacrum x-ray, no evidence of osteomyelitis.      IMPRESSION:    1.   A polymicrobial sacral decubitus.  Unasyn was switched to cefepime and vancomycin.  I will add some oral Flagyl for some more anaerobic coverage.  2.   It looks like he had a urinary tract infection but this may be colonization given the negative urine  culture.  This will not be pursued any further at this time.  3.   COVID was present with symptoms for about 2 weeks.  No treatment is indicated at this time by my view.  Isolation protocol is whatever the hospital infection control currently has in place.  I doubt he is contagious at this time given the 2-week history.  I have spoken with the patient.  Further suggestions to follow.      Thank you very much for allowing me to see this patient.    Dictated By Dong Cruz M.D.  d: 02/06/2024 11:30:13  t: 02/06/2024 11:55:46  Job [id number]  EMS/    Electronically signed by Dong Cruz MD on 2/7/2024  9:22 AM           D/C Summary    No notes of this type exist for this encounter.        Physical Therapy Notes (last 72 hours)      Physical Therapy Note signed by Carolann Hassan PTA at 2/7/2024 11:48 AM  Version 2 of 2    Author: Carolann Hassan PTA Service: Rehab Author Type: Physical Therapy Assistant    Filed: 2/7/2024 11:48 AM Date of Service: 2/7/2024 11:24 AM Status: Addendum    : Carolann Hassan PTA (Physical Therapy Assistant)    Related Notes: Original Note by Carolann Hassan PTA (Physical Therapy Assistant) filed at 2/7/2024 11:26 AM       IP PT attempted, pt refused stating he is \"checking out\" in 2 hours and has some coursework he has to finish. Pt in clinitron bed .       Physical Therapy Note signed by Carolann Hassan PTA at 2/7/2024 11:26 AM  Version 1 of 2    Author: Carolann Hassan PTA Service: Rehab Author Type: Physical Therapy Assistant    Filed: 2/7/2024 11:26 AM Date of Service: 2/7/2024 11:24 AM Status: Signed    : Carolann Hassan PTA (Physical Therapy Assistant)    Related Notes: Addendum by Carolann Hassan PTA (Physical Therapy Assistant) filed at 2/7/2024 11:48 AM       IP PT attempted, pt refused stating he is \"checking out\" in 2 hours and has some coursework he has to finish. Pt in clinitron bed with lap top.         Physical Therapy Note signed by Liz Mojica PT at 2/5/2024  3:24 PM  Version 1 of 1    Author: Liz Mojica PT Service: Rehab Author Type: Physical Therapist    Filed: 2/5/2024  3:24 PM Date of Service: 2/5/2024  2:42 PM Status: Signed    : Liz Mojica PT (Physical Therapist)          PHYSICAL THERAPY TREATMENT NOTE - INPATIENT    Room Number: 312/312-A     Session: 1     Number of Visits to Meet Established Goals: 5    Presenting Problem: chest pain, weakness  Co-Morbidities : MS, pressure sore    History related to current admission: Patient is a 78 year old male admitted on 2/2/2024 from home  for weakness with history of MS.  Pt diagnosed with COVID, acute cystitis.       ASSESSMENT   Pt is making progress towards all IP PT goals. Pt progressed to partial standing this session with mod A of 2. Pt continues with decreased strength, postural control, balance, and tolerance to activity and would benefit from continued PT to address deficits and work towards PLOF.      DISCHARGE RECOMMENDATIONS  PT Discharge Recommendations: Sub-acute rehabilitation     PLAN  PT Treatment Plan: Bed mobility;Body mechanics;Endurance;Patient education;Strengthening;Transfer training;Balance training  Rehab Potential : Fair  Frequency (Obs): 3x/week    CURRENT GOALS   Goal #1 Patient is able to demonstrate supine - sit EOB @ level: moderate assistance      Goal #2 Patient is able to demonstrate transfers Sit to/from Stand at assistance level: moderate assistance      Goal #3 Patient will perform SPT bed<>wheelchair modA      Goal #4     Goal #5     Goal #6     Goal Comments: Goals established on 2/3/2024       2/5/2024 all goals ongoing       SUBJECTIVE  \"This was encouraging\"     OBJECTIVE  Precautions: Other (Comment) (isolation)    WEIGHT BEARING RESTRICTION                   PAIN ASSESSMENT   Rating:  (did not c/o pain)          BALANCE                                                                                                                        Static Sitting: Fair -  Dynamic Sitting: Poor +           Static Standing: Poor  Dynamic Standing: Not tested    ACTIVITY TOLERANCE   Vitals stable                      O2 WALK         AM-PAC '6-Clicks' INPATIENT SHORT FORM - BASIC MOBILITY  How much difficulty does the patient currently have...  Patient Difficulty: Turning over in bed (including adjusting bedclothes, sheets and blankets)?: A Lot   Patient Difficulty: Sitting down on and standing up from a chair with arms (e.g., wheelchair, bedside commode, etc.): A Lot   Patient Difficulty: Moving from lying on back to sitting on the side of the bed?: A Lot   How much help from another person does the patient currently need...   Help from Another: Moving to and from a bed to a chair (including a wheelchair)?: A Lot   Help from Another: Need to walk in hospital room?: Total   Help from Another: Climbing 3-5 steps with a railing?: Total       AM-PAC Score:  Raw Score: 10   Approx Degree of Impairment: 76.75%   Standardized Score (AM-PAC Scale): 32.29   CMS Modifier (G-Code): CL    FUNCTIONAL ABILITY STATUS  Gait Assessment   Functional Mobility/Gait Assessment  Gait Assistance:  (pt non-ambulatory at baseline)    Skilled Therapy Provided: Per RN karsten to work with pt. Pt received in supine and was agreeable to PT session.     Bed Mobility:  Rolling: NT   Supine<>Sit: max A of 2 with HOB elevated. Pt needed total assist to BLE and max A for trunk.   Sit<>Supine: max A of 2      Transfer Mobility:  Sit<>Stand: partial stand with mod A of 2, 2 reps    Stand<>Sit: mod A of 2   Gait: N/A     Therapist's Comments: Pt educated on role of therapy, goals for session, safety, and fall prevention. Wound care present at end of session to work with pt.        Patient End of Session: In bed;Needs met;Call light within reach;RN aware of session/findings;All patient questions and concerns addressed;Family present    PT Session Time: 15  minutes  Therapeutic Activity: 15 minutes                 Physical Therapy Note signed by Liz Mojica PT at 2/5/2024 10:07 AM  Version 1 of 1    Author: Liz Mojica PT Service: Rehab Author Type: Physical Therapist    Filed: 2/5/2024 10:07 AM Date of Service: 2/5/2024 10:07 AM Status: Signed    : Liz Mojica PT (Physical Therapist)       Attempted to see pt for PT treatment session. Pt refused at this time stating he wants to finish breakfast first. Will re-attempt as able and appropriate.                 Occupational Therapy Notes (last 72 hours)      Occupational Therapy Note signed by Cynthia Tovar OT at 2/5/2024  3:10 PM  Version 1 of 1    Author: Cynthia Tovar OT Service: — Author Type: Occupational Therapist    Filed: 2/5/2024  3:10 PM Date of Service: 2/5/2024  1:32 PM Status: Signed    : Cynthia Tovar OT (Occupational Therapist)       OCCUPATIONAL THERAPY EVALUATION - INPATIENT     Room Number: 312/312-A  Evaluation Date: 2/5/2024  Type of Evaluation: Initial  Presenting Problem: UTI, chest pain, COVID, 2/4 s/p debridement bedside of sacral decubitus ulcer    Physician Order: IP Consult to Occupational Therapy  Reason for Therapy: ADL/IADL Dysfunction and Discharge Planning    History: Patient is a 78 year old male admitted on 2/2/2024 with Presenting Problem: UTI, chest pain, COVID, 2/4 s/p debridement bedside of sacral decubitus ulcer. Co-Morbidities : MS, HTN, spinal stenosis. Pt was admitted from home on 2/2 with chest pain. Diagnosis of COVID, acute cystitis, MS exacerbation, and sacral decubitus ulcer.  2/4 s/p bedside debridement of sacral ulcer.        ASSESSMENT   Patient presents with the following performance deficits: decreased standing endurance and balance. These deficits impact the patient’s ability to participate in ADL, transfers, instrumental activities of daily living, rest and sleep, leisure and social participation.     OT Discharge Recommendations:  Sub-acute rehabilitation  OT Device Recommendations: TBD    WEIGHT BEARING RESTRICTION                   EVALUATION SESSION:  Patient Start of Session: supine  FUNCTIONAL TRANSFER ASSESSMENT  Sit to Stand: Edge of Bed  Edge of Bed: Dependent (mod A x 2)    BED MOBILITY  Supine to Sit : Dependent (max A x 2)  Sit to Supine (OT): Dependent (max A x 2)      COGNITION  Overall Cognitive Status:  WFL - within functional limits    Upper Extremity   ROM: within functional limits   Strength: within functional limits     EDUCATION PROVIDED  Patient : Role of Occupational Therapy; Plan of Care; Adaptive Equipment Recommendations; DME Recommendations; Functional Transfer Techniques; Posture/Positioning  Patient's Response to Education: Verbalized Understanding    Equipment used: none   Therapist comments: Pt on pressure off loading mattress. Deflated the mattress before supine to sit.  Max A x 2 supine to sit. Per pt, \"I need to have shoes on before I stand.\"  OT donned shoes on, total A.   At baseline, pt transfers from bed to wheelchair with arm rest removed (at home) without holding onto any device.  OT and PT offered to bring the chair or RW for support when standing, but pt preferred not to hold onto these.  Mod A x 2 to stand. Pt stood twice.  Refer to PT note.  This transfer was completed in preparation for bedside commode transfer.  Pt was assisted back to the bed, since wound care RN and MD were to be in the room shortly after the session. Mattress inflated back on. Informed RN about redness on B legs.     Patient End of Session: In bed;With  staff;Needs met;Call light within reach;RN aware of session/findings;All patient questions and concerns addressed;Family present    OCCUPATIONAL PROFILE    HOME SITUATION  Type of Home: House  Home Layout: One level;Ramped entrance  Lives With: Spouse    Toilet and Equipment: Standard height toilet (sink next to it)  Shower/Tub and Equipment: Other (Comment) (sponge  bath)  Other Equipment:  (wheelchair)          Drives: No       Prior Level of Function: Pt uses wheelchair for mobility. Lebron the wheelchair by the sink, stands by holding onto the sink, turn, and transfers onto the toilet.   Pt's wife assists with sponge bathing and LB dressing.         PAIN ASSESSMENT  Ratin  Location: not reporting pain       OBJECTIVE  Precautions: Bed/chair alarm (buttock wound, s/p debridement 2/4)  Fall Risk: High fall risk      ASSESSMENTS    AM-PAC ‘6-Clicks’ Inpatient Daily Activity Short Form  -   Putting on and taking off regular lower body clothing?: A Lot  -   Bathing (including washing, rinsing, drying)?: A Lot  -   Toileting, which includes using toilet, bedpan or urinal? : A Lot  -   Putting on and taking off regular upper body clothing?: A Little  -   Taking care of personal grooming such as brushing teeth?: A Little  -   Eating meals?: None    AM-PAC Score:  Score: 16  Approx Degree of Impairment: 53.32%  Standardized Score (AM-PAC Scale): 35.96    ADDITIONAL TESTS     NEUROLOGICAL FINDINGS      COGNITION ASSESSMENTS       PLAN  OT Treatment Plan: Balance activities;Energy conservation/work simplification techniques;ADL training;Functional transfer training;UE strengthening/ROM;Patient/Family education;Patient/Family training;Equipment eval/education;Compensatory technique education  Rehab Potential : Fair  Frequency: 3x/week  Number of Visits to Meet Established Goals: 5    ADL Goals   Patient will perform upper body dressing:  with setup  Patient will perform toileting: with mod assist    Functional Transfer Goals  Patient will transfer from supine to sit:  with min assist  Patient will transfer to bedside commode:  with min assist    UE Exercise Program Goal  Patient will be independent with bilateral AROM HEP (home exercise program).    Patient Evaluation Complexity Level:   Occupational Profile/Medical History LOW - Brief history including review of medical or therapy  records    Specific performance deficits impacting engagement in ADL/IADL LOW  1 - 3 performance deficits    Client Assessment/Performance Deficits MODERATE - Comorbidities and min to mod modifications of tasks    Clinical Decision Making LOW - Analysis of occupational profile, problem-focused assessments, limited treatment options    Overall Complexity LOW     OT Session Time: 20 minutes  Self-Care Home Management: 1 minutes  Therapeutic Activity: 8 minutes                                             Occupational Therapy Note signed by Cynthia Tovar OT at 2/5/2024 11:20 AM  Version 1 of 1    Author: Cynthia Tovar OT Service: — Author Type: Occupational Therapist    Filed: 2/5/2024 11:20 AM Date of Service: 2/5/2024 11:19 AM Status: Signed    : Cynthia Tovar OT (Occupational Therapist)       Attempted to see the pt for OT evaluation. Patient asked therapists to return later, since he is still eating breakfast. Will continue to follow.              Video Swallow Study Notes    No notes of this type exist for this encounter.     SLP Notes    No notes of this type exist for this encounter.     Immunizations     Name Date      Covid-19 Moderna 03/16/21     Covid-19 Moderna 02/16/21     Covid-19 Moderna Booster 11/21/21     INFLUENZA defer-03/02/20     Deferral: +Patient Refuses [id number]       Future Appointments        Provider Department Center    2/15/2024 1:20 PM Samuel Aleman MD [facility], [facility]      Multidisciplinary Problems     Active Goals        Problem: Patient/Family Goals    Goal Priority Disciplines Outcome Interventions   Patient/Family Long Term Goal    High Interdisciplinary Progressing    Description: Patient's Long Term Goal: ***    Interventions:  - ***  - See additional Care Plan goals for specific interventions   Patient/Family Short Term Goal    High Interdisciplinary Progressing    Description: Patient's Short Term  Goal: ***    Interventions:   - ***  - See additional Care Plan goals for specific interventions